# Patient Record
Sex: FEMALE | Race: WHITE | NOT HISPANIC OR LATINO | Employment: OTHER | URBAN - METROPOLITAN AREA
[De-identification: names, ages, dates, MRNs, and addresses within clinical notes are randomized per-mention and may not be internally consistent; named-entity substitution may affect disease eponyms.]

---

## 2017-07-14 ENCOUNTER — CONVERSION ENCOUNTER (OUTPATIENT)
Dept: MAMMOGRAPHY | Facility: HOSPITAL | Age: 67
End: 2017-07-14

## 2018-01-02 ENCOUNTER — OFFICE VISIT CONVERTED (OUTPATIENT)
Dept: FAMILY MEDICINE CLINIC | Facility: CLINIC | Age: 68
End: 2018-01-02
Attending: NURSE PRACTITIONER

## 2018-03-02 ENCOUNTER — CONVERSION ENCOUNTER (OUTPATIENT)
Dept: FAMILY MEDICINE CLINIC | Facility: CLINIC | Age: 68
End: 2018-03-02

## 2018-03-02 ENCOUNTER — OFFICE VISIT CONVERTED (OUTPATIENT)
Dept: FAMILY MEDICINE CLINIC | Facility: CLINIC | Age: 68
End: 2018-03-02
Attending: NURSE PRACTITIONER

## 2018-07-30 ENCOUNTER — OFFICE VISIT CONVERTED (OUTPATIENT)
Dept: FAMILY MEDICINE CLINIC | Facility: CLINIC | Age: 68
End: 2018-07-30
Attending: NURSE PRACTITIONER

## 2018-08-06 ENCOUNTER — OFFICE VISIT CONVERTED (OUTPATIENT)
Dept: FAMILY MEDICINE CLINIC | Facility: CLINIC | Age: 68
End: 2018-08-06
Attending: NURSE PRACTITIONER

## 2018-08-06 ENCOUNTER — CONVERSION ENCOUNTER (OUTPATIENT)
Dept: FAMILY MEDICINE CLINIC | Facility: CLINIC | Age: 68
End: 2018-08-06

## 2018-08-13 ENCOUNTER — CONVERSION ENCOUNTER (OUTPATIENT)
Dept: FAMILY MEDICINE CLINIC | Facility: CLINIC | Age: 68
End: 2018-08-13

## 2018-08-13 ENCOUNTER — OFFICE VISIT CONVERTED (OUTPATIENT)
Dept: FAMILY MEDICINE CLINIC | Facility: CLINIC | Age: 68
End: 2018-08-13
Attending: NURSE PRACTITIONER

## 2018-11-17 ENCOUNTER — CONVERSION ENCOUNTER (OUTPATIENT)
Dept: MAMMOGRAPHY | Facility: HOSPITAL | Age: 68
End: 2018-11-17

## 2019-01-03 ENCOUNTER — CONVERSION ENCOUNTER (OUTPATIENT)
Dept: FAMILY MEDICINE CLINIC | Facility: CLINIC | Age: 69
End: 2019-01-03

## 2019-01-03 ENCOUNTER — HOSPITAL ENCOUNTER (OUTPATIENT)
Dept: FAMILY MEDICINE CLINIC | Facility: CLINIC | Age: 69
Discharge: HOME | End: 2019-01-03
Attending: NURSE PRACTITIONER

## 2019-01-03 ENCOUNTER — OFFICE VISIT CONVERTED (OUTPATIENT)
Dept: FAMILY MEDICINE CLINIC | Facility: CLINIC | Age: 69
End: 2019-01-03
Attending: NURSE PRACTITIONER

## 2019-01-03 LAB
25(OH)D3 SERPL-MCNC: 24.5 NG/ML (ref 30–100)
ALBUMIN SERPL-MCNC: 4.2 G/DL (ref 3.5–5)
ALBUMIN/GLOB SERPL: 1.4 {RATIO} (ref 1.4–2.6)
ALP SERPL-CCNC: 63 U/L (ref 43–160)
ALT SERPL-CCNC: 23 U/L (ref 10–40)
ANION GAP SERPL CALC-SCNC: 16 MMOL/L (ref 8–19)
APPEARANCE UR: CLEAR
AST SERPL-CCNC: 24 U/L (ref 15–50)
BASOPHILS # BLD AUTO: 0.04 10*3/UL (ref 0–0.2)
BASOPHILS NFR BLD AUTO: 1.08 % (ref 0–3)
BILIRUB SERPL-MCNC: 0.45 MG/DL (ref 0.2–1.3)
BILIRUB UR QL: NEGATIVE
BUN SERPL-MCNC: 15 MG/DL (ref 5–25)
BUN/CREAT SERPL: 17 {RATIO} (ref 6–20)
CALCIUM SERPL-MCNC: 9.1 MG/DL (ref 8.7–10.4)
CHLORIDE SERPL-SCNC: 103 MMOL/L (ref 99–111)
CHOLEST SERPL-MCNC: 122 MG/DL (ref 107–200)
CHOLEST/HDLC SERPL: 2.4 {RATIO} (ref 3–6)
COLOR UR: YELLOW
CONV CO2: 23 MMOL/L (ref 22–32)
CONV COLLECTION SOURCE (UA): NORMAL
CONV TOTAL PROTEIN: 7.3 G/DL (ref 6.3–8.2)
CONV UROBILINOGEN IN URINE BY AUTOMATED TEST STRIP: 0.2 {EHRLICHU}/DL (ref 0.1–1)
CREAT UR-MCNC: 0.88 MG/DL (ref 0.5–0.9)
EOSINOPHIL # BLD AUTO: 0.08 10*3/UL (ref 0–0.7)
EOSINOPHIL # BLD AUTO: 2 % (ref 0–7)
ERYTHROCYTE [DISTWIDTH] IN BLOOD BY AUTOMATED COUNT: 12.7 % (ref 11.5–14.5)
EST. AVERAGE GLUCOSE BLD GHB EST-MCNC: 120 MG/DL
GFR SERPLBLD BASED ON 1.73 SQ M-ARVRAT: >60 ML/MIN/{1.73_M2}
GLOBULIN UR ELPH-MCNC: 3.1 G/DL (ref 2–3.5)
GLUCOSE SERPL-MCNC: 92 MG/DL (ref 65–99)
GLUCOSE UR QL: NEGATIVE MG/DL
HBA1C MFR BLD: 12.6 G/DL (ref 12–16)
HBA1C MFR BLD: 5.8 % (ref 3.5–5.7)
HCT VFR BLD AUTO: 37.9 % (ref 37–47)
HDLC SERPL-MCNC: 50 MG/DL (ref 40–60)
HGB UR QL STRIP: NEGATIVE
KETONES UR QL STRIP: NEGATIVE MG/DL
LDLC SERPL CALC-MCNC: 61 MG/DL (ref 70–100)
LEUKOCYTE ESTERASE UR QL STRIP: NEGATIVE
LYMPHOCYTES # BLD AUTO: 1.18 10*3/UL (ref 1–5)
MCH RBC QN AUTO: 28.6 PG (ref 27–31)
MCHC RBC AUTO-ENTMCNC: 33.2 G/DL (ref 33–37)
MCV RBC AUTO: 86 FL (ref 81–99)
MONOCYTES # BLD AUTO: 0.31 10*3/UL (ref 0.2–1.2)
MONOCYTES NFR BLD AUTO: 7.69 % (ref 3–10)
NEUTROPHILS # BLD AUTO: 2.46 10*3/UL (ref 2–8)
NEUTROPHILS NFR BLD AUTO: 60.4 % (ref 30–85)
NITRITE UR QL STRIP: NEGATIVE
NRBC BLD AUTO-RTO: 0 % (ref 0–0.01)
OSMOLALITY SERPL CALC.SUM OF ELEC: 286 MOSM/KG (ref 273–304)
PH UR STRIP.AUTO: 5 [PH] (ref 5–8)
PLATELET # BLD AUTO: 288 10*3/UL (ref 130–400)
PMV BLD AUTO: 6.2 FL (ref 7.4–10.4)
POTASSIUM SERPL-SCNC: 4 MMOL/L (ref 3.5–5.3)
PROT UR QL: NEGATIVE MG/DL
RBC # BLD AUTO: 4.41 10*6/UL (ref 4.2–5.4)
SODIUM SERPL-SCNC: 138 MMOL/L (ref 135–147)
SP GR UR: 1.02 (ref 1–1.03)
T4 FREE SERPL-MCNC: 1.4 NG/DL (ref 0.9–1.8)
TRIGL SERPL-MCNC: 55 MG/DL (ref 40–150)
TSH SERPL-ACNC: 1.28 M[IU]/L (ref 0.27–4.2)
VARIANT LYMPHS NFR BLD MANUAL: 28.8 % (ref 20–45)
VLDLC SERPL-MCNC: 11 MG/DL (ref 5–37)
WBC # BLD AUTO: 4.08 10*3/UL (ref 4.8–10.8)

## 2019-03-20 ENCOUNTER — OFFICE VISIT CONVERTED (OUTPATIENT)
Dept: FAMILY MEDICINE CLINIC | Facility: CLINIC | Age: 69
End: 2019-03-20
Attending: NURSE PRACTITIONER

## 2019-03-25 ENCOUNTER — HOSPITAL ENCOUNTER (OUTPATIENT)
Dept: FAMILY MEDICINE CLINIC | Facility: CLINIC | Age: 69
Discharge: HOME | End: 2019-03-25
Attending: NURSE PRACTITIONER

## 2019-03-25 ENCOUNTER — OFFICE VISIT CONVERTED (OUTPATIENT)
Dept: FAMILY MEDICINE CLINIC | Facility: CLINIC | Age: 69
End: 2019-03-25
Attending: NURSE PRACTITIONER

## 2019-03-25 LAB
ALBUMIN SERPL-MCNC: 4.1 G/DL (ref 3.5–5)
ALBUMIN/GLOB SERPL: 1.2 {RATIO} (ref 1.4–2.6)
ALP SERPL-CCNC: 68 U/L (ref 43–160)
ALT SERPL-CCNC: 16 U/L (ref 10–40)
ANION GAP SERPL CALC-SCNC: 16 MMOL/L (ref 8–19)
AST SERPL-CCNC: 19 U/L (ref 15–50)
BASOPHILS # BLD AUTO: 0.05 10*3/UL (ref 0–0.2)
BASOPHILS NFR BLD AUTO: 0.8 % (ref 0–3)
BILIRUB SERPL-MCNC: 0.69 MG/DL (ref 0.2–1.3)
BUN SERPL-MCNC: 10 MG/DL (ref 5–25)
BUN/CREAT SERPL: 11 {RATIO} (ref 6–20)
CALCIUM SERPL-MCNC: 9.3 MG/DL (ref 8.7–10.4)
CHLORIDE SERPL-SCNC: 105 MMOL/L (ref 99–111)
CHOLEST SERPL-MCNC: 102 MG/DL (ref 107–200)
CHOLEST/HDLC SERPL: 2.3 {RATIO} (ref 3–6)
CONV ABS IMM GRAN: 0.01 10*3/UL (ref 0–0.2)
CONV CO2: 26 MMOL/L (ref 22–32)
CONV IMMATURE GRAN: 0.2 % (ref 0–1.8)
CONV TOTAL PROTEIN: 7.5 G/DL (ref 6.3–8.2)
CREAT UR-MCNC: 0.91 MG/DL (ref 0.5–0.9)
DEPRECATED RDW RBC AUTO: 44.2 FL (ref 36.4–46.3)
EOSINOPHIL # BLD AUTO: 0.12 10*3/UL (ref 0–0.7)
EOSINOPHIL # BLD AUTO: 2 % (ref 0–7)
ERYTHROCYTE [DISTWIDTH] IN BLOOD BY AUTOMATED COUNT: 13.5 % (ref 11.7–14.4)
FOLATE SERPL-MCNC: 14 NG/ML (ref 4.8–20)
GFR SERPLBLD BASED ON 1.73 SQ M-ARVRAT: >60 ML/MIN/{1.73_M2}
GLOBULIN UR ELPH-MCNC: 3.4 G/DL (ref 2–3.5)
GLUCOSE SERPL-MCNC: 96 MG/DL (ref 65–99)
HBA1C MFR BLD: 11.5 G/DL (ref 12–16)
HCT VFR BLD AUTO: 37.3 % (ref 37–47)
HDLC SERPL-MCNC: 44 MG/DL (ref 40–60)
LDLC SERPL CALC-MCNC: 43 MG/DL (ref 70–100)
LYMPHOCYTES # BLD AUTO: 1.61 10*3/UL (ref 1–5)
MCH RBC QN AUTO: 27.8 PG (ref 27–31)
MCHC RBC AUTO-ENTMCNC: 30.8 G/DL (ref 33–37)
MCV RBC AUTO: 90.3 FL (ref 81–99)
MONOCYTES # BLD AUTO: 0.37 10*3/UL (ref 0.2–1.2)
MONOCYTES NFR BLD AUTO: 6.2 % (ref 3–10)
NEUTROPHILS # BLD AUTO: 3.81 10*3/UL (ref 2–8)
NEUTROPHILS NFR BLD AUTO: 63.8 % (ref 30–85)
NRBC CBCN: 0 % (ref 0–0.7)
OSMOLALITY SERPL CALC.SUM OF ELEC: 295 MOSM/KG (ref 273–304)
PLATELET # BLD AUTO: 306 10*3/UL (ref 130–400)
PMV BLD AUTO: 9.7 FL (ref 9.4–12.3)
POTASSIUM SERPL-SCNC: 4.2 MMOL/L (ref 3.5–5.3)
RBC # BLD AUTO: 4.13 10*6/UL (ref 4.2–5.4)
SODIUM SERPL-SCNC: 143 MMOL/L (ref 135–147)
T4 FREE SERPL-MCNC: 1.4 NG/DL (ref 0.9–1.8)
TRIGL SERPL-MCNC: 75 MG/DL (ref 40–150)
TSH SERPL-ACNC: 1.8 M[IU]/L (ref 0.27–4.2)
VARIANT LYMPHS NFR BLD MANUAL: 27 % (ref 20–45)
VIT B12 SERPL-MCNC: 168 PG/ML (ref 211–911)
VLDLC SERPL-MCNC: 15 MG/DL (ref 5–37)
WBC # BLD AUTO: 5.97 10*3/UL (ref 4.8–10.8)

## 2019-03-26 LAB
IRON SATN MFR SERPL: 10 % (ref 20–55)
IRON SERPL-MCNC: 42 UG/DL (ref 60–170)
TIBC SERPL-MCNC: 439 UG/DL (ref 245–450)
TRANSFERRIN SERPL-MCNC: 307 MG/DL (ref 250–380)

## 2019-09-10 ENCOUNTER — HOSPITAL ENCOUNTER (OUTPATIENT)
Dept: FAMILY MEDICINE CLINIC | Facility: CLINIC | Age: 69
Discharge: HOME | End: 2019-09-10
Attending: NURSE PRACTITIONER

## 2019-09-10 ENCOUNTER — OFFICE VISIT CONVERTED (OUTPATIENT)
Dept: FAMILY MEDICINE CLINIC | Facility: CLINIC | Age: 69
End: 2019-09-10
Attending: NURSE PRACTITIONER

## 2019-09-10 ENCOUNTER — CONVERSION ENCOUNTER (OUTPATIENT)
Dept: FAMILY MEDICINE CLINIC | Facility: CLINIC | Age: 69
End: 2019-09-10

## 2019-09-10 LAB
25(OH)D3 SERPL-MCNC: 20.1 NG/ML (ref 30–100)
ALBUMIN SERPL-MCNC: 4.2 G/DL (ref 3.5–5)
ALBUMIN/GLOB SERPL: 1.6 {RATIO} (ref 1.4–2.6)
ALP SERPL-CCNC: 68 U/L (ref 43–160)
ALT SERPL-CCNC: 18 U/L (ref 10–40)
ANION GAP SERPL CALC-SCNC: 15 MMOL/L (ref 8–19)
AST SERPL-CCNC: 21 U/L (ref 15–50)
BASOPHILS # BLD AUTO: 0.04 10*3/UL (ref 0–0.2)
BASOPHILS NFR BLD AUTO: 0.7 % (ref 0–3)
BILIRUB SERPL-MCNC: 0.95 MG/DL (ref 0.2–1.3)
BUN SERPL-MCNC: 18 MG/DL (ref 5–25)
BUN/CREAT SERPL: 19 {RATIO} (ref 6–20)
CALCIUM SERPL-MCNC: 9.4 MG/DL (ref 8.7–10.4)
CHLORIDE SERPL-SCNC: 110 MMOL/L (ref 99–111)
CHOLEST SERPL-MCNC: 122 MG/DL (ref 107–200)
CHOLEST/HDLC SERPL: 2.7 {RATIO} (ref 3–6)
CONV ABS IMM GRAN: 0.01 10*3/UL (ref 0–0.2)
CONV CO2: 22 MMOL/L (ref 22–32)
CONV IMMATURE GRAN: 0.2 % (ref 0–1.8)
CONV TOTAL PROTEIN: 6.9 G/DL (ref 6.3–8.2)
CREAT UR-MCNC: 0.97 MG/DL (ref 0.5–0.9)
DEPRECATED RDW RBC AUTO: 45.3 FL (ref 36.4–46.3)
EOSINOPHIL # BLD AUTO: 0.09 10*3/UL (ref 0–0.7)
EOSINOPHIL # BLD AUTO: 1.6 % (ref 0–7)
ERYTHROCYTE [DISTWIDTH] IN BLOOD BY AUTOMATED COUNT: 13.8 % (ref 11.7–14.4)
EST. AVERAGE GLUCOSE BLD GHB EST-MCNC: 126 MG/DL
FOLATE SERPL-MCNC: 9.3 NG/ML (ref 4.8–20)
GFR SERPLBLD BASED ON 1.73 SQ M-ARVRAT: >60 ML/MIN/{1.73_M2}
GLOBULIN UR ELPH-MCNC: 2.7 G/DL (ref 2–3.5)
GLUCOSE SERPL-MCNC: 104 MG/DL (ref 65–99)
HBA1C MFR BLD: 6 % (ref 3.5–5.7)
HCT VFR BLD AUTO: 37 % (ref 37–47)
HDLC SERPL-MCNC: 46 MG/DL (ref 40–60)
HGB BLD-MCNC: 11.5 G/DL (ref 12–16)
LDLC SERPL CALC-MCNC: 64 MG/DL (ref 70–100)
LYMPHOCYTES # BLD AUTO: 1.51 10*3/UL (ref 1–5)
LYMPHOCYTES NFR BLD AUTO: 27.3 % (ref 20–45)
MCH RBC QN AUTO: 28.2 PG (ref 27–31)
MCHC RBC AUTO-ENTMCNC: 31.1 G/DL (ref 33–37)
MCV RBC AUTO: 90.7 FL (ref 81–99)
MONOCYTES # BLD AUTO: 0.44 10*3/UL (ref 0.2–1.2)
MONOCYTES NFR BLD AUTO: 7.9 % (ref 3–10)
NEUTROPHILS # BLD AUTO: 3.45 10*3/UL (ref 2–8)
NEUTROPHILS NFR BLD AUTO: 62.3 % (ref 30–85)
NRBC CBCN: 0 % (ref 0–0.7)
OSMOLALITY SERPL CALC.SUM OF ELEC: 298 MOSM/KG (ref 273–304)
PLATELET # BLD AUTO: 264 10*3/UL (ref 130–400)
PMV BLD AUTO: 9.2 FL (ref 9.4–12.3)
POTASSIUM SERPL-SCNC: 3.8 MMOL/L (ref 3.5–5.3)
RBC # BLD AUTO: 4.08 10*6/UL (ref 4.2–5.4)
SODIUM SERPL-SCNC: 143 MMOL/L (ref 135–147)
T4 FREE SERPL-MCNC: 1.3 NG/DL (ref 0.9–1.8)
TRIGL SERPL-MCNC: 60 MG/DL (ref 40–150)
TSH SERPL-ACNC: 1.79 M[IU]/L (ref 0.27–4.2)
VIT B12 SERPL-MCNC: 226 PG/ML (ref 211–911)
VLDLC SERPL-MCNC: 12 MG/DL (ref 5–37)
WBC # BLD AUTO: 5.54 10*3/UL (ref 4.8–10.8)

## 2019-09-24 ENCOUNTER — HOSPITAL ENCOUNTER (OUTPATIENT)
Dept: FAMILY MEDICINE CLINIC | Facility: CLINIC | Age: 69
Discharge: HOME | End: 2019-09-24
Attending: NURSE PRACTITIONER

## 2019-09-24 LAB
25(OH)D3 SERPL-MCNC: 24 NG/ML (ref 30–100)
ALBUMIN SERPL-MCNC: 4.1 G/DL (ref 3.5–5)
ALBUMIN/GLOB SERPL: 1.5 {RATIO} (ref 1.4–2.6)
ALP SERPL-CCNC: 64 U/L (ref 43–160)
ALT SERPL-CCNC: 18 U/L (ref 10–40)
ANION GAP SERPL CALC-SCNC: 19 MMOL/L (ref 8–19)
AST SERPL-CCNC: 21 U/L (ref 15–50)
BASOPHILS # BLD AUTO: 0.04 10*3/UL (ref 0–0.2)
BASOPHILS NFR BLD AUTO: 1 % (ref 0–3)
BILIRUB SERPL-MCNC: 0.56 MG/DL (ref 0.2–1.3)
BUN SERPL-MCNC: 13 MG/DL (ref 5–25)
BUN/CREAT SERPL: 13 {RATIO} (ref 6–20)
CALCIUM SERPL-MCNC: 9.6 MG/DL (ref 8.7–10.4)
CHLORIDE SERPL-SCNC: 105 MMOL/L (ref 99–111)
CHOLEST SERPL-MCNC: 126 MG/DL (ref 107–200)
CHOLEST/HDLC SERPL: 2.6 {RATIO} (ref 3–6)
CONV ABS IMM GRAN: 0 10*3/UL (ref 0–0.2)
CONV CO2: 23 MMOL/L (ref 22–32)
CONV IMMATURE GRAN: 0 % (ref 0–1.8)
CONV TOTAL PROTEIN: 6.9 G/DL (ref 6.3–8.2)
CREAT UR-MCNC: 1 MG/DL (ref 0.5–0.9)
DEPRECATED RDW RBC AUTO: 44.8 FL (ref 36.4–46.3)
EOSINOPHIL # BLD AUTO: 0.12 10*3/UL (ref 0–0.7)
EOSINOPHIL # BLD AUTO: 3.1 % (ref 0–7)
ERYTHROCYTE [DISTWIDTH] IN BLOOD BY AUTOMATED COUNT: 13.2 % (ref 11.7–14.4)
EST. AVERAGE GLUCOSE BLD GHB EST-MCNC: 126 MG/DL
FERRITIN SERPL-MCNC: 19 NG/ML (ref 10–200)
FOLATE SERPL-MCNC: 9.3 NG/ML (ref 4.8–20)
GFR SERPLBLD BASED ON 1.73 SQ M-ARVRAT: 58 ML/MIN/{1.73_M2}
GLOBULIN UR ELPH-MCNC: 2.8 G/DL (ref 2–3.5)
GLUCOSE SERPL-MCNC: 118 MG/DL (ref 65–99)
HBA1C MFR BLD: 6 % (ref 3.5–5.7)
HCT VFR BLD AUTO: 40.2 % (ref 37–47)
HDLC SERPL-MCNC: 49 MG/DL (ref 40–60)
HGB BLD-MCNC: 12.6 G/DL (ref 12–16)
IRON SATN MFR SERPL: 17 % (ref 20–55)
IRON SERPL-MCNC: 66 UG/DL (ref 60–170)
LDLC SERPL CALC-MCNC: 56 MG/DL (ref 70–100)
LYMPHOCYTES # BLD AUTO: 1.13 10*3/UL (ref 1–5)
LYMPHOCYTES NFR BLD AUTO: 29 % (ref 20–45)
MCH RBC QN AUTO: 28.8 PG (ref 27–31)
MCHC RBC AUTO-ENTMCNC: 31.3 G/DL (ref 33–37)
MCV RBC AUTO: 91.8 FL (ref 81–99)
MONOCYTES # BLD AUTO: 0.31 10*3/UL (ref 0.2–1.2)
MONOCYTES NFR BLD AUTO: 8 % (ref 3–10)
NEUTROPHILS # BLD AUTO: 2.29 10*3/UL (ref 2–8)
NEUTROPHILS NFR BLD AUTO: 58.9 % (ref 30–85)
NRBC CBCN: 0 % (ref 0–0.7)
OSMOLALITY SERPL CALC.SUM OF ELEC: 295 MOSM/KG (ref 273–304)
PLATELET # BLD AUTO: 258 10*3/UL (ref 130–400)
PMV BLD AUTO: 9.4 FL (ref 9.4–12.3)
POTASSIUM SERPL-SCNC: 5.1 MMOL/L (ref 3.5–5.3)
RBC # BLD AUTO: 4.38 10*6/UL (ref 4.2–5.4)
SODIUM SERPL-SCNC: 142 MMOL/L (ref 135–147)
T4 FREE SERPL-MCNC: 1.2 NG/DL (ref 0.9–1.8)
TIBC SERPL-MCNC: 382 UG/DL (ref 245–450)
TRANSFERRIN SERPL-MCNC: 267 MG/DL (ref 250–380)
TRIGL SERPL-MCNC: 103 MG/DL (ref 40–150)
TSH SERPL-ACNC: 1.62 M[IU]/L (ref 0.27–4.2)
VIT B12 SERPL-MCNC: 388 PG/ML (ref 211–911)
VLDLC SERPL-MCNC: 21 MG/DL (ref 5–37)
WBC # BLD AUTO: 3.89 10*3/UL (ref 4.8–10.8)

## 2020-04-16 ENCOUNTER — TELEPHONE CONVERTED (OUTPATIENT)
Dept: FAMILY MEDICINE CLINIC | Facility: CLINIC | Age: 70
End: 2020-04-16
Attending: NURSE PRACTITIONER

## 2020-06-09 ENCOUNTER — OFFICE VISIT CONVERTED (OUTPATIENT)
Dept: FAMILY MEDICINE CLINIC | Facility: CLINIC | Age: 70
End: 2020-06-09
Attending: NURSE PRACTITIONER

## 2020-06-09 ENCOUNTER — CONVERSION ENCOUNTER (OUTPATIENT)
Dept: FAMILY MEDICINE CLINIC | Facility: CLINIC | Age: 70
End: 2020-06-09

## 2020-06-09 ENCOUNTER — HOSPITAL ENCOUNTER (OUTPATIENT)
Dept: FAMILY MEDICINE CLINIC | Facility: CLINIC | Age: 70
Discharge: HOME | End: 2020-06-09
Attending: NURSE PRACTITIONER

## 2020-06-09 LAB
ALBUMIN SERPL-MCNC: 4.2 G/DL (ref 3.5–5)
ALBUMIN/GLOB SERPL: 1.4 {RATIO} (ref 1.4–2.6)
ALP SERPL-CCNC: 63 U/L (ref 43–160)
ALT SERPL-CCNC: 20 U/L (ref 10–40)
ANION GAP SERPL CALC-SCNC: 16 MMOL/L (ref 8–19)
AST SERPL-CCNC: 22 U/L (ref 15–50)
BASOPHILS # BLD AUTO: 0.04 10*3/UL (ref 0–0.2)
BASOPHILS NFR BLD AUTO: 0.7 % (ref 0–3)
BILIRUB SERPL-MCNC: 0.74 MG/DL (ref 0.2–1.3)
BUN SERPL-MCNC: 10 MG/DL (ref 5–25)
BUN/CREAT SERPL: 11 {RATIO} (ref 6–20)
CALCIUM SERPL-MCNC: 9.8 MG/DL (ref 8.7–10.4)
CHLORIDE SERPL-SCNC: 104 MMOL/L (ref 99–111)
CHOLEST SERPL-MCNC: 147 MG/DL (ref 107–200)
CHOLEST/HDLC SERPL: 3.1 {RATIO} (ref 3–6)
CONV ABS IMM GRAN: 0 10*3/UL (ref 0–0.2)
CONV CO2: 24 MMOL/L (ref 22–32)
CONV IMMATURE GRAN: 0 % (ref 0–1.8)
CONV TOTAL PROTEIN: 7.3 G/DL (ref 6.3–8.2)
CREAT UR-MCNC: 0.94 MG/DL (ref 0.5–0.9)
DEPRECATED RDW RBC AUTO: 44 FL (ref 36.4–46.3)
EOSINOPHIL # BLD AUTO: 0.11 10*3/UL (ref 0–0.7)
EOSINOPHIL # BLD AUTO: 2 % (ref 0–7)
ERYTHROCYTE [DISTWIDTH] IN BLOOD BY AUTOMATED COUNT: 13.2 % (ref 11.7–14.4)
EST. AVERAGE GLUCOSE BLD GHB EST-MCNC: 134 MG/DL
FOLATE SERPL-MCNC: 11.7 NG/ML (ref 4.8–20)
GFR SERPLBLD BASED ON 1.73 SQ M-ARVRAT: >60 ML/MIN/{1.73_M2}
GLOBULIN UR ELPH-MCNC: 3.1 G/DL (ref 2–3.5)
GLUCOSE SERPL-MCNC: 95 MG/DL (ref 65–99)
HBA1C MFR BLD: 6.3 % (ref 3.5–5.7)
HCT VFR BLD AUTO: 40.2 % (ref 37–47)
HDLC SERPL-MCNC: 47 MG/DL (ref 40–60)
HGB BLD-MCNC: 12.5 G/DL (ref 12–16)
LDLC SERPL CALC-MCNC: 81 MG/DL (ref 70–100)
LYMPHOCYTES # BLD AUTO: 1.69 10*3/UL (ref 1–5)
LYMPHOCYTES NFR BLD AUTO: 30.3 % (ref 20–45)
MCH RBC QN AUTO: 28.3 PG (ref 27–31)
MCHC RBC AUTO-ENTMCNC: 31.1 G/DL (ref 33–37)
MCV RBC AUTO: 91.2 FL (ref 81–99)
MONOCYTES # BLD AUTO: 0.31 10*3/UL (ref 0.2–1.2)
MONOCYTES NFR BLD AUTO: 5.6 % (ref 3–10)
NEUTROPHILS # BLD AUTO: 3.43 10*3/UL (ref 2–8)
NEUTROPHILS NFR BLD AUTO: 61.4 % (ref 30–85)
NRBC CBCN: 0 % (ref 0–0.7)
OSMOLALITY SERPL CALC.SUM OF ELEC: 289 MOSM/KG (ref 273–304)
PLATELET # BLD AUTO: 257 10*3/UL (ref 130–400)
PMV BLD AUTO: 9.3 FL (ref 9.4–12.3)
POTASSIUM SERPL-SCNC: 3.9 MMOL/L (ref 3.5–5.3)
RBC # BLD AUTO: 4.41 10*6/UL (ref 4.2–5.4)
SODIUM SERPL-SCNC: 140 MMOL/L (ref 135–147)
TRIGL SERPL-MCNC: 93 MG/DL (ref 40–150)
TSH SERPL-ACNC: 1.63 M[IU]/L (ref 0.27–4.2)
VIT B12 SERPL-MCNC: 287 PG/ML (ref 211–911)
VLDLC SERPL-MCNC: 19 MG/DL (ref 5–37)
WBC # BLD AUTO: 5.58 10*3/UL (ref 4.8–10.8)

## 2020-06-10 LAB
25(OH)D3 SERPL-MCNC: 31 NG/ML (ref 30–100)
IRON SATN MFR SERPL: 23 % (ref 20–55)
IRON SERPL-MCNC: 92 UG/DL (ref 60–170)
TIBC SERPL-MCNC: 403 UG/DL (ref 245–450)
TRANSFERRIN SERPL-MCNC: 282 MG/DL (ref 250–380)

## 2020-07-20 ENCOUNTER — OFFICE VISIT CONVERTED (OUTPATIENT)
Dept: CARDIOLOGY | Facility: CLINIC | Age: 70
End: 2020-07-20
Attending: SPECIALIST

## 2020-07-31 ENCOUNTER — CONVERSION ENCOUNTER (OUTPATIENT)
Dept: CARDIOLOGY | Facility: CLINIC | Age: 70
End: 2020-07-31
Attending: SPECIALIST

## 2020-08-12 ENCOUNTER — HOSPITAL ENCOUNTER (OUTPATIENT)
Dept: NUCLEAR MEDICINE | Facility: HOSPITAL | Age: 70
Discharge: HOME | End: 2020-08-12
Attending: SPECIALIST

## 2020-09-23 ENCOUNTER — OFFICE VISIT CONVERTED (OUTPATIENT)
Dept: FAMILY MEDICINE CLINIC | Facility: CLINIC | Age: 70
End: 2020-09-23
Attending: NURSE PRACTITIONER

## 2020-09-23 ENCOUNTER — CONVERSION ENCOUNTER (OUTPATIENT)
Dept: FAMILY MEDICINE CLINIC | Facility: CLINIC | Age: 70
End: 2020-09-23

## 2020-09-23 ENCOUNTER — HOSPITAL ENCOUNTER (OUTPATIENT)
Dept: FAMILY MEDICINE CLINIC | Facility: CLINIC | Age: 70
Discharge: HOME | End: 2020-09-23
Attending: NURSE PRACTITIONER

## 2020-09-23 LAB
ALBUMIN SERPL-MCNC: 4.1 G/DL (ref 3.5–5)
ALBUMIN/GLOB SERPL: 1.4 {RATIO} (ref 1.4–2.6)
ALP SERPL-CCNC: 64 U/L (ref 43–160)
ALT SERPL-CCNC: 27 U/L (ref 10–40)
ANION GAP SERPL CALC-SCNC: 18 MMOL/L (ref 8–19)
AST SERPL-CCNC: 30 U/L (ref 15–50)
BASOPHILS # BLD AUTO: 0.06 10*3/UL (ref 0–0.2)
BASOPHILS NFR BLD AUTO: 1.4 % (ref 0–3)
BILIRUB SERPL-MCNC: 0.83 MG/DL (ref 0.2–1.3)
BUN SERPL-MCNC: 12 MG/DL (ref 5–25)
BUN/CREAT SERPL: 11 {RATIO} (ref 6–20)
CALCIUM SERPL-MCNC: 9.3 MG/DL (ref 8.7–10.4)
CHLORIDE SERPL-SCNC: 105 MMOL/L (ref 99–111)
CHOLEST SERPL-MCNC: 130 MG/DL (ref 107–200)
CHOLEST/HDLC SERPL: 3 {RATIO} (ref 3–6)
CONV ABS IMM GRAN: 0.01 10*3/UL (ref 0–0.2)
CONV CO2: 22 MMOL/L (ref 22–32)
CONV IMMATURE GRAN: 0.2 % (ref 0–1.8)
CONV TOTAL PROTEIN: 7.1 G/DL (ref 6.3–8.2)
CREAT UR-MCNC: 1.14 MG/DL (ref 0.5–0.9)
DEPRECATED RDW RBC AUTO: 45.9 FL (ref 36.4–46.3)
EOSINOPHIL # BLD AUTO: 0.09 10*3/UL (ref 0–0.7)
EOSINOPHIL # BLD AUTO: 2.1 % (ref 0–7)
ERYTHROCYTE [DISTWIDTH] IN BLOOD BY AUTOMATED COUNT: 13.2 % (ref 11.7–14.4)
GFR SERPLBLD BASED ON 1.73 SQ M-ARVRAT: 49 ML/MIN/{1.73_M2}
GLOBULIN UR ELPH-MCNC: 3 G/DL (ref 2–3.5)
GLUCOSE SERPL-MCNC: 130 MG/DL (ref 65–99)
HCT VFR BLD AUTO: 44 % (ref 37–47)
HDLC SERPL-MCNC: 43 MG/DL (ref 40–60)
HGB BLD-MCNC: 13.1 G/DL (ref 12–16)
LDLC SERPL CALC-MCNC: 66 MG/DL (ref 70–100)
LYMPHOCYTES # BLD AUTO: 1.64 10*3/UL (ref 1–5)
LYMPHOCYTES NFR BLD AUTO: 37.7 % (ref 20–45)
MCH RBC QN AUTO: 28.3 PG (ref 27–31)
MCHC RBC AUTO-ENTMCNC: 29.8 G/DL (ref 33–37)
MCV RBC AUTO: 95 FL (ref 81–99)
MONOCYTES # BLD AUTO: 0.32 10*3/UL (ref 0.2–1.2)
MONOCYTES NFR BLD AUTO: 7.4 % (ref 3–10)
NEUTROPHILS # BLD AUTO: 2.23 10*3/UL (ref 2–8)
NEUTROPHILS NFR BLD AUTO: 51.2 % (ref 30–85)
NRBC CBCN: 0 % (ref 0–0.7)
OSMOLALITY SERPL CALC.SUM OF ELEC: 294 MOSM/KG (ref 273–304)
PLATELET # BLD AUTO: 290 10*3/UL (ref 130–400)
PMV BLD AUTO: 9.8 FL (ref 9.4–12.3)
POTASSIUM SERPL-SCNC: 4.2 MMOL/L (ref 3.5–5.3)
RBC # BLD AUTO: 4.63 10*6/UL (ref 4.2–5.4)
SODIUM SERPL-SCNC: 141 MMOL/L (ref 135–147)
TRIGL SERPL-MCNC: 105 MG/DL (ref 40–150)
TSH SERPL-ACNC: 2.15 M[IU]/L (ref 0.27–4.2)
VLDLC SERPL-MCNC: 21 MG/DL (ref 5–37)
WBC # BLD AUTO: 4.35 10*3/UL (ref 4.8–10.8)

## 2020-09-24 LAB
25(OH)D3 SERPL-MCNC: 22.3 NG/ML (ref 30–100)
CONV CREATININE URINE, RANDOM: 241.3 MG/DL (ref 10–300)
CONV MICROALBUM.,U,RANDOM: <12 MG/L (ref 0–20)
FOLATE SERPL-MCNC: 10.1 NG/ML (ref 4.8–20)
IRON SATN MFR SERPL: 21 % (ref 20–55)
IRON SERPL-MCNC: 83 UG/DL (ref 60–170)
MICROALBUMIN/CREAT UR: 5 MG/G{CRE} (ref 0–35)
TIBC SERPL-MCNC: 389 UG/DL (ref 245–450)
TRANSFERRIN SERPL-MCNC: 272 MG/DL (ref 250–380)
VIT B12 SERPL-MCNC: 393 PG/ML (ref 211–911)

## 2020-09-25 LAB
EST. AVERAGE GLUCOSE BLD GHB EST-MCNC: 131 MG/DL
HBA1C MFR BLD: 6.2 % (ref 3.5–5.7)

## 2020-10-01 ENCOUNTER — CONVERSION ENCOUNTER (OUTPATIENT)
Dept: FAMILY MEDICINE CLINIC | Facility: CLINIC | Age: 70
End: 2020-10-01

## 2020-10-01 ENCOUNTER — OFFICE VISIT CONVERTED (OUTPATIENT)
Dept: FAMILY MEDICINE CLINIC | Facility: CLINIC | Age: 70
End: 2020-10-01
Attending: NURSE PRACTITIONER

## 2021-03-18 ENCOUNTER — HOSPITAL ENCOUNTER (OUTPATIENT)
Dept: FAMILY MEDICINE CLINIC | Facility: CLINIC | Age: 71
Discharge: HOME | End: 2021-03-18
Attending: NURSE PRACTITIONER

## 2021-03-18 ENCOUNTER — CONVERSION ENCOUNTER (OUTPATIENT)
Dept: FAMILY MEDICINE CLINIC | Facility: CLINIC | Age: 71
End: 2021-03-18

## 2021-03-18 ENCOUNTER — OFFICE VISIT CONVERTED (OUTPATIENT)
Dept: FAMILY MEDICINE CLINIC | Facility: CLINIC | Age: 71
End: 2021-03-18
Attending: NURSE PRACTITIONER

## 2021-03-18 LAB
25(OH)D3 SERPL-MCNC: 23.1 NG/ML (ref 30–100)
ALBUMIN SERPL-MCNC: 3.9 G/DL (ref 3.5–5)
ALBUMIN/GLOB SERPL: 1.2 {RATIO} (ref 1.4–2.6)
ALP SERPL-CCNC: 66 U/L (ref 43–160)
ALT SERPL-CCNC: 23 U/L (ref 10–40)
AMPHET UR QL CFM: NEGATIVE
ANION GAP SERPL CALC-SCNC: 12 MMOL/L (ref 8–19)
AST SERPL-CCNC: 23 U/L (ref 15–50)
BARBITURATES UR QL: NEGATIVE
BASOPHILS # BLD AUTO: 0.05 10*3/UL (ref 0–0.2)
BASOPHILS NFR BLD AUTO: 1 % (ref 0–3)
BENZODIAZ UR QL SCN: NEGATIVE
BILIRUB SERPL-MCNC: 0.61 MG/DL (ref 0.2–1.3)
BUN SERPL-MCNC: 12 MG/DL (ref 5–25)
BUN/CREAT SERPL: 12 {RATIO} (ref 6–20)
CALCIUM SERPL-MCNC: 9.9 MG/DL (ref 8.7–10.4)
CHLORIDE SERPL-SCNC: 106 MMOL/L (ref 99–111)
CHOLEST SERPL-MCNC: 133 MG/DL (ref 107–200)
CHOLEST/HDLC SERPL: 2.9 {RATIO} (ref 3–6)
CONV ABS IMM GRAN: 0.01 10*3/UL (ref 0–0.2)
CONV AMP/METHAMP UR: NEGATIVE
CONV CO2: 25 MMOL/L (ref 22–32)
CONV COCAINE, UR: NEGATIVE
CONV IMMATURE GRAN: 0.2 % (ref 0–1.8)
CONV TOTAL PROTEIN: 7.2 G/DL (ref 6.3–8.2)
CREAT UR-MCNC: 0.99 MG/DL (ref 0.5–0.9)
DEPRECATED RDW RBC AUTO: 44.1 FL (ref 36.4–46.3)
EOSINOPHIL # BLD AUTO: 0.08 10*3/UL (ref 0–0.7)
EOSINOPHIL # BLD AUTO: 1.7 % (ref 0–7)
ERYTHROCYTE [DISTWIDTH] IN BLOOD BY AUTOMATED COUNT: 13.3 % (ref 11.7–14.4)
EST. AVERAGE GLUCOSE BLD GHB EST-MCNC: 123 MG/DL
FOLATE SERPL-MCNC: 10.8 NG/ML (ref 4.8–20)
GFR SERPLBLD BASED ON 1.73 SQ M-ARVRAT: 58 ML/MIN/{1.73_M2}
GLOBULIN UR ELPH-MCNC: 3.3 G/DL (ref 2–3.5)
GLUCOSE SERPL-MCNC: 117 MG/DL (ref 65–99)
HBA1C MFR BLD: 5.9 % (ref 3.5–5.7)
HCT VFR BLD AUTO: 40.9 % (ref 37–47)
HDLC SERPL-MCNC: 46 MG/DL (ref 40–60)
HGB BLD-MCNC: 12.6 G/DL (ref 12–16)
IRON SATN MFR SERPL: 17 % (ref 20–55)
IRON SERPL-MCNC: 67 UG/DL (ref 60–170)
LDLC SERPL CALC-MCNC: 68 MG/DL (ref 70–100)
LYMPHOCYTES # BLD AUTO: 1.68 10*3/UL (ref 1–5)
LYMPHOCYTES NFR BLD AUTO: 35 % (ref 20–45)
MCH RBC QN AUTO: 27.8 PG (ref 27–31)
MCHC RBC AUTO-ENTMCNC: 30.8 G/DL (ref 33–37)
MCV RBC AUTO: 90.3 FL (ref 81–99)
MDMA UR QL SCN: NEGATIVE
METHADONE UR QL SCN: NEGATIVE
MONOCYTES # BLD AUTO: 0.38 10*3/UL (ref 0.2–1.2)
MONOCYTES NFR BLD AUTO: 7.9 % (ref 3–10)
NEUTROPHILS # BLD AUTO: 2.6 10*3/UL (ref 2–8)
NEUTROPHILS NFR BLD AUTO: 54.2 % (ref 30–85)
NRBC CBCN: 0 % (ref 0–0.7)
OPIATES UR QL SCN: NEGATIVE
OSMOLALITY SERPL CALC.SUM OF ELEC: 289 MOSM/KG (ref 273–304)
OXYCODONE UR QL SCN: NEGATIVE
PCP UR QL: NEGATIVE
PLATELET # BLD AUTO: 247 10*3/UL (ref 130–400)
PMV BLD AUTO: 9 FL (ref 9.4–12.3)
POTASSIUM SERPL-SCNC: 4.2 MMOL/L (ref 3.5–5.3)
RBC # BLD AUTO: 4.53 10*6/UL (ref 4.2–5.4)
SODIUM SERPL-SCNC: 139 MMOL/L (ref 135–147)
THC SERPLBLD CFM-MCNC: NEGATIVE NG/ML
TIBC SERPL-MCNC: 400 UG/DL (ref 245–450)
TRANSFERRIN SERPL-MCNC: 280 MG/DL (ref 250–380)
TRIGL SERPL-MCNC: 96 MG/DL (ref 40–150)
VIT B12 SERPL-MCNC: 419 PG/ML (ref 211–911)
VLDLC SERPL-MCNC: 19 MG/DL (ref 5–37)
WBC # BLD AUTO: 4.8 10*3/UL (ref 4.8–10.8)

## 2021-03-26 ENCOUNTER — HOSPITAL ENCOUNTER (OUTPATIENT)
Dept: VACCINE CLINIC | Facility: HOSPITAL | Age: 71
Discharge: HOME | End: 2021-03-26
Attending: INTERNAL MEDICINE

## 2021-04-23 ENCOUNTER — HOSPITAL ENCOUNTER (OUTPATIENT)
Dept: VACCINE CLINIC | Facility: HOSPITAL | Age: 71
Discharge: HOME | End: 2021-04-23
Attending: INTERNAL MEDICINE

## 2021-05-10 NOTE — H&P
History and Physical      Patient Name: Qiana Altman   Patient ID: [id number]   Sex: Female   YOB: 1950    Primary Care Provider: Taina HARP   Referring Provider: Taina HARP    Visit Date: July 20, 2020    Provider: Lei Richmond MD   Location: [facility]   Location Address: [address] [place] [address], [facility]   [place], [place]  [id number]   Location Phone: [phone]          Chief Complaint  · Chest pain       History Of Present Illness  Consult requested by: Taina HARP   Qiana Altman is a 70 year old /White female with a history of chest pain on and off that lasts for a few minutes to sometimes several hours, sometimes on exertion and relieved by rest. No definite exertional angina. She has shortness of breath on exertion. No PND or orthopnea.   PAST MEDICAL HISTORY: Positive for hypertension and hyperlipidemia. Negative for diabetes mellitus.   FAMILY HISTORY: She has a strong family history of coronary artery disease.   PSYCHOSOCIAL HISTORY: Positive for mood changes and depression. She never used alcohol or tobacco.   CURRENT MEDICATIONS: include Quinapril 10 mg b.i.d.; Esomeprazole; Atorvastatin 10 mg daily; Fluticasone; Zyrtec 10 mg p.r.n; Tylenol. The dosage and frequency of the medications were reviewed with the patient.   ALLERGIES: No known drug allergies.   CHOLESTEROL STATUS: Unknown.       Review of Systems  · Constitutional  o Admits  o : fatigue  o Denies  o : good general health lately, recent weight changes   · Eyes  o Denies  o : double vision  · HENT  o Admits  o : chronic sinus problem  o Denies  o : hearing loss or ringing, swollen glands in neck  · Cardiovascular  o Admits  o : chest pain  o Denies  o : palpitations (fast, fluttering, or skipping beats), swelling (feet, ankles, hands), shortness of breath while walking or lying flat  · Respiratory  o Admits  o : chronic or frequent  cough  o Denies  o : asthma or wheezing, COPD  · Gastrointestinal  o Admits  o : ulcers  o Denies  o : nausea or vomiting  · Neurologic  o Admits  o : lightheaded or dizzy, headaches  o Denies  o : stroke  · Musculoskeletal  o Admits  o : joint pain, back pain  · Endocrine  o Admits  o : thyroid disease, heat or cold intolerance, excessive thirst or urination  o Denies  o : diabetes  · Heme-Lymph  o Admits  o : bleeding or bruising tendency  o Denies  o : anemia      Vitals  Date Time BP Position Site L\R Cuff Size HR RR TEMP (F) WT  HT  BMI kg/m2 BSA m2 O2 Sat HC       07/20/2020 01:55 /108 Sitting    70 - R   156lbs 16oz 5'   30.66 1.74     07/20/2020 01:55 /80 Sitting                     Physical Examination  · Constitutional  o Appearance  o : Awake, alert, cooperative, pleasant.  · Eyes  o Pupils and Irises  o : Pupils equal and reacting to light and accommodation.  · Ears, Nose, Mouth and Throat  o Ears  o : Tympanic membranes are normal.  o Nose  o : Clear with no maxillary tenderness.  o Oral Cavity  o : Clear.  · Neck  o Inspection/Palpation  o : No JVD, bruits, thyromegaly, or adenopathy. Trachea midline. No axillary or cervical lymphadenopathy.  o Thyroid  o : No thyroid enlargement.   · Respiratory  o Inspection of Chest  o : No chest wall deformities, moving equal.  o Auscultation of Lungs  o : Good air entry with vesicular breath sounds.  o Percussion of Chest  o : Resonant bilaterally.   o Palpation of Chest  o : Equal movements bilaterally.  · Cardiovascular  o Heart  o :   § Auscultation of Heart  § : PMI is in the 5th intercostal space. S1 and S2 regular. No S3. No S4. No murmurs.  o Peripheral Vascular System  o :   § Extremities  § : No pedal edema. Peripheral pulses well felt. No cyanosis.  · Gastrointestinal  o Abdominal Examination  o : No organomegaly, masses, tenderness, bruits, or abnormal pulsations. Bowel sounds are normal.  · Musculoskeletal  o General  o : Muscle strength is  normal with normal tone.  · Skin and Subcutaneous Tissue  o General Inspection  o : No rash, petechiae, or suspicious skin lesions. Skin turgor is normal.     EKG was reviewed, which shows sinus rhythm, low voltage, inferior Q-waves.           Assessment     ASSESSMENT AND PLAN:    1.  Abnormal EKG, chest pain, positive risk factors:  In view of her multiple risk factors, strong family history,       will do a lexiscan stress test to rule out any significant ischemia.  She cannot walk on a treadmill because of       back problems.  2.  Shortness of breath:  Will do an echocardiogram to evaluate left ventricular systolic function.  3.  Essential hypertension controlled:  Blood pressure is well controlled at home.  Continue current dose of        Quinapril.  Monitor her blood pressure regularly and be on a low-salt diet.  4.  Hyperlipidemia:  Continue Lipitor, managed by her PMD.  5.  See me back in a month.    Lei Richmond MD, [facility]  SIDRA/gregor           This note was transcribed by Lauren Philip.  gregor/SIDRA  The above service was transcribed by Lauren Philip, and I attest to the accuracy of the note.  ISDRA               Electronically Signed by: Lauren Philip-, -Author on July 22, 2020 10:26:47 AM  Electronically Co-signed by: Lei Richmond MD -Reviewer on July 24, 2020 08:36:05 AM

## 2021-05-10 NOTE — PROCEDURES
"   Procedure Note      Patient Name: Qiana Altman   Patient ID: [id number]   Sex: Female   YOB: 1950    Primary Care Provider: Taina HARP   Referring Provider: Taina HARP    Visit Date: July 31, 2020    Provider: Lei Richmond MD   Location: [facility]   Location Address: [address] [place] [address], [address]   [place]  [id number]   Location Phone: [phone]          FINAL REPORT   TRANSTHORACIC ECHOCARDIOGRAM REPORT    Diagnosis: Chest pain, precordial   Height: 5'0\" Weight: 157 B/P: 168/108 BSA: 1.7   Tech: BNS   MEASUREMENTS:  RVID (Diastole) : RVID. (NORMAL: 0.7 to 2.4 cm max)   LVID (Systole): 2.6 cm (Diastole): 3.3 cm . (NORMAL: 3.7 - 5.4 cm)   Posterior Wall Thickness (Diastole): 1.2 cm. (NORMAL: 0.8 - 1.1 cm)   Septal Thickness (Diastole): 1.2 cm. (NORMAL: 0.7 - 1.2 cm)   LAID (Systole): 3.6 cm. (NORMAL: 1.9 - 3.8 cm)   Aortic Root Diameter (Diastole): 2.8 cm. (NORMAL: 2.0 - 3.7 cm)   DOPPLER:  E/A ratio 0.6 (NORMAL 0.8-2.0)   DT: 269 msec (NORMAL 140-240 msec.)   IVRT 95 m/sec (NORMAL  m/sec.)   E/E': 6 (NORMAL <8 avg.)   COMMENTS:  The patient underwent 2-D, M-Mode, and Doppler examination, including pulse-wave, continuous-wave, and color-flow analysis; the study is technically adequate. The following was observed:   FINDINGS:  AORTIC VALVE: Normal. Tricuspid in appearance with normal central closure.   MITRAL VALVE: Normal. Bicuspid in appearance.   TRICUSPID VALVE: Normal.   PULMONIC VALVE: Not well visualized.   LEFT ATRIUM: Normal. No intracavitary masses or clots seen. LA volume index is 23 mL/m2.   AORTIC ROOT: Normal in size with adequate motion.   LEFT VENTRICLE: Normal left ventricular systolic function. Ejection fraction 59%. There is mild left ventricular hypertrophy.   RIGHT ATRIUM: Normal.   RIGHT VENTRICLE: Normal size and function.   PERICARDIUM: Unremarkable. No evidence of effusion.   INFERIOR VENA CAVA: " Diameter is 1.9 cm.   DOPPLER: Doppler examination of the aortic, mitral, tricuspid, and pulmonary valves was performed. Normal pulmonary artery systolic pressure by Doppler.   Faxed: 08/03/2020      CONCLUSION:  1.  Mild left ventricular hypertrophy.   2.  Normal left ventricular systolic function.   3.  No significant valvular abnormalities noted.       MD SIDRA Mendoza/bill    This note was transcribed by Venus Patel.  bill/SIDRA  The above service was transcribed by Venus Patel, and I attest to the accuracy of the note.  SIDRA                 Electronically Signed by: Qiana Patel-, Other -Author on August 3, 2020 03:31:41 PM  Electronically Co-signed by: Lei Richmond MD -Reviewer on August 15, 2020 09:35:26 AM

## 2021-05-12 NOTE — PROGRESS NOTES
"   Quick Note      Patient Name: Qiana Altman   Patient ID: [id number]   Sex: Female   YOB: 1950    Primary Care Provider: Zohreh HARP   Referring Provider: Zohreh HARP    Visit Date: April 16, 2020    Provider: LOYD Moseley   Location: [facility]   Location Address: [address] [place] [address]  [id number]   Location Phone: [phone]          History Of Present Illness  TELEHEALTH TELEPHONE VISIT  Chief Complaint: Med refills   Qiana Altman is a 70 year old /White female who is presenting for evaluation via telehealth telephone visit. Verbal consent obtained before beginning visit. patient alone on the call.   Provider spent AT/CMA minutes with the patient during telehealth visit.   The following staff were present during this visit: INPUT BOX   Past Medical History/Overview of Patient Symptoms  Qiana Altman is a 70 year old /White female who presents for evaluation and treatment of:      Phone call started 2:33pm  ended at 2:45    HTN-She has not been checking blood pressures, she is on quinapril    Hyperlipidemia doing well on atorvastatin    Is arthritis, she has been hesitant to take Celebrex because she has heard negative things about NSAIDs and COVID-19.  She has been using Tylenol instead    Allergic rhinitis she needs refill on Zyrtec and Flonase    Anemia she has been using only one iron a day she does not need refills.  She forgets to take the second dose.    B12 deficiency, she has not been taking her supplements.  She says \"I am doing fine with all that.\"           Assessment  · Essential hypertension     401.9/I10  · GERD (gastroesophageal reflux disease)     530.81/K21.9  · Hyperlipidemia     272.4/E78.5  · Arthritis     V13.4/V13.4  · Allergic rhinitis     477.9/J30.9    Problems Reconciled  Plan  · Orders  o Hgb A1c [facility] ([id number]) - - 04/16/2020  o Physical, Primary Care Panel (CBC, CMP, Lipid, TSH) [facility] " ([id number], [id number], [id number], [id number]) - - 04/16/2020  o Physician Telephone Evaluation, 11-20 minutes ([id number]) - - 04/16/2020  o ACO-39: Current medications updated and reviewed () - - 04/16/2020  o ACO-15: Pneumococcal Vaccine Administered or Previously Received (4040F) - - 04/16/2020  o ACO-14: Influenza immunization administered or previously received () - - 04/16/2020  · Medications  o atorvastatin 10 mg oral tablet   SIG: TAKE ONE TABLET BY MOUTH EVERY NIGHT AT BEDTIME   DISP: (30) Each with 2 refills  Refilled on 04/16/2020     o Celebrex 100 mg oral capsule   SIG: take 1 capsule (100 mg) by oral route 2 times per day   DISP: (60) capsules with 5 refills  Refilled on 04/16/2020     o cetirizine 10 mg oral tablet   SIG: TAKE ONE TABLET BY MOUTH ONCE DAILY for 90 days   DISP: (90) Tablet with 1 refills  Refilled on 04/16/2020     o fluticasone propionate 50 mcg/actuation nasal spray,suspension   SIG: spray 2 sprays (100 mcg) in each nostril by intranasal route once daily as needed   DISP: (3) Gram with 1 refills  Refilled on 04/16/2020     o Nexium 40 mg oral capsule,delayed release(DR/EC)   SIG: take 1 capsule by oral route daily   DISP: (30) capsules with 2 refills  Refilled on 04/16/2020     o quinapril 10 mg oral tablet   SIG: take 1 tablet by oral route 2 times a day for 30 days   DISP: (60) Each with 2 refills  Refilled on 04/16/2020     o Medications have been Reconciled  o Transition of Care or Provider Policy  · Instructions  o Patient advised to monitor blood pressure (B/P) at home and journal readings. Patient informed that a B/P reading at home of more than 130/80 is considered hypertension. For readings greater nyub670/90 or higher patient is advised to follow up in the office with readings for management. Patient advised to limit sodium intake.  o Advised that cheeses and other sources of dairy fats, animal fats, fast food, and the extras (candy, pastries, pies, doughnuts and cookies) all contain  LDL raising nutrients. Advised to increase fruits, vegetables, whole grains, and to monitor portion sizes.   o Plan Of Care: continue meds as directed, f/u in 4-8 weeks for labs, sooner with problems.   o Take all medications as prescribed/directed.  o Call the office with any concerns or questions.  o Bring all medicines with their bottles to each office visit.  o Discussed Covid-19 precautions including, but not limited to, social distancing, avoid touching your face, and hand washing.   · Disposition  o Call or Return if symptoms worsen or persist.            Electronically Signed by: Taina Mane APRN -Author on April 16, 2020 03:10:58 PM

## 2021-05-13 NOTE — PROGRESS NOTES
Progress Note      Patient Name: Qiana Altman   Patient ID: [id number]   Sex: Female   YOB: 1950    Primary Care Provider: Zohreh HARP    Visit Date: September 23, 2020    Provider: LOYD Nolen   Location: [facility]   Location Address: [address]  [place], [place]  [id number]   Location Phone: [phone]          Chief Complaint     3 months follow up       History Of Present Illness  Qiana Altman is a 70 year old /White female who presents for evaluation and treatment of:      She is fasting and needs refills.  LIPID:  She is doing good overall with meds.  She had a cardio work-up and all is good.  She has had 3 family members with heart attack (mom, 2 brothers)  HTN- She is doing ok but her BP is elevated.  arthritis: SHe is take her meds reg.  b12 def and anemia- on iron supplements  GERD:  She is taking her meds and is doing good.  Allergies:  She is taking her reg meds.  Anxiety:  She is doing ok.  She is not having any SI/HI.  She has her ups and downs but is doing better.       Past Medical History  Disease Name Date Onset Notes   Allergic rhinitis --  --    Allergic rhinitis due to allergen 09/10/2019 --    Anemia 09/10/2019 --    Anxiety disorder 06/09/2020 --    Arthritis --  --    B12 deficiency --  --    B12 deficiency 09/23/2020 --    Depression --  --    Diabetes mellitus, type 2 01/03/2019 --    Diverticulosis --  --    Essential hypertension 01/03/2019 --    GERD (gastroesophageal reflux disease) 01/03/2019 --    Graves disease --  --    Hyperlipidemia 01/03/2019 --    Hypertension --  --    Major depressive disorder 01/03/2019 --    Osteoarthritis 09/23/2020 --    Vitamin D deficiency 01/03/2019 --          Past Surgical History  Procedure Name Date Notes   Appendectomy --  --    Cesarian Section --  --    Cholecstectomy 09/21/16 Dr Petit   Hip fracture repair, left --  --    Hip Surgery --  --   "  Oophorectomy --  --    Tubal_Ligation --  --          Medication List  Name Date Started Instructions   Alcohol Prep Pads topical pads, medicated 04/06/2017 Ck blood sugars every day and as needed   atorvastatin 10 mg oral tablet 09/23/2020 TAKE ONE TABLET BY MOUTH EVERY NIGHT AT BEDTIME   Celebrex 100 mg oral capsule 09/23/2020 take 1 capsule (100 mg) by oral route 2 times per day for 90 days   cetirizine 10 mg oral tablet 09/23/2020 TAKE ONE TABLET BY MOUTH ONCE DAILY for 90 days   cyanocobalamin (vitamin B-12) 1,000 mcg/mL injection solution 09/23/2020 inject 1 milliliter (1,000 mcg) by intramuscular route once a month   fluticasone propionate 50 mcg/actuation nasal spray,suspension 09/23/2020 spray 2 sprays (100 mcg) in each nostril by intranasal route once daily as needed   FreeStyle Lancets 28 gauge miscellaneous misc 08/20/2018 use as directed check blood sugars daily and as needed   glucometer strips 08/20/2018 check BS daily and needed elevated blood sugar dx 250.0   Glucometer-Strips and Lancets 08/27/2018 Check Blood sugars daily and as needed DX e11.9   Iron (ferrous sulfate) 325 mg (65 mg iron) oral tablet 09/23/2020 take 1 tablet (325 mg) by oral route 2 times per day for 90 days   Lexapro 5 mg oral tablet 09/23/2020 take 1 tablet (5 mg) by oral route once daily for 90 days   Nexium 40 mg oral capsule,delayed release(DR/EC) 09/23/2020 take 1 capsule by oral route daily for 90 days   quinapril 10 mg oral tablet 09/23/2020 take 1 tablet by oral route 2 times a day for 90 days   Syringe 3cc/25Gx1\" 3 mL 25 gauge x 1\" miscellaneous syringe 05/02/2019 use as directed with b12 monthly dx E53.8   Tylenol 325 mg Oral Tablet  take 1 - 2 tablets (325 - 650 mg) by oral route every 4-6 hours as needed         Allergy List  Allergen Name Date Reaction Notes   Avelox --  --  --    morphine --  --  --    PENICILLINS --  --  --    scopolamine base 09/2016 throat sweeling --    Shell Fish (shrimp, crayfish, lobster, " crab) --  --  --    SULFA (SULFONAMIDES) --  --  --          Family Medical History  Disease Name Relative/Age Notes   Lung Neoplasm, Malignant  --    Skin Carcinoma In Situ  --    Heart Disease Mother/   --    Congestive Heart Failure Mother/   --          Social History  Finding Status Start/Stop Quantity Notes   Alcohol Never --/-- --  --    Assessed for Abuse/Neglect --  --/-- --  --     --  --/-- --  --    Other Substance Use Never --/-- --  --    Tobacco Former --/-- --  years ago quit 28 yrs ago         Immunizations  NameDate Admin Mfg Trade Name Lot Number Route Inj VIS Given VIS Publication   Jnnklunud26/23/2020 SKB Fluarix, quadrivalent, preservative free [id number] [facility] 09/23/2020    Comments:    Oamibebes69/08/2018 NE Not Entered  NE NE 10/08/2018    Comments: [place]   Ckpfpcxem60/13/2017 NE Not Entered  NE NE 11/14/2017    Comments: [facility]   Wsjpnswuw69/29/2015 SKB Fluarix, quadrivalent, preservative free [id number] [facility] 12/29/2015 07/02/2012   Comments:    Kurkrauih46/09/2014 SKB Fluarix, quadrivalent, preservative free [id number]  NE 09/09/2014 07/02/2012   Comments:    Uzksbqybv2139/23/2020 NE Not Entered [id number] [facility] 09/23/2020    Comments:    Prevnar 1311/14/2017 NE Not Entered  NE NE 11/14/2017    Comments: [facility]   Tdap06/22/2016 SKB BOOSTRIX [id number] [facility] 06/22/2016 01/24/2012   Comments:          Review of Systems  · Constitutional  o Denies  o : fever, fatigue, weight loss, weight gain  · HENT  o Denies  o : headaches, vertigo, lightheadedness  · Cardiovascular  o Denies  o : lower extremity edema, claudication, chest pressure, palpitations  · Respiratory  o Denies  o : shortness of breath, wheezing, cough, hemoptysis, dyspnea on exertion  · Gastrointestinal  o Denies  o : nausea, vomiting, diarrhea, constipation, abdominal pain  · Psychiatric  o Admits  o : anxiety, depression  o Denies  o : suicidal ideation, homicidal ideation      Vitals  Date Time BP  Position Site L\R Cuff Size HR RR TEMP (F) WT  HT  BMI kg/m2 BSA m2 O2 Sat        09/23/2020 07:05 /73 Sitting    66 - R 24 98.4 159lbs 0oz 5'   31.05 1.75 99 %          Physical Examination  · Constitutional  o Appearance  o : well developed, well-nourished, no acute distress  · Neck  o Inspection/Palpation  o : normal appearance, no masses or tenderness, trachea midline  o Thyroid  o : gland size normal, nontender, no nodules or masses present on palpation  · Respiratory  o Respiratory Effort  o : breathing unlabored  o Inspection of Chest  o : chest rise symmetric bilaterally  o Auscultation of Lungs  o : clear to auscultation bilaterally throughout inspiration and expiration  · Cardiovascular  o Heart  o :   § Auscultation of Heart  § : regular rate and rhythm, no murmurs, gallops or rubs  o Peripheral Vascular System  o :   § Femoral Arteries  § : normal femoral pulses, no bruits present  § Extremities  § : no edema  · Psychiatric  o Mood and Affect  o : mood depressed  o Presence of Abnormal Thoughts  o : no hallucinations, no homicidal ideation, no suicidal ideation          Assessment  · Allergic rhinitis due to allergen     477.9/J30.9  · Anxiety disorder     300.00/F41.9  · Diabetes mellitus, type 2     250.00/E11.9  · Essential hypertension     401.9/I10  · GERD (gastroesophageal reflux disease)     530.81/K21.9  · Hyperlipidemia     272.4/E78.5  · Major depressive disorder     296.20/F32.2  · Osteoarthritis     715.90/M19.90  · Vitamin D deficiency     268.9/E55.9  · Screening for depression     V79.0/Z13.89  · Need for influenza vaccination     V04.81/Z23  · Need for pneumococcal vaccination     V03.82/Z23  · Family history of heart attack     V17.3/Z82.49  · B12 deficiency     266.2/E53.8      Plan  · Orders  o Urine microalbumin (88540) - 250.00/E11.9 - 09/23/2020  o Annual depression screening, 15 minutes (37943, ) - V79.0/Z13.89 - 09/23/2020  o ACO-18: Negative screen for clinical  depression using a standardized tool () - V79.0/Z13.89 - 09/23/2020  o Pneumococcal Vaccine, 23 valent, adult (36726) - V03.82/Z23 - 09/23/2020   Vaccine - [id number]; Dose: 0.5; Site: Left Deltoid; Route: Intramuscular; Date: 09/23/2020 07:56:00; Exp: 06/30/2021; Lot: [id number]; Mfg: Not Entered; TradeName: Not Entered; Administered By: Kadie Muhammad LPN; Comment: N/A  o Administration of Pneumococcal Vaccine - Medicare () - V03.82/Z23 - 09/23/2020  o Vitamin D (25-Hydroxy) Level (21305) - - 09/23/2020  o ACO-39: Current medications updated and reviewed () - - 09/23/2020  o ACO-15: Pneumococcal Vaccine Administered or Previously Received (4040F) - - 09/23/2020  o ACO-14: Influenza immunization administered or previously received () - - 09/23/2020  o ACO-13: Fall Risk Screening with no falls in past year or only one fall without injury in the past year (1101F) - - 09/23/2020  o B12 Folate levels (B12FO) - - 09/23/2020  o Iron Profile (Iron 21240 TIBC 87988 and Transferrin 67271) (IRONP) - - 09/23/2020  o Physical, Primary Care Panel (CBC, CMP, Lipid, TSH) [facility] (40055, 11301, 64865, 03050) - - 09/23/2020  o Fluzone High Dose Flu Vaccine (62433) - - 09/23/2020   Vaccine - Influenza; Dose: 0.5; Site: Right Deltoid; Route: Intramuscular; Date: 09/23/2020 07:55:00; Exp: 06/30/2021; Lot: [id number]; Mfg: [employer]; TradeName: Fluarix, quadrivalent, preservative free; Administered By: Kadie Muhammad LPN; Comment: N/A  o Administration of Influenza Vaccine - Medicare () - - 09/23/2020  · Medications  o atorvastatin 10 mg oral tablet   SIG: TAKE ONE TABLET BY MOUTH EVERY NIGHT AT BEDTIME   DISP: (90) Each with 1 refills  Refilled on 09/23/2020     o Celebrex 100 mg oral capsule   SIG: take 1 capsule (100 mg) by oral route 2 times per day for 90 days   DISP: (180) capsules with 1 refills  Refilled on 09/23/2020     o cetirizine 10 mg oral tablet   SIG: TAKE ONE TABLET BY MOUTH ONCE DAILY for 90 days    DISP: (90) Tablet with 1 refills  Refilled on 09/23/2020     o cyanocobalamin (vitamin B-12) 1,000 mcg/mL injection solution   SIG: inject 1 milliliter (1,000 mcg) by intramuscular route once a month   DISP: (3) 1 ml vial with 1 refills  Refilled on 09/23/2020     o fluticasone propionate 50 mcg/actuation nasal spray,suspension   SIG: spray 2 sprays (100 mcg) in each nostril by intranasal route once daily as needed   DISP: (3) Gram with 1 refills  Refilled on 09/23/2020     o Iron (ferrous sulfate) 325 mg (65 mg iron) oral tablet   SIG: take 1 tablet (325 mg) by oral route 2 times per day for 90 days   DISP: (180) tablets with 1 refills  Refilled on 09/23/2020     o Lexapro 5 mg oral tablet   SIG: take 1 tablet (5 mg) by oral route once daily for 90 days   DISP: (90) tablets with 1 refills  Refilled on 09/23/2020     o Nexium 40 mg oral capsule,delayed release(DR/EC)   SIG: take 1 capsule by oral route daily for 90 days   DISP: (90) capsules with 1 refills  Refilled on 09/23/2020     o quinapril 10 mg oral tablet   SIG: take 1 tablet by oral route 2 times a day for 90 days   DISP: (180) Each with 1 refills  Refilled on 09/23/2020     o Medications have been Reconciled  o Transition of Care or Provider Policy  · Instructions  o Discussed the need for therapy, either with a certified counselor, psychologist, and/or family . If no improvement is noted or worsening of their condition, return to office or ER. But also discussed with patient that if they are non-responsive to the type of medication they may need to see a psychiatrist for further evaluation and management.  o Patient was given an SSRI/SSNRI medication and warned of possible side effects of the medication including potential for increased risk of suicidal thoughts and feelings. Patient was instructed that if they begin to exhibit any of these effects they will discontinue the medication immediately and contact our office or the ER ASAP.  o Patient  "agrees to a \"No Self Harm\" contract. Patient will either call us, 911, , [facility], [facility].  o Patient advised to monitor blood pressure (B/P) at home and journal readings. Patient informed that a B/P reading at home of more than 130/80 is considered hypertension. For readings greater cfnn771/90 or higher patient is advised to follow up in the office with readings for management. Patient advised to limit sodium intake.  o Advised that cheeses and other sources of dairy fats, animal fats, fast food, and the extras (candy, pastries, pies, doughnuts and cookies) all contain LDL raising nutrients. Advised to increase fruits, vegetables, whole grains, and to monitor portion sizes.   o Depression Screen completed and scanned into the EMR under the designated folder within the patient's documents.  o Today's PHQ-9 result is _8__  o Take all medications as prescribed/directed.  o Patient was educated/instructed on their diagnosis, treatment and medications prior to discharge from the clinic today.  o Patient instructed to seek medical attention urgently for new or worsening symptoms.  o Call the office with any concerns or questions.  o We will do labs today. We will f.u in 6 months unless labs are needed. We will set up AWV at 7am due to babysitting. Discussed about family and to take care of her self.  o Electronically Identified Patient Education Materials Provided Electronically  · Disposition  o Call or Return if symptoms worsen or persist.  o Follow-up in office in 3-6 months            Electronically Signed by: LOYD Nolen -Author on September 23, 2020 08:12:59 AM  "

## 2021-05-13 NOTE — PROGRESS NOTES
"   Progress Note      Patient Name: Qiana Altman   Patient ID: [id number]   Sex: Female   YOB: 1950    Primary Care Provider: Zohreh HARP   Referring Provider: Zohreh HARP    Visit Date: 2020    Provider: LOYD Moseley   Location: [facility]   Location Address: [address] [place], [place] [address]M[place] Tavarez  [id number]   Location Phone: [phone]          Chief Complaint     Cardiologist referral  Lab   Med refills          History Of Present Illness  Qiana Altman is a 70 year old /White female who presents for evaluation and treatment of:      eval of heart. She wants to see a cardiologist. She lost her brother last week to a heart attack in his 60s, this is the second brother who has  in his 60's with CAD. Mom also had heart problems, she is very anxious. She does have shortness of breath, but denies chest pain. \"I have anxiety, so that causes my chest to hurt sometimes.\"     HLP- she is on lipitor    HTN- BP elevated today, she says it's her nerves, it is good at home. quinapril    arthritis, her back really bothers her. She would like the ultram refilled, she only uses it as needed. AMINAH and UDS were appropriate, she had 30 in September and she is out now.     b12 def  anemia- on iron supplements    gerd, on protonix    anxiety is bad, she wants to try something for it. She is interested in lexapro. She is depressed with her  gone, she denies SI/HI.       Past Medical History  Disease Name Date Onset Notes   Allergic rhinitis --  --    Allergic rhinitis due to allergen 09/10/2019 --    Anemia 09/10/2019 --    Anxiety disorder 2020 --    Arthritis --  --    B12 deficiency --  --    Depression --  --    Diabetes mellitus, type 2 2019 --    Diverticulosis --  --    Essential hypertension 2019 --    GERD (gastroesophageal reflux disease) 2019 --    Graves disease --  --    Hyperlipidemia 2019 --  " "  Hypertension --  --    Major depressive disorder 01/03/2019 --    Vitamin D deficiency 01/03/2019 --          Past Surgical History  Procedure Name Date Notes   Appendectomy --  --    Cesarian Section --  --    Cholecstectomy 09/21/16 Dr Petit   Hip fracture repair, left --  --    Hip Surgery --  --    Oophorectomy --  --    Tubal_Ligation --  --          Medication List  Name Date Started Instructions   Alcohol Prep Pads topical pads, medicated 04/06/2017 Ck blood sugars every day and as needed   atorvastatin 10 mg oral tablet 04/16/2020 TAKE ONE TABLET BY MOUTH EVERY NIGHT AT BEDTIME   Celebrex 100 mg oral capsule 04/16/2020 take 1 capsule (100 mg) by oral route 2 times per day   cetirizine 10 mg oral tablet 04/16/2020 TAKE ONE TABLET BY MOUTH ONCE DAILY for 90 days   cyanocobalamin (vitamin B-12) 1,000 mcg/mL injection solution 06/09/2020 inject 1 milliliter (1,000 mcg) by intramuscular route once a month   fluticasone propionate 50 mcg/actuation nasal spray,suspension 04/16/2020 spray 2 sprays (100 mcg) in each nostril by intranasal route once daily as needed   FreeStyle Lancets 28 gauge miscellaneous misc 08/20/2018 use as directed check blood sugars daily and as needed   glucometer strips 08/20/2018 check BS daily and needed elevated blood sugar dx 250.0   Glucometer-Strips and Lancets 08/27/2018 Check Blood sugars daily and as needed DX e11.9   Iron (ferrous sulfate) 325 mg (65 mg iron) oral tablet 06/09/2020 take 1 tablet (325 mg) by oral route 2 times per day   Nexium 40 mg oral capsule,delayed release(/EC) 04/16/2020 take 1 capsule by oral route daily   quinapril 10 mg oral tablet 04/16/2020 take 1 tablet by oral route 2 times a day for 30 days   Syringe 3cc/25Gx1\" 3 mL 25 gauge x 1\" miscellaneous syringe 05/02/2019 use as directed with b12 monthly dx E53.8   Tylenol 325 mg Oral Tablet  take 1 - 2 tablets (325 - 650 mg) by oral route every 4-6 hours as needed   Ultram 50 mg oral tablet 06/09/2020 " take 1 tablet by oral route 3 times a day as needed         Allergy List  Allergen Name Date Reaction Notes   Avelox --  --  --    morphine --  --  --    PENICILLINS --  --  --    scopolamine base 09/2016 throat sweeling --    Shell Fish (shrimp, crayfish, lobster, crab) --  --  --    SULFA (SULFONAMIDES) --  --  --        Allergies Reconciled  Family Medical History  Disease Name Relative/Age Notes   Lung Neoplasm, Malignant  --    Skin Carcinoma In Situ  --    Heart Disease Mother/   --    Congestive Heart Failure Mother/   --          Social History  Finding Status Start/Stop Quantity Notes   Alcohol Never --/-- --  --    Assessed for Abuse/Neglect --  --/-- --  --     --  --/-- --  --    Other Substance Use Never --/-- --  --    Tobacco Former --/-- --  years ago quit 28 yrs ago         Immunizations  NameDate Admin Mfg Trade Name Lot Number Route Inj VIS Given VIS Publication   Xnrnsrwbq14/08/2018 NE Not Entered  NE NE 10/08/2018    Comments: [place]   Evtxeyncb25/13/2017 NE Not Entered  NE NE 11/14/2017    Comments: [facility]   Krqodptvu06/29/2015 SKB Fluarix, quadrivalent, preservative free [id number] IM  12/29/2015 07/02/2012   Comments:    Haqrvudjs79/09/2014 SKB Fluarix, quadrivalent, preservative free [id number]  NE 09/09/2014 07/02/2012   Comments:    Prevnar 1311/14/2017 NE Not Entered  NE NE 11/14/2017    Comments: [facility]   Tdap06/22/2016 SKB BOOSTRIX [id number] [facility] 06/22/2016 01/24/2012   Comments:          Review of Systems  · Constitutional  o Admits  o : fatigue  o Denies  o : fever, weight loss, weight gain  · HENT  o Denies  o : headaches, vertigo, lightheadedness  · Cardiovascular  o Admits  o : chest pressure with anxiety  o Denies  o : lower extremity edema, claudication, palpitations  · Respiratory  o Admits  o : shortness of breath  o Denies  o : wheezing, cough, hemoptysis  · Gastrointestinal  o Denies  o : nausea, vomiting, diarrhea, constipation, abdominal  pain  · Psychiatric  o Admits  o : anxiety, depression  o Denies  o : suicidal ideation, homicidal ideation      Vitals  Date Time BP Position Site L\R Cuff Size HR RR TEMP (F) WT  HT  BMI kg/m2 BSA m2 O2 Sat        06/09/2020 02:52 /68 Sitting    76 - R 18 97.4 156lbs 3oz 5'   30.5 1.73 100 %    06/09/2020 04:15 /80 Sitting                     Physical Examination  · Constitutional  o Appearance  o : well developed, well-nourished, no acute distress  · Neck  o Inspection/Palpation  o : normal appearance, no masses or tenderness, trachea midline  o Thyroid  o : gland size normal, nontender, no nodules or masses present on palpation  · Respiratory  o Respiratory Effort  o : breathing unlabored  o Inspection of Chest  o : chest rise symmetric bilaterally  o Auscultation of Lungs  o : clear to auscultation bilaterally throughout inspiration and expiration  · Cardiovascular  o Heart  o :   § Auscultation of Heart  § : regular rate and rhythm, no murmurs, gallops or rubs  o Peripheral Vascular System  o :   § Extremities  § : no edema  · Lymphatic  o Neck  o : no cervical lymphadenopathy, no supraclavicular lymphadenopathy  · Psychiatric  o Mood and Affect  o : mood depressed  o Presence of Abnormal Thoughts  o : no hallucinations, no homicidal ideation, no suicidal ideation  · EKG  o EKG  o : sinus rhythm          Results  · In-Office Procedures  o Lab procedure  § IOP - Urine Drug Screen In-House [facility] (76783)   § Amphetamines Ur Ql: Negative   § Barbiturates Ur Ql: Negative   § Buprenorphine+Nor Ur Ql Scn: Negative   § Benzodiaz Ur Ql: Negative   § Cocaine Ur Ql: Negative   § Methadone Ur Ql: Negative   § Methamphet Ur Ql: Negative   § MDMA Ur Ql Scn: Negative   § Opiates Ur Ql: Negative   § Oxycodone Ur Ql: Negative   § PCP Ur Ql: Negative   § THC Ur Ql: Negative   § Temp in Range?: Within/Acceptable   § Control Seen?: Yes       Assessment  · Allergic rhinitis due to allergen     477.9/J30.9  · Anxiety  disorder     300.00/F41.9  · Chest pain     786.50/R07.9  · Essential hypertension     401.9/I10  · Fatigue     780.79/R53.83  · GERD (gastroesophageal reflux disease)     530.81/K21.9  · Hyperlipidemia     272.4/E78.5  · Major depressive disorder     296.20/F32.2  · Osteoarthritis     715.90/M19.90  · Vitamin D deficiency     268.9/E55.9  · Shortness of breath     786.05/R06.02  · Family history of heart attack     V17.3/Z82.49  · Back pain     724.5/M54.9  · B12 deficiency     266.2/E53.8    Problems Reconciled  Plan  · Orders  o Hgb A1c [facility] ([id number]) - - 06/09/2020  o Physical, Primary Care Panel (CBC, CMP, Lipid, TSH) [facility] (53710, 55239, 79301, 51627) - - 06/09/2020  o Vitamin D (25-Hydroxy) Level (06749) - - 06/09/2020  o ACO-39: Current medications updated and reviewed () - - 06/09/2020  o ACO-15: Pneumococcal Vaccine Administered or Previously Received (4040F) - - 06/09/2020  o ACO-14: Influenza immunization was not administered for reasons documented () - - 06/09/2020  o CARDIOLOGY CONSULTATION (CARDI) - 786.50/R07.9, 786.05/R06.02, V17.3/Z82.49 - 06/09/2020   pt requests dr magdaleno  o B12 Folate levels (B12FO) - - 06/09/2020  o AMINAH Report (KASPR) - - 06/09/2020  o Iron Profile (Iron 98098 TIBC 93809 and Transferrin 58041) (IRONP) - - 06/09/2020  · Medications  o Lexapro 5 mg oral tablet   SIG: take 1 tablet (5 mg) by oral route once daily   DISP: (30) tablets with 1 refills  Prescribed on 06/09/2020     o cyanocobalamin (vitamin B-12) 1,000 mcg/mL injection solution   SIG: inject 1 milliliter (1,000 mcg) by intramuscular route once a month   DISP: (3) 1 ml vial with 1 refills  Refilled on 06/09/2020     o Iron (ferrous sulfate) 325 mg (65 mg iron) oral tablet   SIG: take 1 tablet (325 mg) by oral route 2 times per day   DISP: (60) tablets with 5 refills  Refilled on 06/09/2020     o Ultram 50 mg oral tablet   SIG: take 1 tablet by oral route 3 times a day as needed   DISP: (30) tablets with 0  "refills  Refilled on 06/09/2020     o Medications have been Reconciled  o Transition of Care or Provider Policy  · Instructions  o Discussed the need for therapy, either with a certified counselor, psychologist, and/or family . If no improvement is noted or worsening of their condition, return to office or ER. But also discussed with patient that if they are non-responsive to the type of medication they may need to see a psychiatrist for further evaluation and management.  o Patient was given an SSRI/SSNRI medication and warned of possible side effects of the medication including potential for increased risk of suicidal thoughts and feelings. Patient was instructed that if they begin to exhibit any of these effects they will discontinue the medication immediately and contact our office or the ER ASAP.  o Patient agrees to a \"No Self Harm\" contract. Patient will either call , [facility], ER, [facility], [facility].  o Patient advised to monitor blood pressure (B/P) at home and journal readings. Patient informed that a B/P reading at home of more than 130/80 is considered hypertension. For readings greater cpbd920/90 or higher patient is advised to follow up in the office with readings for management. Patient advised to limit sodium intake.  o Advised that cheeses and other sources of dairy fats, animal fats, fast food, and the extras (candy, pastries, pies, doughnuts and cookies) all contain LDL raising nutrients. Advised to increase fruits, vegetables, whole grains, and to monitor portion sizes.   o Patient was educated/instructed on their diagnosis, treatment and medications prior to discharge from the clinic today.  o Minutes spent with patient including greater than 50% in Education/Counseling/Care Coordination.  o Time spent with the patient was minutes, more than 50% face to face.  o will refer to cardiology for stress test and consultation  o will try her on low dose lexapro " with f/u in 6 weeks, r and b discussed  o discussed r and b of the ultram, she knows she cannot stay on this, the 30 must last her for 6 months, precautions for falls  · Disposition  o Call or Return if symptoms worsen or persist.  o F/U 6 weeks  o Care Transition  o TIFFANY Sent            Electronically Signed by: LOYD Moseley -Author on June 12, 2020 10:23:01 AM

## 2021-05-13 NOTE — PROGRESS NOTES
Progress Note      Patient Name: Qiana Altman   Patient ID: [id number]   Sex: Female   YOB: 1950    Primary Care Provider: Zohreh HARP    Visit Date: October 1, 2020    Provider: LOYD Nolen   Location: [facility]   Location Address: [address]  [place]  [id number]   Location Phone: [phone]          Chief Complaint  · Annual Wellness Exam      History Of Present Illness  The patient is a 70 year old /White female who has come to this office for her Annual Wellness Visit.   Her Primary Care Provider is Zohreh HARP. Her comprehensive Care Team list, including suppliers, has been updated on the Facesheet. Her medical/family history, height, weight, BMI, and blood pressure have been reviewed and are in the chart. The Health Risk Assessment has been completed and scanned in the chart.   Medications are listed in the medication list.   The active problem list includes: Allergic rhinitis due to allergen, Anemia, Anxiety disorder, Arthritis, B12 deficiency, Diabetes mellitus, type 2, Diverticulosis, Essential hypertension, GERD (gastroesophageal reflux disease), Hyperlipidemia, Major depressive disorder, Osteoarthritis, and Vitamin D deficiency   The patient does have a history of substance use. This includes tobacco use. She stopped over 20+ yrs   Patient reports her diet is adequate.   The Mini-Cog has been administered and is scanned in chart. The results are negative. Her cognitive function is without limitation.   A hearing loss screen was completed today and the result is negative.   Patient does not have any risk factors for depression. Patient completed the PHQ-9 today and it has been scanned in the chart. The total score is 1-4.   The Timed Up and Go screen was administered today and the result is negative.   The Hernandez Index of [place] in ADLs indicated full function (score of 6).   A Falls Risk  Assessment has been completed, including a review of home fall hazards and medication review.   Overall, the patient's functional ability is noted by this provider to be within normal limits. Her level of safety is noted to be within normal limits. Her balance/gait is within normal limits. There have been no falls in the past year. Patient-specific home safety recommendations have been reviewed and a copy has been given to patient.   She denies issues with leaking urine.   There are no additional risk factors identified.   Living Will/Advanced Directive previously executed but not in chart.   Personalized health advice was given to the patient and a written health screening schedule was established; see Plan for details.       Past Medical History  Disease Name Date Onset Notes   Allergic rhinitis due to allergen 09/10/2019 --    Anemia 09/10/2019 --    Anxiety disorder 06/09/2020 --    Arthritis --  --    B12 deficiency --  --    B12 deficiency 09/23/2020 --    Depression --  --    Diabetes mellitus, type 2 01/03/2019 --    Diverticulosis --  --    Essential hypertension 01/03/2019 --    GERD (gastroesophageal reflux disease) 01/03/2019 --    Graves disease --  --    History of Graves' disease 10/01/2020 --    Hyperlipidemia 01/03/2019 --    Major depressive disorder 01/03/2019 --    Osteoarthritis 09/23/2020 --    Vitamin D deficiency 01/03/2019 --          Past Surgical History  Procedure Name Date Notes   Appendectomy --  --    Cesarian Section --  --    Cholecstectomy 09/21/16 Dr Petit   Hip fracture repair, left --  --    Hip Surgery --  --    Oophorectomy --  --    Tubal_Ligation --  --          Medication List  Name Date Started Instructions   Alcohol Prep Pads topical pads, medicated 04/06/2017 Ck blood sugars every day and as needed   atorvastatin 10 mg oral tablet 09/23/2020 TAKE ONE TABLET BY MOUTH EVERY NIGHT AT BEDTIME   Celebrex 100 mg oral capsule 09/23/2020 take 1 capsule (100 mg) by oral route 2  "times per day for 90 days   cetirizine 10 mg oral tablet 09/23/2020 TAKE ONE TABLET BY MOUTH ONCE DAILY for 90 days   cyanocobalamin (vitamin B-12) 1,000 mcg/mL injection solution 09/23/2020 inject 1 milliliter (1,000 mcg) by intramuscular route once a month   fluticasone propionate 50 mcg/actuation nasal spray,suspension 09/23/2020 spray 2 sprays (100 mcg) in each nostril by intranasal route once daily as needed   FreeStyle Lancets 28 gauge miscellaneous misc 08/20/2018 use as directed check blood sugars daily and as needed   glucometer strips 08/20/2018 check BS daily and needed elevated blood sugar dx 250.0   Glucometer-Strips and Lancets 08/27/2018 Check Blood sugars daily and as needed DX e11.9   Iron (ferrous sulfate) 325 mg (65 mg iron) oral tablet 09/23/2020 take 1 tablet (325 mg) by oral route 2 times per day for 90 days   Lexapro 5 mg oral tablet 09/23/2020 take 1 tablet (5 mg) by oral route once daily for 90 days   Nexium 40 mg oral capsule,delayed release(DR/EC) 09/23/2020 take 1 capsule by oral route daily for 90 days   quinapril 10 mg oral tablet 09/23/2020 take 1 tablet by oral route 2 times a day for 90 days   Syringe 3cc/25Gx1\" 3 mL 25 gauge x 1\" miscellaneous syringe 05/02/2019 use as directed with b12 monthly dx E53.8   Tylenol 325 mg Oral Tablet  take 1 - 2 tablets (325 - 650 mg) by oral route every 4-6 hours as needed         Allergy List  Allergen Name Date Reaction Notes   Avelox --  --  --    morphine --  --  --    PENICILLINS --  --  --    scopolamine base 09/2016 throat sweeling --    Shell Fish (shrimp, crayfish, lobster, crab) --  --  --    SULFA (SULFONAMIDES) --  --  --        Allergies Reconciled  Family Medical History  Disease Name Relative/Age Notes   Lung Neoplasm, Malignant  --    Skin Carcinoma In Situ  --    Heart Disease Mother/   --    Congestive Heart Failure Mother/   --          Social History  Finding Status Start/Stop Quantity Notes   Alcohol Never --/-- --  --  "   Assessed for Abuse/Neglect --  --/-- --  --     --  --/-- --  --    Other Substance Use Never --/-- --  --    Tobacco Former --/-- --  years ago quit 28 yrs ago         Immunizations  NameDate Admin Mfg Trade Name Lot Number Route Inj VIS Given VIS Publication   Kymzyfdmm16/23/2020 SKB Fluarix, quadrivalent, preservative free [id number] IM RD 09/23/2020    Comments:    Xvqzyacfr4545/23/2020 NE Not Entered [id number] IM LD 09/23/2020    Comments:    Prevnar 1311/14/2017 NE Not Entered  NE NE 11/14/2017    Comments: [facility]   Tdap06/22/2016 SKB BOOSTRIX [id number] IM LD 06/22/2016 01/24/2012   Comments:          Review of Systems  · Constitutional  o Denies  o : fever      Vitals  Date Time BP Position Site L\R Cuff Size HR RR TEMP (F) WT  HT  BMI kg/m2 BSA m2 O2 Sat FR L/min FiO2 HC       10/01/2020 07:10 /74 Sitting    69 - R 16 96.9 161lbs 0oz 5'   31.44 1.76 100 %  21%    10/01/2020 07:37 /67 Sitting                       Physical Examination  · Constitutional  o Appearance  o : well-nourished, well developed, alert, in no acute distress          Results  · In-Office Procedures  o Lab procedure  § IOP - Urine Drug Screen In-House [facility] (93506)   § Amphetamines Ur Ql: Negative   § Barbiturates Ur Ql: Negative   § Buprenorphine+Nor Ur Ql Scn: Negative   § Benzodiaz Ur Ql: Negative   § Cocaine Ur Ql: Negative   § Methadone Ur Ql: Negative   § Methamphet Ur Ql: Negative   § MDMA Ur Ql Scn: Negative   § Opiates Ur Ql: Negative   § Oxycodone Ur Ql: Negative   § PCP Ur Ql: Negative   § THC Ur Ql: Negative   § Temp in Range?: Within/Acceptable   § Control Seen?: Yes       Assessment  · Encounter for Medicare annual wellness exam     V70.0/Z00.00  · Screening for depression     V79.0/Z13.89  · Screening for alcoholism     V79.1/Z13.39  · Advanced care planning/counseling discussion     V65.49/Z71.89  · Allergic rhinitis due to allergen     477.9/J30.9  · Anemia     285.9/D64.9  · Anxiety  disorder     300.00/F41.9  · B12 deficiency     266.2/E53.8  · Diabetes mellitus, type 2     250.00/E11.9  · Essential hypertension     401.9/I10  · GERD (gastroesophageal reflux disease)     530.81/K21.9  · Hyperlipidemia     272.4/E78.5  · Major depressive disorder     296.20/F32.2  · Osteoarthritis     715.90/M19.90  · Vitamin D deficiency     268.9/E55.9  · Hypertension     401.9/I10  · Diverticulosis     562.10/K57.90  · Arthritis     V13.4/V13.4  · History of Graves' disease     V12.29/Z86.39      Plan  · Orders  o Falls Risk Assessment Completed (3288F) - V70.0/Z00.00 - 10/01/2020  o Brief hearing screening (written) [facility] () - V70.0/Z00.00 - 10/01/2020  o Annual wellness visit; includes a personalized prevention plan of service (pps), initial visit () - V70.0/Z00.00 - 10/01/2020  o ACO-13: Fall Risk Screening with no falls in past year or only one fall without injury in the past year (1101F) - V70.0/Z00.00 - 10/01/2020  o Presence or absence of urinary incontinence assessed (WOLF) (1090F) - V70.0/Z00.00 - 10/01/2020  o ACO-18: Negative screen for clinical depression using a standardized tool () - V79.0/Z13.89 - 10/01/2020  o Negative alcohol screening () - V79.1/Z13.39 - 10/01/2020  o ACO-39: Current medications updated and reviewed (, 1159F) - - 10/01/2020  o Influenza immunization was ordered or administered () - - 10/01/2020  o ACO-15: Pneumococcal Vaccine Administered or Previously Received (4040F) - - 10/01/2020  o ACO - Pt declines to or was not able to provide an Advance Care Plan or name a Surrogate Decision Maker (1124F) - - 10/01/2020   at [facility]  · Medications  o tramadol 50 mg oral tablet   SIG: take 1 tablet (50 mg) by oral route every 6 hours as needed   DISP: (60) Tablet with 0 refills  Adjusted on 10/01/2020     o Medications have been Reconciled  o Transition of Care or Provider Policy  · Instructions  o Health Risk Assessment has been reviewed with the  patient.  o Written health screening schedule for next 5-10 years was established with patient; information scanned in chart and given/mailed to patient.  o Fall prevention methods discussed and a copy of recommendations given/mailed to patient.  o Today's PHQ-9 score is: __4_  o Depression Screen completed and scanned into the EMR under the designated folder within the patient's documents.  o Audit-C Questionnaire completed and scanned into the EMR under the designated folder within the patient's documents.  o Audit-C score of 0-4 - Negative Screen - Brief Discussion  o We signed consent for the tramadol. She will do UDS. F.U for refill if needed but she take Celebrex but in the winter she needs a little more for the OA.  o Refuses all prevent. exam at this time.  o Discussed about her BP and taking med.   o Rx given to pt.  o Electronically Identified Patient Education Materials Provided Electronically  · Disposition  o Call or Return if symptoms worsen or persist.            Electronically Signed by: LOYD Nolen -Author on October 1, 2020 07:45:47 AM

## 2021-05-14 VITALS
HEART RATE: 78 BPM | BODY MASS INDEX: 31.29 KG/M2 | TEMPERATURE: 97.6 F | RESPIRATION RATE: 12 BRPM | SYSTOLIC BLOOD PRESSURE: 140 MMHG | DIASTOLIC BLOOD PRESSURE: 60 MMHG | OXYGEN SATURATION: 99 % | WEIGHT: 159.37 LBS | HEIGHT: 60 IN

## 2021-05-14 VITALS
HEIGHT: 60 IN | HEART RATE: 69 BPM | TEMPERATURE: 96.9 F | DIASTOLIC BLOOD PRESSURE: 74 MMHG | OXYGEN SATURATION: 100 % | SYSTOLIC BLOOD PRESSURE: 144 MMHG | RESPIRATION RATE: 16 BRPM | BODY MASS INDEX: 31.61 KG/M2 | WEIGHT: 161 LBS

## 2021-05-14 VITALS
TEMPERATURE: 98.4 F | SYSTOLIC BLOOD PRESSURE: 143 MMHG | BODY MASS INDEX: 31.22 KG/M2 | WEIGHT: 159 LBS | OXYGEN SATURATION: 99 % | DIASTOLIC BLOOD PRESSURE: 73 MMHG | RESPIRATION RATE: 24 BRPM | HEIGHT: 60 IN | HEART RATE: 66 BPM

## 2021-05-14 NOTE — PROGRESS NOTES
Progress Note      Patient Name: Qiana Altman   Patient ID: [id number]   Sex: Female   YOB: 1950    Primary Care Provider: Zohreh HARP    Visit Date: March 18, 2021    Provider: LOYD Nolen   Location: [facility]   Location Address: [address]  [place], [place]  [id number]   Location Phone: [phone]          Chief Complaint     Follow up and med refills       History Of Present Illness  Qiana Altman is a 71 year old /White female who presents for evaluation and treatment of:      She is fasting and needs refills.  LIPID:  She is doing good overall with meds.  She has had 3 family members with heart attack (mom, 2 brothers)  HTN- She is doing ok but her BP meds and no issues.  arthritis: SHe is take her meds reg.  She is taking the tramadol 1 a day but then some she will take 2 she will take the Celebrex and is doing ok overall.  she brought her bottle in.  b12 def and anemia- on iron supplements  GERD:  She is taking her meds and is doing good.  Allergies:  She is taking her reg meds.  Anxiety:  She is doing ok.  She is not having any SI/HI.  She has her ups and downs but is doing better.  Her daughter is going for a divorce.  Graves disease--she used to take med but no issues recently (has issues with eyes)       Past Medical History  Disease Name Date Onset Notes   Allergic rhinitis due to allergen 09/10/2019 --    Anemia 09/10/2019 --    Anxiety disorder 06/09/2020 --    Arthritis --  --    B12 deficiency --  --    B12 deficiency 09/23/2020 --    Depression --  --    Diabetes mellitus, type 2 01/03/2019 --    Diverticulosis --  --    Essential hypertension 01/03/2019 --    GERD (gastroesophageal reflux disease) 01/03/2019 --    Graves disease --  --    History of Graves' disease 10/01/2020 --    Hyperlipidemia 01/03/2019 --    Major depressive disorder 01/03/2019 --    Osteoarthritis 09/23/2020 --    Vitamin D deficiency  "01/03/2019 --          Past Surgical History  Procedure Name Date Notes   Appendectomy --  --    Cesarian Section --  --    Cholecstectomy 09/21/16 Dr Petit   Hip fracture repair, left --  --    Hip Surgery --  --    Oophorectomy --  --    Tubal_Ligation --  --          Medication List  Name Date Started Instructions   Alcohol Prep Pads topical pads, medicated 04/06/2017 Ck blood sugars every day and as needed   atorvastatin 10 mg oral tablet 03/18/2021 TAKE ONE TABLET BY MOUTH EVERY NIGHT AT BEDTIME   Celebrex 100 mg oral capsule 03/18/2021 take 1 capsule (100 mg) by oral route 2 times per day for 90 days   cetirizine 10 mg oral tablet 03/18/2021 TAKE ONE TABLET BY MOUTH ONCE DAILY for 90 days   cyanocobalamin (vitamin B-12) 1,000 mcg/mL injection solution 03/18/2021 inject 1 milliliter (1,000 mcg) by intramuscular route once a month   fluticasone propionate 50 mcg/actuation nasal spray,suspension 03/18/2021 spray 2 sprays (100 mcg) in each nostril by intranasal route once daily as needed   FreeStyle Lancets 28 gauge miscellaneous misc 08/20/2018 use as directed check blood sugars daily and as needed   glucometer strips 08/20/2018 check BS daily and needed elevated blood sugar dx 250.0   Glucometer-Strips and Lancets 08/27/2018 Check Blood sugars daily and as needed DX e11.9   Lexapro 5 mg oral tablet 03/18/2021 take 1 tablet (5 mg) by oral route once daily for 90 days   Nexium 40 mg oral capsule,delayed release(/EC) 03/18/2021 take 1 capsule by oral route daily for 90 days   quinapril 10 mg oral tablet 03/18/2021 take 1 tablet by oral route 2 times a day for 90 days   Syringe 3cc/25Gx1\" 3 mL 25 gauge x 1\" miscellaneous syringe 03/18/2021 use as directed with b12 monthly dx E53.8   tramadol 50 mg oral tablet 03/18/2021 take 1 tablet (50 mg) by oral route every 6 hours as needed   Tylenol 325 mg Oral Tablet  take 1 - 2 tablets (325 - 650 mg) by oral route every 4-6 hours as needed   Vitamin C 250 mg oral tablet "  take 1 tablet by oral route daily   Vitamin D2 1,250 mcg (50,000 unit) oral capsule 03/18/2021 take 1 capsule by oral route daily   Zinc Plus 10-1-13.3 mg-mcg-mg oral lozenge  dissolve 1 lozenge by oral route daily         Allergy List  Allergen Name Date Reaction Notes   Avelox --  --  --    morphine --  --  --    PENICILLINS --  --  --    scopolamine base 09/2016 throat sweeling --    Shell Fish (shrimp, crayfish, lobster, crab) --  --  --    SULFA (SULFONAMIDES) --  --  --        Allergies Reconciled  Family Medical History  Disease Name Relative/Age Notes   Lung Neoplasm, Malignant  --    Skin Carcinoma In Situ  --    Heart Disease Mother/   --    Congestive Heart Failure Mother/   --          Social History  Finding Status Start/Stop Quantity Notes   Alcohol Never --/-- --  --    Assessed for Abuse/Neglect --  --/-- --  --     --  --/-- --  --    Other Substance Use Never --/-- --  --    Tobacco Former --/-- --  years ago quit 28 yrs ago         Immunizations  NameDate Admin Mfg Trade Name Lot Number Route Inj VIS Given VIS Publication   Owwylsjzj12/23/2020 SKB Fluarix, quadrivalent, preservative free [id number] [facility] 09/23/2020    Comments:    Cydlquawv0832/23/2020 NE Not Entered [id number]  LD 09/23/2020    Comments:    Prevnar 1311/14/2017 NE Not Entered  NE NE 11/14/2017    Comments: [facility]   Tdap06/22/2016 SKB BOOSTRIX [id number] [facility] 06/22/2016 01/24/2012   Comments:          Review of Systems  · Constitutional  o Denies  o : fever, fatigue, weight loss, weight gain  · HENT  o Denies  o : headaches, vertigo, lightheadedness  · Cardiovascular  o Denies  o : lower extremity edema, claudication, chest pressure, palpitations  · Respiratory  o Denies  o : shortness of breath, wheezing, cough, hemoptysis, dyspnea on exertion  · Gastrointestinal  o Denies  o : nausea, vomiting, diarrhea, constipation, abdominal pain  · Integument  o Denies  o : rash, itching  · Psychiatric  o Admits  o : anxiety,  depression  o Denies  o : suicidal ideation, homicidal ideation      Vitals  Date Time BP Position Site L\R Cuff Size HR RR TEMP (F) WT  HT  BMI kg/m2 BSA m2 O2 Sat FR L/min FiO2 HC       03/18/2021 08:12 /60 Sitting    78 - R 12 97.6 159lbs 6oz 5'   31.13 1.75 99 %      03/18/2021 08:51 /72 Sitting                       Physical Examination  · Constitutional  o Appearance  o : well developed, well-nourished, no acute distress  · Neck  o Inspection/Palpation  o : normal appearance, no masses or tenderness, trachea midline  o Thyroid  o : gland size normal, nontender, no nodules or masses present on palpation  · Respiratory  o Respiratory Effort  o : breathing unlabored  o Inspection of Chest  o : chest rise symmetric bilaterally  o Auscultation of Lungs  o : clear to auscultation bilaterally throughout inspiration and expiration  · Cardiovascular  o Heart  o :   § Auscultation of Heart  § : regular rate and rhythm, no murmurs, gallops or rubs  o Peripheral Vascular System  o :   § Femoral Arteries  § : normal femoral pulses, no bruits present  § Pedal Pulses  § : bilateral pulse strength 2+  § Extremities  § : no edema  · Psychiatric  o Mood and Affect  o : mood depressed  o Presence of Abnormal Thoughts  o : no hallucinations, no homicidal ideation, no suicidal ideation          Results  · In-Office Procedures  o Lab procedure  § IOP - Urine Drug Screen In-House [facility] (43168)   § Amphetamines Ur Ql: Negative   § Barbiturates Ur Ql: Negative   § Buprenorphine+Nor Ur Ql Scn: Negative   § Benzodiaz Ur Ql: Negative   § Cocaine Ur Ql: Negative   § Methadone Ur Ql: Negative   § Methamphet Ur Ql: Negative   § MDMA Ur Ql Scn: Negative   § Opiates Ur Ql: Negative   § Oxycodone Ur Ql: Negative   § PCP Ur Ql: Negative   § THC Ur Ql: Negative   § Temp in Range?: Within/Acceptable   § Control Seen?: Yes       Assessment  · Allergic rhinitis due to allergen     477.9/J30.9  · Anxiety  disorder     300.00/F41.9  · Diabetes mellitus, type 2     250.00/E11.9  · Essential hypertension     401.9/I10  · GERD (gastroesophageal reflux disease)     530.81/K21.9  · Hyperlipidemia     272.4/E78.5  · Major depressive disorder     296.20/F32.2  · Osteoarthritis     715.90/M19.90  · Vitamin D deficiency     268.9/E55.9  · Family history of heart attack     V17.3/Z82.49  · B12 deficiency     266.2/E53.8      Plan  · Orders  o Urine microalbumin (36733) - 250.00/E11.9 - 03/18/2021  o CBC with Auto Diff [facility] (21058) - - 03/18/2021  o Vitamin D (25-Hydroxy) Level (70493) - - 03/18/2021  o AMINAH Report (KASPR) - - 03/18/2021  o ACO-39: Current medications updated and reviewed () - - 03/18/2021  o ACO-15: Pneumococcal Vaccine Administered or Previously Received (4040F) - - 03/18/2021  o ACO-14: Influenza immunization administered or previously received () - - 03/18/2021  o ACO-13: Fall Risk Screening with no falls in past year or only one fall without injury in the past year (1101F) - - 03/18/2021  o B12 Folate levels (B12FO) - - 03/18/2021  o Iron Profile (Iron 53036 TIBC 17290 and Transferrin 25490) (IRONP) - - 03/18/2021  o Diabetes 1 Panel (CMP, Lipid, A1c) [facility] (29635, 15190, 44691) - - 03/18/2021  · Medications  o atorvastatin 10 mg oral tablet   SIG: TAKE ONE TABLET BY MOUTH EVERY NIGHT AT BEDTIME   DISP: (90) Each with 1 refills  Refilled on 03/18/2021     o Celebrex 100 mg oral capsule   SIG: take 1 capsule (100 mg) by oral route 2 times per day for 90 days   DISP: (180) Capsule with 1 refills  Refilled on 03/18/2021     o cetirizine 10 mg oral tablet   SIG: TAKE ONE TABLET BY MOUTH ONCE DAILY for 90 days   DISP: (90) Tablet with 1 refills  Refilled on 03/18/2021     o cyanocobalamin (vitamin B-12) 1,000 mcg/mL injection solution   SIG: inject 1 milliliter (1,000 mcg) by intramuscular route once a month   DISP: (3) Vial with 1 refills  Refilled on 03/18/2021     o fluticasone propionate 50  "mcg/actuation nasal spray,suspension   SIG: spray 2 sprays (100 mcg) in each nostril by intranasal route once daily as needed   DISP: (3) Gram with 1 refills  Refilled on 03/18/2021     o Lexapro 5 mg oral tablet   SIG: take 1 tablet (5 mg) by oral route once daily for 90 days   DISP: (90) Tablet with 1 refills  Refilled on 03/18/2021     o Nexium 40 mg oral capsule,delayed release(DR/EC)   SIG: take 1 capsule by oral route daily for 90 days   DISP: (90) Capsule with 1 refills  Refilled on 03/18/2021     o quinapril 10 mg oral tablet   SIG: take 1 tablet by oral route 2 times a day for 90 days   DISP: (180) Each with 1 refills  Refilled on 03/18/2021     o Syringe 3cc/25Gx1\" 3 mL 25 gauge x 1\" miscellaneous syringe   SIG: use as directed with b12 monthly dx E53.8   DISP: (1) Box with 0 refills  Refilled on 03/18/2021     o tramadol 50 mg oral tablet   SIG: take 1 tablet (50 mg) by oral route every 6 hours as needed   DISP: (60) Tablet with 0 refills  Refilled on 03/18/2021     o Vitamin D2 1,250 mcg (50,000 unit) oral capsule   SIG: take 1 capsule by oral route daily   DISP: (13) Capsule with 1 refills  Refilled on 03/18/2021     o Medications have been Reconciled  o Transition of Care or Provider Policy  · Instructions  o Discussed the need for therapy, either with a certified counselor, psychologist, and/or family . If no improvement is noted or worsening of their condition, return to office or ER. But also discussed with patient that if they are non-responsive to the type of medication they may need to see a psychiatrist for further evaluation and management.  o Patient was given an SSRI/SSNRI medication and warned of possible side effects of the medication including potential for increased risk of suicidal thoughts and feelings. Patient was instructed that if they begin to exhibit any of these effects they will discontinue the medication immediately and contact our office or the ER ASAP.  o Patient " "agrees to a \"No Self Harm\" contract. Patient will either call us, 1, , [facility], [facility].  o Patient advised to monitor blood pressure (B/P) at home and journal readings. Patient informed that a B/P reading at home of more than 130/80 is considered hypertension. For readings greater [id number]/90 or higher patient is advised to follow up in the office with readings for management. Patient advised to limit sodium intake.  o Advised that cheeses and other sources of dairy fats, animal fats, fast food, and the extras (candy, pastries, pies, doughnuts and cookies) all contain LDL raising nutrients. Advised to increase fruits, vegetables, whole grains, and to monitor portion sizes.   o Obtained a written consent for AMINAH query. Discussed the risk and benefits of the use of controlled substances with the patient, including the risk of tolerance and drug dependence. The patient has been counseled on the need to have an exit strategy, including potentially discontinuing the use of controlled substances. AMINAH has or will be reviewed as soon as it becomes avaliable.  o Take all medications as prescribed/directed.  o Patient was educated/instructed on their diagnosis, treatment and medications prior to discharge from the clinic today.  o Patient instructed to seek medical attention urgently for new or worsening symptoms.  o Call the office with any concerns or questions.  o We will do labs today. We will f.u in 6 months unless labs are needed. Discussed about family and to take care of her self. I fill the tramadol.             Electronically Signed by: LOYD Nolen -Author on March 18, 2021 09:01:53 AM  "

## 2021-05-15 VITALS
HEART RATE: 68 BPM | WEIGHT: 157 LBS | DIASTOLIC BLOOD PRESSURE: 108 MMHG | HEIGHT: 60 IN | BODY MASS INDEX: 30.82 KG/M2 | SYSTOLIC BLOOD PRESSURE: 168 MMHG

## 2021-05-15 VITALS
SYSTOLIC BLOOD PRESSURE: 149 MMHG | WEIGHT: 154.19 LBS | OXYGEN SATURATION: 97 % | HEIGHT: 60 IN | TEMPERATURE: 98.1 F | BODY MASS INDEX: 30.27 KG/M2 | RESPIRATION RATE: 16 BRPM | HEART RATE: 79 BPM | DIASTOLIC BLOOD PRESSURE: 74 MMHG

## 2021-05-15 VITALS
HEIGHT: 60 IN | OXYGEN SATURATION: 99 % | WEIGHT: 152.06 LBS | TEMPERATURE: 98.2 F | DIASTOLIC BLOOD PRESSURE: 73 MMHG | SYSTOLIC BLOOD PRESSURE: 148 MMHG | BODY MASS INDEX: 29.85 KG/M2 | RESPIRATION RATE: 12 BRPM | HEART RATE: 72 BPM

## 2021-05-15 VITALS
SYSTOLIC BLOOD PRESSURE: 133 MMHG | HEIGHT: 60 IN | BODY MASS INDEX: 29.91 KG/M2 | HEART RATE: 76 BPM | RESPIRATION RATE: 12 BRPM | DIASTOLIC BLOOD PRESSURE: 72 MMHG | TEMPERATURE: 97.3 F | WEIGHT: 152.37 LBS | OXYGEN SATURATION: 99 %

## 2021-05-15 VITALS
DIASTOLIC BLOOD PRESSURE: 68 MMHG | TEMPERATURE: 97.4 F | WEIGHT: 156.19 LBS | HEIGHT: 60 IN | SYSTOLIC BLOOD PRESSURE: 138 MMHG | HEART RATE: 76 BPM | DIASTOLIC BLOOD PRESSURE: 80 MMHG | RESPIRATION RATE: 18 BRPM | OXYGEN SATURATION: 100 % | BODY MASS INDEX: 30.67 KG/M2 | SYSTOLIC BLOOD PRESSURE: 161 MMHG

## 2021-05-16 VITALS
HEART RATE: 73 BPM | SYSTOLIC BLOOD PRESSURE: 140 MMHG | RESPIRATION RATE: 12 BRPM | DIASTOLIC BLOOD PRESSURE: 79 MMHG | OXYGEN SATURATION: 99 % | SYSTOLIC BLOOD PRESSURE: 153 MMHG | TEMPERATURE: 97.8 F | HEIGHT: 60 IN | DIASTOLIC BLOOD PRESSURE: 80 MMHG | BODY MASS INDEX: 33.29 KG/M2 | WEIGHT: 169.56 LBS

## 2021-05-16 VITALS
OXYGEN SATURATION: 97 % | TEMPERATURE: 97.9 F | RESPIRATION RATE: 12 BRPM | HEART RATE: 73 BPM | WEIGHT: 150.44 LBS | SYSTOLIC BLOOD PRESSURE: 153 MMHG | DIASTOLIC BLOOD PRESSURE: 71 MMHG | BODY MASS INDEX: 29.54 KG/M2 | HEIGHT: 60 IN

## 2021-05-16 VITALS
SYSTOLIC BLOOD PRESSURE: 119 MMHG | RESPIRATION RATE: 14 BRPM | WEIGHT: 153.5 LBS | TEMPERATURE: 97.8 F | BODY MASS INDEX: 30.14 KG/M2 | OXYGEN SATURATION: 97 % | HEART RATE: 72 BPM | DIASTOLIC BLOOD PRESSURE: 66 MMHG | HEIGHT: 60 IN

## 2021-05-16 VITALS
HEIGHT: 60 IN | SYSTOLIC BLOOD PRESSURE: 132 MMHG | HEART RATE: 68 BPM | TEMPERATURE: 97.2 F | RESPIRATION RATE: 12 BRPM | BODY MASS INDEX: 29.54 KG/M2 | OXYGEN SATURATION: 99 % | WEIGHT: 150.44 LBS | DIASTOLIC BLOOD PRESSURE: 74 MMHG

## 2021-05-16 VITALS
RESPIRATION RATE: 12 BRPM | DIASTOLIC BLOOD PRESSURE: 72 MMHG | HEART RATE: 61 BPM | OXYGEN SATURATION: 98 % | WEIGHT: 153 LBS | BODY MASS INDEX: 30.04 KG/M2 | TEMPERATURE: 96.6 F | HEIGHT: 60 IN | SYSTOLIC BLOOD PRESSURE: 157 MMHG

## 2021-05-16 VITALS
DIASTOLIC BLOOD PRESSURE: 72 MMHG | HEIGHT: 60 IN | RESPIRATION RATE: 12 BRPM | SYSTOLIC BLOOD PRESSURE: 132 MMHG | WEIGHT: 168 LBS | BODY MASS INDEX: 32.98 KG/M2 | HEART RATE: 75 BPM | OXYGEN SATURATION: 98 % | TEMPERATURE: 98.2 F

## 2021-08-30 RX ORDER — ESOMEPRAZOLE MAGNESIUM 40 MG/1
CAPSULE, DELAYED RELEASE ORAL
Qty: 90 CAPSULE | Refills: 0 | Status: SHIPPED | OUTPATIENT
Start: 2021-08-30 | End: 2021-10-04 | Stop reason: SDUPTHER

## 2021-10-04 ENCOUNTER — OFFICE VISIT (OUTPATIENT)
Dept: FAMILY MEDICINE CLINIC | Facility: CLINIC | Age: 71
End: 2021-10-04

## 2021-10-04 VITALS
DIASTOLIC BLOOD PRESSURE: 78 MMHG | OXYGEN SATURATION: 98 % | WEIGHT: 159 LBS | BODY MASS INDEX: 31.22 KG/M2 | HEART RATE: 73 BPM | RESPIRATION RATE: 20 BRPM | TEMPERATURE: 96.6 F | HEIGHT: 60 IN | SYSTOLIC BLOOD PRESSURE: 130 MMHG

## 2021-10-04 DIAGNOSIS — J30.9 ALLERGIC RHINITIS, UNSPECIFIED SEASONALITY, UNSPECIFIED TRIGGER: ICD-10-CM

## 2021-10-04 DIAGNOSIS — I10 ESSENTIAL HYPERTENSION: ICD-10-CM

## 2021-10-04 DIAGNOSIS — E78.2 MIXED HYPERLIPIDEMIA: ICD-10-CM

## 2021-10-04 DIAGNOSIS — Z51.81 ENCOUNTER FOR THERAPEUTIC DRUG LEVEL MONITORING: ICD-10-CM

## 2021-10-04 DIAGNOSIS — D50.8 OTHER IRON DEFICIENCY ANEMIA: ICD-10-CM

## 2021-10-04 DIAGNOSIS — Z12.31 VISIT FOR SCREENING MAMMOGRAM: ICD-10-CM

## 2021-10-04 DIAGNOSIS — F33.0 MILD EPISODE OF RECURRENT MAJOR DEPRESSIVE DISORDER (HCC): ICD-10-CM

## 2021-10-04 DIAGNOSIS — Z12.31 ENCOUNTER FOR SCREENING MAMMOGRAM FOR MALIGNANT NEOPLASM OF BREAST: ICD-10-CM

## 2021-10-04 DIAGNOSIS — K21.9 GASTROESOPHAGEAL REFLUX DISEASE WITHOUT ESOPHAGITIS: ICD-10-CM

## 2021-10-04 DIAGNOSIS — E53.8 B12 DEFICIENCY: ICD-10-CM

## 2021-10-04 DIAGNOSIS — M19.90 OSTEOARTHRITIS, UNSPECIFIED OSTEOARTHRITIS TYPE, UNSPECIFIED SITE: ICD-10-CM

## 2021-10-04 DIAGNOSIS — Z86.39 HISTORY OF GRAVES' DISEASE: ICD-10-CM

## 2021-10-04 DIAGNOSIS — R73.09 ELEVATED GLUCOSE: Primary | ICD-10-CM

## 2021-10-04 DIAGNOSIS — F41.1 GENERALIZED ANXIETY DISORDER: ICD-10-CM

## 2021-10-04 DIAGNOSIS — E55.9 VITAMIN D DEFICIENCY: ICD-10-CM

## 2021-10-04 DIAGNOSIS — Z23 NEED FOR INFLUENZA VACCINATION: ICD-10-CM

## 2021-10-04 DIAGNOSIS — Z78.0 POSTMENOPAUSAL: ICD-10-CM

## 2021-10-04 PROBLEM — D64.9 ANEMIA: Status: ACTIVE | Noted: 2019-09-10

## 2021-10-04 PROBLEM — E78.5 HYPERLIPIDEMIA: Status: ACTIVE | Noted: 2019-01-03

## 2021-10-04 PROBLEM — F32.9 MAJOR DEPRESSIVE DISORDER: Status: ACTIVE | Noted: 2019-01-03

## 2021-10-04 PROBLEM — F41.9 ANXIETY DISORDER: Status: ACTIVE | Noted: 2020-06-09

## 2021-10-04 LAB
25(OH)D3 SERPL-MCNC: 41.2 NG/ML (ref 30–100)
ALBUMIN SERPL-MCNC: 4.5 G/DL (ref 3.5–5.2)
ALBUMIN/GLOB SERPL: 2 G/DL
ALP SERPL-CCNC: 65 U/L (ref 39–117)
ALT SERPL W P-5'-P-CCNC: 24 U/L (ref 1–33)
AMPHET+METHAMPHET UR QL: NEGATIVE
AMPHETAMINE INTERNAL CONTROL: NORMAL
AMPHETAMINES UR QL: NEGATIVE
ANION GAP SERPL CALCULATED.3IONS-SCNC: 12.4 MMOL/L (ref 5–15)
AST SERPL-CCNC: 24 U/L (ref 1–32)
BARBITURATE INTERNAL CONTROL: NORMAL
BARBITURATES UR QL SCN: NEGATIVE
BENZODIAZ UR QL SCN: NEGATIVE
BENZODIAZEPINE INTERNAL CONTROL: NORMAL
BILIRUB SERPL-MCNC: 0.8 MG/DL (ref 0–1.2)
BUN SERPL-MCNC: 11 MG/DL (ref 8–23)
BUN/CREAT SERPL: 12.5 (ref 7–25)
BUPRENORPHINE INTERNAL CONTROL: NORMAL
BUPRENORPHINE SERPL-MCNC: NEGATIVE NG/ML
CALCIUM SPEC-SCNC: 9.6 MG/DL (ref 8.6–10.5)
CANNABINOIDS SERPL QL: NEGATIVE
CHLORIDE SERPL-SCNC: 107 MMOL/L (ref 98–107)
CHOLEST SERPL-MCNC: 137 MG/DL (ref 0–200)
CO2 SERPL-SCNC: 24.6 MMOL/L (ref 22–29)
COCAINE INTERNAL CONTROL: NORMAL
COCAINE UR QL: NEGATIVE
CREAT SERPL-MCNC: 0.88 MG/DL (ref 0.57–1)
EXPIRATION DATE: NORMAL
FERRITIN SERPL-MCNC: 21.9 NG/ML (ref 13–150)
FOLATE SERPL-MCNC: 7.82 NG/ML (ref 4.78–24.2)
GFR SERPL CREATININE-BSD FRML MDRD: 64 ML/MIN/1.73
GLOBULIN UR ELPH-MCNC: 2.2 GM/DL
GLUCOSE SERPL-MCNC: 122 MG/DL (ref 65–99)
HBA1C MFR BLD: 5.7 % (ref 4.8–5.6)
HDLC SERPL-MCNC: 45 MG/DL (ref 40–60)
IRON 24H UR-MRATE: 100 MCG/DL (ref 37–145)
IRON SATN MFR SERPL: 23 % (ref 20–50)
LDLC SERPL CALC-MCNC: 69 MG/DL (ref 0–100)
LDLC/HDLC SERPL: 1.47 {RATIO}
Lab: NORMAL
MDMA (ECSTASY) INTERNAL CONTROL: NORMAL
MDMA UR QL SCN: NEGATIVE
METHADONE INTERNAL CONTROL: NORMAL
METHADONE UR QL SCN: NEGATIVE
METHAMPHETAMINE INTERNAL CONTROL: NORMAL
OPIATES INTERNAL CONTROL: NORMAL
OPIATES UR QL: NEGATIVE
OXYCODONE INTERNAL CONTROL: NORMAL
OXYCODONE UR QL SCN: NEGATIVE
PCP UR QL SCN: NEGATIVE
PHENCYCLIDINE INTERNAL CONTROL: NORMAL
POTASSIUM SERPL-SCNC: 4.3 MMOL/L (ref 3.5–5.2)
PROT SERPL-MCNC: 6.7 G/DL (ref 6–8.5)
SODIUM SERPL-SCNC: 144 MMOL/L (ref 136–145)
T4 FREE SERPL-MCNC: 1.32 NG/DL (ref 0.93–1.7)
THC INTERNAL CONTROL: NORMAL
TIBC SERPL-MCNC: 437 MCG/DL (ref 298–536)
TRANSFERRIN SERPL-MCNC: 293 MG/DL (ref 200–360)
TRIGL SERPL-MCNC: 129 MG/DL (ref 0–150)
TSH SERPL DL<=0.05 MIU/L-ACNC: 2.27 UIU/ML (ref 0.27–4.2)
VIT B12 BLD-MCNC: 257 PG/ML (ref 211–946)
VLDLC SERPL-MCNC: 23 MG/DL (ref 5–40)

## 2021-10-04 PROCEDURE — 82728 ASSAY OF FERRITIN: CPT | Performed by: NURSE PRACTITIONER

## 2021-10-04 PROCEDURE — 84443 ASSAY THYROID STIM HORMONE: CPT | Performed by: NURSE PRACTITIONER

## 2021-10-04 PROCEDURE — 83036 HEMOGLOBIN GLYCOSYLATED A1C: CPT | Performed by: NURSE PRACTITIONER

## 2021-10-04 PROCEDURE — G0008 ADMIN INFLUENZA VIRUS VAC: HCPCS | Performed by: NURSE PRACTITIONER

## 2021-10-04 PROCEDURE — 82607 VITAMIN B-12: CPT | Performed by: NURSE PRACTITIONER

## 2021-10-04 PROCEDURE — 80305 DRUG TEST PRSMV DIR OPT OBS: CPT | Performed by: NURSE PRACTITIONER

## 2021-10-04 PROCEDURE — 84466 ASSAY OF TRANSFERRIN: CPT | Performed by: NURSE PRACTITIONER

## 2021-10-04 PROCEDURE — 80053 COMPREHEN METABOLIC PANEL: CPT | Performed by: NURSE PRACTITIONER

## 2021-10-04 PROCEDURE — 80061 LIPID PANEL: CPT | Performed by: NURSE PRACTITIONER

## 2021-10-04 PROCEDURE — 1159F MED LIST DOCD IN RCRD: CPT | Performed by: NURSE PRACTITIONER

## 2021-10-04 PROCEDURE — G0439 PPPS, SUBSEQ VISIT: HCPCS | Performed by: NURSE PRACTITIONER

## 2021-10-04 PROCEDURE — 99214 OFFICE O/P EST MOD 30 MIN: CPT | Performed by: NURSE PRACTITIONER

## 2021-10-04 PROCEDURE — 83540 ASSAY OF IRON: CPT | Performed by: NURSE PRACTITIONER

## 2021-10-04 PROCEDURE — 82746 ASSAY OF FOLIC ACID SERUM: CPT | Performed by: NURSE PRACTITIONER

## 2021-10-04 PROCEDURE — 82306 VITAMIN D 25 HYDROXY: CPT | Performed by: NURSE PRACTITIONER

## 2021-10-04 PROCEDURE — 90662 IIV NO PRSV INCREASED AG IM: CPT | Performed by: NURSE PRACTITIONER

## 2021-10-04 PROCEDURE — 84439 ASSAY OF FREE THYROXINE: CPT | Performed by: NURSE PRACTITIONER

## 2021-10-04 PROCEDURE — 1126F AMNT PAIN NOTED NONE PRSNT: CPT | Performed by: NURSE PRACTITIONER

## 2021-10-04 RX ORDER — ERGOCALCIFEROL 1.25 MG/1
50000 CAPSULE ORAL WEEKLY
COMMUNITY
End: 2021-10-04 | Stop reason: SDUPTHER

## 2021-10-04 RX ORDER — QUINAPRIL 10 MG/1
10 TABLET ORAL DAILY
Qty: 90 TABLET | Refills: 1 | Status: SHIPPED | OUTPATIENT
Start: 2021-10-04 | End: 2021-11-29 | Stop reason: SDUPTHER

## 2021-10-04 RX ORDER — CELECOXIB 100 MG/1
100 CAPSULE ORAL 2 TIMES DAILY
Qty: 180 CAPSULE | Refills: 1 | Status: SHIPPED | OUTPATIENT
Start: 2021-10-04 | End: 2022-03-28 | Stop reason: SDUPTHER

## 2021-10-04 RX ORDER — ERGOCALCIFEROL 1.25 MG/1
50000 CAPSULE ORAL WEEKLY
Qty: 13 CAPSULE | Refills: 1 | Status: SHIPPED | OUTPATIENT
Start: 2021-10-04 | End: 2022-03-28 | Stop reason: SDUPTHER

## 2021-10-04 RX ORDER — CETIRIZINE HYDROCHLORIDE 10 MG/1
10 TABLET ORAL DAILY
COMMUNITY
End: 2021-10-04 | Stop reason: SDUPTHER

## 2021-10-04 RX ORDER — TRAMADOL HYDROCHLORIDE 50 MG/1
50 TABLET ORAL EVERY 6 HOURS PRN
Qty: 32 TABLET | Refills: 0 | Status: SHIPPED | OUTPATIENT
Start: 2021-10-04 | End: 2022-03-28 | Stop reason: SDUPTHER

## 2021-10-04 RX ORDER — FLUTICASONE PROPIONATE 50 MCG
2 SPRAY, SUSPENSION (ML) NASAL DAILY
COMMUNITY
End: 2021-10-04 | Stop reason: SDUPTHER

## 2021-10-04 RX ORDER — TRAMADOL HYDROCHLORIDE 50 MG/1
50 TABLET ORAL EVERY 6 HOURS PRN
COMMUNITY
End: 2021-10-04 | Stop reason: SDUPTHER

## 2021-10-04 RX ORDER — ESCITALOPRAM OXALATE 5 MG/1
TABLET ORAL
Qty: 90 TABLET | Refills: 1 | OUTPATIENT
Start: 2021-10-04

## 2021-10-04 RX ORDER — ESOMEPRAZOLE MAGNESIUM 40 MG/1
40 CAPSULE, DELAYED RELEASE ORAL DAILY
Qty: 90 CAPSULE | Refills: 1 | Status: SHIPPED | OUTPATIENT
Start: 2021-10-04 | End: 2022-03-28 | Stop reason: SDUPTHER

## 2021-10-04 RX ORDER — ATORVASTATIN CALCIUM 10 MG/1
10 TABLET, FILM COATED ORAL DAILY
Qty: 90 TABLET | Refills: 1 | Status: SHIPPED | OUTPATIENT
Start: 2021-10-04 | End: 2022-03-28 | Stop reason: SDUPTHER

## 2021-10-04 RX ORDER — FLUTICASONE PROPIONATE 50 MCG
2 SPRAY, SUSPENSION (ML) NASAL DAILY
Qty: 16 G | Refills: 5 | Status: SHIPPED | OUTPATIENT
Start: 2021-10-04 | End: 2022-03-29

## 2021-10-04 RX ORDER — CETIRIZINE HYDROCHLORIDE 10 MG/1
10 TABLET ORAL DAILY
Qty: 90 TABLET | Refills: 1 | Status: SHIPPED | OUTPATIENT
Start: 2021-10-04 | End: 2022-03-28 | Stop reason: SDUPTHER

## 2021-10-04 RX ORDER — QUINAPRIL 10 MG/1
10 TABLET ORAL NIGHTLY
COMMUNITY
End: 2021-10-04 | Stop reason: SDUPTHER

## 2021-10-04 RX ORDER — ATORVASTATIN CALCIUM 10 MG/1
10 TABLET, FILM COATED ORAL DAILY
COMMUNITY
End: 2021-10-04 | Stop reason: SDUPTHER

## 2021-10-04 RX ORDER — CELECOXIB 100 MG/1
CAPSULE ORAL
COMMUNITY
End: 2021-10-04 | Stop reason: SDUPTHER

## 2021-10-04 NOTE — PROGRESS NOTES
The ABCs of the Annual Wellness Visit  Subsequent Medicare Wellness Visit    Chief Complaint   Patient presents with   • Hypertension   • Hyperlipidemia   • Depression      Subjective    History of Present Illness:  Qiana Altman is a 71 y.o. female who presents for a Subsequent Medicare Wellness Visit.    The following portions of the patient's history were reviewed and   updated as appropriate: allergies, current medications, past family history, past medical history, past social history, past surgical history and problem list.    Compared to one year ago, the patient feels her physical   health is the same.    Compared to one year ago, the patient feels her mental   health is the same.    Recent Hospitalizations:  She was not admitted to the hospital during the last year.       Current Medical Providers:  Patient Care Team:  Zohreh Mcduffie APRN as PCP - General (Nurse Practitioner)    Outpatient Medications Prior to Visit   Medication Sig Dispense Refill   • atorvastatin (LIPITOR) 10 MG tablet Take 10 mg by mouth Daily.     • celecoxib (CeleBREX) 100 MG capsule Celebrex 100 mg oral capsule take 1 capsule by oral route daily for 30 days   Suspended     • cetirizine (zyrTEC) 10 MG tablet Take 10 mg by mouth Daily.     • esomeprazole (nexIUM) 20 MG capsule Nexium 20 mg oral capsule,delayed release(DR/EC) take 1 capsule (20 mg) by oral route once daily at least 1 hour before a meal swallowing whole. Do not crush or chew granules.   Suspended     • esomeprazole (nexIUM) 40 MG capsule TAKE ONE CAPSULE BY MOUTH ONCE DAILY 90 capsule 0   • fluticasone (FLONASE) 50 MCG/ACT nasal spray 2 sprays into the nostril(s) as directed by provider Daily.     • quinapril (ACCUPRIL) 10 MG tablet Take 10 mg by mouth Every Night.     • traMADol (ULTRAM) 50 MG tablet Take 50 mg by mouth Every 6 (Six) Hours As Needed.     • vitamin D (ERGOCALCIFEROL) 1.25 MG (87789 UT) capsule capsule Take 50,000 Units by mouth 1 (One) Time Per  "Week.       No facility-administered medications prior to visit.       Opioid medication/s are on active medication list.  and I have evaluated her active treatment plan and pain score trends (see table).  There were no vitals filed for this visit.  I have reviewed the chart for potential of high risk medication and harmful drug interactions in the elderly.            Aspirin is not on active medication list.  Aspirin use is contraindicated for this patient due to: current NSAID therapy.  .    Patient Active Problem List   Diagnosis   • Allergic rhinitis due to allergen   • Essential hypertension   • GERD (gastroesophageal reflux disease)   • History of Graves' disease   • Hyperlipidemia   • Major depressive disorder   • Vitamin D deficiency   • Osteoarthritis   • B12 deficiency   • Anxiety disorder   • Anemia     Advance Care Planning  Advance Directive is not on file.  ACP discussion was declined by the patient. Patient does not have an advance directive, information provided.          Objective    Vitals:    10/04/21 0738 10/04/21 0752   BP: 157/72 130/78   Pulse: 73    Resp: 20    Temp: 96.6 °F (35.9 °C)    SpO2: 98%    Weight: 72.1 kg (159 lb)    Height: 152.4 cm (60\")      BMI Readings from Last 1 Encounters:   10/04/21 31.05 kg/m²   BMI is above normal parameters. Recommendations include: nutrition counseling    Does the patient have evidence of cognitive impairment? No    Physical Exam  Vitals reviewed.   Constitutional:       Appearance: Normal appearance. She is well-developed.   Cardiovascular:      Rate and Rhythm: Normal rate and regular rhythm.      Heart sounds: Normal heart sounds. No murmur heard.     Pulmonary:      Effort: Pulmonary effort is normal.      Breath sounds: Normal breath sounds.   Neurological:      Mental Status: She is alert and oriented to person, place, and time.      Cranial Nerves: No cranial nerve deficit.      Motor: No weakness.   Psychiatric:         Mood and Affect: Mood " and affect normal.                 HEALTH RISK ASSESSMENT    Smoking Status:  Social History     Tobacco Use   Smoking Status Former Smoker   • Packs/day: 1.00   • Years: 5.00   • Pack years: 5.00   • Types: Cigarettes   • Start date:    • Quit date:    • Years since quittin.7   Smokeless Tobacco Never Used   Tobacco Comment    QUIT 28 YEARS AGO     Alcohol Consumption:  Social History     Substance and Sexual Activity   Alcohol Use Never     Fall Risk Screen:    VANIA Fall Risk Assessment was completed, and patient is at LOW risk for falls.Assessment completed on:10/4/2021    Depression Screening:  PHQ-2/PHQ-9 Depression Screening 10/4/2021   Little interest or pleasure in doing things 0   Feeling down, depressed, or hopeless 0   Total Score 0       Health Habits and Functional and Cognitive Screening:  Functional & Cognitive Status 10/4/2021   Do you have difficulty preparing food and eating? No   Do you have difficulty bathing yourself, getting dressed or grooming yourself? No   Do you have difficulty using the toilet? No   Do you have difficulty moving around from place to place? No   Do you have trouble with steps or getting out of a bed or a chair? No   Current Diet Well Balanced Diet   Dental Exam Not up to date   Eye Exam Not up to date   Exercise (times per week) 5 times per week   Current Exercises Include Walking;House Cleaning   Do you need help using the phone?  No   Are you deaf or do you have serious difficulty hearing?  No   Do you need help with transportation? No   Do you need help shopping? No   Do you need help preparing meals?  No   Do you need help with housework?  No   Do you need help with laundry? No   Do you need help taking your medications? No   Do you need help managing money? No   Do you ever drive or ride in a car without wearing a seat belt? No   Have you felt unusual stress, anger or loneliness in the last month? Yes   Who do you live with? Child   If you need help, do  you have trouble finding someone available to you? No   Have you been bothered in the last four weeks by sexual problems? No       Age-appropriate Screening Schedule:  Refer to the list below for future screening recommendations based on patient's age, sex and/or medical conditions. Orders for these recommended tests are listed in the plan section. The patient has been provided with a written plan.    Health Maintenance   Topic Date Due   • ZOSTER VACCINE (1 of 2) Never done   • DXA SCAN  07/14/2019   • MAMMOGRAM  11/19/2020   • LIPID PANEL  10/04/2021   • INFLUENZA VACCINE  10/01/2021   • TDAP/TD VACCINES (2 - Td or Tdap) 06/22/2026              Assessment/Plan   CMS Preventative Services Quick Reference  Risk Factors Identified During Encounter  Dementia/Memory   Fall Risk-High or Moderate   Immunization--flu, shingles  The above risks/problems have been discussed with the patient.  Follow up actions/plans if indicated are seen below in the Assessment/Plan Section.  Pertinent information has been shared with the patient in the After Visit Summary.    Diagnoses and all orders for this visit:    1. Elevated glucose (Primary)  -     Hemoglobin A1c    2. Essential hypertension  -     quinapril (ACCUPRIL) 10 MG tablet; Take 1 tablet by mouth Daily.  Dispense: 90 tablet; Refill: 1  -     Comprehensive Metabolic Panel  -     CBC & Differential    3. Mixed hyperlipidemia  -     atorvastatin (LIPITOR) 10 MG tablet; Take 1 tablet by mouth Daily.  Dispense: 90 tablet; Refill: 1  -     Comprehensive Metabolic Panel  -     CBC & Differential  -     Lipid Panel    4. History of Graves' disease  -     TSH  -     T4, Free    5. Generalized anxiety disorder    6. Vitamin D deficiency  -     vitamin D (ERGOCALCIFEROL) 1.25 MG (84337 UT) capsule capsule; Take 1 capsule by mouth 1 (One) Time Per Week.  Dispense: 13 capsule; Refill: 1  -     Vitamin D 25 Hydroxy    7. Gastroesophageal reflux disease without esophagitis  -      esomeprazole (nexIUM) 40 MG capsule; Take 1 capsule by mouth Daily.  Dispense: 90 capsule; Refill: 1    8. Mild episode of recurrent major depressive disorder (HCC)    9. Osteoarthritis, unspecified osteoarthritis type, unspecified site  -     celecoxib (CeleBREX) 100 MG capsule; Take 1 capsule by mouth 2 (Two) Times a Day.  Dispense: 180 capsule; Refill: 1  -     traMADol (ULTRAM) 50 MG tablet; Take 1 tablet by mouth Every 6 (Six) Hours As Needed for Moderate Pain  or Severe Pain .  Dispense: 32 tablet; Refill: 0  -     POC Urine Drug Screen [facility]    10. B12 deficiency  -     Vitamin B12 & Folate    11. Allergic rhinitis, unspecified seasonality, unspecified trigger  -     fluticasone (FLONASE) 50 MCG/ACT nasal spray; 2 sprays into the nostril(s) as directed by provider Daily.  Dispense: 16 g; Refill: 5  -     cetirizine (zyrTEC) 10 MG tablet; Take 1 tablet by mouth Daily.  Dispense: 90 tablet; Refill: 1    12. Postmenopausal  -     DEXA Bone Density Axial; Future    13. Need for influenza vaccination  -     Fluzone High-Dose 65+yrs (6013-8034)    14. Encounter for therapeutic drug level monitoring   -     POC Urine Drug Screen [facility]    15. Other iron deficiency anemia  -     Iron Profile  -     Ferritin    16. Visit for screening mammogram    Other orders  -     Cancel: Mammo Screening Bilateral With CAD; Future        Follow Up:   Return in about 6 months (around 4/4/2022).     An After Visit Summary and PPPS were made available to the patient.               Chief Complaint  Hypertension, Hyperlipidemia, and Depression    Subjective    History of Present Illness    Qiana A Agapito presents for Annual Wellness Visit as well as for follow-up on chronic medical problems including hypertension, hyperlipidemia, allergic rhinitis, depression, GERD, arthritis and VIDAL, hx of graves disease, b12 and d def..   LIPID:  She is doing good overall with lipitor.  She has had 3 family members with heart  attack (mom, 2 brothers)  HTN- She is doing ok but her quinapril and no issues.  Arthritis: She is take her tramadol only as needed but does take her celebrex daily. She is taking tylenol--she is having lower back pain on and off.  She has to watch otherwise she will aggrevate the leg.  b12 def and anemia- on iron supplements and b12 month (her daughter give it to her) but has not had it for about 1 month  GERD:  She is taking her nexium and is doing good.  Allergies:  She is taking her zyrtec and flonase.  Anxiety:  She is doing ok.  She is not having any SI/HI.  She has her ups and downs but is doing better.   Graves disease--she used to take med but no issues recently (has issues with eyes)    Past Medical History:   Diagnosis Date   • Allergic rhinitis due to allergen 09/10/2019   • Anemia 09/10/2019   • Anxiety disorder 2020   • Arthritis    • B12 deficiency 2020   • Depression    • Diverticulosis    • Essential hypertension 2019   • GERD (gastroesophageal reflux disease) 2019   • Graves' disease    • History of Graves' disease 10/01/2020   • HLD (hyperlipidemia) 2019   • Major depressive disorder 2019   • Osteoarthritis 2020   • T2DM (type 2 diabetes mellitus) (HCC) 2019   • Vitamin D deficiency 2019     Past Surgical History:   Procedure Laterality Date   • APPENDECTOMY     •  SECTION     • CHOLECYSTECTOMY  2016    DR SCALES   • HIP FRACTURE SURGERY Left     REPAIR   • HIP SURGERY     • OOPHORECTOMY     • TUBAL ABDOMINAL LIGATION       Family History   Problem Relation Age of Onset   • Heart disease Mother    • Heart failure Mother    • Lung cancer Other    • Skin cancer Other      Social History     Tobacco Use   • Smoking status: Former Smoker     Packs/day: 1.00     Years: 5.00     Pack years: 5.00     Types: Cigarettes     Start date:      Quit date:      Years since quittin.7   • Smokeless tobacco: Never Used   • Tobacco  "comment: QUIT 28 YEARS AGO   Substance Use Topics   • Alcohol use: Never     Vitals:    10/04/21 0738 10/04/21 0752   BP: 157/72 130/78   Pulse: 73    Resp: 20    Temp: 96.6 °F (35.9 °C)    SpO2: 98%    Weight: 72.1 kg (159 lb)    Height: 152.4 cm (60\")      Body mass index is 31.05 kg/m².    Result Review :     Common labs    Common Labsle 3/18/21 3/18/21    0855 0855   Glucose  117 (A)   BUN  12   Creatinine  0.99 (A)   Sodium  139   Potassium  4.2   Chloride  106   Calcium  9.9   Albumin  3.9   Total Bilirubin  0.61   Alkaline Phosphatase  66   AST (SGOT)  23   ALT (SGPT)  23   WBC 4.80    Hemoglobin 12.6    Hematocrit 40.9    Platelets 247    Total Cholesterol  133   Triglycerides  96   HDL Cholesterol  46   LDL Cholesterol   68 (A)   Hemoglobin A1C  5.9 (A)   (A) Abnormal value       Comments are available for some flowsheets but are not being displayed.                  Follow-up in 6 months for labs and appt. Call with any concerns or questions that you may have regarding your medications or history.    I have reviewed all medications and at this time no medications changes need to be adjusted for all chronic conditions.  We will see what her b12 level is and if it is ok we will do oral vit b12.    Zohreh Mcduffie, LOYD      "

## 2021-10-19 ENCOUNTER — HOSPITAL ENCOUNTER (OUTPATIENT)
Dept: MAMMOGRAPHY | Facility: HOSPITAL | Age: 71
Discharge: HOME | End: 2021-10-19
Admitting: NURSE PRACTITIONER

## 2021-10-19 PROCEDURE — 77063 BREAST TOMOSYNTHESIS BI: CPT

## 2021-10-19 PROCEDURE — 77067 SCR MAMMO BI INCL CAD: CPT

## 2021-11-03 DIAGNOSIS — M19.90 OSTEOARTHRITIS, UNSPECIFIED OSTEOARTHRITIS TYPE, UNSPECIFIED SITE: ICD-10-CM

## 2021-11-03 RX ORDER — TRAMADOL HYDROCHLORIDE 50 MG/1
TABLET ORAL
Qty: 32 TABLET | Refills: 0 | OUTPATIENT
Start: 2021-11-03

## 2021-11-29 DIAGNOSIS — I10 ESSENTIAL HYPERTENSION: ICD-10-CM

## 2021-11-29 RX ORDER — QUINAPRIL 10 MG/1
10 TABLET ORAL 2 TIMES DAILY
Qty: 180 TABLET | Refills: 1 | Status: SHIPPED | OUTPATIENT
Start: 2021-11-29 | End: 2022-03-28 | Stop reason: SDUPTHER

## 2022-01-13 ENCOUNTER — APPOINTMENT (OUTPATIENT)
Dept: BONE DENSITY | Facility: HOSPITAL | Age: 72
End: 2022-01-13

## 2022-03-28 ENCOUNTER — OFFICE VISIT (OUTPATIENT)
Dept: FAMILY MEDICINE CLINIC | Facility: CLINIC | Age: 72
End: 2022-03-28

## 2022-03-28 VITALS
WEIGHT: 157.7 LBS | SYSTOLIC BLOOD PRESSURE: 160 MMHG | OXYGEN SATURATION: 98 % | BODY MASS INDEX: 30.96 KG/M2 | TEMPERATURE: 97.5 F | DIASTOLIC BLOOD PRESSURE: 78 MMHG | RESPIRATION RATE: 16 BRPM | HEART RATE: 66 BPM | HEIGHT: 60 IN

## 2022-03-28 DIAGNOSIS — D50.9 IRON DEFICIENCY ANEMIA, UNSPECIFIED IRON DEFICIENCY ANEMIA TYPE: ICD-10-CM

## 2022-03-28 DIAGNOSIS — Z51.81 ENCOUNTER FOR MEDICATION MONITORING: ICD-10-CM

## 2022-03-28 DIAGNOSIS — E78.2 MIXED HYPERLIPIDEMIA: ICD-10-CM

## 2022-03-28 DIAGNOSIS — I10 ESSENTIAL HYPERTENSION: Primary | ICD-10-CM

## 2022-03-28 DIAGNOSIS — Z86.39 HISTORY OF GRAVES' DISEASE: ICD-10-CM

## 2022-03-28 DIAGNOSIS — K21.9 GASTROESOPHAGEAL REFLUX DISEASE WITHOUT ESOPHAGITIS: ICD-10-CM

## 2022-03-28 DIAGNOSIS — M19.90 OSTEOARTHRITIS, UNSPECIFIED OSTEOARTHRITIS TYPE, UNSPECIFIED SITE: ICD-10-CM

## 2022-03-28 DIAGNOSIS — Z78.0 POSTMENOPAUSAL: ICD-10-CM

## 2022-03-28 DIAGNOSIS — J30.9 ALLERGIC RHINITIS, UNSPECIFIED SEASONALITY, UNSPECIFIED TRIGGER: ICD-10-CM

## 2022-03-28 DIAGNOSIS — R30.0 DYSURIA: ICD-10-CM

## 2022-03-28 DIAGNOSIS — E53.8 B12 DEFICIENCY: ICD-10-CM

## 2022-03-28 DIAGNOSIS — E55.9 VITAMIN D DEFICIENCY: ICD-10-CM

## 2022-03-28 DIAGNOSIS — Z12.11 SCREEN FOR COLON CANCER: ICD-10-CM

## 2022-03-28 LAB
ALBUMIN SERPL-MCNC: 4 G/DL (ref 3.5–5.2)
ALBUMIN/GLOB SERPL: 1.4 G/DL
ALP SERPL-CCNC: 70 U/L (ref 39–117)
ALT SERPL W P-5'-P-CCNC: 18 U/L (ref 1–33)
AMPHET+METHAMPHET UR QL: NEGATIVE
AMPHETAMINE INTERNAL CONTROL: ABNORMAL
AMPHETAMINES UR QL: NEGATIVE
ANION GAP SERPL CALCULATED.3IONS-SCNC: 7.9 MMOL/L (ref 5–15)
AST SERPL-CCNC: 17 U/L (ref 1–32)
BARBITURATE INTERNAL CONTROL: ABNORMAL
BARBITURATES UR QL SCN: NEGATIVE
BASOPHILS # BLD AUTO: 0.05 10*3/MM3 (ref 0–0.2)
BASOPHILS NFR BLD AUTO: 1.1 % (ref 0–1.5)
BENZODIAZ UR QL SCN: NEGATIVE
BENZODIAZEPINE INTERNAL CONTROL: ABNORMAL
BILIRUB BLD-MCNC: NEGATIVE MG/DL
BILIRUB SERPL-MCNC: 0.6 MG/DL (ref 0–1.2)
BUN SERPL-MCNC: 13 MG/DL (ref 8–23)
BUN/CREAT SERPL: 14 (ref 7–25)
BUPRENORPHINE INTERNAL CONTROL: ABNORMAL
BUPRENORPHINE SERPL-MCNC: NEGATIVE NG/ML
CALCIUM SPEC-SCNC: 9.4 MG/DL (ref 8.6–10.5)
CANNABINOIDS SERPL QL: NEGATIVE
CHLORIDE SERPL-SCNC: 109 MMOL/L (ref 98–107)
CHOLEST SERPL-MCNC: 129 MG/DL (ref 0–200)
CLARITY, POC: CLEAR
CO2 SERPL-SCNC: 24.1 MMOL/L (ref 22–29)
COCAINE INTERNAL CONTROL: ABNORMAL
COCAINE UR QL: NEGATIVE
COLOR UR: YELLOW
CREAT SERPL-MCNC: 0.93 MG/DL (ref 0.57–1)
DEPRECATED RDW RBC AUTO: 39.6 FL (ref 37–54)
EGFRCR SERPLBLD CKD-EPI 2021: 66.3 ML/MIN/1.73
EOSINOPHIL # BLD AUTO: 0.09 10*3/MM3 (ref 0–0.4)
EOSINOPHIL NFR BLD AUTO: 1.9 % (ref 0.3–6.2)
ERYTHROCYTE [DISTWIDTH] IN BLOOD BY AUTOMATED COUNT: 12.8 % (ref 12.3–15.4)
EXPIRATION DATE: ABNORMAL
EXPIRATION DATE: ABNORMAL
FERRITIN SERPL-MCNC: 20.2 NG/ML (ref 13–150)
FOLATE SERPL-MCNC: 8.38 NG/ML (ref 4.78–24.2)
GLOBULIN UR ELPH-MCNC: 2.9 GM/DL
GLUCOSE SERPL-MCNC: 128 MG/DL (ref 65–99)
GLUCOSE UR STRIP-MCNC: NEGATIVE MG/DL
HCT VFR BLD AUTO: 39.8 % (ref 34–46.6)
HDLC SERPL-MCNC: 46 MG/DL (ref 40–60)
HGB BLD-MCNC: 12.6 G/DL (ref 12–15.9)
IMM GRANULOCYTES # BLD AUTO: 0.01 10*3/MM3 (ref 0–0.05)
IMM GRANULOCYTES NFR BLD AUTO: 0.2 % (ref 0–0.5)
IRON 24H UR-MRATE: 65 MCG/DL (ref 37–145)
IRON SATN MFR SERPL: 14 % (ref 20–50)
KETONES UR QL: NEGATIVE
LDLC SERPL CALC-MCNC: 66 MG/DL (ref 0–100)
LDLC/HDLC SERPL: 1.43 {RATIO}
LEUKOCYTE EST, POC: ABNORMAL
LYMPHOCYTES # BLD AUTO: 1.79 10*3/MM3 (ref 0.7–3.1)
LYMPHOCYTES NFR BLD AUTO: 38.3 % (ref 19.6–45.3)
Lab: ABNORMAL
Lab: ABNORMAL
MCH RBC QN AUTO: 27.3 PG (ref 26.6–33)
MCHC RBC AUTO-ENTMCNC: 31.7 G/DL (ref 31.5–35.7)
MCV RBC AUTO: 86.3 FL (ref 79–97)
MDMA (ECSTASY) INTERNAL CONTROL: ABNORMAL
MDMA UR QL SCN: NEGATIVE
METHADONE INTERNAL CONTROL: ABNORMAL
METHADONE UR QL SCN: NEGATIVE
METHAMPHETAMINE INTERNAL CONTROL: ABNORMAL
MONOCYTES # BLD AUTO: 0.26 10*3/MM3 (ref 0.1–0.9)
MONOCYTES NFR BLD AUTO: 5.6 % (ref 5–12)
NEUTROPHILS NFR BLD AUTO: 2.47 10*3/MM3 (ref 1.7–7)
NEUTROPHILS NFR BLD AUTO: 52.9 % (ref 42.7–76)
NITRITE UR-MCNC: NEGATIVE MG/ML
NRBC BLD AUTO-RTO: 0 /100 WBC (ref 0–0.2)
OPIATES INTERNAL CONTROL: ABNORMAL
OPIATES UR QL: NEGATIVE
OXYCODONE INTERNAL CONTROL: ABNORMAL
OXYCODONE UR QL SCN: NEGATIVE
PCP UR QL SCN: NEGATIVE
PH UR: 5 [PH] (ref 5–8)
PHENCYCLIDINE INTERNAL CONTROL: ABNORMAL
PLATELET # BLD AUTO: 259 10*3/MM3 (ref 140–450)
PMV BLD AUTO: 9.5 FL (ref 6–12)
POTASSIUM SERPL-SCNC: 3.9 MMOL/L (ref 3.5–5.2)
PROT SERPL-MCNC: 6.9 G/DL (ref 6–8.5)
PROT UR STRIP-MCNC: NEGATIVE MG/DL
RBC # BLD AUTO: 4.61 10*6/MM3 (ref 3.77–5.28)
RBC # UR STRIP: NEGATIVE /UL
SODIUM SERPL-SCNC: 141 MMOL/L (ref 136–145)
SP GR UR: 1.02 (ref 1–1.03)
T4 FREE SERPL-MCNC: 1.15 NG/DL (ref 0.93–1.7)
THC INTERNAL CONTROL: ABNORMAL
TIBC SERPL-MCNC: 450 MCG/DL (ref 298–536)
TRANSFERRIN SERPL-MCNC: 302 MG/DL (ref 200–360)
TRIGL SERPL-MCNC: 87 MG/DL (ref 0–150)
TSH SERPL DL<=0.05 MIU/L-ACNC: 2.73 UIU/ML (ref 0.27–4.2)
UROBILINOGEN UR QL: NORMAL
VIT B12 BLD-MCNC: 259 PG/ML (ref 211–946)
VLDLC SERPL-MCNC: 17 MG/DL (ref 5–40)
WBC NRBC COR # BLD: 4.67 10*3/MM3 (ref 3.4–10.8)

## 2022-03-28 PROCEDURE — 82607 VITAMIN B-12: CPT | Performed by: NURSE PRACTITIONER

## 2022-03-28 PROCEDURE — 83540 ASSAY OF IRON: CPT | Performed by: NURSE PRACTITIONER

## 2022-03-28 PROCEDURE — 99214 OFFICE O/P EST MOD 30 MIN: CPT | Performed by: NURSE PRACTITIONER

## 2022-03-28 PROCEDURE — 84443 ASSAY THYROID STIM HORMONE: CPT | Performed by: NURSE PRACTITIONER

## 2022-03-28 PROCEDURE — 82746 ASSAY OF FOLIC ACID SERUM: CPT | Performed by: NURSE PRACTITIONER

## 2022-03-28 PROCEDURE — 81003 URINALYSIS AUTO W/O SCOPE: CPT | Performed by: NURSE PRACTITIONER

## 2022-03-28 PROCEDURE — 80061 LIPID PANEL: CPT | Performed by: NURSE PRACTITIONER

## 2022-03-28 PROCEDURE — 84466 ASSAY OF TRANSFERRIN: CPT | Performed by: NURSE PRACTITIONER

## 2022-03-28 PROCEDURE — 84439 ASSAY OF FREE THYROXINE: CPT | Performed by: NURSE PRACTITIONER

## 2022-03-28 PROCEDURE — 85025 COMPLETE CBC W/AUTO DIFF WBC: CPT | Performed by: NURSE PRACTITIONER

## 2022-03-28 PROCEDURE — 87086 URINE CULTURE/COLONY COUNT: CPT | Performed by: NURSE PRACTITIONER

## 2022-03-28 PROCEDURE — 82728 ASSAY OF FERRITIN: CPT | Performed by: NURSE PRACTITIONER

## 2022-03-28 PROCEDURE — 80053 COMPREHEN METABOLIC PANEL: CPT | Performed by: NURSE PRACTITIONER

## 2022-03-28 PROCEDURE — 80305 DRUG TEST PRSMV DIR OPT OBS: CPT | Performed by: NURSE PRACTITIONER

## 2022-03-28 RX ORDER — ATORVASTATIN CALCIUM 10 MG/1
10 TABLET, FILM COATED ORAL DAILY
Qty: 90 TABLET | Refills: 1 | Status: SHIPPED | OUTPATIENT
Start: 2022-03-28 | End: 2022-09-19 | Stop reason: SDUPTHER

## 2022-03-28 RX ORDER — TRAMADOL HYDROCHLORIDE 50 MG/1
50 TABLET ORAL EVERY 6 HOURS PRN
Qty: 60 TABLET | Refills: 0 | Status: SHIPPED | OUTPATIENT
Start: 2022-03-28 | End: 2022-08-15

## 2022-03-28 RX ORDER — QUINAPRIL 10 MG/1
10 TABLET ORAL 2 TIMES DAILY
Qty: 180 TABLET | Refills: 1 | Status: SHIPPED | OUTPATIENT
Start: 2022-03-28 | End: 2022-09-19 | Stop reason: SDUPTHER

## 2022-03-28 RX ORDER — ESOMEPRAZOLE MAGNESIUM 40 MG/1
40 CAPSULE, DELAYED RELEASE ORAL DAILY
Qty: 90 CAPSULE | Refills: 1 | Status: SHIPPED | OUTPATIENT
Start: 2022-03-28 | End: 2022-09-19 | Stop reason: SDUPTHER

## 2022-03-28 RX ORDER — ERGOCALCIFEROL 1.25 MG/1
50000 CAPSULE ORAL WEEKLY
Qty: 13 CAPSULE | Refills: 1 | Status: SHIPPED | OUTPATIENT
Start: 2022-03-28 | End: 2022-09-19 | Stop reason: SDUPTHER

## 2022-03-28 RX ORDER — CETIRIZINE HYDROCHLORIDE 10 MG/1
10 TABLET ORAL DAILY
Qty: 90 TABLET | Refills: 1 | Status: SHIPPED | OUTPATIENT
Start: 2022-03-28 | End: 2022-09-19 | Stop reason: SDUPTHER

## 2022-03-28 RX ORDER — CELECOXIB 100 MG/1
100 CAPSULE ORAL 2 TIMES DAILY
Qty: 180 CAPSULE | Refills: 1 | Status: SHIPPED | OUTPATIENT
Start: 2022-03-28 | End: 2022-09-19 | Stop reason: SDUPTHER

## 2022-03-28 NOTE — PROGRESS NOTES
Chief Complaint  Follow-up (6 month ), Hypertension, Hyperlipidemia, and Urinary Tract Infection (Burning urinating )    Subjective          Qiana Altman presents to [facility]  History of Present Illness  LIPID:  She is doing good overall with lipitor.  She has had 3 family members with heart attack (mom, 2 brothers).  She is going to see cardiology soon just for a check up.  HTN- She is doing ok but her quinapril and no issues and is currently not have any CP/SOA.   Arthritis: She is take her tramadol only as needed but does take her celebrex daily. She is taking tylenol--she is having lower back pain on and off.  She has to watch otherwise she will aggrevate the leg.  She is taking the tramadol as needed but has been out for some time.  She takes it as needed.    b12 def and anemia- on iron supplements and b12 monthly and needs to have her labs checked.    GERD:  She is taking her nexium and is doing good.  Allergies:  She is taking her zyrtec and flonase.  Anxiety:  She is doing ok.  She is not having any SI/HI.  She has her ups and downs but is doing better.   Graves disease--she used to take med but no issues recently (has issues with eyes)        Allergies  Shellfish allergy, Moxifloxacin hcl, Scopolamine, Sulfa antibiotics, Morphine, and Penicillins    Social History     Tobacco Use   • Smoking status: Former Smoker     Packs/day: 1.00     Years: 5.00     Pack years: 5.00     Types: Cigarettes     Start date:      Quit date:      Years since quittin.2   • Smokeless tobacco: Never Used   • Tobacco comment: QUIT 28 YEARS AGO   Substance Use Topics   • Alcohol use: Never   • Drug use: Never       Family History   Problem Relation Age of Onset   • Heart disease Mother    • Heart failure Mother    • Lung cancer Other    • Skin cancer Other         Health Maintenance Due   Topic Date Due   • DXA SCAN  2019   • COLORECTAL CANCER SCREENING  2020     "    Immunization History   Administered Date(s) Administered   • Fluzone High-Dose 65+yrs 10/04/2021   • Influenza, Unspecified 09/23/2020   • Pneumococcal Conjugate 13-Valent (PCV13) 11/14/2017   • Pneumococcal Polysaccharide (PPSV23) 09/23/2020   • Tdap 06/22/2016       Review of Systems   Constitutional: Positive for fatigue.   Respiratory: Negative for cough and shortness of breath.    Cardiovascular: Negative for chest pain and leg swelling.   Gastrointestinal: Negative for constipation, diarrhea, nausea and vomiting.   Skin: Negative for rash.   Psychiatric/Behavioral: Negative for hallucinations and suicidal ideas.        Objective       Vitals:    03/28/22 0852 03/28/22 0905   BP: 172/76 160/78   Pulse: 66    Resp: 16    Temp: 97.5 °F (36.4 °C)    SpO2: 98%    Weight: 71.5 kg (157 lb 11.2 oz)    Height: 152.4 cm (60\")        Body mass index is 30.8 kg/m².         Physical Exam  Vitals reviewed.   Constitutional:       Appearance: Normal appearance. She is well-developed.   HENT:      Right Ear: Tympanic membrane and ear canal normal. There is no impacted cerumen.      Left Ear: Tympanic membrane and ear canal normal. There is no impacted cerumen.   Cardiovascular:      Rate and Rhythm: Normal rate and regular rhythm.      Heart sounds: Normal heart sounds. No murmur heard.  Pulmonary:      Effort: Pulmonary effort is normal.      Breath sounds: Normal breath sounds.   Musculoskeletal:      Right lower leg: No edema.      Left lower leg: No edema.   Neurological:      Mental Status: She is alert and oriented to person, place, and time.      Cranial Nerves: No cranial nerve deficit.      Motor: No weakness.   Psychiatric:         Mood and Affect: Mood and affect normal.             Result Review :     The following data was reviewed by: LOYD Nolen on 03/28/2022:    Common labs    Common Labsle 10/4/21 10/4/21 10/4/21    0821 0821 0821   Glucose   122 (A)   BUN   11   Creatinine   0.88   eGFR " Non African Am   64   Sodium   144   Potassium   4.3   Chloride   107   Calcium   9.6   Albumin   4.50   Total Bilirubin   0.8   Alkaline Phosphatase   65   AST (SGOT)   24   ALT (SGPT)   24   Total Cholesterol  137    Triglycerides  129    HDL Cholesterol  45    LDL Cholesterol   69    Hemoglobin A1C 5.70 (A)     (A) Abnormal value            UA    Urinalysis 3/28/22   Ketones, UA Negative   Leukocytes, UA Trace (A)   (A) Abnormal value            Amphetamine Screen, Urine   Date Value Ref Range Status   03/28/2022 Negative Negative Final     AMP INTERNAL CONTROL   Date Value Ref Range Status   03/28/2022 Passed Passed Final     Barbiturates Screen, Urine   Date Value Ref Range Status   03/28/2022 Negative Negative Final     Buprenorphine, Screen, Urine   Date Value Ref Range Status   03/28/2022 Negative Negative Final     BUPRENORPHINE INTERNAL CONTROL   Date Value Ref Range Status   03/28/2022 Passed Passed Final     Benzodiazepine Screen, Urine   Date Value Ref Range Status   03/28/2022 Negative Negative Final     BENZODIAZEPINE INTERNAL CONTROL   Date Value Ref Range Status   03/28/2022 Passed Passed Final     Cocaine Screen, Urine   Date Value Ref Range Status   03/28/2022 Negative Negative Final   03/18/2021 Negative  Final     COCAINE INTERNAL CONTROL   Date Value Ref Range Status   03/28/2022 Passed Passed Final     MDMA (ECSTASY)   Date Value Ref Range Status   03/28/2022 Negative Negative Final     MDMA (ECSTASY) INTERNAL CONTROL   Date Value Ref Range Status   03/28/2022 Passed Passed Final     Methamphetamine, Ur   Date Value Ref Range Status   03/28/2022 Negative Negative Final     METHAMPHETAMINE INTERNAL CONTROL   Date Value Ref Range Status   03/28/2022 Passed Passed Final     Methadone Screen, Urine   Date Value Ref Range Status   03/28/2022 Negative Negative Final     OPIATES INTERNAL CONTROL   Date Value Ref Range Status   03/28/2022 Passed Passed Final     Oxycodone Screen, Urine   Date Value  Ref Range Status   03/28/2022 Negative Negative Final     OXYCODONE INTERNAL CONTROL   Date Value Ref Range Status   03/28/2022 Passed Passed Final     PHENCYCLIDINE INTERNAL CONTROL   Date Value Ref Range Status   03/28/2022 Passed Passed Final     THC, Screen, Urine   Date Value Ref Range Status   03/28/2022 Negative Negative Final     THC INTERNAL CONTROL   Date Value Ref Range Status   03/28/2022 Passed Passed Final     Lot Number   Date Value Ref Range Status   03/28/2022 [id number]  Final     Expiration Date   Date Value Ref Range Status   03/28/2022 12/31/2022  Final                    Assessment and Plan      Diagnoses and all orders for this visit:    1. Essential hypertension (Primary)  -     quinapril (ACCUPRIL) 10 MG tablet; Take 1 tablet by mouth 2 (Two) Times a Day.  Dispense: 180 tablet; Refill: 1  -     Comprehensive Metabolic Panel  -     CBC & Differential  -     TSH  -     Lipid Panel    2. Mixed hyperlipidemia  -     atorvastatin (LIPITOR) 10 MG tablet; Take 1 tablet by mouth Daily.  Dispense: 90 tablet; Refill: 1    3. Osteoarthritis, unspecified osteoarthritis type, unspecified site  -     celecoxib (CeleBREX) 100 MG capsule; Take 1 capsule by mouth 2 (Two) Times a Day.  Dispense: 180 capsule; Refill: 1  -     traMADol (ULTRAM) 50 MG tablet; Take 1 tablet by mouth Every 6 (Six) Hours As Needed for Moderate Pain  or Severe Pain .  Dispense: 60 tablet; Refill: 0  -     POC Urine Drug Screen [facility]    4. Allergic rhinitis, unspecified seasonality, unspecified trigger  -     cetirizine (zyrTEC) 10 MG tablet; Take 1 tablet by mouth Daily.  Dispense: 90 tablet; Refill: 1    5. Gastroesophageal reflux disease without esophagitis  -     esomeprazole (nexIUM) 40 MG capsule; Take 1 capsule by mouth Daily.  Dispense: 90 capsule; Refill: 1    6. Postmenopausal  -     DEXA Bone Density Axial; Future    7. Dysuria  -     POCT urinalysis dipstick, automated  -     Urine Culture - Urine, Urine, Clean  Catch    8. Vitamin D deficiency  -     vitamin D (ERGOCALCIFEROL) 1.25 MG (39313 UT) capsule capsule; Take 1 capsule by mouth 1 (One) Time Per Week.  Dispense: 13 capsule; Refill: 1    9. Encounter for medication monitoring  -     POC Urine Drug Screen [facility]    10. Screen for colon cancer  -     Cologuard - Stool, Per Rectum; Future    11. B12 deficiency  -     Vitamin B12 & Folate    12. Iron deficiency anemia, unspecified iron deficiency anemia type  -     Iron Profile  -     Ferritin    13. History of Graves' disease  -     T4, Free            Follow Up     Return in about 6 months (around 9/28/2022).  Follow-up in 6 months for labs and appt. Call with any concerns or questions that you may have regarding your medications or history.    I have reviewed all medications and at this time no medications changes need to be adjusted for all chronic conditions.  Chuck, consent previously on chart.  She is aware that if she needs a refill of tramadol within the next 2 months she will need a follow-up.  I did increase from 30 to 60 tablets as she only takes when she needs the tramadol.  Patient is aware not to take the Celebrex with the tramadol.  Patient was given instructions and counseling regarding her condition or for health maintenance advice. Please see specific information pulled into the AVS if appropriate.         Zohreh Mcduffie, APRN  03/28/2022

## 2022-03-29 DIAGNOSIS — J30.9 ALLERGIC RHINITIS, UNSPECIFIED SEASONALITY, UNSPECIFIED TRIGGER: ICD-10-CM

## 2022-03-29 LAB — BACTERIA SPEC AEROBE CULT: NO GROWTH

## 2022-03-29 RX ORDER — FLUTICASONE PROPIONATE 50 MCG
SPRAY, SUSPENSION (ML) NASAL
Qty: 16 G | Refills: 5 | Status: SHIPPED | OUTPATIENT
Start: 2022-03-29 | End: 2022-09-19 | Stop reason: SDUPTHER

## 2022-06-23 ENCOUNTER — OFFICE VISIT (OUTPATIENT)
Dept: FAMILY MEDICINE CLINIC | Facility: CLINIC | Age: 72
End: 2022-06-23

## 2022-06-23 ENCOUNTER — TELEPHONE (OUTPATIENT)
Dept: FAMILY MEDICINE CLINIC | Facility: CLINIC | Age: 72
End: 2022-06-23

## 2022-06-23 DIAGNOSIS — U07.1 COVID-19: Primary | ICD-10-CM

## 2022-06-23 DIAGNOSIS — J06.9 UPPER RESPIRATORY INFECTION WITH COUGH AND CONGESTION: ICD-10-CM

## 2022-06-23 PROCEDURE — 99213 OFFICE O/P EST LOW 20 MIN: CPT | Performed by: NURSE PRACTITIONER

## 2022-06-23 RX ORDER — BROMPHENIRAMINE MALEATE, PSEUDOEPHEDRINE HYDROCHLORIDE, AND DEXTROMETHORPHAN HYDROBROMIDE 2; 30; 10 MG/5ML; MG/5ML; MG/5ML
10 SYRUP ORAL 4 TIMES DAILY PRN
Qty: 473 ML | Refills: 0 | Status: SHIPPED | OUTPATIENT
Start: 2022-06-23 | End: 2022-08-15

## 2022-06-23 RX ORDER — AZITHROMYCIN 250 MG/1
TABLET, FILM COATED ORAL
Qty: 6 TABLET | Refills: 0 | Status: SHIPPED | OUTPATIENT
Start: 2022-06-23 | End: 2022-06-28

## 2022-06-23 RX ORDER — NEBULIZER ACCESSORIES
1 KIT MISCELLANEOUS EVERY 4 HOURS PRN
Qty: 1 EACH | Refills: 0 | Status: SHIPPED | OUTPATIENT
Start: 2022-06-23 | End: 2022-09-19

## 2022-06-23 RX ORDER — DEXAMETHASONE 4 MG/1
4 TABLET ORAL 2 TIMES DAILY WITH MEALS
Qty: 10 TABLET | Refills: 0 | Status: SHIPPED | OUTPATIENT
Start: 2022-06-23 | End: 2022-06-28

## 2022-06-23 RX ORDER — ALBUTEROL SULFATE 2.5 MG/.5ML
2.5 SOLUTION RESPIRATORY (INHALATION) EVERY 4 HOURS PRN
Qty: 60 ML | Refills: 0 | Status: SHIPPED | OUTPATIENT
Start: 2022-06-23 | End: 2023-01-19

## 2022-06-23 NOTE — PROGRESS NOTES
Chief Complaint  Covid-19 Home Monitoring Video Visit (At home test 6/22/22-Positive ), Generalized Body Aches, Cough, Nasal Congestion, and Headache    Subjective         Qiana Altman presents to [facility]  History of Present Illness   She presents today for an acute visit.  She is a patient of LOYD Nolen.  She has had a positive COVID test yesterday.  She is complaining of body aches, headaches, cough and congestion.  She denies any difficulty breathing, denies nausea, vomiting or diarrhea.  She states she is able to eat and drink fluids.  She is wanting a prescription for albuterol nebulizer and a new tubing for the nebulizer.    Objective   Vital Signs:   There were no vitals taken for this visit.    Physical Exam   Constitutional: She appears well-developed and well-nourished.   HENT:   Head: Normocephalic.   Neck: Neck normal appearance.  Pulmonary/Chest: Effort normal.   Neurological: She is alert.   Psychiatric: She has a normal mood and affect.     Result Review :                 Assessment and Plan    Diagnoses and all orders for this visit:    1. COVID-19 (Primary)  Assessment & Plan:  Discussed with patient to quarantine for at least 5 days from onset of symptoms and symptoms have resolved.  She will then need to wear a mask if she goes out in public for the next 5 days. Patient instructed not to go anywhere except to seek medical care.  Instructed to seek medical care for any difficulty of breathing, unable to keep fluids down, or worsening of symptoms.    Orders:  -     azithromycin (ZITHROMAX) 250 MG tablet; Take 2 tablets by mouth Daily for 1 day, THEN 1 tablet Daily for 4 days.  Dispense: 6 tablet; Refill: 0  -     dexamethasone (DECADRON) 4 MG tablet; Take 1 tablet by mouth 2 (Two) Times a Day With Meals for 5 days.  Dispense: 10 tablet; Refill: 0    2. Upper respiratory infection with cough and congestion  -      brompheniramine-pseudoephedrine-DM 30-2-10 MG/5ML syrup; Take 10 mL by mouth 4 (Four) Times a Day As Needed for Congestion or Cough.  Dispense: 473 mL; Refill: 0  -     azithromycin (ZITHROMAX) 250 MG tablet; Take 2 tablets by mouth Daily for 1 day, THEN 1 tablet Daily for 4 days.  Dispense: 6 tablet; Refill: 0  -     dexamethasone (DECADRON) 4 MG tablet; Take 1 tablet by mouth 2 (Two) Times a Day With Meals for 5 days.  Dispense: 10 tablet; Refill: 0  -     albuterol (PROVENTIL) 2.5 MG/0.5ML nebulizer solution; Take 2.5 mg by nebulization Every 4 (Four) Hours As Needed for Wheezing or Shortness of Air.  Dispense: 60 mL; Refill: 0  -     Respiratory Therapy Supplies (Nebulizer/Tubing/Mouthpiece) kit; 1 each Every 4 (Four) Hours As Needed (wheezing/soa).  Dispense: 1 each; Refill: 0      Follow Up   Return if symptoms worsen or fail to improve.  Patient was given instructions and counseling regarding her condition or for health maintenance advice. Please see specific information pulled into the AVS if appropriate.     Mode of Visit: Video  Location of patient: home  You have chosen to receive care through a telehealth visit.  Does the patient consent to use a video/audio connection for your medical care today? Yes  The visit included audio and video interaction. No technical issues occurred during this visit.

## 2022-06-23 NOTE — TELEPHONE ENCOUNTER
Caller: Qiana Altman    Relationship: Self    Best call back number: [phone]    What medication are you requesting: Z-PAC AND STEROIDS    What are your current symptoms: COVID 19+  HEADACHE AND BODY ACHES     How long have you been experiencing symptoms: 1 DAY    Have you had these symptoms before:    [] Yes  [x] No    Have you been treated for these symptoms before:   [] Yes  [x] No    If a prescription is needed, what is your preferred pharmacy and phone number: [facility] [place], [place] - [address] - [phone] [facility] [phone]      Additional notes:    PATIENT STATES SHE TESTED POSITIVE FOR COVID ON 6/22/22.

## 2022-06-23 NOTE — ASSESSMENT & PLAN NOTE
Discussed with patient to quarantine for at least 5 days from onset of symptoms and symptoms have resolved.  She will then need to wear a mask if she goes out in public for the next 5 days. Patient instructed not to go anywhere except to seek medical care.  Instructed to seek medical care for any difficulty of breathing, unable to keep fluids down, or worsening of symptoms.

## 2022-06-23 NOTE — TELEPHONE ENCOUNTER
Advised patient and daughter that Zohreh is on vacation and she would need an apt with [facility] to get Rx. Transferred to [facility] for scheduling.

## 2022-07-01 ENCOUNTER — APPOINTMENT (OUTPATIENT)
Dept: BONE DENSITY | Facility: HOSPITAL | Age: 72
End: 2022-07-01

## 2022-07-26 ENCOUNTER — TELEPHONE (OUTPATIENT)
Dept: FAMILY MEDICINE CLINIC | Facility: CLINIC | Age: 72
End: 2022-07-26

## 2022-07-26 DIAGNOSIS — Z78.0 POSTMENOPAUSAL: Primary | ICD-10-CM

## 2022-07-26 NOTE — TELEPHONE ENCOUNTER
2022 PT CALLED & CANCELLED APT, STATES SHE IS SICK.  SHE WANTED TO R/S BUT IS AVAILABLE ONLY ON A Friday. OUR NEXT Friday IS IN October WHICH AT THAT TIME THE ORDER WILL BE .  ROUTED BACK TO [facility] FOR NEW ORDER - message from the hub regarding referral for bone density, patient had to cancel appointment for 22.

## 2022-08-05 ENCOUNTER — HOSPITAL ENCOUNTER (OUTPATIENT)
Dept: BONE DENSITY | Facility: HOSPITAL | Age: 72
Discharge: HOME | End: 2022-08-05
Admitting: NURSE PRACTITIONER

## 2022-08-05 DIAGNOSIS — Z78.0 POSTMENOPAUSAL: ICD-10-CM

## 2022-08-05 PROCEDURE — 77080 DXA BONE DENSITY AXIAL: CPT

## 2022-08-08 RX ORDER — PHENOL 1.4 %
600 AEROSOL, SPRAY (ML) MUCOUS MEMBRANE DAILY
Qty: 90 TABLET | Refills: 1 | Status: SHIPPED | OUTPATIENT
Start: 2022-08-08 | End: 2023-02-21

## 2022-08-08 RX ORDER — PHENOL 1.4 %
600 AEROSOL, SPRAY (ML) MUCOUS MEMBRANE DAILY
COMMUNITY
End: 2022-08-08 | Stop reason: SDUPTHER

## 2022-08-08 NOTE — TELEPHONE ENCOUNTER
Per patient , she asked if I could send over Calcium to [facility]. I sent Calcium 600 mg #90 1 rf to [facility]

## 2022-08-15 ENCOUNTER — OFFICE VISIT (OUTPATIENT)
Dept: FAMILY MEDICINE CLINIC | Facility: CLINIC | Age: 72
End: 2022-08-15

## 2022-08-15 VITALS
SYSTOLIC BLOOD PRESSURE: 148 MMHG | HEART RATE: 79 BPM | OXYGEN SATURATION: 96 % | BODY MASS INDEX: 30.51 KG/M2 | DIASTOLIC BLOOD PRESSURE: 80 MMHG | HEIGHT: 60 IN | WEIGHT: 155.4 LBS | TEMPERATURE: 97.8 F

## 2022-08-15 DIAGNOSIS — R52 BODY ACHES: ICD-10-CM

## 2022-08-15 DIAGNOSIS — J02.9 SORE THROAT: ICD-10-CM

## 2022-08-15 DIAGNOSIS — R05.9 COUGH: Primary | ICD-10-CM

## 2022-08-15 DIAGNOSIS — I10 ESSENTIAL HYPERTENSION: ICD-10-CM

## 2022-08-15 DIAGNOSIS — R51.9 ACUTE NONINTRACTABLE HEADACHE, UNSPECIFIED HEADACHE TYPE: ICD-10-CM

## 2022-08-15 LAB
EXPIRATION DATE: NORMAL
INTERNAL CONTROL: NORMAL
Lab: NORMAL
S PYO AG THROAT QL: NEGATIVE
SARS-COV-2 RNA PNL SPEC NAA+PROBE: DETECTED

## 2022-08-15 PROCEDURE — 99213 OFFICE O/P EST LOW 20 MIN: CPT | Performed by: NURSE PRACTITIONER

## 2022-08-15 PROCEDURE — U0004 COV-19 TEST NON-CDC HGH THRU: HCPCS | Performed by: NURSE PRACTITIONER

## 2022-08-15 PROCEDURE — 87880 STREP A ASSAY W/OPTIC: CPT | Performed by: NURSE PRACTITIONER

## 2022-08-15 RX ORDER — AZITHROMYCIN 250 MG/1
TABLET, FILM COATED ORAL
Qty: 6 TABLET | Refills: 0 | Status: SHIPPED | OUTPATIENT
Start: 2022-08-15 | End: 2022-08-20

## 2022-08-15 RX ORDER — PREDNISONE 20 MG/1
TABLET ORAL
Qty: 9 TABLET | Refills: 0 | Status: SHIPPED | OUTPATIENT
Start: 2022-08-15 | End: 2022-08-20

## 2022-08-15 NOTE — PROGRESS NOTES
"Chief Complaint  Headache, URI, and Generalized Body Aches    Subjective          Qiana AMA Altman, 72 y.o. female presents to [facility]  History of Present Illness   She presents today for an acute visit, she is a patient of LOYD Nolen.  She is complaining of cough, congestion and low-grade fever.  She is also complaining of body aches and sore throat.  She states her symptoms started yesterday.  She took a home COVID test and it was negative yesterday.  She states she had COVID on 6/26/2022.  She states she has been using over-the-counter Mucinex.    POCT rapid strep swab in office today is negative.  A COVID PCR was obtained.    She has hypertension and borderline diabetes, her last A1c was 5.7%.    Objective   Vital Signs:   /80 (BP Location: Right arm, Patient Position: Sitting, Cuff Size: Adult)   Pulse 79   Temp 97.8 °F (36.6 °C) (Temporal)   Ht 152.4 cm (60\")   Wt 70.5 kg (155 lb 6.4 oz)   SpO2 96%   BMI 30.35 kg/m²     Current Outpatient Medications on File Prior to Visit   Medication Sig Dispense Refill   • albuterol (PROVENTIL) 2.5 MG/0.5ML nebulizer solution Take 2.5 mg by nebulization Every 4 (Four) Hours As Needed for Wheezing or Shortness of Air. 60 mL 0   • atorvastatin (LIPITOR) 10 MG tablet Take 1 tablet by mouth Daily. 90 tablet 1   • calcium carbonate (OS-JANENE) 600 MG tablet Take 1 tablet by mouth Daily. 90 tablet 1   • celecoxib (CeleBREX) 100 MG capsule Take 1 capsule by mouth 2 (Two) Times a Day. 180 capsule 1   • cetirizine (zyrTEC) 10 MG tablet Take 1 tablet by mouth Daily. 90 tablet 1   • esomeprazole (nexIUM) 40 MG capsule Take 1 capsule by mouth Daily. 90 capsule 1   • fluticasone (FLONASE) 50 MCG/ACT nasal spray instill 2 sprays in each nostril every day AS NEEDED 16 g 5   • quinapril (ACCUPRIL) 10 MG tablet Take 1 tablet by mouth 2 (Two) Times a Day. 180 tablet 1   • Respiratory Therapy Supplies (Nebulizer/Tubing/Mouthpiece) " kit 1 each Every 4 (Four) Hours As Needed (wheezing/soa). 1 each 0   • vitamin D (ERGOCALCIFEROL) 1.25 MG (67452 UT) capsule capsule Take 1 capsule by mouth 1 (One) Time Per Week. 13 capsule 1   • [DISCONTINUED] brompheniramine-pseudoephedrine-DM 30-2-10 MG/5ML syrup Take 10 mL by mouth 4 (Four) Times a Day As Needed for Congestion or Cough. 473 mL 0   • [DISCONTINUED] traMADol (ULTRAM) 50 MG tablet Take 1 tablet by mouth Every 6 (Six) Hours As Needed for Moderate Pain  or Severe Pain . 60 tablet 0     No current facility-administered medications on file prior to visit.     Past Medical History:   Diagnosis Date   • Allergic rhinitis due to allergen 09/10/2019   • Anemia 09/10/2019   • Anxiety disorder 06/09/2020   • Arthritis    • B12 deficiency 09/23/2020   • Depression    • Diverticulosis    • Essential hypertension 01/03/2019   • GERD (gastroesophageal reflux disease) 01/03/2019   • Graves' disease    • History of Graves' disease 10/01/2020   • HLD (hyperlipidemia) 01/03/2019   • Major depressive disorder 01/03/2019   • Osteoarthritis 09/23/2020   • T2DM (type 2 diabetes mellitus) ([facility]) 01/03/2019   • Vitamin D deficiency 01/03/2019      Physical Exam  Vitals reviewed.   Constitutional:       Appearance: Normal appearance. She is well-developed.   HENT:      Right Ear: Tympanic membrane, ear canal and external ear normal.      Left Ear: Tympanic membrane, ear canal and external ear normal.      Mouth/Throat:      Mouth: Mucous membranes are moist.      Pharynx: Posterior oropharyngeal erythema present. No pharyngeal swelling or oropharyngeal exudate.   Neck:      Thyroid: No thyroid mass, thyromegaly or thyroid tenderness.   Cardiovascular:      Rate and Rhythm: Normal rate and regular rhythm.      Heart sounds: No murmur heard.    No friction rub. No gallop.   Pulmonary:      Effort: Pulmonary effort is normal.      Breath sounds: Normal breath sounds. No wheezing or rhonchi.   Lymphadenopathy:      Cervical:  No cervical adenopathy.   Skin:     General: Skin is warm and dry.   Neurological:      Mental Status: She is alert and oriented to person, place, and time.      Cranial Nerves: No cranial nerve deficit.   Psychiatric:         Mood and Affect: Mood and affect normal.         Behavior: Behavior normal.         Thought Content: Thought content normal. Thought content does not include homicidal or suicidal ideation.         Judgment: Judgment normal.        Result Review :   The following data was reviewed by: LOYD Mcarthur on 08/15/2022:  CMP    CMP 10/4/21 3/28/22   Glucose 122 (A) 128 (A)   BUN 11 13   Creatinine 0.88 0.93   eGFR Non African Am 64    Sodium 144 141   Potassium 4.3 3.9   Chloride 107 109 (A)   Calcium 9.6 9.4   Albumin 4.50 4.00   Total Bilirubin 0.8 0.6   Alkaline Phosphatase 65 70   AST (SGOT) 24 17   ALT (SGPT) 24 18   (A) Abnormal value            A1C Last 3 Results    HGBA1C Last 3 Results 10/4/21   Hemoglobin A1C 5.70 (A)   (A) Abnormal value            Data reviewed: POCT rapid strep:     POCT rapid strep A  Order: [id number]    Specimen Information: Swab         0 Result Notes    Component   Ref Range & Units 09:41   (8/15/22) 4 mo ago   (3/28/22) 4 mo ago   (3/28/22) 10 mo ago   (10/4/21)   Rapid Strep A Screen   Negative, VALID, INVALID, Not Performed Negative       Internal Control   Passed Passed       Lot Number  [id number]  [id number]  [id number]  [id number]    Expiration Date  04/21/2024 12/31/2022 03/31/2023 04/30/2022    Resulting Agency [facility]                Specimen Collected: 08/15/22 09:41 Last Resulted: 08/15/22 09:41                Assessment and Plan    Diagnoses and all orders for this visit:    1. Cough (Primary)  -     COVID-19,APTIMA PANTHER(KATIE),BH HECTOR/BH CRISTINA, NP/OP SWAB IN UTM/VTM/SALINE TRANSPORT MEDIA,24 HR TAT - Swab, Nasal Cavity  -     POCT rapid strep A  -     azithromycin (ZITHROMAX) 250 MG tablet;  Take 2 tablets by mouth Daily for 1 day, THEN 1 tablet Daily for 4 days.  Dispense: 6 tablet; Refill: 0  -     predniSONE (DELTASONE) 20 MG tablet; Take 3 tablets by mouth Daily for 1 day, THEN 2 tablets Daily for 2 days, THEN 1 tablet Daily for 2 days.  Dispense: 9 tablet; Refill: 0    2. Acute nonintractable headache, unspecified headache type  -     COVID-19,APTIMA PANTHER(KATIE),BH HECTOR/BH CRISTINA, NP/OP SWAB IN UTM/VTM/SALINE TRANSPORT MEDIA,24 HR TAT - Swab, Nasal Cavity  -     POCT rapid strep A  -     azithromycin (ZITHROMAX) 250 MG tablet; Take 2 tablets by mouth Daily for 1 day, THEN 1 tablet Daily for 4 days.  Dispense: 6 tablet; Refill: 0  -     predniSONE (DELTASONE) 20 MG tablet; Take 3 tablets by mouth Daily for 1 day, THEN 2 tablets Daily for 2 days, THEN 1 tablet Daily for 2 days.  Dispense: 9 tablet; Refill: 0    3. Sore throat  -     COVID-19,APTIMA PANTHER(KATIE),BH HECTOR/BH CRISTINA, NP/OP SWAB IN UTM/VTM/SALINE TRANSPORT MEDIA,24 HR TAT - Swab, Nasal Cavity  -     POCT rapid strep A  -     azithromycin (ZITHROMAX) 250 MG tablet; Take 2 tablets by mouth Daily for 1 day, THEN 1 tablet Daily for 4 days.  Dispense: 6 tablet; Refill: 0  -     predniSONE (DELTASONE) 20 MG tablet; Take 3 tablets by mouth Daily for 1 day, THEN 2 tablets Daily for 2 days, THEN 1 tablet Daily for 2 days.  Dispense: 9 tablet; Refill: 0    4. Body aches  -     COVID-19,APTIMA PANTHER(KATIE),BH HECTOR/BH CRISTINA, NP/OP SWAB IN UTM/VTM/SALINE TRANSPORT MEDIA,24 HR TAT - Swab, Nasal Cavity  -     POCT rapid strep A  -     azithromycin (ZITHROMAX) 250 MG tablet; Take 2 tablets by mouth Daily for 1 day, THEN 1 tablet Daily for 4 days.  Dispense: 6 tablet; Refill: 0  -     predniSONE (DELTASONE) 20 MG tablet; Take 3 tablets by mouth Daily for 1 day, THEN 2 tablets Daily for 2 days, THEN 1 tablet Daily for 2 days.  Dispense: 9 tablet; Refill: 0    5. Essential hypertension  Assessment & Plan:  Blood pressure stable on quinapril.    I will treat her with  Z-Evan and prednisone Dosepak.  We will send her COVID swab and will notify her of results when available.  Advised to rest and push fluids.  Discussed with patient to quarantine for at least 5 days from onset of symptoms and symptoms have resolved if positive for COVID.  Patient instructed not to go anywhere except to seek medical care.  Advised to wear a mask if patient goes anywhere for the next 5 days.  Instructed to seek medical care for any difficulty of breathing, unable to keep fluids down, or worsening of symptoms.      Follow Up   Return if symptoms worsen or fail to improve.  Patient was given instructions and counseling regarding her condition or for health maintenance advice. Please see specific information pulled into the AVS if appropriate.

## 2022-08-16 NOTE — PROGRESS NOTES
Please call patient with POSITIVE Covid!  Discuss with patient to quarantine for at least 5 days from onset of symptoms and symptoms have resolved.  Patient instructed not to go anywhere except to seek medical care.  Advise to wear a mask if patient goes anywhere for the next 10 days.  Instruct to seek medical care for any difficulty of breathing, unable to keep fluids down, or worsening of symptoms.

## 2022-09-16 ENCOUNTER — OFFICE VISIT (OUTPATIENT)
Dept: FAMILY MEDICINE CLINIC | Facility: CLINIC | Age: 72
End: 2022-09-16

## 2022-09-16 VITALS
BODY MASS INDEX: 30.15 KG/M2 | SYSTOLIC BLOOD PRESSURE: 138 MMHG | RESPIRATION RATE: 20 BRPM | HEIGHT: 60 IN | TEMPERATURE: 96.2 F | OXYGEN SATURATION: 96 % | DIASTOLIC BLOOD PRESSURE: 70 MMHG | WEIGHT: 153.6 LBS | HEART RATE: 72 BPM

## 2022-09-16 DIAGNOSIS — R05.9 COUGH: ICD-10-CM

## 2022-09-16 DIAGNOSIS — R06.02 SHORTNESS OF BREATH: ICD-10-CM

## 2022-09-16 DIAGNOSIS — J01.00 ACUTE NON-RECURRENT MAXILLARY SINUSITIS: Primary | ICD-10-CM

## 2022-09-16 DIAGNOSIS — U09.9 POST-COVID SYNDROME: ICD-10-CM

## 2022-09-16 PROCEDURE — 96372 THER/PROPH/DIAG INJ SC/IM: CPT | Performed by: NURSE PRACTITIONER

## 2022-09-16 PROCEDURE — 99213 OFFICE O/P EST LOW 20 MIN: CPT | Performed by: NURSE PRACTITIONER

## 2022-09-16 RX ORDER — METHYLPREDNISOLONE SODIUM SUCCINATE 125 MG/2ML
125 INJECTION, POWDER, LYOPHILIZED, FOR SOLUTION INTRAMUSCULAR; INTRAVENOUS ONCE
Status: COMPLETED | OUTPATIENT
Start: 2022-09-16 | End: 2022-09-16

## 2022-09-16 RX ORDER — BENZONATATE 200 MG/1
200 CAPSULE ORAL 3 TIMES DAILY PRN
Qty: 21 CAPSULE | Refills: 0 | Status: SHIPPED | OUTPATIENT
Start: 2022-09-16 | End: 2022-09-23

## 2022-09-16 RX ORDER — DOXYCYCLINE HYCLATE 100 MG/1
100 CAPSULE ORAL 2 TIMES DAILY
Qty: 14 CAPSULE | Refills: 0 | Status: SHIPPED | OUTPATIENT
Start: 2022-09-16 | End: 2022-09-23

## 2022-09-16 RX ADMIN — METHYLPREDNISOLONE SODIUM SUCCINATE 125 MG: 125 INJECTION, POWDER, LYOPHILIZED, FOR SOLUTION INTRAMUSCULAR; INTRAVENOUS at 09:11

## 2022-09-16 NOTE — PROGRESS NOTES
"Answers for HPI/ROS submitted by the patient on 9/15/2022  Please describe your symptoms.: Cough, congestion, shortness of breath, ear pain, wheezing  Have you had these symptoms before?: Yes  How long have you been having these symptoms?: Greater than 2 weeks  Please list any medications you are currently taking for this condition.: Zyrtec, Flonase, mucinex,celebrex  Please describe any probable cause for these symptoms. : COVID in June and August  What is the primary reason for your visit?: Other    Chief Complaint  Cough (On and off since covid 8/22), Nasal Congestion, and Sinus Problem (Sinus pressure )    Subjective          Qiana Altman, 72 y.o. female presents to [facility]  History of Present Illness   She presents today for an acute visit.  She is a patient of LOYD Nolen.  She is complaining of cough, congestion, shortness of breath, ear pain and wheezing for the past 2 weeks.  She tested positive for COVID on 8/15/2022.  She states she is just not getting better.  She has been taking Zyrtec and Flonase without any relief.  She has been using her albuterol nebulizer but does complain it causes her to feel little jittery.  She denies any chest pain or fever.  She is worried that she could have pneumonia.  Objective   Vital Signs:   /70   Pulse 72   Temp 96.2 °F (35.7 °C)   Resp 20   Ht 152.4 cm (60\")   Wt 69.7 kg (153 lb 9.6 oz)   SpO2 96%   BMI 30.00 kg/m²     Current Outpatient Medications on File Prior to Visit   Medication Sig Dispense Refill   • albuterol (PROVENTIL) 2.5 MG/0.5ML nebulizer solution Take 2.5 mg by nebulization Every 4 (Four) Hours As Needed for Wheezing or Shortness of Air. 60 mL 0   • atorvastatin (LIPITOR) 10 MG tablet Take 1 tablet by mouth Daily. 90 tablet 1   • calcium carbonate (OS-JANENE) 600 MG tablet Take 1 tablet by mouth Daily. 90 tablet 1   • celecoxib (CeleBREX) 100 MG capsule Take 1 capsule by mouth 2 (Two) " Times a Day. 180 capsule 1   • cetirizine (zyrTEC) 10 MG tablet Take 1 tablet by mouth Daily. 90 tablet 1   • esomeprazole (nexIUM) 40 MG capsule Take 1 capsule by mouth Daily. 90 capsule 1   • fluticasone (FLONASE) 50 MCG/ACT nasal spray instill 2 sprays in each nostril every day AS NEEDED 16 g 5   • quinapril (ACCUPRIL) 10 MG tablet Take 1 tablet by mouth 2 (Two) Times a Day. 180 tablet 1   • Respiratory Therapy Supplies (Nebulizer/Tubing/Mouthpiece) kit 1 each Every 4 (Four) Hours As Needed (wheezing/soa). 1 each 0   • vitamin D (ERGOCALCIFEROL) 1.25 MG (83405 UT) capsule capsule Take 1 capsule by mouth 1 (One) Time Per Week. 13 capsule 1     No current facility-administered medications on file prior to visit.     Past Medical History:   Diagnosis Date   • Allergic    • Allergic rhinitis due to allergen 09/10/2019   • Anemia 09/10/2019   • Anxiety disorder 06/09/2020   • Arthritis    • B12 deficiency 09/23/2020   • Depression    • Diverticulosis    • Essential hypertension 01/03/2019   • GERD (gastroesophageal reflux disease) 01/03/2019   • Graves' disease    • History of Graves' disease 10/01/2020   • HLD (hyperlipidemia) 01/03/2019   • Hypothyroidism    • Inflammatory bowel disease    • Major depressive disorder 01/03/2019   • Osteoarthritis 09/23/2020   • Osteopenia    • T2DM (type 2 diabetes mellitus) ([facility]) 01/03/2019   • Vitamin D deficiency 01/03/2019      Physical Exam  Vitals reviewed.   Constitutional:       Appearance: Normal appearance. She is well-developed.   HENT:      Right Ear: Tympanic membrane, ear canal and external ear normal.      Left Ear: Tympanic membrane, ear canal and external ear normal.      Nose:      Right Sinus: Maxillary sinus tenderness present.      Left Sinus: Maxillary sinus tenderness present.      Mouth/Throat:      Mouth: Mucous membranes are moist.      Pharynx: Posterior oropharyngeal erythema present. No pharyngeal swelling or oropharyngeal exudate.   Neck:      Thyroid:  No thyroid mass, thyromegaly or thyroid tenderness.   Cardiovascular:      Rate and Rhythm: Normal rate and regular rhythm.      Heart sounds: No murmur heard.    No friction rub. No gallop.   Pulmonary:      Effort: Pulmonary effort is normal.      Breath sounds: Normal breath sounds. No wheezing or rhonchi.   Lymphadenopathy:      Cervical: No cervical adenopathy.   Skin:     General: Skin is warm and dry.   Neurological:      Mental Status: She is alert and oriented to person, place, and time.      Cranial Nerves: No cranial nerve deficit.   Psychiatric:         Mood and Affect: Mood and affect normal.         Behavior: Behavior normal.         Thought Content: Thought content normal. Thought content does not include homicidal or suicidal ideation.         Judgment: Judgment normal.        Result Review :                 Assessment and Plan    Diagnoses and all orders for this visit:    1. Acute non-recurrent maxillary sinusitis (Primary)  -     doxycycline (VIBRAMYCIN) 100 MG capsule; Take 1 capsule by mouth 2 (Two) Times a Day for 7 days.  Dispense: 14 capsule; Refill: 0  -     methylPREDNISolone sodium succinate (SOLU-Medrol) injection 125 mg    2. Cough  -     XR Chest PA & Lateral; Future  -     methylPREDNISolone sodium succinate (SOLU-Medrol) injection 125 mg  -     benzonatate (TESSALON) 200 MG capsule; Take 1 capsule by mouth 3 (Three) Times a Day As Needed for Cough for up to 7 days.  Dispense: 21 capsule; Refill: 0    3. Post-COVID syndrome  -     XR Chest PA & Lateral; Future  -     methylPREDNISolone sodium succinate (SOLU-Medrol) injection 125 mg    4. Shortness of breath  -     XR Chest PA & Lateral; Future  -     methylPREDNISolone sodium succinate (SOLU-Medrol) injection 125 mg    Due to her prolonged cough, shortness of breath and congestion, I will have her get a chest x-ray to rule out pneumonia.  I will start her on doxycycline.  We will give her Solu-Medrol 125 mg IM in the office today.   Advised to call or follow-up if not improving or any worsening of symptoms.  Advised that if she has worsening of symptoms with difficulty breathing or fever over the weekend that she needs to go to the emergency room.    Follow Up   Return if symptoms worsen or fail to improve.  Patient was given instructions and counseling regarding her condition or for health maintenance advice. Please see specific information pulled into the AVS if appropriate.

## 2022-09-19 ENCOUNTER — OFFICE VISIT (OUTPATIENT)
Dept: FAMILY MEDICINE CLINIC | Facility: CLINIC | Age: 72
End: 2022-09-19

## 2022-09-19 ENCOUNTER — HOSPITAL ENCOUNTER (OUTPATIENT)
Dept: GENERAL RADIOLOGY | Facility: HOSPITAL | Age: 72
Discharge: HOME | End: 2022-09-19
Admitting: NURSE PRACTITIONER

## 2022-09-19 VITALS
RESPIRATION RATE: 12 BRPM | DIASTOLIC BLOOD PRESSURE: 66 MMHG | OXYGEN SATURATION: 98 % | HEART RATE: 70 BPM | SYSTOLIC BLOOD PRESSURE: 137 MMHG | TEMPERATURE: 97.6 F | BODY MASS INDEX: 30.1 KG/M2 | WEIGHT: 153.3 LBS | HEIGHT: 60 IN

## 2022-09-19 DIAGNOSIS — Z86.39 HISTORY OF GRAVES' DISEASE: ICD-10-CM

## 2022-09-19 DIAGNOSIS — R06.02 SHORTNESS OF BREATH: ICD-10-CM

## 2022-09-19 DIAGNOSIS — U09.9 POST-COVID SYNDROME: ICD-10-CM

## 2022-09-19 DIAGNOSIS — E53.8 B12 DEFICIENCY: ICD-10-CM

## 2022-09-19 DIAGNOSIS — Z12.31 SCREENING MAMMOGRAM FOR BREAST CANCER: ICD-10-CM

## 2022-09-19 DIAGNOSIS — I10 ESSENTIAL HYPERTENSION: Primary | ICD-10-CM

## 2022-09-19 DIAGNOSIS — R05.9 COUGH: ICD-10-CM

## 2022-09-19 DIAGNOSIS — J30.9 ALLERGIC RHINITIS, UNSPECIFIED SEASONALITY, UNSPECIFIED TRIGGER: ICD-10-CM

## 2022-09-19 DIAGNOSIS — E55.9 VITAMIN D DEFICIENCY: ICD-10-CM

## 2022-09-19 DIAGNOSIS — K21.9 GASTROESOPHAGEAL REFLUX DISEASE WITHOUT ESOPHAGITIS: ICD-10-CM

## 2022-09-19 DIAGNOSIS — E78.2 MIXED HYPERLIPIDEMIA: ICD-10-CM

## 2022-09-19 DIAGNOSIS — M19.90 OSTEOARTHRITIS, UNSPECIFIED OSTEOARTHRITIS TYPE, UNSPECIFIED SITE: ICD-10-CM

## 2022-09-19 LAB
25(OH)D3 SERPL-MCNC: 40 NG/ML (ref 30–100)
ALBUMIN SERPL-MCNC: 4.1 G/DL (ref 3.5–5.2)
ALBUMIN/GLOB SERPL: 1.9 G/DL
ALP SERPL-CCNC: 60 U/L (ref 39–117)
ALT SERPL W P-5'-P-CCNC: 21 U/L (ref 1–33)
ANION GAP SERPL CALCULATED.3IONS-SCNC: 10 MMOL/L (ref 5–15)
AST SERPL-CCNC: 22 U/L (ref 1–32)
BASOPHILS # BLD AUTO: 0.05 10*3/MM3 (ref 0–0.2)
BASOPHILS NFR BLD AUTO: 1.1 % (ref 0–1.5)
BILIRUB SERPL-MCNC: 0.6 MG/DL (ref 0–1.2)
BUN SERPL-MCNC: 15 MG/DL (ref 8–23)
BUN/CREAT SERPL: 16.1 (ref 7–25)
CALCIUM SPEC-SCNC: 9.6 MG/DL (ref 8.6–10.5)
CHLORIDE SERPL-SCNC: 107 MMOL/L (ref 98–107)
CHOLEST SERPL-MCNC: 120 MG/DL (ref 0–200)
CO2 SERPL-SCNC: 25 MMOL/L (ref 22–29)
CREAT SERPL-MCNC: 0.93 MG/DL (ref 0.57–1)
DEPRECATED RDW RBC AUTO: 40.1 FL (ref 37–54)
EGFRCR SERPLBLD CKD-EPI 2021: 66.3 ML/MIN/1.73
EOSINOPHIL # BLD AUTO: 0.13 10*3/MM3 (ref 0–0.4)
EOSINOPHIL NFR BLD AUTO: 2.8 % (ref 0.3–6.2)
ERYTHROCYTE [DISTWIDTH] IN BLOOD BY AUTOMATED COUNT: 12.8 % (ref 12.3–15.4)
FOLATE SERPL-MCNC: 8.13 NG/ML (ref 4.78–24.2)
GLOBULIN UR ELPH-MCNC: 2.2 GM/DL
GLUCOSE SERPL-MCNC: 129 MG/DL (ref 65–99)
HCT VFR BLD AUTO: 38.3 % (ref 34–46.6)
HDLC SERPL-MCNC: 48 MG/DL (ref 40–60)
HGB BLD-MCNC: 12.6 G/DL (ref 12–15.9)
IMM GRANULOCYTES # BLD AUTO: 0.01 10*3/MM3 (ref 0–0.05)
IMM GRANULOCYTES NFR BLD AUTO: 0.2 % (ref 0–0.5)
IRON 24H UR-MRATE: 71 MCG/DL (ref 37–145)
IRON SATN MFR SERPL: 16 % (ref 20–50)
LDLC SERPL CALC-MCNC: 55 MG/DL (ref 0–100)
LDLC/HDLC SERPL: 1.13 {RATIO}
LYMPHOCYTES # BLD AUTO: 1.71 10*3/MM3 (ref 0.7–3.1)
LYMPHOCYTES NFR BLD AUTO: 36.6 % (ref 19.6–45.3)
MCH RBC QN AUTO: 28.6 PG (ref 26.6–33)
MCHC RBC AUTO-ENTMCNC: 32.9 G/DL (ref 31.5–35.7)
MCV RBC AUTO: 86.8 FL (ref 79–97)
MONOCYTES # BLD AUTO: 0.37 10*3/MM3 (ref 0.1–0.9)
MONOCYTES NFR BLD AUTO: 7.9 % (ref 5–12)
NEUTROPHILS NFR BLD AUTO: 2.4 10*3/MM3 (ref 1.7–7)
NEUTROPHILS NFR BLD AUTO: 51.4 % (ref 42.7–76)
NRBC BLD AUTO-RTO: 0 /100 WBC (ref 0–0.2)
PLATELET # BLD AUTO: 267 10*3/MM3 (ref 140–450)
PMV BLD AUTO: 9.1 FL (ref 6–12)
POTASSIUM SERPL-SCNC: 4.3 MMOL/L (ref 3.5–5.2)
PROT SERPL-MCNC: 6.3 G/DL (ref 6–8.5)
RBC # BLD AUTO: 4.41 10*6/MM3 (ref 3.77–5.28)
SODIUM SERPL-SCNC: 142 MMOL/L (ref 136–145)
T4 FREE SERPL-MCNC: 1.42 NG/DL (ref 0.93–1.7)
TIBC SERPL-MCNC: 432 MCG/DL (ref 298–536)
TRANSFERRIN SERPL-MCNC: 290 MG/DL (ref 200–360)
TRIGL SERPL-MCNC: 88 MG/DL (ref 0–150)
VIT B12 BLD-MCNC: 228 PG/ML (ref 211–946)
VLDLC SERPL-MCNC: 17 MG/DL (ref 5–40)
WBC NRBC COR # BLD: 4.67 10*3/MM3 (ref 3.4–10.8)

## 2022-09-19 PROCEDURE — 80053 COMPREHEN METABOLIC PANEL: CPT | Performed by: NURSE PRACTITIONER

## 2022-09-19 PROCEDURE — 84466 ASSAY OF TRANSFERRIN: CPT | Performed by: NURSE PRACTITIONER

## 2022-09-19 PROCEDURE — 82746 ASSAY OF FOLIC ACID SERUM: CPT | Performed by: NURSE PRACTITIONER

## 2022-09-19 PROCEDURE — 85025 COMPLETE CBC W/AUTO DIFF WBC: CPT | Performed by: NURSE PRACTITIONER

## 2022-09-19 PROCEDURE — 71046 X-RAY EXAM CHEST 2 VIEWS: CPT

## 2022-09-19 PROCEDURE — 83540 ASSAY OF IRON: CPT | Performed by: NURSE PRACTITIONER

## 2022-09-19 PROCEDURE — 99214 OFFICE O/P EST MOD 30 MIN: CPT | Performed by: NURSE PRACTITIONER

## 2022-09-19 PROCEDURE — 80061 LIPID PANEL: CPT | Performed by: NURSE PRACTITIONER

## 2022-09-19 PROCEDURE — 82306 VITAMIN D 25 HYDROXY: CPT | Performed by: NURSE PRACTITIONER

## 2022-09-19 PROCEDURE — 84439 ASSAY OF FREE THYROXINE: CPT | Performed by: NURSE PRACTITIONER

## 2022-09-19 PROCEDURE — 82607 VITAMIN B-12: CPT | Performed by: NURSE PRACTITIONER

## 2022-09-19 RX ORDER — ESOMEPRAZOLE MAGNESIUM 40 MG/1
40 CAPSULE, DELAYED RELEASE ORAL DAILY
Qty: 90 CAPSULE | Refills: 1 | Status: SHIPPED | OUTPATIENT
Start: 2022-09-19

## 2022-09-19 RX ORDER — CELECOXIB 100 MG/1
100 CAPSULE ORAL 2 TIMES DAILY
Qty: 180 CAPSULE | Refills: 1 | Status: SHIPPED | OUTPATIENT
Start: 2022-09-19 | End: 2023-03-23

## 2022-09-19 RX ORDER — FLUTICASONE PROPIONATE 50 MCG
2 SPRAY, SUSPENSION (ML) NASAL DAILY
Qty: 16 G | Refills: 5 | Status: SHIPPED | OUTPATIENT
Start: 2022-09-19 | End: 2023-03-23

## 2022-09-19 RX ORDER — CETIRIZINE HYDROCHLORIDE 10 MG/1
10 TABLET ORAL DAILY
Qty: 90 TABLET | Refills: 1 | Status: SHIPPED | OUTPATIENT
Start: 2022-09-19 | End: 2023-02-21

## 2022-09-19 RX ORDER — ERGOCALCIFEROL 1.25 MG/1
50000 CAPSULE ORAL WEEKLY
Qty: 13 CAPSULE | Refills: 1 | Status: SHIPPED | OUTPATIENT
Start: 2022-09-19 | End: 2023-02-21

## 2022-09-19 RX ORDER — QUINAPRIL 10 MG/1
10 TABLET ORAL 2 TIMES DAILY
Qty: 180 TABLET | Refills: 1 | Status: SHIPPED | OUTPATIENT
Start: 2022-09-19 | End: 2023-03-23

## 2022-09-19 RX ORDER — ATORVASTATIN CALCIUM 10 MG/1
10 TABLET, FILM COATED ORAL DAILY
Qty: 90 TABLET | Refills: 1 | Status: SHIPPED | OUTPATIENT
Start: 2022-09-19 | End: 2023-02-23 | Stop reason: HOSPADM

## 2022-09-19 NOTE — PROGRESS NOTES
Answers for HPI/ROS submitted by the patient on 9/15/2022  How long have you been having these symptoms?: Greater than 2 weeks  What is the primary reason for your visit?: Other      Chief Complaint  Hyperlipidemia, Hypertension, and Anxiety (6 month follow up refills, fasting for labs )    Tyler Altman presents to [facility]  History of Present Illness   She has had covid 2 times.  She saw Treva last Friday.  She is going to get her CXR this week.  She is feeling better.  Anxiety:  She is doing ok.  She is not having any SI/HI.  She has her ups and downs but is doing better  LIPID:  She is doing good overall with lipitor.    HTN- She is doing ok but her quinapril and no issues and is currently not have any CP/SOA.   Arthritis: She is taking tylenol--she is having lower back pain on and off.  She has to watch otherwise she will aggrevate the leg.    B12 def and anemia- on iron supplements and b12 monthly and needs to have her labs checked.  She is doing OTC.    GERD:  She is taking her nexium and is doing good.  Allergies:  She is taking her zyrtec and flonase.  Graves disease--she used to take med but no issues recently (has issues with eyes)  She is due her mammogram.  Osteopenia she is going to do the calcium.  DEXA is due in 2 years.      Allergies  Shellfish allergy, Moxifloxacin hcl, Scopolamine, Sulfa antibiotics, Morphine, and Penicillins    Social History     Tobacco Use   • Smoking status: Former Smoker     Packs/day: 1.00     Years: 5.00     Pack years: 5.00     Types: Cigarettes     Start date: 1980     Quit date: 1985     Years since quittin.7   • Smokeless tobacco: Never Used   • Tobacco comment: QUIT 28 YEARS AGO   Vaping Use   • Vaping Use: Never used   Substance Use Topics   • Alcohol use: Never   • Drug use: Never       Family History   Problem Relation Age of Onset   • Heart disease Mother    • Heart failure Mother    •  "Arthritis Mother    • Hyperlipidemia Mother    • Hypertension Mother    • Stroke Mother    • Thyroid disease Mother    • Lung cancer Other    • Skin cancer Other    • Heart disease Brother    • Heart disease Brother    • Kidney disease Daughter    • Miscarriages / Stillbirths Daughter         Health Maintenance Due   Topic Date Due   • COLORECTAL CANCER SCREENING  03/20/2020        Immunization History   Administered Date(s) Administered   • COVID-19 (MODERNA) 1st, 2nd, 3rd Dose Only 03/26/2021, 04/23/2021   • Flu Vaccine Quad PF >36MO 09/09/2014, 12/29/2015   • Fluzone High-Dose 65+yrs 10/04/2021   • Influenza, Unspecified 09/23/2020   • Pneumococcal Conjugate 13-Valent (PCV13) 11/14/2017   • Pneumococcal Polysaccharide (PPSV23) 09/23/2020   • Tdap 06/22/2016       Review of Systems   Constitutional: Positive for fatigue.   Respiratory: Positive for cough. Negative for shortness of breath.    Cardiovascular: Negative for chest pain.   Gastrointestinal: Negative for diarrhea, nausea and vomiting.        Objective       Vitals:    09/19/22 0857   BP: 137/66   Pulse: 70   Resp: 12   Temp: 97.6 °F (36.4 °C)   SpO2: 98%   Weight: 69.5 kg (153 lb 4.8 oz)   Height: 152.4 cm (60\")       Body mass index is 29.94 kg/m².         Physical Exam  Vitals reviewed.   Constitutional:       Appearance: Normal appearance. She is well-developed.   Cardiovascular:      Rate and Rhythm: Normal rate and regular rhythm.      Heart sounds: Normal heart sounds. No murmur heard.  Pulmonary:      Effort: Pulmonary effort is normal.      Breath sounds: Normal breath sounds.   Neurological:      Mental Status: She is alert and oriented to person, place, and time.      Cranial Nerves: No cranial nerve deficit.      Motor: No weakness.   Psychiatric:         Mood and Affect: Mood and affect normal.             Result Review :     The following data was reviewed by: LOYD Nolen on 09/19/2022:    Common labs    Common Labsle 10/4/21 " 10/4/21 10/4/21 3/28/22 3/28/22 3/28/22    0821 0821 0821 0923 0923 0923   Glucose   122 (A)  128 (A)    BUN   11  13    Creatinine   0.88  0.93    eGFR Non African Am   64      Sodium   144  141    Potassium   4.3  3.9    Chloride   107  109 (A)    Calcium   9.6  9.4    Albumin   4.50  4.00    Total Bilirubin   0.8  0.6    Alkaline Phosphatase   65  70    AST (SGOT)   24  17    ALT (SGPT)   24  18    WBC    4.67     Hemoglobin    12.6     Hematocrit    39.8     Platelets    259     Total Cholesterol  137    129   Triglycerides  129    87   HDL Cholesterol  45    46   LDL Cholesterol   69    66   Hemoglobin A1C 5.70 (A)        (A) Abnormal value            Most Recent A1C    HGBA1C Most Recent 10/4/21   Hemoglobin A1C 5.70 (A)   (A) Abnormal value                           Assessment and Plan      Diagnoses and all orders for this visit:    1. Essential hypertension (Primary)  -     CBC Auto Differential  -     quinapril (ACCUPRIL) 10 MG tablet; Take 1 tablet by mouth 2 (Two) Times a Day.  Dispense: 180 tablet; Refill: 1  -     T4, Free    2. Mixed hyperlipidemia  -     Lipid Panel With LDL / HDL Ratio  -     Comprehensive metabolic panel  -     atorvastatin (LIPITOR) 10 MG tablet; Take 1 tablet by mouth Daily.  Dispense: 90 tablet; Refill: 1    3. Vitamin D deficiency  -     Vitamin D 25 Hydroxy  -     vitamin D (ERGOCALCIFEROL) 1.25 MG (98977 UT) capsule capsule; Take 1 capsule by mouth 1 (One) Time Per Week.  Dispense: 13 capsule; Refill: 1    4. B12 deficiency  -     Vitamin B12 & Folate  -     Iron Profile    5. Allergic rhinitis, unspecified seasonality, unspecified trigger  -     fluticasone (FLONASE) 50 MCG/ACT nasal spray; 2 sprays into the nostril(s) as directed by provider Daily.  Dispense: 16 g; Refill: 5  -     cetirizine (zyrTEC) 10 MG tablet; Take 1 tablet by mouth Daily.  Dispense: 90 tablet; Refill: 1    6. Gastroesophageal reflux disease without esophagitis  -     esomeprazole (nexIUM) 40 MG  capsule; Take 1 capsule by mouth Daily.  Dispense: 90 capsule; Refill: 1    7. Osteoarthritis, unspecified osteoarthritis type, unspecified site  -     celecoxib (CeleBREX) 100 MG capsule; Take 1 capsule by mouth 2 (Two) Times a Day.  Dispense: 180 capsule; Refill: 1    8. History of Graves' disease  -     Iron Profile    9. Screening mammogram for breast cancer  -     Mammo Screening Digital Tomosynthesis Bilateral With CAD; Future            Follow Up     Return in about 6 months (around 3/19/2023) for Medicare Wellness 10/4/22--Due this year.  Follow-up in 6 months for labs and appt. Call with any concerns or questions that you may have regarding your medications or history.    I have reviewed all medications and at this time no medications changes need to be adjusted for all chronic conditions.  She will get her CXR done this week.  Her lungs sound very good.  She does feel much better since being on the antibiotics and the cough medicine.  Patient was given instructions and counseling regarding her condition or for health maintenance advice. Please see specific information pulled into the AVS if appropriate.         Zohreh Mcduffie, APRN  09/19/2022

## 2022-09-29 ENCOUNTER — OFFICE VISIT (OUTPATIENT)
Dept: FAMILY MEDICINE CLINIC | Facility: CLINIC | Age: 72
End: 2022-09-29

## 2022-09-29 VITALS
BODY MASS INDEX: 30.77 KG/M2 | HEART RATE: 68 BPM | TEMPERATURE: 98 F | DIASTOLIC BLOOD PRESSURE: 72 MMHG | SYSTOLIC BLOOD PRESSURE: 162 MMHG | OXYGEN SATURATION: 99 % | WEIGHT: 156.7 LBS | HEIGHT: 60 IN

## 2022-09-29 DIAGNOSIS — Z23 NEED FOR INFLUENZA VACCINATION: ICD-10-CM

## 2022-09-29 DIAGNOSIS — R30.0 DYSURIA: Primary | ICD-10-CM

## 2022-09-29 DIAGNOSIS — I10 ESSENTIAL HYPERTENSION: ICD-10-CM

## 2022-09-29 LAB
BACTERIA UR QL AUTO: NORMAL /HPF
BILIRUB BLD-MCNC: NEGATIVE MG/DL
BILIRUB UR QL STRIP: NEGATIVE
CANDIDA SPECIES: NEGATIVE
CLARITY UR: CLEAR
CLARITY, POC: CLEAR
COLOR UR: YELLOW
COLOR UR: YELLOW
EXPIRATION DATE: NORMAL
GARDNERELLA VAGINALIS: NEGATIVE
GLUCOSE UR STRIP-MCNC: NEGATIVE MG/DL
GLUCOSE UR STRIP-MCNC: NEGATIVE MG/DL
HGB UR QL STRIP.AUTO: NEGATIVE
HYALINE CASTS UR QL AUTO: NORMAL /LPF
KETONES UR QL STRIP: NEGATIVE
KETONES UR QL: NEGATIVE
LEUKOCYTE EST, POC: NEGATIVE
LEUKOCYTE ESTERASE UR QL STRIP.AUTO: NEGATIVE
Lab: NORMAL
NITRITE UR QL STRIP: NEGATIVE
NITRITE UR-MCNC: NEGATIVE MG/ML
PH UR STRIP.AUTO: 5.5 [PH] (ref 5–8)
PH UR: 5.5 [PH] (ref 5–8)
PROT UR QL STRIP: NEGATIVE
PROT UR STRIP-MCNC: NEGATIVE MG/DL
RBC # UR STRIP: NEGATIVE /UL
RBC # UR STRIP: NORMAL /HPF
REF LAB TEST METHOD: NORMAL
SP GR UR STRIP: 1.02 (ref 1–1.03)
SP GR UR: 1.02 (ref 1–1.03)
SQUAMOUS #/AREA URNS HPF: NORMAL /HPF
T VAGINALIS DNA VAG QL PROBE+SIG AMP: NEGATIVE
UROBILINOGEN UR QL STRIP: NORMAL
UROBILINOGEN UR QL: NORMAL
WBC # UR STRIP: NORMAL /HPF

## 2022-09-29 PROCEDURE — G0008 ADMIN INFLUENZA VIRUS VAC: HCPCS | Performed by: NURSE PRACTITIONER

## 2022-09-29 PROCEDURE — 81003 URINALYSIS AUTO W/O SCOPE: CPT | Performed by: NURSE PRACTITIONER

## 2022-09-29 PROCEDURE — 81001 URINALYSIS AUTO W/SCOPE: CPT | Performed by: NURSE PRACTITIONER

## 2022-09-29 PROCEDURE — 87510 GARDNER VAG DNA DIR PROBE: CPT | Performed by: NURSE PRACTITIONER

## 2022-09-29 PROCEDURE — 90662 IIV NO PRSV INCREASED AG IM: CPT | Performed by: NURSE PRACTITIONER

## 2022-09-29 PROCEDURE — 87660 TRICHOMONAS VAGIN DIR PROBE: CPT | Performed by: NURSE PRACTITIONER

## 2022-09-29 PROCEDURE — 99214 OFFICE O/P EST MOD 30 MIN: CPT | Performed by: NURSE PRACTITIONER

## 2022-09-29 PROCEDURE — 87480 CANDIDA DNA DIR PROBE: CPT | Performed by: NURSE PRACTITIONER

## 2022-09-29 NOTE — ASSESSMENT & PLAN NOTE
Hypertension is worsening.  Medication changes per orders.  Ambulatory blood pressure monitoring.   Take one additional dose of Quinapril this morning due to elevated blood pressure and continue to monitor blood pressure readings.   Blood pressure will be reassessed in 4 weeks.  Advised to call or follow-up sooner if worsening of symptoms.

## 2022-09-29 NOTE — PROGRESS NOTES
Answers for HPI/ROS submitted by the patient on 9/29/2022  What is the primary reason for your visit?: Painful Urination  Chronicity: recurrent  Onset: yesterday  Frequency: every urination  Progression since onset: gradually worsening  Pain quality: burning  Pain - numeric: 6/10  Fever: no fever  Sexually active?: No  History of pyelonephritis?: No  chills: No  discharge: No  flank pain: No  frequency: Yes  hematuria: No  hesitancy: Yes  nausea: No  possible pregnancy: No  sweats: No  urgency: Yes  vomiting: No  Chief Complaint  Dysuria    Subjective          Qiana A Agapito, 72 y.o. female presents to [facility]  History of Present Illness  Patient presents to office with complaints of difficulties urination and painful urination. She describes urge to void but then unable to urinate. She reports similar symptoms in the past when feeling constipated or when ingesting excessive amounts of acid foods. She complains of stress incontinence and wears an incontinence pad to assist with dribbling.She reports urine that is dark in color. She admits to drinking a lot of tea, a cup of coffee with only a couple of bottles water per day. Patient also verbalizes that she has been ingesting a lot of fruit over the past few days.  She denies any vaginal discharge or itching.  She is also concerned with recent weight gain of three pounds, feeling bloated and having an elevated blood pressure reading here in the office. She currently take Quinipril 10mg twice daily and has been controlled for a long time. She states she checks her Blood pressure at home and it usually runs 130s - 135s systolically. Patient admits to eating a lot of doughnuts lately. She also reports recent loss of mother-in-law causing stress. She does not have any visible edema and denies having edema at home.  She does admit to having a headache but she denies any chest pain.    Dysuria   This is a recurrent problem. The current  "episode started yesterday. The problem occurs every urination. The problem has been gradually worsening. The quality of the pain is described as burning. The pain is at a severity of 6/10. There has been no fever. She is not sexually active. There is no history of pyelonephritis. Associated symptoms include frequency, hesitancy and urgency. Pertinent negatives include no chills, discharge, flank pain, hematuria, nausea, possible pregnancy, sweats or vomiting.          Objective   Vital Signs:   /72   Pulse 68   Temp 98 °F (36.7 °C)   Ht 152.4 cm (60\")   Wt 71.1 kg (156 lb 11.2 oz)   SpO2 99%   BMI 30.60 kg/m²     Current Outpatient Medications on File Prior to Visit   Medication Sig Dispense Refill   • albuterol (PROVENTIL) 2.5 MG/0.5ML nebulizer solution Take 2.5 mg by nebulization Every 4 (Four) Hours As Needed for Wheezing or Shortness of Air. 60 mL 0   • atorvastatin (LIPITOR) 10 MG tablet Take 1 tablet by mouth Daily. 90 tablet 1   • calcium carbonate (OS-JANENE) 600 MG tablet Take 1 tablet by mouth Daily. 90 tablet 1   • celecoxib (CeleBREX) 100 MG capsule Take 1 capsule by mouth 2 (Two) Times a Day. 180 capsule 1   • cetirizine (zyrTEC) 10 MG tablet Take 1 tablet by mouth Daily. 90 tablet 1   • esomeprazole (nexIUM) 40 MG capsule Take 1 capsule by mouth Daily. 90 capsule 1   • fluticasone (FLONASE) 50 MCG/ACT nasal spray 2 sprays into the nostril(s) as directed by provider Daily. 16 g 5   • quinapril (ACCUPRIL) 10 MG tablet Take 1 tablet by mouth 2 (Two) Times a Day. 180 tablet 1   • vitamin D (ERGOCALCIFEROL) 1.25 MG (84157 UT) capsule capsule Take 1 capsule by mouth 1 (One) Time Per Week. 13 capsule 1     No current facility-administered medications on file prior to visit.     Past Medical History:   Diagnosis Date   • Allergic    • Allergic rhinitis due to allergen 09/10/2019   • Anemia 09/10/2019   • Anxiety disorder 06/09/2020   • Arthritis    • B12 deficiency 09/23/2020   • Depression    • " Diverticulosis    • Essential hypertension 01/03/2019   • GERD (gastroesophageal reflux disease) 01/03/2019   • Graves' disease    • History of Graves' disease 10/01/2020   • HLD (hyperlipidemia) 01/03/2019   • Hypothyroidism    • Inflammatory bowel disease    • Major depressive disorder 01/03/2019   • Osteoarthritis 09/23/2020   • Osteopenia    • T2DM (type 2 diabetes mellitus) (HCC) 01/03/2019   • Vitamin D deficiency 01/03/2019      Physical Exam  Vitals and nursing note reviewed.   Constitutional:       Appearance: Normal appearance. She is well-developed.   Neck:      Thyroid: No thyroid mass, thyromegaly or thyroid tenderness.   Cardiovascular:      Rate and Rhythm: Normal rate and regular rhythm.      Heart sounds: No murmur heard.    No friction rub. No gallop.   Pulmonary:      Effort: Pulmonary effort is normal.      Breath sounds: Normal breath sounds. No wheezing or rhonchi.   Musculoskeletal:      Right lower leg: No edema.      Left lower leg: No edema.   Lymphadenopathy:      Cervical: No cervical adenopathy.   Skin:     General: Skin is warm and dry.   Neurological:      Mental Status: She is alert and oriented to person, place, and time.   Psychiatric:         Attention and Perception: Attention normal.         Mood and Affect: Mood and affect normal.         Behavior: Behavior normal. Behavior is cooperative.         Thought Content: Thought content normal. Thought content does not include homicidal or suicidal ideation.         Judgment: Judgment normal.        Result Review :              POCT urinalysis dipstick, automated [[id number]] (Order [id number])  Order  Date: 9/29/2022 Department: [facility] Ordering/Authorizing: Treva Jeffery APRN       Reprint Order Requisition    POCT urinalysis dipstick, automated (Order #[id number]) on 9/29/22            POCT urinalysis dipstick, automated  Order: [id number]   Status: Final result       Visible to patient: No  (scheduled for 9/29/2022 10:48 PM)       Next appt: 10/31/2022 at 08:45 AM in Family Medicine (Treva Jeffery, LOYD)       Dx: Dysuria      Specimen Information: Urine         0 Result Notes      Component   Ref Range & Units 08:47   (9/29/22) 1 mo ago   (8/15/22) 6 mo ago   (3/28/22) 6 mo ago   (3/28/22) 12 mo ago   (10/4/21) 3 yr ago   (3/20/19) 3 yr ago   (3/16/19)   Color   Yellow, Straw, Dark Yellow, Davina Yellow   Yellow        Clarity, UA   Clear Clear   Clear    CLEAR R  CLEAR R    Specific Gravity    1.005 - 1.030 1.020   1.020    1.016 R  1.009 R    pH, Urine   5.0 - 8.0 5.5   5.0    5.5 R  6.0 R    Leukocytes   Negative Negative   Trace Abnormal     NEGATIVE R, CM  NEGATIVE R, CM    Nitrite, UA   Negative Negative   Negative    NEGATIVE R  NEGATIVE R    Protein, POC   Negative mg/dL Negative   Negative        Glucose, UA   Negative mg/dL Negative   Negative R    NEGATIVE R  NEGATIVE R    Ketones, UA   Negative Negative   Negative    NEGATIVE R  NEGATIVE R    Urobilinogen, UA   Normal, 0.2 E.U./dL 0.2 E.U./dL   Normal R    0.2 R  0.2 R    Bilirubin   Negative Negative   Negative        Blood, UA   Negative Negative   Negative    NEGATIVE R  NEGATIVE            Assessment and Plan    Diagnoses and all orders for this visit:    1. Dysuria (Primary)  Comments:  Painful frequent urination. Negative urinanlysis with dipstick. Will send out urine for analysis at lab. Will also obtain vaginal swab for r/o BV.  Orders:  -     POCT urinalysis dipstick, automated  -     Gardnerella vaginalis, Trichomonas vaginalis, Candida albicans, DNA - Swab, Vagina  -     Urinalysis With Microscopic - Urine, Clean Catch    2. Essential hypertension  Assessment & Plan:  Hypertension is worsening.  Medication changes per orders.  Ambulatory blood pressure monitoring.   Take one additional dose of Quinapril this morning due to elevated blood pressure and continue to monitor blood pressure readings.   Blood pressure will be  reassessed in 4 weeks.  Advised to call or follow-up sooner if worsening of symptoms.      3. Need for influenza vaccination  Comments:  Influenza vaccine today at patient request  Orders:  -     Fluzone High-Dose 65+yrs      Follow Up   Return in about 4 weeks (around 10/27/2022) for Recheck uncontrolled HTN.  Patient was given instructions and counseling regarding her condition or for health maintenance advice. Please see specific information pulled into the AVS if appropriate.     I reviewed all the information by my student LOYD Ahumada student, and I attest that all information is correct.    LOYD Manzano

## 2022-10-03 ENCOUNTER — TELEPHONE (OUTPATIENT)
Dept: FAMILY MEDICINE CLINIC | Facility: CLINIC | Age: 72
End: 2022-10-03

## 2022-10-03 DIAGNOSIS — M25.462 SWELLING OF LEFT KNEE JOINT: ICD-10-CM

## 2022-10-03 DIAGNOSIS — M25.562 ACUTE PAIN OF LEFT KNEE: Primary | ICD-10-CM

## 2022-10-03 NOTE — TELEPHONE ENCOUNTER
Please let her know that I placed an order for an x-ray of the left knee.  Have her schedule an appointment to follow-up with me regarding her knee.

## 2022-10-03 NOTE — TELEPHONE ENCOUNTER
HER LEFT KNEE IS SWOLLEN AND VERY PAINFUL AND SHE SAID THAT SHE TALKED WITH YOU ABOUT IT ON Thursday - WANTS TO SEE IF SHE CAN GET A ORDER FOR AN XRAY - SHE HAS A JUMA IN HER LEG

## 2022-10-03 NOTE — TELEPHONE ENCOUNTER
Caller: HAZEL YOUSSEF    Relationship: Emergency Contact    Best call back number: [phone]     What is the medical concern/diagnosis: LEFT KNEE SWOLLEN AND SORE    What specialty or service is being requested: XRAY    What is the provider, practice or medical service name: [facility]    What is the office location:     What is the office phone number:     Any additional details:

## 2022-10-04 ENCOUNTER — TELEPHONE (OUTPATIENT)
Dept: FAMILY MEDICINE CLINIC | Facility: CLINIC | Age: 72
End: 2022-10-04

## 2022-10-04 NOTE — TELEPHONE ENCOUNTER
Patient states she has since taken 1 tablet 3x a day since she was advise by Treva to increase her meds from 2x a day to 3

## 2022-10-05 RX ORDER — HYDROCHLOROTHIAZIDE 12.5 MG/1
12.5 TABLET ORAL DAILY
COMMUNITY
End: 2022-10-05 | Stop reason: SDUPTHER

## 2022-10-05 RX ORDER — HYDROCHLOROTHIAZIDE 12.5 MG/1
12.5 TABLET ORAL DAILY
Qty: 30 TABLET | Refills: 2 | Status: SHIPPED | OUTPATIENT
Start: 2022-10-05 | End: 2023-01-19 | Stop reason: SDUPTHER

## 2022-10-08 ENCOUNTER — HOSPITAL ENCOUNTER (OUTPATIENT)
Dept: GENERAL RADIOLOGY | Facility: HOSPITAL | Age: 72
Discharge: HOME | End: 2022-10-08
Admitting: NURSE PRACTITIONER

## 2022-10-08 DIAGNOSIS — M25.562 ACUTE PAIN OF LEFT KNEE: ICD-10-CM

## 2022-10-08 DIAGNOSIS — M25.462 SWELLING OF LEFT KNEE JOINT: ICD-10-CM

## 2022-10-08 PROCEDURE — 73562 X-RAY EXAM OF KNEE 3: CPT

## 2022-10-12 ENCOUNTER — TELEPHONE (OUTPATIENT)
Dept: FAMILY MEDICINE CLINIC | Facility: CLINIC | Age: 72
End: 2022-10-12

## 2022-10-12 NOTE — TELEPHONE ENCOUNTER
Caller: Qiana Altman    Relationship: Self    Best call back number:[phone]     What is the best time to reach you: ANY    Who are you requesting to speak with (clinical staff, provider,  specific staff member): CLINICAL        What was the call regarding: PATIENT STATED SHE IS ON 3 DIFFERENT BLOOD PRESSURE MEDICATIONS AND A WATER PILL, WANTED TO KNOW IF SHE SHOULD CONTINUE TAKE ALL MEDICATIONS CAUSE HER BLOOD PRESSURE NOW /68 AND HER BLOOD PRESSURE MACHINE IS SHOWING IN THE GREEN.     Do you require a callback: YES

## 2022-10-13 ENCOUNTER — OFFICE VISIT (OUTPATIENT)
Dept: FAMILY MEDICINE CLINIC | Facility: CLINIC | Age: 72
End: 2022-10-13

## 2022-10-13 VITALS
SYSTOLIC BLOOD PRESSURE: 129 MMHG | BODY MASS INDEX: 29.9 KG/M2 | HEART RATE: 69 BPM | DIASTOLIC BLOOD PRESSURE: 64 MMHG | TEMPERATURE: 96.1 F | WEIGHT: 152.3 LBS | OXYGEN SATURATION: 99 % | HEIGHT: 60 IN

## 2022-10-13 DIAGNOSIS — I10 ESSENTIAL HYPERTENSION: ICD-10-CM

## 2022-10-13 DIAGNOSIS — E66.3 OVERWEIGHT (BMI 25.0-29.9): ICD-10-CM

## 2022-10-13 DIAGNOSIS — M17.12 OSTEOARTHRITIS OF LEFT KNEE, UNSPECIFIED OSTEOARTHRITIS TYPE: ICD-10-CM

## 2022-10-13 DIAGNOSIS — Z00.00 ENCOUNTER FOR SUBSEQUENT ANNUAL WELLNESS VISIT (AWV) IN MEDICARE PATIENT: Primary | ICD-10-CM

## 2022-10-13 PROCEDURE — 1159F MED LIST DOCD IN RCRD: CPT | Performed by: NURSE PRACTITIONER

## 2022-10-13 PROCEDURE — G0439 PPPS, SUBSEQ VISIT: HCPCS | Performed by: NURSE PRACTITIONER

## 2022-10-13 PROCEDURE — 1170F FXNL STATUS ASSESSED: CPT | Performed by: NURSE PRACTITIONER

## 2022-10-13 NOTE — ASSESSMENT & PLAN NOTE
Patient's (Body mass index is 29.74 kg/m².) indicates that they are overweight with health conditions that include hypertension, dyslipidemias and GERD . Weight is newly identified. BMI is is above average; BMI management plan is completed. We discussed portion control and increasing exercise.

## 2022-10-13 NOTE — PROGRESS NOTES
Answers for HPI/ROS submitted by the patient on 10/13/2022  Please describe your symptoms.: Follow up BP meds  Have you had these symptoms before?: Yes  How long have you been having these symptoms?: Greater than 2 weeks  Please list any medications you are currently taking for this condition.: Hydrochlorathyzide and BP meds  What is the primary reason for your visit?: Other    Chief Complaint  No chief complaint on file.    Subjective     {Problem List  Visit Diagnosis   Encounters  Notes  Medications  Labs  Result Review Imaging  Media :23}     Qiana Altman, 72 y.o. female presents to [facility]  History of Present Illness   She presents today for follow-up on uncontrolled hypertension.  She is on quinapril and was told to take an extra dose when she was here on her last visit 9/29/2022.     PHQ-9 Total Score:       She has been having left knee pain and x-ray was obtained.  X-ray showed mild degenerative changes/osteoarthritis.    Objective   Vital Signs:   There were no vitals taken for this visit.    Current Outpatient Medications on File Prior to Visit   Medication Sig Dispense Refill   • albuterol (PROVENTIL) 2.5 MG/0.5ML nebulizer solution Take 2.5 mg by nebulization Every 4 (Four) Hours As Needed for Wheezing or Shortness of Air. 60 mL 0   • atorvastatin (LIPITOR) 10 MG tablet Take 1 tablet by mouth Daily. 90 tablet 1   • calcium carbonate (OS-JANENE) 600 MG tablet Take 1 tablet by mouth Daily. 90 tablet 1   • celecoxib (CeleBREX) 100 MG capsule Take 1 capsule by mouth 2 (Two) Times a Day. 180 capsule 1   • cetirizine (zyrTEC) 10 MG tablet Take 1 tablet by mouth Daily. 90 tablet 1   • esomeprazole (nexIUM) 40 MG capsule Take 1 capsule by mouth Daily. 90 capsule 1   • fluticasone (FLONASE) 50 MCG/ACT nasal spray 2 sprays into the nostril(s) as directed by provider Daily. 16 g 5   • hydroCHLOROthiazide (HYDRODIURIL) 12.5 MG tablet Take 1 tablet by mouth Daily. 30  tablet 2   • quinapril (ACCUPRIL) 10 MG tablet Take 1 tablet by mouth 2 (Two) Times a Day. 180 tablet 1   • vitamin D (ERGOCALCIFEROL) 1.25 MG (92462 UT) capsule capsule Take 1 capsule by mouth 1 (One) Time Per Week. 13 capsule 1     No current facility-administered medications on file prior to visit.     Past Medical History:   Diagnosis Date   • Allergic    • Allergic rhinitis due to allergen 09/10/2019   • Anemia 09/10/2019   • Anxiety disorder 06/09/2020   • Arthritis    • B12 deficiency 09/23/2020   • Depression    • Diverticulosis    • Essential hypertension 01/03/2019   • GERD (gastroesophageal reflux disease) 01/03/2019   • Graves' disease    • History of Graves' disease 10/01/2020   • HLD (hyperlipidemia) 01/03/2019   • Hypothyroidism    • Inflammatory bowel disease    • Major depressive disorder 01/03/2019   • Osteoarthritis 09/23/2020   • Osteopenia    • T2DM (type 2 diabetes mellitus) ([facility]) 01/03/2019   • Vitamin D deficiency 01/03/2019      Physical Exam   Result Review :{Labs  Result Review  Imaging  Med Tab  Media  Procedures :23}   {The following data was reviewed by (Optional):68490}  {Ambulatory Labs (Optional):10051}  {Data reviewed (Optional):96387:::1}          Assessment and Plan {CC Problem List  Visit Diagnosis   ROS  Review (Popup)  Health Maintenance  Quality  BestPractice  Medications  SmartSets  SnapShot Encounters  Media :23}   There are no diagnoses linked to this encounter.  {Time Spent (Optional):95161}  Follow Up {Instructions Charge Capture  Follow-up Communications :23}  No follow-ups on file.  Patient was given instructions and counseling regarding her condition or for health maintenance advice. Please see specific information pulled into the AVS if appropriate.       Treva Jeffery, APRN  10/13/2022

## 2022-10-13 NOTE — PROGRESS NOTES
The ABCs of the Annual Wellness Visit  Subsequent Medicare Wellness Visit    Chief Complaint   Patient presents with   • Abnormal Imaging     Follow up on X Ray results    • Medicare Wellness-subsequent      Subjective    History of Present Illness:  Qiana Altman is a 72 y.o. female who presents today for follow-up on uncontrolled hypertension.  She is on quinapril 10 mg twice daily. She was having elevated BP, not well controlled. She was started on HCTZ 12.5 mg daily.  She states that she took quinapril 3 times a day as instructed at first but then was started on hydrochlorothiazide.  She states her blood pressures have been much better so she is only taking quinapril twice daily and the hydrochlorothiazide.    She has been having left knee pain and x-ray was obtained.  X-ray showed mild degenerative changes/osteoarthritis. She states her knee is better this week. She has been wearing a knee brace.     She also  presents for a Subsequent Medicare Wellness Visit.    The following portions of the patient's history were reviewed and   updated as appropriate: allergies, current medications, past family history, past medical history, past social history, past surgical history and problem list.    She is due for colorectal screening.  She received the Cologuard test but she is not collected yet.  She wants to know about where it can be dropped off at.    Compared to one year ago, the patient feels her physical   health is the same.    Compared to one year ago, the patient feels her mental   health is the same.    Recent Hospitalizations:  She was not admitted to the hospital during the last year.       Current Medical Providers:  Patient Care Team:  Zohreh Mcduffie APRN as PCP - General (Nurse Practitioner)    Outpatient Medications Prior to Visit   Medication Sig Dispense Refill   • albuterol (PROVENTIL) 2.5 MG/0.5ML nebulizer solution Take 2.5 mg by nebulization Every 4 (Four) Hours As Needed for Wheezing or  Shortness of Air. 60 mL 0   • atorvastatin (LIPITOR) 10 MG tablet Take 1 tablet by mouth Daily. 90 tablet 1   • calcium carbonate (OS-JANENE) 600 MG tablet Take 1 tablet by mouth Daily. 90 tablet 1   • celecoxib (CeleBREX) 100 MG capsule Take 1 capsule by mouth 2 (Two) Times a Day. 180 capsule 1   • cetirizine (zyrTEC) 10 MG tablet Take 1 tablet by mouth Daily. 90 tablet 1   • esomeprazole (nexIUM) 40 MG capsule Take 1 capsule by mouth Daily. 90 capsule 1   • fluticasone (FLONASE) 50 MCG/ACT nasal spray 2 sprays into the nostril(s) as directed by provider Daily. 16 g 5   • hydroCHLOROthiazide (HYDRODIURIL) 12.5 MG tablet Take 1 tablet by mouth Daily. 30 tablet 2   • quinapril (ACCUPRIL) 10 MG tablet Take 1 tablet by mouth 2 (Two) Times a Day. 180 tablet 1   • vitamin D (ERGOCALCIFEROL) 1.25 MG (38832 UT) capsule capsule Take 1 capsule by mouth 1 (One) Time Per Week. 13 capsule 1     No facility-administered medications prior to visit.       No opioid medication identified on active medication list. I have reviewed chart for other potential  high risk medication/s and harmful drug interactions in the elderly.          Aspirin is not on active medication list.  Aspirin use is not indicated based on review of current medical condition/s. Risk of harm outweighs potential benefits.  .    Patient Active Problem List   Diagnosis   • Allergic rhinitis due to allergen   • Essential hypertension   • GERD (gastroesophageal reflux disease)   • History of Graves' disease   • Hyperlipidemia   • Major depressive disorder   • Vitamin D deficiency   • Osteoarthritis   • B12 deficiency   • Anxiety disorder   • Anemia   • COVID-19   • Overweight (BMI 25.0-29.9)     Advance Care Planning  Advance Directive is not on file.  ACP discussion was held with the patient during this visit. Patient has an advance directive (not in EMR), copy requested.    Review of Systems   Constitutional: Negative for chills, fatigue and fever.   Respiratory:  "Negative for cough, shortness of breath and wheezing.    Cardiovascular: Negative for chest pain and palpitations.   Gastrointestinal: Negative for constipation, diarrhea, nausea and vomiting.   Genitourinary: Negative for difficulty urinating and dysuria.   Musculoskeletal: Positive for arthralgias.   Skin: Negative for color change.   Neurological: Negative for dizziness and syncope.   Psychiatric/Behavioral: Negative for suicidal ideas. The patient is not nervous/anxious.         Objective    Vitals:    10/13/22 0838   BP: 129/64   BP Location: Left arm   Patient Position: Sitting   Cuff Size: Adult   Pulse: 69   Temp: 96.1 °F (35.6 °C)   SpO2: 99%   Weight: 69.1 kg (152 lb 4.8 oz)   Height: 152.4 cm (60\")     Estimated body mass index is 29.74 kg/m² as calculated from the following:    Height as of this encounter: 152.4 cm (60\").    Weight as of this encounter: 69.1 kg (152 lb 4.8 oz).    BMI is >= 25 and <30. (Overweight) The following options were offered after discussion;: weight loss educational material (shared in after visit summary)      Does the patient have evidence of cognitive impairment? No    Physical Exam  Vitals reviewed.   Constitutional:       Appearance: Normal appearance. She is well-developed.   Neck:      Thyroid: No thyroid mass, thyromegaly or thyroid tenderness.   Cardiovascular:      Rate and Rhythm: Normal rate and regular rhythm.      Heart sounds: No murmur heard.    No friction rub. No gallop.   Pulmonary:      Effort: Pulmonary effort is normal.      Breath sounds: Normal breath sounds. No wheezing or rhonchi.   Lymphadenopathy:      Cervical: No cervical adenopathy.   Skin:     General: Skin is warm and dry.   Neurological:      Mental Status: She is alert and oriented to person, place, and time.      Cranial Nerves: No cranial nerve deficit.   Psychiatric:         Mood and Affect: Mood and affect normal.         Behavior: Behavior normal.         Thought Content: Thought content " normal. Thought content does not include homicidal or suicidal ideation.         Judgment: Judgment normal.       Lab Results   Component Value Date    TRIG 88 2022    HDL 48 2022    LDL 55 2022    VLDL 17 2022            HEALTH RISK ASSESSMENT    Smoking Status:  Social History     Tobacco Use   Smoking Status Former   • Packs/day: 1.00   • Years: 5.00   • Pack years: 5.00   • Types: Cigarettes   • Start date: 1980   • Quit date: 1985   • Years since quittin.8   Smokeless Tobacco Never   Tobacco Comments    QUIT 28 YEARS AGO     Alcohol Consumption:  Social History     Substance and Sexual Activity   Alcohol Use Never     Fall Risk Screen:    VANIA Fall Risk Assessment was completed, and patient is at LOW risk for falls.Assessment completed on:10/13/2022    Depression Screening:  PHQ-2/PHQ-9 Depression Screening 10/13/2022   Retired PHQ-9 Total Score -   Retired Total Score -   Little Interest or Pleasure in Doing Things 0-->not at all   Feeling Down, Depressed or Hopeless 0-->not at all   PHQ-9: Brief Depression Severity Measure Score 0       Health Habits and Functional and Cognitive Screening:  Functional & Cognitive Status 10/13/2022   Do you have difficulty preparing food and eating? No   Do you have difficulty bathing yourself, getting dressed or grooming yourself? No   Do you have difficulty using the toilet? No   Do you have difficulty moving around from place to place? No   Do you have trouble with steps or getting out of a bed or a chair? No   Current Diet Well Balanced Diet   Dental Exam Not up to date   Eye Exam Not up to date   Exercise (times per week) 5 times per week   Current Exercises Include Yard Work;Walking;Gardening   Do you need help using the phone?  No   Are you deaf or do you have serious difficulty hearing?  No   Do you need help with transportation? No   Do you need help shopping? No   Do you need help preparing meals?  No   Do you need help with  housework?  No   Do you need help with laundry? No   Do you need help taking your medications? No   Do you need help managing money? No   Do you ever drive or ride in a car without wearing a seat belt? No   Have you felt unusual stress, anger or loneliness in the last month? No   Who do you live with? Child   If you need help, do you have trouble finding someone available to you? No   Have you been bothered in the last four weeks by sexual problems? No       Age-appropriate Screening Schedule:  Refer to the list below for future screening recommendations based on patient's age, sex and/or medical conditions. Orders for these recommended tests are listed in the plan section. The patient has been provided with a written plan.    Health Maintenance   Topic Date Due   • ZOSTER VACCINE (1 of 2) 03/28/2023 (Originally 3/14/2002)   • LIPID PANEL  09/19/2023   • MAMMOGRAM  10/19/2023   • DXA SCAN  08/05/2024   • TDAP/TD VACCINES (2 - Td or Tdap) 06/22/2026   • INFLUENZA VACCINE  Completed              Assessment & Plan   CMS Preventative Services Quick Reference  Risk Factors Identified During Encounter  Immunizations Discussed/Encouraged (specific Immunizations; COVID19  Obesity/Overweight    We discussed Cologuard test and that she can call the phone number listed on the pamphlet to arrange pickup from [employer].  Cologuard pamphlet given to patient today.  The above risks/problems have been discussed with the patient.  Follow up actions/plans if indicated are seen below in the Assessment/Plan Section.  Pertinent information has been shared with the patient in the After Visit Summary.    Diagnoses and all orders for this visit:    1. Encounter for subsequent annual wellness visit (AWV) in Medicare patient (Primary)    2. Essential hypertension  Assessment & Plan:  Hypertension is improving with treatment.  Continue current treatment regimen.  Continue current medications.  Blood pressure will be reassessed in 3 months.      3.  Overweight (BMI 25.0-29.9)  Assessment & Plan:  Patient's (Body mass index is 29.74 kg/m².) indicates that they are overweight with health conditions that include hypertension, dyslipidemias and GERD . Weight is newly identified. BMI is is above average; BMI management plan is completed. We discussed portion control and increasing exercise.       4. Osteoarthritis of left knee, unspecified osteoarthritis type  Assessment & Plan:  Left knee pain improving with brace.  Patient will notify if any new or worsening of pain.        Follow Up:   Return in about 3 months (around 1/13/2023) for Next scheduled follow up.     An After Visit Summary and PPPS were made available to the patient.

## 2022-12-14 ENCOUNTER — APPOINTMENT (OUTPATIENT)
Dept: MAMMOGRAPHY | Facility: HOSPITAL | Age: 72
End: 2022-12-14

## 2022-12-15 ENCOUNTER — HOSPITAL ENCOUNTER (OUTPATIENT)
Dept: MAMMOGRAPHY | Facility: HOSPITAL | Age: 72
Discharge: HOME | End: 2022-12-15
Admitting: NURSE PRACTITIONER

## 2022-12-15 DIAGNOSIS — Z12.31 SCREENING MAMMOGRAM FOR BREAST CANCER: ICD-10-CM

## 2022-12-15 PROCEDURE — 77067 SCR MAMMO BI INCL CAD: CPT

## 2022-12-15 PROCEDURE — 77063 BREAST TOMOSYNTHESIS BI: CPT

## 2022-12-20 RX ORDER — HYDROCHLOROTHIAZIDE 12.5 MG/1
TABLET ORAL
Qty: 30 TABLET | Refills: 2 | OUTPATIENT
Start: 2022-12-20

## 2023-01-19 ENCOUNTER — OFFICE VISIT (OUTPATIENT)
Dept: FAMILY MEDICINE CLINIC | Facility: CLINIC | Age: 73
End: 2023-01-19
Payer: MEDICARE

## 2023-01-19 VITALS
TEMPERATURE: 96.6 F | BODY MASS INDEX: 29.72 KG/M2 | SYSTOLIC BLOOD PRESSURE: 135 MMHG | DIASTOLIC BLOOD PRESSURE: 65 MMHG | HEART RATE: 71 BPM | OXYGEN SATURATION: 98 % | WEIGHT: 151.4 LBS | HEIGHT: 60 IN

## 2023-01-19 DIAGNOSIS — E53.8 B12 DEFICIENCY: ICD-10-CM

## 2023-01-19 DIAGNOSIS — E78.2 MIXED HYPERLIPIDEMIA: ICD-10-CM

## 2023-01-19 DIAGNOSIS — I10 ESSENTIAL HYPERTENSION: Primary | ICD-10-CM

## 2023-01-19 DIAGNOSIS — R73.09 ELEVATED GLUCOSE LEVEL: ICD-10-CM

## 2023-01-19 DIAGNOSIS — R53.83 OTHER FATIGUE: ICD-10-CM

## 2023-01-19 DIAGNOSIS — E55.9 VITAMIN D DEFICIENCY: ICD-10-CM

## 2023-01-19 DIAGNOSIS — D64.9 ANEMIA, UNSPECIFIED TYPE: ICD-10-CM

## 2023-01-19 DIAGNOSIS — Z82.49 FAMILY HISTORY OF MYOCARDIAL INFARCTION: ICD-10-CM

## 2023-01-19 LAB
25(OH)D3 SERPL-MCNC: 53 NG/ML (ref 30–100)
ALBUMIN SERPL-MCNC: 4.4 G/DL (ref 3.5–5.2)
ALBUMIN/GLOB SERPL: 1.7 G/DL
ALP SERPL-CCNC: 68 U/L (ref 39–117)
ALT SERPL W P-5'-P-CCNC: 22 U/L (ref 1–33)
ANION GAP SERPL CALCULATED.3IONS-SCNC: 7.9 MMOL/L (ref 5–15)
AST SERPL-CCNC: 23 U/L (ref 1–32)
BASOPHILS # BLD AUTO: 0.06 10*3/MM3 (ref 0–0.2)
BASOPHILS NFR BLD AUTO: 1.5 % (ref 0–1.5)
BILIRUB SERPL-MCNC: 0.6 MG/DL (ref 0–1.2)
BUN SERPL-MCNC: 15 MG/DL (ref 8–23)
BUN/CREAT SERPL: 15.5 (ref 7–25)
CALCIUM SPEC-SCNC: 9.9 MG/DL (ref 8.6–10.5)
CHLORIDE SERPL-SCNC: 99 MMOL/L (ref 98–107)
CHOLEST SERPL-MCNC: 118 MG/DL (ref 0–200)
CO2 SERPL-SCNC: 29.1 MMOL/L (ref 22–29)
CREAT SERPL-MCNC: 0.97 MG/DL (ref 0.57–1)
DEPRECATED RDW RBC AUTO: 38 FL (ref 37–54)
EGFRCR SERPLBLD CKD-EPI 2021: 63 ML/MIN/1.73
EOSINOPHIL # BLD AUTO: 0.15 10*3/MM3 (ref 0–0.4)
EOSINOPHIL NFR BLD AUTO: 3.7 % (ref 0.3–6.2)
ERYTHROCYTE [DISTWIDTH] IN BLOOD BY AUTOMATED COUNT: 12.5 % (ref 12.3–15.4)
FERRITIN SERPL-MCNC: 30.6 NG/ML (ref 13–150)
FOLATE SERPL-MCNC: 9.51 NG/ML (ref 4.78–24.2)
GLOBULIN UR ELPH-MCNC: 2.6 GM/DL
GLUCOSE SERPL-MCNC: 126 MG/DL (ref 65–99)
HCT VFR BLD AUTO: 37 % (ref 34–46.6)
HDLC SERPL-MCNC: 52 MG/DL (ref 40–60)
HGB BLD-MCNC: 12.1 G/DL (ref 12–15.9)
IMM GRANULOCYTES # BLD AUTO: 0.01 10*3/MM3 (ref 0–0.05)
IMM GRANULOCYTES NFR BLD AUTO: 0.2 % (ref 0–0.5)
IRON 24H UR-MRATE: 49 MCG/DL (ref 37–145)
IRON SATN MFR SERPL: 10 % (ref 20–50)
LDLC SERPL CALC-MCNC: 53 MG/DL (ref 0–100)
LDLC/HDLC SERPL: 1.04 {RATIO}
LYMPHOCYTES # BLD AUTO: 1.36 10*3/MM3 (ref 0.7–3.1)
LYMPHOCYTES NFR BLD AUTO: 33.5 % (ref 19.6–45.3)
MCH RBC QN AUTO: 27.9 PG (ref 26.6–33)
MCHC RBC AUTO-ENTMCNC: 32.7 G/DL (ref 31.5–35.7)
MCV RBC AUTO: 85.3 FL (ref 79–97)
MONOCYTES # BLD AUTO: 0.36 10*3/MM3 (ref 0.1–0.9)
MONOCYTES NFR BLD AUTO: 8.9 % (ref 5–12)
NEUTROPHILS NFR BLD AUTO: 2.12 10*3/MM3 (ref 1.7–7)
NEUTROPHILS NFR BLD AUTO: 52.2 % (ref 42.7–76)
NRBC BLD AUTO-RTO: 0 /100 WBC (ref 0–0.2)
PLATELET # BLD AUTO: 276 10*3/MM3 (ref 140–450)
PMV BLD AUTO: 9.2 FL (ref 6–12)
POTASSIUM SERPL-SCNC: 4.2 MMOL/L (ref 3.5–5.2)
PROT SERPL-MCNC: 7 G/DL (ref 6–8.5)
RBC # BLD AUTO: 4.34 10*6/MM3 (ref 3.77–5.28)
SODIUM SERPL-SCNC: 136 MMOL/L (ref 136–145)
T4 FREE SERPL-MCNC: 1.36 NG/DL (ref 0.93–1.7)
TIBC SERPL-MCNC: 469 MCG/DL (ref 298–536)
TRANSFERRIN SERPL-MCNC: 315 MG/DL (ref 200–360)
TRIGL SERPL-MCNC: 59 MG/DL (ref 0–150)
TSH SERPL DL<=0.05 MIU/L-ACNC: 1.66 UIU/ML (ref 0.27–4.2)
VIT B12 BLD-MCNC: 196 PG/ML (ref 211–946)
VLDLC SERPL-MCNC: 13 MG/DL (ref 5–40)
WBC NRBC COR # BLD: 4.06 10*3/MM3 (ref 3.4–10.8)

## 2023-01-19 PROCEDURE — 84466 ASSAY OF TRANSFERRIN: CPT | Performed by: NURSE PRACTITIONER

## 2023-01-19 PROCEDURE — 82746 ASSAY OF FOLIC ACID SERUM: CPT | Performed by: NURSE PRACTITIONER

## 2023-01-19 PROCEDURE — 82306 VITAMIN D 25 HYDROXY: CPT | Performed by: NURSE PRACTITIONER

## 2023-01-19 PROCEDURE — 80061 LIPID PANEL: CPT | Performed by: NURSE PRACTITIONER

## 2023-01-19 PROCEDURE — 84443 ASSAY THYROID STIM HORMONE: CPT | Performed by: NURSE PRACTITIONER

## 2023-01-19 PROCEDURE — 85025 COMPLETE CBC W/AUTO DIFF WBC: CPT | Performed by: NURSE PRACTITIONER

## 2023-01-19 PROCEDURE — 82607 VITAMIN B-12: CPT | Performed by: NURSE PRACTITIONER

## 2023-01-19 PROCEDURE — 84439 ASSAY OF FREE THYROXINE: CPT | Performed by: NURSE PRACTITIONER

## 2023-01-19 PROCEDURE — 82728 ASSAY OF FERRITIN: CPT | Performed by: NURSE PRACTITIONER

## 2023-01-19 PROCEDURE — 80053 COMPREHEN METABOLIC PANEL: CPT | Performed by: NURSE PRACTITIONER

## 2023-01-19 PROCEDURE — 83540 ASSAY OF IRON: CPT | Performed by: NURSE PRACTITIONER

## 2023-01-19 PROCEDURE — 99214 OFFICE O/P EST MOD 30 MIN: CPT | Performed by: NURSE PRACTITIONER

## 2023-01-19 PROCEDURE — 83036 HEMOGLOBIN GLYCOSYLATED A1C: CPT | Performed by: NURSE PRACTITIONER

## 2023-01-19 RX ORDER — HYDROCHLOROTHIAZIDE 12.5 MG/1
12.5 TABLET ORAL DAILY
Qty: 30 TABLET | Refills: 2 | Status: SHIPPED | OUTPATIENT
Start: 2023-01-19 | End: 2023-02-23 | Stop reason: HOSPADM

## 2023-01-19 NOTE — ASSESSMENT & PLAN NOTE
Hypertension is improving with treatment.  Continue current treatment regimen.  Continue current medications.  Ambulatory blood pressure monitoring.  She will continue with quinapril and HCTZ as prescribed.  We will check labs today.  Blood pressure will be reassessed at the next regular appointment.

## 2023-01-19 NOTE — PROGRESS NOTES
"Chief Complaint  Hypertension and Hyperlipidemia    Subjective          Qiana Altman, 72 y.o. female presents to [facility]  History of Present Illness   She presents today for a 3-month follow-up on hypertension.  She is a patient of LOYD Nolen.  She wants to get her labs drawn today.    Hypertension: Blood pressure is stable on quinapril 10 mg twice daily and hydrochlorothiazide 12.5 mg daily. She is complaining of leg cramps.   She states she has a strong FH of MIs and wants a referral to follow-up with cardiology.  She has seen Dr. Richmond in the past.    Hyperlipidemia: She is currently on atorvastatin 10 mg daily.    Vitamin D deficiency: Currently on vitamin D 50,000 units weekly.    GERD: Currently on Nexium 40 mg daily.    She has vitamin B12 deficiency but has not had injections in a long time.  She is complaining of fatigue.       Tobacco Use: Medium Risk   • Smoking Tobacco Use: Former   • Smokeless Tobacco Use: Never   • Passive Exposure: Not on file      Objective   Vital Signs:   /65   Pulse 71   Temp 96.6 °F (35.9 °C)   Ht 152.4 cm (60\")   Wt 68.7 kg (151 lb 6.4 oz)   SpO2 98%   BMI 29.57 kg/m²       Current Outpatient Medications:   •  atorvastatin (LIPITOR) 10 MG tablet, Take 1 tablet by mouth Daily., Disp: 90 tablet, Rfl: 1  •  calcium carbonate (OS-JANENE) 600 MG tablet, Take 1 tablet by mouth Daily., Disp: 90 tablet, Rfl: 1  •  celecoxib (CeleBREX) 100 MG capsule, Take 1 capsule by mouth 2 (Two) Times a Day., Disp: 180 capsule, Rfl: 1  •  cetirizine (zyrTEC) 10 MG tablet, Take 1 tablet by mouth Daily., Disp: 90 tablet, Rfl: 1  •  esomeprazole (nexIUM) 40 MG capsule, Take 1 capsule by mouth Daily., Disp: 90 capsule, Rfl: 1  •  fluticasone (FLONASE) 50 MCG/ACT nasal spray, 2 sprays into the nostril(s) as directed by provider Daily., Disp: 16 g, Rfl: 5  •  hydroCHLOROthiazide (HYDRODIURIL) 12.5 MG tablet, Take 1 tablet by mouth Daily., " Disp: 30 tablet, Rfl: 2  •  quinapril (ACCUPRIL) 10 MG tablet, Take 1 tablet by mouth 2 (Two) Times a Day., Disp: 180 tablet, Rfl: 1  •  vitamin D (ERGOCALCIFEROL) 1.25 MG (40590 UT) capsule capsule, Take 1 capsule by mouth 1 (One) Time Per Week., Disp: 13 capsule, Rfl: 1   Past Medical History:   Diagnosis Date   • Allergic    • Allergic rhinitis due to allergen 09/10/2019   • Anemia 09/10/2019   • Anxiety disorder 06/09/2020   • Arthritis    • B12 deficiency 09/23/2020   • Depression    • Diverticulosis    • Essential hypertension 01/03/2019   • GERD (gastroesophageal reflux disease) 01/03/2019   • Graves' disease    • History of Graves' disease 10/01/2020   • HLD (hyperlipidemia) 01/03/2019   • Hypothyroidism    • Inflammatory bowel disease    • Major depressive disorder 01/03/2019   • Osteoarthritis 09/23/2020   • Osteopenia    • T2DM (type 2 diabetes mellitus) ([facility]) 01/03/2019   • Vitamin D deficiency 01/03/2019      Physical Exam  Vitals reviewed.   Constitutional:       Appearance: Normal appearance. She is well-developed.   Neck:      Thyroid: No thyroid mass, thyromegaly or thyroid tenderness.   Cardiovascular:      Rate and Rhythm: Normal rate and regular rhythm.      Heart sounds: No murmur heard.    No friction rub. No gallop.   Pulmonary:      Effort: Pulmonary effort is normal.      Breath sounds: Normal breath sounds. No wheezing or rhonchi.   Musculoskeletal:      Right lower leg: No edema.      Left lower leg: No edema.   Lymphadenopathy:      Cervical: No cervical adenopathy.   Skin:     General: Skin is warm and dry.   Neurological:      Mental Status: She is alert and oriented to person, place, and time.      Cranial Nerves: No cranial nerve deficit.   Psychiatric:         Mood and Affect: Mood and affect normal.         Behavior: Behavior normal.         Thought Content: Thought content normal. Thought content does not include homicidal or suicidal ideation.         Judgment: Judgment normal.         Result Review :     CMP    CMP 3/28/22 9/19/22   Glucose 128 (A) 129 (A)   BUN 13 15   Creatinine 0.93 0.93   eGFR 66.3 66.3   Sodium 141 142   Potassium 3.9 4.3   Chloride 109 (A) 107   Calcium 9.4 9.6   Total Protein 6.9 6.3   Albumin 4.00 4.10   Globulin 2.9 2.2   Total Bilirubin 0.6 0.6   Alkaline Phosphatase 70 60   AST (SGOT) 17 22   ALT (SGPT) 18 21   Albumin/Globulin Ratio 1.4 1.9   BUN/Creatinine Ratio 14.0 16.1   Anion Gap 7.9 10.0   (A) Abnormal value       Comments are available for some flowsheets but are not being displayed.           CBC    CBC 3/28/22 9/19/22   WBC 4.67 4.67   RBC 4.61 4.41   Hemoglobin 12.6 12.6   Hematocrit 39.8 38.3   MCV 86.3 86.8   MCH 27.3 28.6   MCHC 31.7 32.9   RDW 12.8 12.8   Platelets 259 267           Lipid Panel    Lipid Panel 3/28/22 9/19/22   Total Cholesterol 129 120   Triglycerides 87 88   HDL Cholesterol 46 48   VLDL Cholesterol 17 17   LDL Cholesterol  66 55   LDL/HDL Ratio 1.43 1.13           TSH    TSH 3/28/22   TSH 2.730                     Assessment and Plan    Diagnoses and all orders for this visit:    1. Essential hypertension (Primary)  Assessment & Plan:  Hypertension is improving with treatment.  Continue current treatment regimen.  Continue current medications.  Ambulatory blood pressure monitoring.  She will continue with quinapril and HCTZ as prescribed.  We will check labs today.  Blood pressure will be reassessed at the next regular appointment.    Orders:  -     Comprehensive Metabolic Panel  -     CBC & Differential  -     Lipid Panel  -     TSH+Free T4  -     Ambulatory Referral to Cardiology    2. B12 deficiency  Assessment & Plan:  Not currently on B12 injections, will check labs today.     Orders:  -     Vitamin B12 & Folate    3. Anemia, unspecified type  Assessment & Plan:  Will check labs today.     Orders:  -     CBC & Differential  -     Iron Profile  -     Ferritin  -     Vitamin B12 & Folate    4. Mixed hyperlipidemia  Assessment  & Plan:  Lipid abnormalities are improving with treatment.  Pharmacotherapy as ordered.  We will check fasting lipid panel today.  Lipids will be reassessed in 6 months.    Orders:  -     Comprehensive Metabolic Panel  -     Lipid Panel  -     TSH+Free T4    5. Vitamin D deficiency  Assessment & Plan:  We will check vitamin D level today with her labs.  She will continue vitamin D 50,000 units weekly at this time.    Orders:  -     Vitamin D,25-Hydroxy    6. Other fatigue  -     Comprehensive Metabolic Panel  -     CBC & Differential  -     TSH+Free T4  -     Iron Profile  -     Ferritin  -     Vitamin B12 & Folate  -     Ambulatory Referral to Cardiology    7. Family history of myocardial infarction  -     Ambulatory Referral to Cardiology    Other orders  -     hydroCHLOROthiazide (HYDRODIURIL) 12.5 MG tablet; Take 1 tablet by mouth Daily.  Dispense: 30 tablet; Refill: 2      Follow Up   Return in about 6 months (around 7/19/2023) for Next scheduled follow up HTN, HLD, Anemia.  Patient was given instructions and counseling regarding her condition or for health maintenance advice. Please see specific information pulled into the AVS if appropriate.     Parts of this note are electronic transcriptions/translations of spoken language to printed text using the Dragon Dictation system.      Treva Jeffery, APRN  01/19/2023

## 2023-01-19 NOTE — ASSESSMENT & PLAN NOTE
We will check vitamin D level today with her labs.  She will continue vitamin D 50,000 units weekly at this time.

## 2023-01-19 NOTE — ASSESSMENT & PLAN NOTE
Lipid abnormalities are improving with treatment.  Pharmacotherapy as ordered.  We will check fasting lipid panel today.  Lipids will be reassessed in 6 months.

## 2023-01-20 LAB — HBA1C MFR BLD: 6.3 % (ref 4.8–5.6)

## 2023-01-20 RX ORDER — CYANOCOBALAMIN 1000 UG/ML
1000 INJECTION, SOLUTION INTRAMUSCULAR; SUBCUTANEOUS WEEKLY
Qty: 8 ML | Refills: 0 | Status: SHIPPED | OUTPATIENT
Start: 2023-01-20 | End: 2023-03-11

## 2023-01-20 RX ORDER — CYANOCOBALAMIN 1000 UG/ML
1000 INJECTION, SOLUTION INTRAMUSCULAR; SUBCUTANEOUS WEEKLY
Status: DISCONTINUED | OUTPATIENT
Start: 2023-01-23 | End: 2023-01-20

## 2023-02-02 NOTE — TELEPHONE ENCOUNTER
"  Caller: Qiana Altman    Relationship: Self    Best call back number: [phone]    Requested Prescriptions:   Requested Prescriptions     Pending Prescriptions Disp Refills   • Syringe/Needle, Disp, 25G X 1\" 3 ML misc 8 each 0     Si each 1 (One) Time Per Week for 8 doses. With B12 inj    TEST STRIPS FOR ACCU CHECK AVIA PLUS AND NEEDLES    Pharmacy where request should be sent: [facility] [address] [facility] [phone] [facility] [phone] [facility]     Does the patient have less than a 3 day supply:  [x] Yes  [] No    Would you like a call back once the refill request has been completed: [x] Yes [] No    If the office needs to give you a call back, can they leave a voicemail: [x] Yes [] No    Zoie Brendan Nieto Rep   23 12:01 EST           "

## 2023-02-06 ENCOUNTER — TELEPHONE (OUTPATIENT)
Dept: FAMILY MEDICINE CLINIC | Facility: CLINIC | Age: 73
End: 2023-02-06

## 2023-02-20 LAB
BASOPHILS # BLD AUTO: 0.02 10*3/MM3 (ref 0–0.2)
BASOPHILS NFR BLD AUTO: 0.5 % (ref 0–1.5)
BILIRUB UR QL STRIP: ABNORMAL
CLARITY UR: ABNORMAL
COLOR UR: ABNORMAL
D-LACTATE SERPL-SCNC: 1.7 MMOL/L (ref 0.5–2)
DEPRECATED RDW RBC AUTO: 43.3 FL (ref 37–54)
EOSINOPHIL # BLD AUTO: 0.03 10*3/MM3 (ref 0–0.4)
EOSINOPHIL NFR BLD AUTO: 0.8 % (ref 0.3–6.2)
ERYTHROCYTE [DISTWIDTH] IN BLOOD BY AUTOMATED COUNT: 14 % (ref 12.3–15.4)
GLUCOSE UR STRIP-MCNC: NEGATIVE MG/DL
HCT VFR BLD AUTO: 35.7 % (ref 34–46.6)
HGB BLD-MCNC: 12.2 G/DL (ref 12–15.9)
HGB UR QL STRIP.AUTO: NEGATIVE
HOLD SPECIMEN: NORMAL
HOLD SPECIMEN: NORMAL
IMM GRANULOCYTES # BLD AUTO: 0.01 10*3/MM3 (ref 0–0.05)
IMM GRANULOCYTES NFR BLD AUTO: 0.3 % (ref 0–0.5)
KETONES UR QL STRIP: ABNORMAL
LEUKOCYTE ESTERASE UR QL STRIP.AUTO: ABNORMAL
LIPASE SERPL-CCNC: 40 U/L (ref 13–60)
LYMPHOCYTES # BLD AUTO: 0.76 10*3/MM3 (ref 0.7–3.1)
LYMPHOCYTES NFR BLD AUTO: 19.2 % (ref 19.6–45.3)
MCH RBC QN AUTO: 28.8 PG (ref 26.6–33)
MCHC RBC AUTO-ENTMCNC: 34.2 G/DL (ref 31.5–35.7)
MCV RBC AUTO: 84.2 FL (ref 79–97)
MONOCYTES # BLD AUTO: 0.49 10*3/MM3 (ref 0.1–0.9)
MONOCYTES NFR BLD AUTO: 12.4 % (ref 5–12)
NEUTROPHILS NFR BLD AUTO: 2.65 10*3/MM3 (ref 1.7–7)
NEUTROPHILS NFR BLD AUTO: 66.8 % (ref 42.7–76)
NITRITE UR QL STRIP: NEGATIVE
NRBC BLD AUTO-RTO: 0 /100 WBC (ref 0–0.2)
PH UR STRIP.AUTO: 5.5 [PH] (ref 5–8)
PLATELET # BLD AUTO: 250 10*3/MM3 (ref 140–450)
PMV BLD AUTO: 8.5 FL (ref 6–12)
PROT UR QL STRIP: ABNORMAL
RBC # BLD AUTO: 4.24 10*6/MM3 (ref 3.77–5.28)
SP GR UR STRIP: 1.02 (ref 1–1.03)
UROBILINOGEN UR QL STRIP: ABNORMAL
WBC NRBC COR # BLD: 3.96 10*3/MM3 (ref 3.4–10.8)
WHOLE BLOOD HOLD COAG: NORMAL
WHOLE BLOOD HOLD SPECIMEN: NORMAL

## 2023-02-20 PROCEDURE — 82550 ASSAY OF CK (CPK): CPT | Performed by: PHYSICIAN ASSISTANT

## 2023-02-20 PROCEDURE — 86308 HETEROPHILE ANTIBODY SCREEN: CPT | Performed by: PHYSICIAN ASSISTANT

## 2023-02-20 PROCEDURE — 99284 EMERGENCY DEPT VISIT MOD MDM: CPT

## 2023-02-20 PROCEDURE — 83615 LACTATE (LD) (LDH) ENZYME: CPT | Performed by: PHYSICIAN ASSISTANT

## 2023-02-20 PROCEDURE — 80053 COMPREHEN METABOLIC PANEL: CPT

## 2023-02-20 PROCEDURE — 36415 COLL VENOUS BLD VENIPUNCTURE: CPT

## 2023-02-20 PROCEDURE — 85025 COMPLETE CBC W/AUTO DIFF WBC: CPT

## 2023-02-20 PROCEDURE — 85610 PROTHROMBIN TIME: CPT | Performed by: EMERGENCY MEDICINE

## 2023-02-20 PROCEDURE — 82247 BILIRUBIN TOTAL: CPT | Performed by: PHYSICIAN ASSISTANT

## 2023-02-20 PROCEDURE — 83605 ASSAY OF LACTIC ACID: CPT

## 2023-02-20 PROCEDURE — 81001 URINALYSIS AUTO W/SCOPE: CPT

## 2023-02-20 PROCEDURE — 80143 DRUG ASSAY ACETAMINOPHEN: CPT | Performed by: FAMILY MEDICINE

## 2023-02-20 PROCEDURE — 83690 ASSAY OF LIPASE: CPT

## 2023-02-20 PROCEDURE — 82248 BILIRUBIN DIRECT: CPT | Performed by: PHYSICIAN ASSISTANT

## 2023-02-20 RX ORDER — SODIUM CHLORIDE 0.9 % (FLUSH) 0.9 %
10 SYRINGE (ML) INJECTION AS NEEDED
Status: DISCONTINUED | OUTPATIENT
Start: 2023-02-20 | End: 2023-02-23 | Stop reason: HOSPADM

## 2023-02-21 ENCOUNTER — APPOINTMENT (OUTPATIENT)
Dept: CT IMAGING | Facility: HOSPITAL | Age: 73
DRG: 442 | End: 2023-02-21
Payer: MEDICARE

## 2023-02-21 ENCOUNTER — APPOINTMENT (OUTPATIENT)
Dept: GENERAL RADIOLOGY | Facility: HOSPITAL | Age: 73
DRG: 442 | End: 2023-02-21
Payer: MEDICARE

## 2023-02-21 ENCOUNTER — APPOINTMENT (OUTPATIENT)
Dept: MRI IMAGING | Facility: HOSPITAL | Age: 73
DRG: 442 | End: 2023-02-21
Payer: MEDICARE

## 2023-02-21 ENCOUNTER — HOSPITAL ENCOUNTER (INPATIENT)
Facility: HOSPITAL | Age: 73
LOS: 2 days | Discharge: HOME | DRG: 442 | End: 2023-02-23
Attending: EMERGENCY MEDICINE | Admitting: FAMILY MEDICINE
Payer: MEDICARE

## 2023-02-21 DIAGNOSIS — R10.9 ACUTE RIGHT FLANK PAIN: ICD-10-CM

## 2023-02-21 DIAGNOSIS — N10 ACUTE PYELONEPHRITIS: ICD-10-CM

## 2023-02-21 DIAGNOSIS — R10.13 ABDOMINAL PAIN, ACUTE, EPIGASTRIC: Primary | ICD-10-CM

## 2023-02-21 DIAGNOSIS — E87.1 ACUTE HYPONATREMIA: ICD-10-CM

## 2023-02-21 DIAGNOSIS — R79.89 ELEVATED LFTS: ICD-10-CM

## 2023-02-21 LAB
ALBUMIN SERPL-MCNC: 3.9 G/DL (ref 3.5–5.2)
ALBUMIN SERPL-MCNC: 3.9 G/DL (ref 3.5–5.2)
ALBUMIN/GLOB SERPL: 1.1 G/DL
ALBUMIN/GLOB SERPL: 1.1 G/DL
ALP SERPL-CCNC: 198 U/L (ref 39–117)
ALP SERPL-CCNC: 202 U/L (ref 39–117)
ALT SERPL W P-5'-P-CCNC: 1214 U/L (ref 1–33)
ALT SERPL W P-5'-P-CCNC: 1214 U/L (ref 1–33)
ANION GAP SERPL CALCULATED.3IONS-SCNC: 11.3 MMOL/L (ref 5–15)
ANION GAP SERPL CALCULATED.3IONS-SCNC: 12.8 MMOL/L (ref 5–15)
APAP SERPL-MCNC: <5 MCG/ML (ref 0–30)
AST SERPL-CCNC: 1315 U/L (ref 1–32)
AST SERPL-CCNC: 1323 U/L (ref 1–32)
BACTERIA UR QL AUTO: ABNORMAL /HPF
BACTERIA UR QL AUTO: ABNORMAL /HPF
BASOPHILS # BLD AUTO: 0.03 10*3/MM3 (ref 0–0.2)
BASOPHILS NFR BLD AUTO: 0.9 % (ref 0–1.5)
BILIRUB CONJ SERPL-MCNC: 3.2 MG/DL (ref 0–0.3)
BILIRUB INDIRECT SERPL-MCNC: 1.6 MG/DL
BILIRUB SERPL-MCNC: 4.7 MG/DL (ref 0–1.2)
BILIRUB SERPL-MCNC: 4.8 MG/DL (ref 0–1.2)
BILIRUB SERPL-MCNC: 4.9 MG/DL (ref 0–1.2)
BILIRUB UR QL STRIP: ABNORMAL
BUN SERPL-MCNC: 13 MG/DL (ref 8–23)
BUN SERPL-MCNC: 14 MG/DL (ref 8–23)
BUN/CREAT SERPL: 13.2 (ref 7–25)
BUN/CREAT SERPL: 14.6 (ref 7–25)
CALCIUM SPEC-SCNC: 9.6 MG/DL (ref 8.6–10.5)
CALCIUM SPEC-SCNC: 9.7 MG/DL (ref 8.6–10.5)
CERULOPLASMIN SERPL-MCNC: 32 MG/DL (ref 19–39)
CHLORIDE SERPL-SCNC: 86 MMOL/L (ref 98–107)
CHLORIDE SERPL-SCNC: 87 MMOL/L (ref 98–107)
CK SERPL-CCNC: 154 U/L (ref 20–180)
CLARITY UR: CLEAR
CO2 SERPL-SCNC: 23.2 MMOL/L (ref 22–29)
CO2 SERPL-SCNC: 25.7 MMOL/L (ref 22–29)
COLOR UR: ABNORMAL
CREAT SERPL-MCNC: 0.89 MG/DL (ref 0.57–1)
CREAT SERPL-MCNC: 1.06 MG/DL (ref 0.57–1)
DEPRECATED RDW RBC AUTO: 43.8 FL (ref 37–54)
EGFRCR SERPLBLD CKD-EPI 2021: 56.6 ML/MIN/1.73
EGFRCR SERPLBLD CKD-EPI 2021: 69.8 ML/MIN/1.73
EOSINOPHIL # BLD AUTO: 0.04 10*3/MM3 (ref 0–0.4)
EOSINOPHIL NFR BLD AUTO: 1.3 % (ref 0.3–6.2)
ERYTHROCYTE [DISTWIDTH] IN BLOOD BY AUTOMATED COUNT: 14.3 % (ref 12.3–15.4)
FLUAV AG NPH QL: NEGATIVE
FLUBV AG NPH QL IA: NEGATIVE
GLOBULIN UR ELPH-MCNC: 3.5 GM/DL
GLOBULIN UR ELPH-MCNC: 3.5 GM/DL
GLUCOSE SERPL-MCNC: 130 MG/DL (ref 65–99)
GLUCOSE SERPL-MCNC: 95 MG/DL (ref 65–99)
GLUCOSE UR STRIP-MCNC: NEGATIVE MG/DL
HAV IGM SERPL QL IA: NORMAL
HBV CORE IGM SERPL QL IA: NORMAL
HBV SURFACE AG SERPL QL IA: NORMAL
HCT VFR BLD AUTO: 35.8 % (ref 34–46.6)
HCV AB SER DONR QL: NORMAL
HETEROPH AB SER QL LA: NEGATIVE
HGB BLD-MCNC: 12.3 G/DL (ref 12–15.9)
HGB UR QL STRIP.AUTO: NEGATIVE
HYALINE CASTS UR QL AUTO: ABNORMAL /LPF
HYALINE CASTS UR QL AUTO: ABNORMAL /LPF
IMM GRANULOCYTES # BLD AUTO: 0 10*3/MM3 (ref 0–0.05)
IMM GRANULOCYTES NFR BLD AUTO: 0 % (ref 0–0.5)
INR PPP: 1.03 (ref 0.86–1.15)
IRON 24H UR-MRATE: 109 MCG/DL (ref 37–145)
IRON SATN MFR SERPL: 22 % (ref 20–50)
KETONES UR QL STRIP: ABNORMAL
LDH SERPL-CCNC: 519 U/L (ref 135–214)
LEUKOCYTE ESTERASE UR QL STRIP.AUTO: ABNORMAL
LYMPHOCYTES # BLD AUTO: 0.71 10*3/MM3 (ref 0.7–3.1)
LYMPHOCYTES NFR BLD AUTO: 22.4 % (ref 19.6–45.3)
MCH RBC QN AUTO: 28.7 PG (ref 26.6–33)
MCHC RBC AUTO-ENTMCNC: 34.4 G/DL (ref 31.5–35.7)
MCV RBC AUTO: 83.4 FL (ref 79–97)
MONOCYTES # BLD AUTO: 0.47 10*3/MM3 (ref 0.1–0.9)
MONOCYTES NFR BLD AUTO: 14.8 % (ref 5–12)
NEUTROPHILS NFR BLD AUTO: 1.92 10*3/MM3 (ref 1.7–7)
NEUTROPHILS NFR BLD AUTO: 60.6 % (ref 42.7–76)
NITRITE UR QL STRIP: NEGATIVE
NRBC BLD AUTO-RTO: 0 /100 WBC (ref 0–0.2)
OSMOLALITY UR: 418 MOSM/KG (ref 50–1400)
PH UR STRIP.AUTO: 5.5 [PH] (ref 5–8)
PLATELET # BLD AUTO: 264 10*3/MM3 (ref 140–450)
PMV BLD AUTO: 8.8 FL (ref 6–12)
POTASSIUM SERPL-SCNC: 3.8 MMOL/L (ref 3.5–5.2)
POTASSIUM SERPL-SCNC: 4.4 MMOL/L (ref 3.5–5.2)
PROT SERPL-MCNC: 7.4 G/DL (ref 6–8.5)
PROT SERPL-MCNC: 7.4 G/DL (ref 6–8.5)
PROT UR QL STRIP: NEGATIVE
PROTHROMBIN TIME: 13.6 SECONDS (ref 11.8–14.9)
RBC # BLD AUTO: 4.29 10*6/MM3 (ref 3.77–5.28)
RBC # UR STRIP: ABNORMAL /HPF
RBC # UR STRIP: ABNORMAL /HPF
REF LAB TEST METHOD: ABNORMAL
REF LAB TEST METHOD: ABNORMAL
SARS-COV-2 RNA RESP QL NAA+PROBE: NOT DETECTED
SODIUM SERPL-SCNC: 122 MMOL/L (ref 136–145)
SODIUM SERPL-SCNC: 124 MMOL/L (ref 136–145)
SODIUM UR-SCNC: 66 MMOL/L
SP GR UR STRIP: 1.03 (ref 1–1.03)
SQUAMOUS #/AREA URNS HPF: ABNORMAL /HPF
SQUAMOUS #/AREA URNS HPF: ABNORMAL /HPF
TIBC SERPL-MCNC: 501 MCG/DL (ref 298–536)
TRANSFERRIN SERPL-MCNC: 336 MG/DL (ref 200–360)
UROBILINOGEN UR QL STRIP: ABNORMAL
WBC # UR STRIP: ABNORMAL /HPF
WBC # UR STRIP: ABNORMAL /HPF
WBC NRBC COR # BLD: 3.17 10*3/MM3 (ref 3.4–10.8)

## 2023-02-21 PROCEDURE — 86015 ACTIN ANTIBODY EACH: CPT | Performed by: INTERNAL MEDICINE

## 2023-02-21 PROCEDURE — 86235 NUCLEAR ANTIGEN ANTIBODY: CPT | Performed by: INTERNAL MEDICINE

## 2023-02-21 PROCEDURE — 86645 CMV ANTIBODY IGM: CPT | Performed by: INTERNAL MEDICINE

## 2023-02-21 PROCEDURE — 74181 MRI ABDOMEN W/O CONTRAST: CPT

## 2023-02-21 PROCEDURE — 87040 BLOOD CULTURE FOR BACTERIA: CPT | Performed by: EMERGENCY MEDICINE

## 2023-02-21 PROCEDURE — 86665 EPSTEIN-BARR CAPSID VCA: CPT | Performed by: INTERNAL MEDICINE

## 2023-02-21 PROCEDURE — 84466 ASSAY OF TRANSFERRIN: CPT | Performed by: INTERNAL MEDICINE

## 2023-02-21 PROCEDURE — 82390 ASSAY OF CERULOPLASMIN: CPT | Performed by: INTERNAL MEDICINE

## 2023-02-21 PROCEDURE — 83935 ASSAY OF URINE OSMOLALITY: CPT | Performed by: PHYSICIAN ASSISTANT

## 2023-02-21 PROCEDURE — 87804 INFLUENZA ASSAY W/OPTIC: CPT | Performed by: PHYSICIAN ASSISTANT

## 2023-02-21 PROCEDURE — 25010000002 CEFTRIAXONE PER 250 MG: Performed by: EMERGENCY MEDICINE

## 2023-02-21 PROCEDURE — 84300 ASSAY OF URINE SODIUM: CPT | Performed by: PHYSICIAN ASSISTANT

## 2023-02-21 PROCEDURE — 85025 COMPLETE CBC W/AUTO DIFF WBC: CPT | Performed by: FAMILY MEDICINE

## 2023-02-21 PROCEDURE — 94799 UNLISTED PULMONARY SVC/PX: CPT

## 2023-02-21 PROCEDURE — 71045 X-RAY EXAM CHEST 1 VIEW: CPT

## 2023-02-21 PROCEDURE — U0004 COV-19 TEST NON-CDC HGH THRU: HCPCS | Performed by: PHYSICIAN ASSISTANT

## 2023-02-21 PROCEDURE — 86225 DNA ANTIBODY NATIVE: CPT | Performed by: INTERNAL MEDICINE

## 2023-02-21 PROCEDURE — 86644 CMV ANTIBODY: CPT | Performed by: INTERNAL MEDICINE

## 2023-02-21 PROCEDURE — 80053 COMPREHEN METABOLIC PANEL: CPT | Performed by: FAMILY MEDICINE

## 2023-02-21 PROCEDURE — 99223 1ST HOSP IP/OBS HIGH 75: CPT | Performed by: FAMILY MEDICINE

## 2023-02-21 PROCEDURE — 86381 MITOCHONDRIAL ANTIBODY EACH: CPT | Performed by: INTERNAL MEDICINE

## 2023-02-21 PROCEDURE — 74177 CT ABD & PELVIS W/CONTRAST: CPT

## 2023-02-21 PROCEDURE — 83540 ASSAY OF IRON: CPT | Performed by: INTERNAL MEDICINE

## 2023-02-21 PROCEDURE — 80074 ACUTE HEPATITIS PANEL: CPT | Performed by: EMERGENCY MEDICINE

## 2023-02-21 PROCEDURE — 81001 URINALYSIS AUTO W/SCOPE: CPT | Performed by: EMERGENCY MEDICINE

## 2023-02-21 PROCEDURE — 0 IOPAMIDOL PER 1 ML: Performed by: EMERGENCY MEDICINE

## 2023-02-21 RX ORDER — NALOXONE HCL 0.4 MG/ML
0.4 VIAL (ML) INJECTION
Status: DISCONTINUED | OUTPATIENT
Start: 2023-02-21 | End: 2023-02-23 | Stop reason: HOSPADM

## 2023-02-21 RX ORDER — LIDOCAINE HYDROCHLORIDE 20 MG/ML
15 SOLUTION OROPHARYNGEAL ONCE
Status: COMPLETED | OUTPATIENT
Start: 2023-02-21 | End: 2023-02-21

## 2023-02-21 RX ORDER — ONDANSETRON 4 MG/1
4 TABLET, FILM COATED ORAL EVERY 6 HOURS PRN
Status: DISCONTINUED | OUTPATIENT
Start: 2023-02-21 | End: 2023-02-23 | Stop reason: HOSPADM

## 2023-02-21 RX ORDER — PANTOPRAZOLE SODIUM 40 MG/10ML
40 INJECTION, POWDER, LYOPHILIZED, FOR SOLUTION INTRAVENOUS DAILY
Status: DISCONTINUED | OUTPATIENT
Start: 2023-02-21 | End: 2023-02-23 | Stop reason: HOSPADM

## 2023-02-21 RX ORDER — CEFTRIAXONE SODIUM 1 G/50ML
1 INJECTION, SOLUTION INTRAVENOUS EVERY 24 HOURS
Status: DISCONTINUED | OUTPATIENT
Start: 2023-02-22 | End: 2023-02-23 | Stop reason: HOSPADM

## 2023-02-21 RX ORDER — ALUMINA, MAGNESIA, AND SIMETHICONE 2400; 2400; 240 MG/30ML; MG/30ML; MG/30ML
15 SUSPENSION ORAL ONCE
Status: COMPLETED | OUTPATIENT
Start: 2023-02-21 | End: 2023-02-21

## 2023-02-21 RX ORDER — NITROGLYCERIN 0.4 MG/1
0.4 TABLET SUBLINGUAL
Status: DISCONTINUED | OUTPATIENT
Start: 2023-02-21 | End: 2023-02-23 | Stop reason: HOSPADM

## 2023-02-21 RX ORDER — SODIUM CHLORIDE 9 MG/ML
100 INJECTION, SOLUTION INTRAVENOUS CONTINUOUS
Status: DISCONTINUED | OUTPATIENT
Start: 2023-02-21 | End: 2023-02-21 | Stop reason: SDUPTHER

## 2023-02-21 RX ORDER — SODIUM CHLORIDE 0.9 % (FLUSH) 0.9 %
10 SYRINGE (ML) INJECTION AS NEEDED
Status: DISCONTINUED | OUTPATIENT
Start: 2023-02-21 | End: 2023-02-23 | Stop reason: HOSPADM

## 2023-02-21 RX ORDER — SODIUM CHLORIDE 9 MG/ML
40 INJECTION, SOLUTION INTRAVENOUS AS NEEDED
Status: DISCONTINUED | OUTPATIENT
Start: 2023-02-21 | End: 2023-02-23 | Stop reason: HOSPADM

## 2023-02-21 RX ORDER — SODIUM CHLORIDE 9 MG/ML
75 INJECTION, SOLUTION INTRAVENOUS CONTINUOUS
Status: DISCONTINUED | OUTPATIENT
Start: 2023-02-21 | End: 2023-02-23 | Stop reason: HOSPADM

## 2023-02-21 RX ORDER — FLUTICASONE PROPIONATE 50 MCG
2 SPRAY, SUSPENSION (ML) NASAL DAILY
Status: DISCONTINUED | OUTPATIENT
Start: 2023-02-21 | End: 2023-02-23 | Stop reason: HOSPADM

## 2023-02-21 RX ORDER — SODIUM CHLORIDE 0.9 % (FLUSH) 0.9 %
10 SYRINGE (ML) INJECTION EVERY 12 HOURS SCHEDULED
Status: DISCONTINUED | OUTPATIENT
Start: 2023-02-21 | End: 2023-02-23 | Stop reason: HOSPADM

## 2023-02-21 RX ORDER — ONDANSETRON 2 MG/ML
4 INJECTION INTRAMUSCULAR; INTRAVENOUS EVERY 6 HOURS PRN
Status: DISCONTINUED | OUTPATIENT
Start: 2023-02-21 | End: 2023-02-23 | Stop reason: HOSPADM

## 2023-02-21 RX ORDER — CEFTRIAXONE SODIUM 1 G/50ML
1 INJECTION, SOLUTION INTRAVENOUS ONCE
Status: COMPLETED | OUTPATIENT
Start: 2023-02-21 | End: 2023-02-21

## 2023-02-21 RX ADMIN — IOPAMIDOL 75 ML: 755 INJECTION, SOLUTION INTRAVENOUS at 00:31

## 2023-02-21 RX ADMIN — FLUTICASONE PROPIONATE 2 SPRAY: 50 SPRAY, METERED NASAL at 14:54

## 2023-02-21 RX ADMIN — SODIUM CHLORIDE 100 ML/HR: 9 INJECTION, SOLUTION INTRAVENOUS at 10:48

## 2023-02-21 RX ADMIN — CEFTRIAXONE SODIUM 1 G: 1 INJECTION, SOLUTION INTRAVENOUS at 05:25

## 2023-02-21 RX ADMIN — PANTOPRAZOLE SODIUM 40 MG: 40 INJECTION, POWDER, FOR SOLUTION INTRAVENOUS at 14:54

## 2023-02-21 RX ADMIN — SODIUM CHLORIDE 1000 ML: 9 INJECTION, SOLUTION INTRAVENOUS at 05:20

## 2023-02-21 RX ADMIN — Medication 10 ML: at 10:48

## 2023-02-21 RX ADMIN — LIDOCAINE HYDROCHLORIDE 15 ML: 20 SOLUTION ORAL; TOPICAL at 05:35

## 2023-02-21 RX ADMIN — ALUMINUM HYDROXIDE, MAGNESIUM HYDROXIDE, AND DIMETHICONE 15 ML: 400; 400; 40 SUSPENSION ORAL at 05:35

## 2023-02-22 LAB
ALBUMIN SERPL-MCNC: 3.4 G/DL (ref 3.5–5.2)
ALBUMIN/GLOB SERPL: 1.1 G/DL
ALP SERPL-CCNC: 177 U/L (ref 39–117)
ALT SERPL W P-5'-P-CCNC: 1126 U/L (ref 1–33)
ANION GAP SERPL CALCULATED.3IONS-SCNC: 12.2 MMOL/L (ref 5–15)
AST SERPL-CCNC: 1265 U/L (ref 1–32)
BASOPHILS # BLD AUTO: 0.01 10*3/MM3 (ref 0–0.2)
BASOPHILS NFR BLD AUTO: 0.4 % (ref 0–1.5)
BILIRUB CONJ SERPL-MCNC: 3 MG/DL (ref 0–0.3)
BILIRUB SERPL-MCNC: 3.7 MG/DL (ref 0–1.2)
BUN SERPL-MCNC: 11 MG/DL (ref 8–23)
BUN/CREAT SERPL: 13.8 (ref 7–25)
CALCIUM SPEC-SCNC: 9 MG/DL (ref 8.6–10.5)
CENTROMERE B AB SER-ACNC: <0.2 AI (ref 0–0.9)
CHLORIDE SERPL-SCNC: 97 MMOL/L (ref 98–107)
CHROMATIN AB SERPL-ACNC: <0.2 AI (ref 0–0.9)
CMV IGG SERPL IA-ACNC: >10 U/ML (ref 0–0.59)
CMV IGM SERPL IA-ACNC: <30 AU/ML (ref 0–29.9)
CO2 SERPL-SCNC: 20.8 MMOL/L (ref 22–29)
CREAT SERPL-MCNC: 0.8 MG/DL (ref 0.57–1)
DEPRECATED RDW RBC AUTO: 46.7 FL (ref 37–54)
DSDNA AB SER-ACNC: 1 IU/ML (ref 0–9)
EBV VCA IGM SER IA-ACNC: <36 U/ML (ref 0–35.9)
EGFRCR SERPLBLD CKD-EPI 2021: 79.4 ML/MIN/1.73
ENA JO1 AB SER-ACNC: <0.2 AI (ref 0–0.9)
ENA RNP AB SER-ACNC: <0.2 AI (ref 0–0.9)
ENA SCL70 AB SER-ACNC: <0.2 AI (ref 0–0.9)
ENA SM AB SER-ACNC: <0.2 AI (ref 0–0.9)
ENA SS-A AB SER-ACNC: <0.2 AI (ref 0–0.9)
ENA SS-B AB SER-ACNC: <0.2 AI (ref 0–0.9)
EOSINOPHIL # BLD AUTO: 0.02 10*3/MM3 (ref 0–0.4)
EOSINOPHIL NFR BLD AUTO: 0.7 % (ref 0.3–6.2)
ERYTHROCYTE [DISTWIDTH] IN BLOOD BY AUTOMATED COUNT: 14.6 % (ref 12.3–15.4)
GLOBULIN UR ELPH-MCNC: 3.2 GM/DL
GLUCOSE BLDC GLUCOMTR-MCNC: 110 MG/DL (ref 70–99)
GLUCOSE BLDC GLUCOMTR-MCNC: 60 MG/DL (ref 70–99)
GLUCOSE SERPL-MCNC: 64 MG/DL (ref 65–99)
HCT VFR BLD AUTO: 32.9 % (ref 34–46.6)
HGB BLD-MCNC: 11.1 G/DL (ref 12–15.9)
IMM GRANULOCYTES # BLD AUTO: 0.01 10*3/MM3 (ref 0–0.05)
IMM GRANULOCYTES NFR BLD AUTO: 0.4 % (ref 0–0.5)
INR PPP: 1.06 (ref 0.86–1.15)
LYMPHOCYTES # BLD AUTO: 0.57 10*3/MM3 (ref 0.7–3.1)
LYMPHOCYTES NFR BLD AUTO: 20.4 % (ref 19.6–45.3)
Lab: NORMAL
MAGNESIUM SERPL-MCNC: 2 MG/DL (ref 1.6–2.4)
MCH RBC QN AUTO: 29.2 PG (ref 26.6–33)
MCHC RBC AUTO-ENTMCNC: 33.7 G/DL (ref 31.5–35.7)
MCV RBC AUTO: 86.6 FL (ref 79–97)
MITOCHONDRIA M2 IGG SER-ACNC: <20 UNITS (ref 0–20)
MONOCYTES # BLD AUTO: 0.45 10*3/MM3 (ref 0.1–0.9)
MONOCYTES NFR BLD AUTO: 16.1 % (ref 5–12)
NEUTROPHILS NFR BLD AUTO: 1.74 10*3/MM3 (ref 1.7–7)
NEUTROPHILS NFR BLD AUTO: 62 % (ref 42.7–76)
NRBC BLD AUTO-RTO: 0 /100 WBC (ref 0–0.2)
PHOSPHATE SERPL-MCNC: 3.4 MG/DL (ref 2.5–4.5)
PLATELET # BLD AUTO: 258 10*3/MM3 (ref 140–450)
PMV BLD AUTO: 8.7 FL (ref 6–12)
POTASSIUM SERPL-SCNC: 3.9 MMOL/L (ref 3.5–5.2)
PROCALCITONIN SERPL-MCNC: 0.36 NG/ML (ref 0–0.25)
PROT SERPL-MCNC: 6.6 G/DL (ref 6–8.5)
PROTHROMBIN TIME: 13.9 SECONDS (ref 11.8–14.9)
RBC # BLD AUTO: 3.8 10*6/MM3 (ref 3.77–5.28)
SMA IGG SER-ACNC: 45 UNITS (ref 0–19)
SODIUM SERPL-SCNC: 130 MMOL/L (ref 136–145)
WBC NRBC COR # BLD: 2.8 10*3/MM3 (ref 3.4–10.8)

## 2023-02-22 PROCEDURE — 80053 COMPREHEN METABOLIC PANEL: CPT | Performed by: FAMILY MEDICINE

## 2023-02-22 PROCEDURE — 85610 PROTHROMBIN TIME: CPT | Performed by: PHYSICIAN ASSISTANT

## 2023-02-22 PROCEDURE — 94799 UNLISTED PULMONARY SVC/PX: CPT

## 2023-02-22 PROCEDURE — 82248 BILIRUBIN DIRECT: CPT | Performed by: FAMILY MEDICINE

## 2023-02-22 PROCEDURE — 99233 SBSQ HOSP IP/OBS HIGH 50: CPT | Performed by: FAMILY MEDICINE

## 2023-02-22 PROCEDURE — 25010000002 CEFTRIAXONE PER 250 MG: Performed by: FAMILY MEDICINE

## 2023-02-22 PROCEDURE — 85025 COMPLETE CBC W/AUTO DIFF WBC: CPT | Performed by: FAMILY MEDICINE

## 2023-02-22 PROCEDURE — 84100 ASSAY OF PHOSPHORUS: CPT | Performed by: FAMILY MEDICINE

## 2023-02-22 PROCEDURE — 83735 ASSAY OF MAGNESIUM: CPT | Performed by: FAMILY MEDICINE

## 2023-02-22 PROCEDURE — 82962 GLUCOSE BLOOD TEST: CPT

## 2023-02-22 PROCEDURE — 84145 PROCALCITONIN (PCT): CPT | Performed by: FAMILY MEDICINE

## 2023-02-22 PROCEDURE — 36415 COLL VENOUS BLD VENIPUNCTURE: CPT | Performed by: FAMILY MEDICINE

## 2023-02-22 RX ADMIN — PANTOPRAZOLE SODIUM 40 MG: 40 INJECTION, POWDER, FOR SOLUTION INTRAVENOUS at 09:02

## 2023-02-22 RX ADMIN — FLUTICASONE PROPIONATE 2 SPRAY: 50 SPRAY, METERED NASAL at 09:03

## 2023-02-22 RX ADMIN — Medication 10 ML: at 09:03

## 2023-02-22 RX ADMIN — SODIUM CHLORIDE 75 ML/HR: 9 INJECTION, SOLUTION INTRAVENOUS at 09:02

## 2023-02-22 RX ADMIN — CEFTRIAXONE SODIUM 1 G: 1 INJECTION, SOLUTION INTRAVENOUS at 04:12

## 2023-02-22 RX ADMIN — SODIUM CHLORIDE 75 ML/HR: 9 INJECTION, SOLUTION INTRAVENOUS at 04:12

## 2023-02-22 RX ADMIN — SODIUM CHLORIDE 75 ML/HR: 9 INJECTION, SOLUTION INTRAVENOUS at 14:08

## 2023-02-23 ENCOUNTER — READMISSION MANAGEMENT (OUTPATIENT)
Dept: CALL CENTER | Facility: HOSPITAL | Age: 73
End: 2023-02-23
Payer: MEDICARE

## 2023-02-23 VITALS
HEART RATE: 81 BPM | TEMPERATURE: 98 F | RESPIRATION RATE: 18 BRPM | HEIGHT: 60 IN | WEIGHT: 143.3 LBS | BODY MASS INDEX: 28.13 KG/M2 | DIASTOLIC BLOOD PRESSURE: 67 MMHG | OXYGEN SATURATION: 96 % | SYSTOLIC BLOOD PRESSURE: 123 MMHG

## 2023-02-23 PROBLEM — R10.13 ABDOMINAL PAIN, ACUTE, EPIGASTRIC: Status: RESOLVED | Noted: 2023-02-21 | Resolved: 2023-02-23

## 2023-02-23 LAB
ALBUMIN SERPL-MCNC: 3.2 G/DL (ref 3.5–5.2)
ALP SERPL-CCNC: 193 U/L (ref 39–117)
ALT SERPL W P-5'-P-CCNC: 965 U/L (ref 1–33)
ANION GAP SERPL CALCULATED.3IONS-SCNC: 6.5 MMOL/L (ref 5–15)
AST SERPL-CCNC: 960 U/L (ref 1–32)
BASOPHILS # BLD AUTO: 0.02 10*3/MM3 (ref 0–0.2)
BASOPHILS NFR BLD AUTO: 0.8 % (ref 0–1.5)
BILIRUB CONJ SERPL-MCNC: 2.1 MG/DL (ref 0–0.3)
BILIRUB INDIRECT SERPL-MCNC: 0.6 MG/DL
BILIRUB SERPL-MCNC: 2.7 MG/DL (ref 0–1.2)
BUN SERPL-MCNC: 7 MG/DL (ref 8–23)
BUN/CREAT SERPL: 8.9 (ref 7–25)
CALCIUM SPEC-SCNC: 8.7 MG/DL (ref 8.6–10.5)
CHLORIDE SERPL-SCNC: 103 MMOL/L (ref 98–107)
CO2 SERPL-SCNC: 24.5 MMOL/L (ref 22–29)
CREAT SERPL-MCNC: 0.79 MG/DL (ref 0.57–1)
DEPRECATED RDW RBC AUTO: 47.4 FL (ref 37–54)
EGFRCR SERPLBLD CKD-EPI 2021: 80.6 ML/MIN/1.73
EOSINOPHIL # BLD AUTO: 0.04 10*3/MM3 (ref 0–0.4)
EOSINOPHIL NFR BLD AUTO: 1.5 % (ref 0.3–6.2)
ERYTHROCYTE [DISTWIDTH] IN BLOOD BY AUTOMATED COUNT: 15 % (ref 12.3–15.4)
GLUCOSE SERPL-MCNC: 115 MG/DL (ref 65–99)
HCT VFR BLD AUTO: 32.3 % (ref 34–46.6)
HGB BLD-MCNC: 10.7 G/DL (ref 12–15.9)
IMM GRANULOCYTES # BLD AUTO: 0.01 10*3/MM3 (ref 0–0.05)
IMM GRANULOCYTES NFR BLD AUTO: 0.4 % (ref 0–0.5)
LYMPHOCYTES # BLD AUTO: 0.77 10*3/MM3 (ref 0.7–3.1)
LYMPHOCYTES NFR BLD AUTO: 29.3 % (ref 19.6–45.3)
MAGNESIUM SERPL-MCNC: 2.1 MG/DL (ref 1.6–2.4)
MCH RBC QN AUTO: 28.5 PG (ref 26.6–33)
MCHC RBC AUTO-ENTMCNC: 33.1 G/DL (ref 31.5–35.7)
MCV RBC AUTO: 85.9 FL (ref 79–97)
MONOCYTES # BLD AUTO: 0.49 10*3/MM3 (ref 0.1–0.9)
MONOCYTES NFR BLD AUTO: 18.6 % (ref 5–12)
NEUTROPHILS NFR BLD AUTO: 1.3 10*3/MM3 (ref 1.7–7)
NEUTROPHILS NFR BLD AUTO: 49.4 % (ref 42.7–76)
NRBC BLD AUTO-RTO: 0 /100 WBC (ref 0–0.2)
PHOSPHATE SERPL-MCNC: 2.6 MG/DL (ref 2.5–4.5)
PLATELET # BLD AUTO: 243 10*3/MM3 (ref 140–450)
PMV BLD AUTO: 8.5 FL (ref 6–12)
POTASSIUM SERPL-SCNC: 4.4 MMOL/L (ref 3.5–5.2)
PROT SERPL-MCNC: 6.4 G/DL (ref 6–8.5)
RBC # BLD AUTO: 3.76 10*6/MM3 (ref 3.77–5.28)
SODIUM SERPL-SCNC: 134 MMOL/L (ref 136–145)
WBC NRBC COR # BLD: 2.63 10*3/MM3 (ref 3.4–10.8)

## 2023-02-23 PROCEDURE — 80048 BASIC METABOLIC PNL TOTAL CA: CPT | Performed by: FAMILY MEDICINE

## 2023-02-23 PROCEDURE — 99239 HOSP IP/OBS DSCHRG MGMT >30: CPT | Performed by: FAMILY MEDICINE

## 2023-02-23 PROCEDURE — 25010000002 CEFTRIAXONE PER 250 MG: Performed by: FAMILY MEDICINE

## 2023-02-23 PROCEDURE — 94799 UNLISTED PULMONARY SVC/PX: CPT

## 2023-02-23 PROCEDURE — 84100 ASSAY OF PHOSPHORUS: CPT | Performed by: FAMILY MEDICINE

## 2023-02-23 PROCEDURE — 85025 COMPLETE CBC W/AUTO DIFF WBC: CPT | Performed by: FAMILY MEDICINE

## 2023-02-23 PROCEDURE — 80076 HEPATIC FUNCTION PANEL: CPT | Performed by: FAMILY MEDICINE

## 2023-02-23 PROCEDURE — 83735 ASSAY OF MAGNESIUM: CPT | Performed by: FAMILY MEDICINE

## 2023-02-23 RX ADMIN — FLUTICASONE PROPIONATE 2 SPRAY: 50 SPRAY, METERED NASAL at 09:22

## 2023-02-23 RX ADMIN — Medication 10 ML: at 09:21

## 2023-02-23 RX ADMIN — PANTOPRAZOLE SODIUM 40 MG: 40 INJECTION, POWDER, FOR SOLUTION INTRAVENOUS at 09:21

## 2023-02-23 RX ADMIN — CEFTRIAXONE SODIUM 1 G: 1 INJECTION, SOLUTION INTRAVENOUS at 05:21

## 2023-02-23 RX ADMIN — SODIUM CHLORIDE 75 ML/HR: 9 INJECTION, SOLUTION INTRAVENOUS at 05:21

## 2023-02-23 NOTE — OUTREACH NOTE
Prep Survey    Flowsheet Row Responses   [facility] patient discharged from? [facility]   Is LACE score < 7 ? Yes   Eligibility [facility]   Date of Admission 02/21/23   Date of Discharge 02/23/23   Discharge Disposition Home or Self Care   Discharge diagnosis Abdominal pain, acute, epigastric   Does the patient have one of the following disease processes/diagnoses(primary or secondary)? Other   Does the patient have Home health ordered? No   Is there a DME ordered? No   Prep survey completed? Yes          Flora MATOS - Registered Nurse

## 2023-02-24 ENCOUNTER — TRANSITIONAL CARE MANAGEMENT TELEPHONE ENCOUNTER (OUTPATIENT)
Dept: CALL CENTER | Facility: HOSPITAL | Age: 73
End: 2023-02-24
Payer: MEDICARE

## 2023-02-24 NOTE — OUTREACH NOTE
Call Center TCM Note    Flowsheet Row Responses   [facility] [place] patient discharged from? [facility]   Does the patient have one of the following disease processes/diagnoses(primary or secondary)? Other   TCM attempt successful? Yes   Call start time 0947   Call end time 0950   Discharge diagnosis Abdominal pain, acute, epigastric   Does the patient have all medications ordered at discharge? Yes   Is the patient taking all medications as directed (includes completed medication regime)? Yes   Comments hospital f/u with PCP on 3/2   Does the patient have an appointment with their PCP within 7 days of discharge? Yes   Has home health visited the patient within 72 hours of discharge? N/A   Psychosocial issues? No   Did the patient receive a copy of their discharge instructions? Yes   What is the patient's perception of their health status since discharge? Improving   Is the patient/caregiver able to teach back the hierarchy of who to call/visit for symptoms/problems? PCP, Specialist, Home health nurse, Urgent Care, ED, 911 Yes   TCM call completed? Yes   Wrap up additional comments Doing well, all concerns addressed, confirmed appt with PCP for 3/2.   Call end time 0950   Would this patient benefit from a Referral to Amb Social Work? No   Is the patient interested in additional calls from an ambulatory ?  NOTE:  applies to high risk patients requiring additional follow-up. No          Mell Rea RN    2/24/2023, 09:50 EST

## 2023-02-26 LAB
BACTERIA SPEC AEROBE CULT: NORMAL
BACTERIA SPEC AEROBE CULT: NORMAL

## 2023-03-02 ENCOUNTER — OFFICE VISIT (OUTPATIENT)
Dept: FAMILY MEDICINE CLINIC | Facility: CLINIC | Age: 73
End: 2023-03-02
Payer: MEDICARE

## 2023-03-02 ENCOUNTER — TELEPHONE (OUTPATIENT)
Dept: GASTROENTEROLOGY | Facility: CLINIC | Age: 73
End: 2023-03-02
Payer: MEDICARE

## 2023-03-02 VITALS
TEMPERATURE: 97.4 F | SYSTOLIC BLOOD PRESSURE: 130 MMHG | OXYGEN SATURATION: 99 % | HEIGHT: 60 IN | HEART RATE: 74 BPM | DIASTOLIC BLOOD PRESSURE: 70 MMHG | WEIGHT: 141 LBS | BODY MASS INDEX: 27.68 KG/M2 | RESPIRATION RATE: 18 BRPM

## 2023-03-02 DIAGNOSIS — K75.9 HEPATITIS: Primary | ICD-10-CM

## 2023-03-02 DIAGNOSIS — L29.9 ITCHING: ICD-10-CM

## 2023-03-02 LAB
ALBUMIN SERPL-MCNC: 3.9 G/DL (ref 3.5–5.2)
ALBUMIN/GLOB SERPL: 1 G/DL
ALP SERPL-CCNC: 184 U/L (ref 39–117)
ALT SERPL W P-5'-P-CCNC: 823 U/L (ref 1–33)
ANION GAP SERPL CALCULATED.3IONS-SCNC: 6.5 MMOL/L (ref 5–15)
AST SERPL-CCNC: 748 U/L (ref 1–32)
BASOPHILS # BLD AUTO: 0.03 10*3/MM3 (ref 0–0.2)
BASOPHILS NFR BLD AUTO: 0.7 % (ref 0–1.5)
BILIRUB SERPL-MCNC: 4.9 MG/DL (ref 0–1.2)
BUN SERPL-MCNC: 8 MG/DL (ref 8–23)
BUN/CREAT SERPL: 9.8 (ref 7–25)
CALCIUM SPEC-SCNC: 9.9 MG/DL (ref 8.6–10.5)
CHLORIDE SERPL-SCNC: 99 MMOL/L (ref 98–107)
CO2 SERPL-SCNC: 22.5 MMOL/L (ref 22–29)
CREAT SERPL-MCNC: 0.82 MG/DL (ref 0.57–1)
DEPRECATED RDW RBC AUTO: 43 FL (ref 37–54)
EGFRCR SERPLBLD CKD-EPI 2021: 77.1 ML/MIN/1.73
EOSINOPHIL # BLD AUTO: 0.06 10*3/MM3 (ref 0–0.4)
EOSINOPHIL NFR BLD AUTO: 1.4 % (ref 0.3–6.2)
ERYTHROCYTE [DISTWIDTH] IN BLOOD BY AUTOMATED COUNT: 13.7 % (ref 12.3–15.4)
GLOBULIN UR ELPH-MCNC: 4.1 GM/DL
GLUCOSE SERPL-MCNC: 109 MG/DL (ref 65–99)
HCT VFR BLD AUTO: 37.4 % (ref 34–46.6)
HGB BLD-MCNC: 12.5 G/DL (ref 12–15.9)
IMM GRANULOCYTES # BLD AUTO: 0.01 10*3/MM3 (ref 0–0.05)
IMM GRANULOCYTES NFR BLD AUTO: 0.2 % (ref 0–0.5)
LYMPHOCYTES # BLD AUTO: 0.99 10*3/MM3 (ref 0.7–3.1)
LYMPHOCYTES NFR BLD AUTO: 23 % (ref 19.6–45.3)
MCH RBC QN AUTO: 28.9 PG (ref 26.6–33)
MCHC RBC AUTO-ENTMCNC: 33.4 G/DL (ref 31.5–35.7)
MCV RBC AUTO: 86.4 FL (ref 79–97)
MONOCYTES # BLD AUTO: 0.56 10*3/MM3 (ref 0.1–0.9)
MONOCYTES NFR BLD AUTO: 13 % (ref 5–12)
NEUTROPHILS NFR BLD AUTO: 2.66 10*3/MM3 (ref 1.7–7)
NEUTROPHILS NFR BLD AUTO: 61.7 % (ref 42.7–76)
NRBC BLD AUTO-RTO: 0 /100 WBC (ref 0–0.2)
PLATELET # BLD AUTO: 416 10*3/MM3 (ref 140–450)
PMV BLD AUTO: 9.7 FL (ref 6–12)
POTASSIUM SERPL-SCNC: 5.1 MMOL/L (ref 3.5–5.2)
PROT SERPL-MCNC: 8 G/DL (ref 6–8.5)
RBC # BLD AUTO: 4.33 10*6/MM3 (ref 3.77–5.28)
SODIUM SERPL-SCNC: 128 MMOL/L (ref 136–145)
WBC NRBC COR # BLD: 4.31 10*3/MM3 (ref 3.4–10.8)

## 2023-03-02 PROCEDURE — 80053 COMPREHEN METABOLIC PANEL: CPT | Performed by: NURSE PRACTITIONER

## 2023-03-02 PROCEDURE — 85025 COMPLETE CBC W/AUTO DIFF WBC: CPT | Performed by: NURSE PRACTITIONER

## 2023-03-02 PROCEDURE — 1111F DSCHRG MED/CURRENT MED MERGE: CPT | Performed by: NURSE PRACTITIONER

## 2023-03-02 PROCEDURE — 36415 COLL VENOUS BLD VENIPUNCTURE: CPT | Performed by: NURSE PRACTITIONER

## 2023-03-02 PROCEDURE — 99495 TRANSJ CARE MGMT MOD F2F 14D: CPT | Performed by: NURSE PRACTITIONER

## 2023-03-02 RX ORDER — HYDROXYZINE HYDROCHLORIDE 10 MG/1
10 TABLET, FILM COATED ORAL 3 TIMES DAILY PRN
Qty: 30 TABLET | Refills: 0 | Status: SHIPPED | OUTPATIENT
Start: 2023-03-02 | End: 2023-03-09

## 2023-03-02 NOTE — PROGRESS NOTES
Chief Complaint  Transitional Care Management    Subjective     {Problem List  Visit Diagnosis   Encounters  Notes  Medications  Labs  Result Review Imaging  Media :23}     Qiana Altman presents to [facility]  History of Present Illness       Qiana Altman is a pleasant 72 y.o. female came to the ED with ab pain and nausea, poor appetite.  She had elevated AST, ALT, hyponatremia, and mild hyperbilirubinemia.  She had evidence for UTI as well.  Gastroenterology was consulted (Dr. Murcia).  CT abd without acute findings.  MRI/MRCP performed, which did not show any filling defects.  She was given empiric ceftriaxone for 3 days.  She was given IV fluids and PPI while here.  Home atorvastatin stopped.  Home HCTZ stopped.  Hep ABC panel negative.  Gastroenterology thought acute hepatitis was most likely viral.  She did have positive smooth muscle antibody and a positive CMV IgG.  Over there following days, her LFTs/bili, sodium trended towards normal.  Her appetite and general well being improved and she was tolerated normal diet past 48 hours without N/V.  She is feeling close to baseline and wants to go home.  She is being discharged home with home health.  Repeat CMP in 1 week ordered to follow Na, and LFTs.  F/U with PCP 1 weeks.  Gastroenterology follow up in 2 weeks.   (*of note: patient wants to follow up with Dr. Dominguez, who has taken care of  and other family members)    She went to the ER for abd pain.  She is going to pee a lot.  She is itching.    Allergies  Shellfish allergy, Moxifloxacin hcl, Scopolamine, Sulfa antibiotics, Morphine, and Penicillins    Social History     Tobacco Use   • Smoking status: Former     Packs/day: 1.00     Years: 5.00     Pack years: 5.00     Types: Cigarettes     Start date: 1980     Quit date: 1985     Years since quittin.1   • Smokeless tobacco: Never   • Tobacco comments:     QUIT 28 YEARS AGO   Vaping Use   •  "Vaping Use: Never used   Substance Use Topics   • Alcohol use: Never   • Drug use: Never       Family History   Problem Relation Age of Onset   • Heart disease Mother    • Heart failure Mother    • Arthritis Mother    • Hyperlipidemia Mother    • Hypertension Mother    • Stroke Mother    • Thyroid disease Mother    • Lung cancer Other    • Skin cancer Other    • Heart disease Brother    • Heart disease Brother    • Kidney disease Daughter    • Miscarriages / Stillbirths Daughter         Health Maintenance Due   Topic Date Due   • COVID-19 Vaccine (3 - Booster for Moderna series) 06/18/2021        Immunization History   Administered Date(s) Administered   • COVID-19 (MODERNA) 1st, 2nd, 3rd Dose Only 03/26/2021, 04/23/2021   • Flu Vaccine Quad PF >36MO 09/09/2014, 12/29/2015   • Fluzone High-Dose 65+yrs 10/04/2021, 09/29/2022   • Influenza, Unspecified 09/23/2020   • Pneumococcal Conjugate 13-Valent (PCV13) 11/14/2017   • Pneumococcal Polysaccharide (PPSV23) 09/23/2020   • Tdap 06/22/2016       Review of Systems   Constitutional: Positive for fatigue.   Respiratory: Negative for cough and shortness of breath.    Cardiovascular: Negative for chest pain.   Gastrointestinal: Positive for abdominal distention and abdominal pain. Negative for GERD and indigestion.    Itching    Objective       Vitals:    03/02/23 1141   BP: 130/70   Pulse: 74   Resp: 18   Temp: 97.4 °F (36.3 °C)   SpO2: 99%   Weight: 64 kg (141 lb)   Height: 152.4 cm (60\")       Body mass index is 27.54 kg/m².         Physical Exam  Vitals reviewed.   Constitutional:       Appearance: Normal appearance. She is well-developed.   Cardiovascular:      Rate and Rhythm: Normal rate and regular rhythm.      Heart sounds: Normal heart sounds. No murmur heard.  Pulmonary:      Effort: Pulmonary effort is normal.      Breath sounds: Normal breath sounds.   Skin:     Coloration: Skin is jaundiced.   Neurological:      Mental Status: She is alert and oriented to " person, place, and time.      Cranial Nerves: No cranial nerve deficit.      Motor: No weakness.   Psychiatric:         Mood and Affect: Mood and affect normal.             Result Review :{Labs  Result Review  Imaging  Med Tab  Media :23}     The following data was reviewed by: LOYD Nolen on 03/02/2023:    Common Labs   Common labs    Common Labs 2/21/23 2/21/23 2/22/23 2/22/23 2/23/23 2/23/23 2/23/23    1003 1003 0616 0616 0509 0509 0509   Glucose  95  64 (A)  115 (A)    BUN  13  11  7 (A)    Creatinine  0.89  0.80  0.79    Sodium  124 (A)  130 (A)  134 (A)    Potassium  3.8  3.9  4.4    Chloride  87 (A)  97 (A)  103    Calcium  9.6  9.0  8.7    Albumin  3.9  3.4 (A)   3.2 (A)   Total Bilirubin  4.7 (A)  3.7 (A)   2.7 (A)   Alkaline Phosphatase  198 (A)  177 (A)   193 (A)   AST (SGOT)  1,323 (A)  1,265 (A)   960 (A)   ALT (SGPT)  1,214 (A)  1,126 (A)   965 (A)   WBC 3.17 (A)  2.80 (A)  2.63 (A)     Hemoglobin 12.3  11.1 (A)  10.7 (A)     Hematocrit 35.8  32.9 (A)  32.3 (A)     Platelets 264  258  243     (A) Abnormal value              {Data reviewed (optional):10868}             Assessment and Plan {CC Problem List  Visit Diagnosis  ROS  Review (Popup)  [facility] Maintenance  Quality  BestPractice  Medications  SmartSets  SnapShot Encounters  Media :23}     Diagnoses and all orders for this visit:    1. Hepatitis (Primary)  -     Comprehensive Metabolic Panel  -     CBC Auto Differential  -     hydrOXYzine (ATARAX) 10 MG tablet; Take 1 tablet by mouth 3 (Three) Times a Day As Needed for Itching.  Dispense: 30 tablet; Refill: 0    2. Itching  -     hydrOXYzine (ATARAX) 10 MG tablet; Take 1 tablet by mouth 3 (Three) Times a Day As Needed for Itching.  Dispense: 30 tablet; Refill: 0        {Time Spent (Optional):22389}    Follow Up {Instructions Charge Capture  Follow-up Communications :23}    No follow-ups on file.  We will reach out to GI.    Patient was given instructions and  counseling regarding her condition or for health maintenance advice. Please see specific information pulled into the AVS if appropriate.     Parts of this note are electronic transcriptions/translations of spoken language to printed text using the Dragon Dictation system.      {Cessation (Optional):42018}    Zohreh Mcduffie, APRN  03/02/2023

## 2023-03-02 NOTE — TELEPHONE ENCOUNTER
Contacted patient and left voicemail to call regarding a sooner new patient appt with Anna Oquendo.

## 2023-03-02 NOTE — PROGRESS NOTES
Transitional Care Follow Up Visit  Subjective     Qiana Altman is a 72 y.o. female who presents for a transitional care management visit.    Within 48 business hours after discharge our office contacted her via telephone to coordinate her care and needs.      I reviewed and discussed the details of that call along with the discharge summary, hospital problems, inpatient lab results, inpatient diagnostic studies, and consultation reports with Qiana.     Current outpatient and discharge medications have been reconciled for the patient.  Reviewed by: LOYD Nolen      Date of TCM Phone Call 2/23/2023   [facility]   Date of Admission 2/21/2023   Date of Discharge 2/23/2023   Discharge Disposition Home or Self Care     Risk for Readmission (LACE) Score: 5 (2/23/2023  6:00 AM)      History of Present Illness   Course During Hospital Stay:    Discharge Diagnoses   • Hepatitis/transaminitis, resolving  • Hyperbilirubinemia, resolving  • Hyponatremia, resolving (Home HCTZ stopped this admission)  • Urinary tract infection, org unspecified, treated  • Hypertension  • Hyperlipidemia (Home atorvastatin stopped this admission).  • Depression/anxiety  • GERD  • Osteoarthritis   • Low vitamin B12  • Positive smooth muscle antibody  • Positive CMV IgG  Hospital Course   Hospital Course:     • Qiana Altman is a pleasant 72 y.o. female came to the ED with ab pain and nausea, poor appetite.  She had elevated AST, ALT, hyponatremia, and mild hyperbilirubinemia.  She had evidence for UTI as well.  Gastroenterology was consulted (Dr. Murcia).  CT abd without acute findings.  MRI/MRCP performed, which did not show any filling defects.  She was given empiric ceftriaxone for 3 days.  She was given IV fluids and PPI while here.  Home atorvastatin stopped.  Home HCTZ stopped.  Hep ABC panel negative.  Gastroenterology thought acute hepatitis was most likely viral.  She did have positive smooth muscle  antibody and a positive CMV IgG.  Over there following days, her LFTs/bili, sodium trended towards normal.  Her appetite and general well being improved and she was tolerated normal diet past 48 hours without N/V.  She is feeling close to baseline and wants to go home.  She is being discharged home with home health.  Repeat CMP in 1 week ordered to follow Na, and LFTs.  F/U with PCP 1 weeks.  Gastroenterology follow up in 2 weeks.   (*of note: patient wants to follow up with Dr. Dominguez, who has taken care of  and other family members)  She said that she started with abdominal pain.  She went to the emergency room.  She said she really saw her urine to be dark dark yellow.  She was not really concerned that it would be the liver.  She just knew her abdomen hurt.  She said she felt nauseated.  She did not have that much of an appetite.  She said that has gotten better since getting home.  The belly is still very tender.  She is able to eat.  She is not having any diarrhea.  She is having itching and this has started recently.  She said her skin is very itchy and aggravating.  She thinks that it was potentially an antibiotic.     The following portions of the patient's history were reviewed and updated as appropriate: allergies, current medications, past family history, past medical history, past social history, past surgical history and problem list.    Review of Systems   Constitutional: Positive for fatigue.   Respiratory: Negative for cough and shortness of breath.    Cardiovascular: Negative for chest pain.   Gastrointestinal: Positive for abdominal pain. Negative for constipation, diarrhea, nausea and vomiting.       Objective   Physical Exam  Vitals reviewed.   Constitutional:       Appearance: Normal appearance. She is well-developed.   Cardiovascular:      Rate and Rhythm: Normal rate and regular rhythm.      Heart sounds: Normal heart sounds. No murmur heard.  Pulmonary:      Effort: Pulmonary effort  is normal.      Breath sounds: Normal breath sounds.   Abdominal:      General: Bowel sounds are normal.      Palpations: Abdomen is soft.      Tenderness: There is abdominal tenderness.   Skin:     General: Skin is warm and dry.      Coloration: Skin is jaundiced.   Neurological:      Mental Status: She is alert and oriented to person, place, and time.      Cranial Nerves: No cranial nerve deficit.      Motor: No weakness.   Psychiatric:         Mood and Affect: Mood and affect normal.         Assessment & Plan   Problems Addressed this Visit    None  Visit Diagnoses     Hepatitis    -  Primary    Relevant Medications    hydrOXYzine (ATARAX) 10 MG tablet    Other Relevant Orders    Comprehensive Metabolic Panel    CBC Auto Differential    Itching        Relevant Medications    hydrOXYzine (ATARAX) 10 MG tablet      Diagnoses       Codes Comments    Hepatitis    -  Primary ICD-10-CM: K75.9  ICD-9-CM: 573.3     Itching     ICD-10-CM: L29.9  ICD-9-CM: 698.9         We will reach out to GI for a follow-up appointment.  We will start Atarax for itching.  I did discuss that it was more than likely the liver breaking down the enzymes which is cause the itching.  We will start the low-dose 3 times a day.  Caution sedation.  Call with questions or concerns.  She will keep her appointments with cardiology and myself in the upcoming months.  We may need to do further labs if her liver enzymes have not come down further.  We need to monitor the liver functions until they are back to her normal range.    Zohreh Mcduffie, LOYD  03/02/2023

## 2023-03-06 ENCOUNTER — TELEPHONE (OUTPATIENT)
Dept: FAMILY MEDICINE CLINIC | Facility: CLINIC | Age: 73
End: 2023-03-06

## 2023-03-06 NOTE — TELEPHONE ENCOUNTER
Caller: Qiana Altman    Relationship to patient: Self    Best call back number: [phone]    Chief complaint: 1 MONTH FOLLOW UP     Type of visit: OFFICE VISIT    Requested date: THE WEEK OF April 2, 2023    Additional notes: PLEASE CALL IF THERE ARE ANY OPENINGS.

## 2023-03-08 PROBLEM — Z82.49 FAMILY HISTORY OF MYOCARDIAL INFARCTION: Status: RESOLVED | Noted: 2023-01-19 | Resolved: 2023-03-08

## 2023-03-08 PROBLEM — U07.1 COVID-19: Status: RESOLVED | Noted: 2022-06-23 | Resolved: 2023-03-08

## 2023-03-08 PROBLEM — Z86.39 HISTORY OF GRAVES' DISEASE: Status: RESOLVED | Noted: 2020-10-01 | Resolved: 2023-03-08

## 2023-03-09 ENCOUNTER — OFFICE VISIT (OUTPATIENT)
Dept: CARDIOLOGY | Facility: CLINIC | Age: 73
End: 2023-03-09
Payer: MEDICARE

## 2023-03-09 ENCOUNTER — TELEPHONE (OUTPATIENT)
Dept: FAMILY MEDICINE CLINIC | Facility: CLINIC | Age: 73
End: 2023-03-09
Payer: MEDICARE

## 2023-03-09 VITALS
HEIGHT: 60 IN | HEART RATE: 69 BPM | WEIGHT: 139 LBS | SYSTOLIC BLOOD PRESSURE: 123 MMHG | BODY MASS INDEX: 27.29 KG/M2 | DIASTOLIC BLOOD PRESSURE: 68 MMHG

## 2023-03-09 DIAGNOSIS — I10 ESSENTIAL HYPERTENSION: Primary | ICD-10-CM

## 2023-03-09 DIAGNOSIS — E78.2 MIXED HYPERLIPIDEMIA: ICD-10-CM

## 2023-03-09 PROCEDURE — 3078F DIAST BP <80 MM HG: CPT | Performed by: NURSE PRACTITIONER

## 2023-03-09 PROCEDURE — 3074F SYST BP LT 130 MM HG: CPT | Performed by: NURSE PRACTITIONER

## 2023-03-09 PROCEDURE — 1159F MED LIST DOCD IN RCRD: CPT | Performed by: NURSE PRACTITIONER

## 2023-03-09 PROCEDURE — 99213 OFFICE O/P EST LOW 20 MIN: CPT | Performed by: NURSE PRACTITIONER

## 2023-03-09 PROCEDURE — 1160F RVW MEDS BY RX/DR IN RCRD: CPT | Performed by: NURSE PRACTITIONER

## 2023-03-09 RX ORDER — LANCETS
1 EACH MISCELLANEOUS DAILY
COMMUNITY
Start: 2023-02-07

## 2023-03-09 RX ORDER — BLOOD SUGAR DIAGNOSTIC
1 STRIP MISCELLANEOUS DAILY
COMMUNITY
Start: 2023-03-01

## 2023-03-09 RX ORDER — SYRINGE WITH NEEDLE, 1 ML 25GX5/8"
1 SYRINGE, EMPTY DISPOSABLE MISCELLANEOUS DAILY
COMMUNITY
Start: 2023-02-24

## 2023-03-09 NOTE — PROGRESS NOTES
Chief Complaint  Hypertension and Fatigue    Subjective            History of Present Illness  Qiana Altman is a 72-year-old white/ female patient who presents to the office today for follow-up.  She has hypertension and hyperlipidemia. She had recent hospitalization, was diagnosed with hepatitis, and is being followed by gastroenterology. Upon discharge her atorvastatin was discontinued due to liver function. She reports compliance with her other medications.  She denies any chest pain, shortness of breath, lightheadedness/dizziness, palpitations, or edema.    PMH  Past Medical History:   Diagnosis Date   • Allergic rhinitis due to allergen 09/10/2019   • Anemia 09/10/2019   • Anxiety disorder 2020   • B12 deficiency 2020   • COVID-19 2022   • Depression    • Diverticulosis    • Essential hypertension 2019   • Family history of myocardial infarction 2023   • GERD (gastroesophageal reflux disease) 2019   • Graves' disease    • HLD (hyperlipidemia) 2019   • Hypothyroidism    • Inflammatory bowel disease    • Major depressive disorder 2019   • Osteoarthritis 2020   • Osteopenia    • T2DM (type 2 diabetes mellitus) ([facility]) 2019   • Vitamin D deficiency 2019         ALLERGY  Allergies   Allergen Reactions   • Shellfish Allergy Anaphylaxis   • Moxifloxacin Hcl Unknown - Low Severity   • Scopolamine Swelling   • Sulfa Antibiotics Unknown - Low Severity   • Morphine Rash   • Penicillins Rash          SURGICALHX  Past Surgical History:   Procedure Laterality Date   • APPENDECTOMY     •  SECTION     • CHOLECYSTECTOMY  2016    DR SCALES   • COLON SURGERY     • COLONOSCOPY     • HIP FRACTURE SURGERY Left     REPAIR   • HIP SURGERY     • OOPHORECTOMY     • SMALL INTESTINE SURGERY     • TUBAL ABDOMINAL LIGATION            SOC  Social History     Socioeconomic History   • Marital status:    Tobacco Use   • Smoking status: Former      "Packs/day: 1.00     Years: 5.00     Pack years: 5.00     Types: Cigarettes     Start date: 1980     Quit date: 1985     Years since quittin.2   • Smokeless tobacco: Never   • Tobacco comments:     QUIT 28 YEARS AGO   Vaping Use   • Vaping Use: Never used   Substance and Sexual Activity   • Alcohol use: Never   • Drug use: Never   • Sexual activity: Not Currently     Birth control/protection: Surgical, Post-menopausal, Tubal ligation         FAMHX  Family History   Problem Relation Age of Onset   • Heart disease Mother    • Heart failure Mother    • Arthritis Mother    • Hyperlipidemia Mother    • Hypertension Mother    • Stroke Mother    • Thyroid disease Mother    • Lung cancer Other    • Skin cancer Other    • Heart disease Brother    • Heart disease Brother    • Kidney disease Daughter    • Miscarriages / Stillbirths Daughter           MEDSIGONLY  Current Outpatient Medications on File Prior to Visit   Medication Sig   • Accu-Chek Graciela Plus test strip 1 each by Other route Daily.   • Accu-Chek Softclix Lancets lancets 1 each by Other route Daily.   • B-D 3CC LUER-BUZZ SYR 25GX1\" 25G X 1\" 3 ML misc 1 syringe by Other route Daily.   • celecoxib (CeleBREX) 100 MG capsule Take 1 capsule by mouth 2 (Two) Times a Day.   • cyanocobalamin 1000 MCG/ML injection Inject 1 mL into the appropriate muscle as directed by prescriber 1 (One) Time Per Week for 8 doses. (Patient taking differently: Inject 1 mL into the appropriate muscle as directed by prescriber 1 (One) Time Per Week. Tuesday)   • esomeprazole (nexIUM) 40 MG capsule Take 1 capsule by mouth Daily.   • fluticasone (FLONASE) 50 MCG/ACT nasal spray 2 sprays into the nostril(s) as directed by provider Daily.   • quinapril (ACCUPRIL) 10 MG tablet Take 1 tablet by mouth 2 (Two) Times a Day.   • [DISCONTINUED] hydrOXYzine (ATARAX) 10 MG tablet Take 1 tablet by mouth 3 (Three) Times a Day As Needed for Itching.     No current facility-administered medications " "on file prior to visit.         Objective   /68   Pulse 69   Ht 152.4 cm (60\")   Wt 63 kg (139 lb)   BMI 27.15 kg/m²       Physical Exam  HENT:      Head: Normocephalic.   Neck:      Vascular: No carotid bruit.   Cardiovascular:      Rate and Rhythm: Normal rate and regular rhythm.      Pulses: Normal pulses.      Heart sounds: Normal heart sounds. No murmur heard.  Pulmonary:      Effort: Pulmonary effort is normal.      Breath sounds: Normal breath sounds.   Musculoskeletal:      Cervical back: Neck supple.      Right lower leg: No edema.      Left lower leg: No edema.   Skin:     General: Skin is dry.      Coloration: Skin is jaundiced.   Neurological:      Mental Status: She is alert and oriented to person, place, and time.   Psychiatric:         Behavior: Behavior normal.       Result Review :   The following data was reviewed by: LOYD Sanchez on 03/09/2023:  No results found for: PROBNP  CMP    CMP 3/2/23       Glucose 109 (A)   BUN 8   Creatinine 0.82   eGFR 77.1   Sodium 128 (A)   Potassium 5.1   Chloride 99   Calcium 9.9   Total Protein 8.0   Albumin 3.9   Globulin 4.1   Total Bilirubin 4.9 (A)   Alkaline Phosphatase 184 (A)   AST (SGOT) 748 (A)   ALT (SGPT) 823 (A)   Albumin/Globulin Ratio 1.0   BUN/Creatinine Ratio 9.8   Anion Gap 6.5   (A) Abnormal value            CBC w/diff    CBC w/Diff 3/2/23   WBC 4.31   RBC 4.33   Hemoglobin 12.5   Hematocrit 37.4   MCV 86.4   MCH 28.9   MCHC 33.4   RDW 13.7   Platelets 416   Neutrophil Rel % 61.7   Immature Granulocyte Rel % 0.2   Lymphocyte Rel % 23.0   Monocyte Rel % 13.0 (A)   Eosinophil Rel % 1.4   Basophil Rel % 0.7   (A) Abnormal value             Lab Results   Component Value Date    TSH 1.660 01/19/2023      Lab Results   Component Value Date    FREET4 1.36 01/19/2023      No results found for: DDIMERQUANT  Magnesium   Date Value Ref Range Status   02/23/2023 2.1 1.6 - 2.4 mg/dL Final      No results found for: DIGOXIN   Lab Results "   Component Value Date    TROPONINT <0.01 03/16/2019           Lipid Panel    Lipid Panel 1/19/23   Total Cholesterol 118   Triglycerides 59   HDL Cholesterol 52   VLDL Cholesterol 13   LDL Cholesterol  53   LDL/HDL Ratio 1.04                   Assessment and Plan    Diagnoses and all orders for this visit:    1. Essential hypertension (Primary)  Currently controlled and without adverse effect from medication, continue quinapril 10 mg twice daily.    2. Mixed hyperlipidemia  Last lipid panel was 1/19/2023 with LDL 53 which is within her goal range, she is no longer able to take statin medication due to her liver disease.  We talked about ways to modify diet to lower lipid levels.  Repeat fasting lipid and hepatic function panel in 3 months.  -     Lipid Panel; Future  -     Hepatic Function Panel; Future            Follow Up   Return in about 6 months (around 9/9/2023) for Follow up with Dr Richmond.    Patient was given instructions and counseling regarding her condition or for health maintenance advice. Please see specific information pulled into the AVS if appropriate.     Qiana SERRATO Agapito  reports that she quit smoking about 38 years ago. Her smoking use included cigarettes. She started smoking about 43 years ago. She has a 5.00 pack-year smoking history. She has never used smokeless tobacco.           Nikki Read, APRN  03/09/23  08:57 EST    Dictated Utilizing Dragon Dictation

## 2023-03-09 NOTE — TELEPHONE ENCOUNTER
Called patient in regards to a message about getting her an appt with Dr. Dominguez's office. Patient verified information and has been scheduled with Anna Oquendo on 5/11 at 8:15. Patient verbalized understanding and the appt with Britni Arias has been cancelled.

## 2023-03-13 NOTE — TELEPHONE ENCOUNTER
Pt wants to know if she should be taking her vitamin D? And she would liked it checked tomorrow along with her sodium level.

## 2023-03-14 ENCOUNTER — LAB (OUTPATIENT)
Dept: FAMILY MEDICINE CLINIC | Facility: CLINIC | Age: 73
End: 2023-03-14
Payer: MEDICARE

## 2023-03-14 DIAGNOSIS — E55.9 VITAMIN D DEFICIENCY: Primary | ICD-10-CM

## 2023-03-14 LAB
25(OH)D3 SERPL-MCNC: 30.2 NG/ML (ref 30–100)
ALBUMIN SERPL-MCNC: 3.5 G/DL (ref 3.5–5.2)
ALBUMIN/GLOB SERPL: 0.8 G/DL
ALP SERPL-CCNC: 128 U/L (ref 39–117)
ALT SERPL W P-5'-P-CCNC: 566 U/L (ref 1–33)
ANION GAP SERPL CALCULATED.3IONS-SCNC: 9.8 MMOL/L (ref 5–15)
AST SERPL-CCNC: 503 U/L (ref 1–32)
BILIRUB SERPL-MCNC: 2.7 MG/DL (ref 0–1.2)
BUN SERPL-MCNC: 8 MG/DL (ref 8–23)
BUN/CREAT SERPL: 11.4 (ref 7–25)
CALCIUM SPEC-SCNC: 9.5 MG/DL (ref 8.6–10.5)
CHLORIDE SERPL-SCNC: 96 MMOL/L (ref 98–107)
CO2 SERPL-SCNC: 24.2 MMOL/L (ref 22–29)
CREAT SERPL-MCNC: 0.7 MG/DL (ref 0.57–1)
EGFRCR SERPLBLD CKD-EPI 2021: 92.6 ML/MIN/1.73
GLOBULIN UR ELPH-MCNC: 4.2 GM/DL
GLUCOSE SERPL-MCNC: 100 MG/DL (ref 65–99)
POTASSIUM SERPL-SCNC: 4.8 MMOL/L (ref 3.5–5.2)
PROT SERPL-MCNC: 7.7 G/DL (ref 6–8.5)
SODIUM SERPL-SCNC: 130 MMOL/L (ref 136–145)

## 2023-03-14 PROCEDURE — 36415 COLL VENOUS BLD VENIPUNCTURE: CPT | Performed by: NURSE PRACTITIONER

## 2023-03-14 PROCEDURE — 82306 VITAMIN D 25 HYDROXY: CPT | Performed by: NURSE PRACTITIONER

## 2023-03-14 PROCEDURE — 80053 COMPREHEN METABOLIC PANEL: CPT | Performed by: NURSE PRACTITIONER

## 2023-03-15 DIAGNOSIS — K75.9 HEPATITIS: Primary | ICD-10-CM

## 2023-03-23 DIAGNOSIS — I10 ESSENTIAL HYPERTENSION: ICD-10-CM

## 2023-03-23 DIAGNOSIS — E78.2 MIXED HYPERLIPIDEMIA: ICD-10-CM

## 2023-03-23 DIAGNOSIS — J30.9 ALLERGIC RHINITIS, UNSPECIFIED SEASONALITY, UNSPECIFIED TRIGGER: ICD-10-CM

## 2023-03-23 DIAGNOSIS — M19.90 OSTEOARTHRITIS, UNSPECIFIED OSTEOARTHRITIS TYPE, UNSPECIFIED SITE: ICD-10-CM

## 2023-03-23 RX ORDER — QUINAPRIL 10 MG/1
TABLET ORAL
Qty: 180 TABLET | Refills: 1 | Status: SHIPPED | OUTPATIENT
Start: 2023-03-23

## 2023-03-23 RX ORDER — CELECOXIB 100 MG/1
CAPSULE ORAL
Qty: 180 CAPSULE | Refills: 1 | Status: SHIPPED | OUTPATIENT
Start: 2023-03-23

## 2023-03-23 RX ORDER — FLUTICASONE PROPIONATE 50 MCG
SPRAY, SUSPENSION (ML) NASAL
Qty: 16 G | Refills: 1 | Status: SHIPPED | OUTPATIENT
Start: 2023-03-23

## 2023-03-23 RX ORDER — ATORVASTATIN CALCIUM 10 MG/1
TABLET, FILM COATED ORAL
Qty: 90 TABLET | Refills: 1 | Status: SHIPPED | OUTPATIENT
Start: 2023-03-23

## 2023-04-18 ENCOUNTER — LAB (OUTPATIENT)
Dept: FAMILY MEDICINE CLINIC | Facility: CLINIC | Age: 73
End: 2023-04-18
Payer: MEDICARE

## 2023-04-18 DIAGNOSIS — E78.2 MIXED HYPERLIPIDEMIA: ICD-10-CM

## 2023-04-18 DIAGNOSIS — K75.9 HEPATITIS: ICD-10-CM

## 2023-04-18 LAB
ALBUMIN SERPL-MCNC: 3.4 G/DL (ref 3.5–5.2)
ALBUMIN/GLOB SERPL: 0.7 G/DL
ALP SERPL-CCNC: 122 U/L (ref 39–117)
ALT SERPL W P-5'-P-CCNC: 268 U/L (ref 1–33)
ANION GAP SERPL CALCULATED.3IONS-SCNC: 8 MMOL/L (ref 5–15)
AST SERPL-CCNC: 283 U/L (ref 1–32)
BASOPHILS # BLD AUTO: 0.04 10*3/MM3 (ref 0–0.2)
BASOPHILS NFR BLD AUTO: 1.2 % (ref 0–1.5)
BILIRUB CONJ SERPL-MCNC: 0.3 MG/DL (ref 0–0.3)
BILIRUB SERPL-MCNC: 0.7 MG/DL (ref 0–1.2)
BUN SERPL-MCNC: 10 MG/DL (ref 8–23)
BUN/CREAT SERPL: 10.9 (ref 7–25)
CALCIUM SPEC-SCNC: 9.4 MG/DL (ref 8.6–10.5)
CHLORIDE SERPL-SCNC: 102 MMOL/L (ref 98–107)
CHOLEST SERPL-MCNC: 136 MG/DL (ref 0–200)
CO2 SERPL-SCNC: 26 MMOL/L (ref 22–29)
CREAT SERPL-MCNC: 0.92 MG/DL (ref 0.57–1)
DEPRECATED RDW RBC AUTO: 50.1 FL (ref 37–54)
EGFRCR SERPLBLD CKD-EPI 2021: 66.7 ML/MIN/1.73
EOSINOPHIL # BLD AUTO: 0.02 10*3/MM3 (ref 0–0.4)
EOSINOPHIL NFR BLD AUTO: 0.6 % (ref 0.3–6.2)
ERYTHROCYTE [DISTWIDTH] IN BLOOD BY AUTOMATED COUNT: 15 % (ref 12.3–15.4)
GLOBULIN UR ELPH-MCNC: 4.7 GM/DL
GLUCOSE SERPL-MCNC: 125 MG/DL (ref 65–99)
HCT VFR BLD AUTO: 39.1 % (ref 34–46.6)
HDLC SERPL-MCNC: 38 MG/DL (ref 40–60)
HGB BLD-MCNC: 12.8 G/DL (ref 12–15.9)
IMM GRANULOCYTES # BLD AUTO: 0.01 10*3/MM3 (ref 0–0.05)
IMM GRANULOCYTES NFR BLD AUTO: 0.3 % (ref 0–0.5)
LDLC SERPL CALC-MCNC: 83 MG/DL (ref 0–100)
LDLC/HDLC SERPL: 2.18 {RATIO}
LYMPHOCYTES # BLD AUTO: 0.97 10*3/MM3 (ref 0.7–3.1)
LYMPHOCYTES NFR BLD AUTO: 28.1 % (ref 19.6–45.3)
MCH RBC QN AUTO: 29.4 PG (ref 26.6–33)
MCHC RBC AUTO-ENTMCNC: 32.7 G/DL (ref 31.5–35.7)
MCV RBC AUTO: 89.9 FL (ref 79–97)
MONOCYTES # BLD AUTO: 0.33 10*3/MM3 (ref 0.1–0.9)
MONOCYTES NFR BLD AUTO: 9.6 % (ref 5–12)
NEUTROPHILS NFR BLD AUTO: 2.08 10*3/MM3 (ref 1.7–7)
NEUTROPHILS NFR BLD AUTO: 60.2 % (ref 42.7–76)
NRBC BLD AUTO-RTO: 0 /100 WBC (ref 0–0.2)
PLATELET # BLD AUTO: 284 10*3/MM3 (ref 140–450)
PMV BLD AUTO: 9.2 FL (ref 6–12)
POTASSIUM SERPL-SCNC: 4.9 MMOL/L (ref 3.5–5.2)
PROT SERPL-MCNC: 8.1 G/DL (ref 6–8.5)
RBC # BLD AUTO: 4.35 10*6/MM3 (ref 3.77–5.28)
SODIUM SERPL-SCNC: 136 MMOL/L (ref 136–145)
TRIGL SERPL-MCNC: 76 MG/DL (ref 0–150)
VLDLC SERPL-MCNC: 15 MG/DL (ref 5–40)
WBC NRBC COR # BLD: 3.45 10*3/MM3 (ref 3.4–10.8)

## 2023-04-18 PROCEDURE — 80061 LIPID PANEL: CPT | Performed by: NURSE PRACTITIONER

## 2023-04-18 PROCEDURE — 85025 COMPLETE CBC W/AUTO DIFF WBC: CPT | Performed by: NURSE PRACTITIONER

## 2023-04-18 PROCEDURE — 80053 COMPREHEN METABOLIC PANEL: CPT | Performed by: NURSE PRACTITIONER

## 2023-04-18 PROCEDURE — 82248 BILIRUBIN DIRECT: CPT | Performed by: NURSE PRACTITIONER

## 2023-04-19 ENCOUNTER — TELEPHONE (OUTPATIENT)
Dept: CARDIOLOGY | Facility: CLINIC | Age: 73
End: 2023-04-19
Payer: MEDICARE

## 2023-04-19 NOTE — TELEPHONE ENCOUNTER
----- Message from LOYD Burnette sent at 4/18/2023  4:37 PM EDT -----  Notify pt renal function is good, sodium and potassium are in normal ranges, liver enzymes are improved. Continue current meds

## 2023-05-11 ENCOUNTER — OFFICE VISIT (OUTPATIENT)
Dept: GASTROENTEROLOGY | Facility: CLINIC | Age: 73
End: 2023-05-11
Payer: MEDICARE

## 2023-05-11 VITALS
BODY MASS INDEX: 28.11 KG/M2 | HEIGHT: 60 IN | DIASTOLIC BLOOD PRESSURE: 70 MMHG | WEIGHT: 143.2 LBS | SYSTOLIC BLOOD PRESSURE: 156 MMHG | HEART RATE: 62 BPM

## 2023-05-11 DIAGNOSIS — Z87.440 HISTORY OF UTI: ICD-10-CM

## 2023-05-11 DIAGNOSIS — R74.8 ELEVATED LIVER ENZYMES: Primary | ICD-10-CM

## 2023-05-11 DIAGNOSIS — R17 JAUNDICE: ICD-10-CM

## 2023-05-11 DIAGNOSIS — K21.9 GASTROESOPHAGEAL REFLUX DISEASE WITHOUT ESOPHAGITIS: ICD-10-CM

## 2023-05-11 PROCEDURE — 3078F DIAST BP <80 MM HG: CPT | Performed by: NURSE PRACTITIONER

## 2023-05-11 PROCEDURE — 1159F MED LIST DOCD IN RCRD: CPT | Performed by: NURSE PRACTITIONER

## 2023-05-11 PROCEDURE — 1160F RVW MEDS BY RX/DR IN RCRD: CPT | Performed by: NURSE PRACTITIONER

## 2023-05-11 PROCEDURE — 3077F SYST BP >= 140 MM HG: CPT | Performed by: NURSE PRACTITIONER

## 2023-05-11 PROCEDURE — 99204 OFFICE O/P NEW MOD 45 MIN: CPT | Performed by: NURSE PRACTITIONER

## 2023-05-11 NOTE — PROGRESS NOTES
Chief Complaint        Elevated Hepatic Enzymes    History of Present Illness      Qiana Altman is a 73 y.o. female who presents to [facility] as a new patient for elevated liver enzymes.     Went to the ER at [place] in Feb of this year with worsening abd pain and back pain. Initially diagnosed with elevated liver enzymes and UTI. Admits she had covid 3 times previously. Was jaundiced on admission. Labs showed alkaline phosphatase at 177, ALT 1126, AST 1265 and total bilirubin at 3.7.    Normal CT abd/pelvis and MRCP. LFTs are now trending down. Patient is happy to report her abd pain has completely resolved. Still having some chronic back pain secondary to arthritis.     Has been taking celebrex daily for OA pain.     Hx GERD/HH--taking Nexium 40 mg daily. Denies any breakthrough reflux. Denies any N&V, abd pain, reflux, constipation, diarrhea, rectal bleeding or melena. Good appetite.     Is currently off LIPITOR since elevated liver enzymes.     Last EGD and colonoscopy---Was done at [facility] years ago. Has hx colon polyps. Had negative COLOGARD recently.     GI FH--Maternal grandmother with rectal cancer.     Results       Result Review :   The following data was reviewed by: LOYD Leary on 05/11/2023     CMP        3/2/2023    12:08 3/14/2023    09:49 4/18/2023    09:02   CMP   Glucose 109   100   125     BUN 8   8   10     Creatinine 0.82   0.70   0.92     EGFR 77.1   92.6   66.7     Sodium 128   130   136     Potassium 5.1   4.8   4.9     Chloride 99   96   102     Calcium 9.9   9.5   9.4     Total Protein 8.0   7.7   8.1     Albumin 3.9   3.5   3.4     Globulin 4.1   4.2   4.7     Total Bilirubin 4.9   2.7   0.7     Alkaline Phosphatase 184   128   122     AST (SGOT) 748   503   283     ALT (SGPT) 823   566   268     Albumin/Globulin Ratio 1.0   0.8   0.7     BUN/Creatinine Ratio 9.8   11.4   10.9     Anion Gap 6.5   9.8   8.0       CBC         2/23/2023    05:09 3/2/2023    12:08 4/18/2023    09:02   CBC   WBC 2.63   4.31   3.45     RBC 3.76   4.33   4.35     Hemoglobin 10.7   12.5   12.8     Hematocrit 32.3   37.4   39.1     MCV 85.9   86.4   89.9     MCH 28.5   28.9   29.4     MCHC 33.1   33.4   32.7     RDW 15.0   13.7   15.0     Platelets 243   416   284         Lipase   Lipase   Date Value Ref Range Status   02/20/2023 40 13 - 60 U/L Final     Iron Profile   Iron   Date Value Ref Range Status   02/21/2023 109 37 - 145 mcg/dL Final     TIBC   Date Value Ref Range Status   02/21/2023 501 298 - 536 mcg/dL Final     Iron Saturation   Date Value Ref Range Status   02/21/2023 22 20 - 50 % Final     Transferrin   Date Value Ref Range Status   02/21/2023 336 200 - 360 mg/dL Final     Ferritin   Ferritin   Date Value Ref Range Status   01/19/2023 30.60 13.00 - 150.00 ng/mL Final     Liver Workup   Smooth Muscle Ab   Date Value Ref Range Status   02/21/2023 45 (H) 0 - 19 Units Final     Comment:                      Negative                     0 - 19                   Weak positive               20 - 30                   Moderate to strong positive     >30   Actin Antibodies are found in 52-85% of patients with   autoimmune hepatitis or chronic active hepatitis and   in 22% of patients with primary biliary cirrhosis.     Ceruloplasmin   Date Value Ref Range Status   02/21/2023 32 19 - 39 mg/dL Final     Ferritin   Date Value Ref Range Status   01/19/2023 30.60 13.00 - 150.00 ng/mL Final     Iron   Date Value Ref Range Status   02/21/2023 109 37 - 145 mcg/dL Final     TIBC   Date Value Ref Range Status   02/21/2023 501 298 - 536 mcg/dL Final     Iron Saturation   Date Value Ref Range Status   02/21/2023 22 20 - 50 % Final     Transferrin   Date Value Ref Range Status   02/21/2023 336 200 - 360 mg/dL Final     Mitochondrial Ab   Date Value Ref Range Status   02/21/2023 <20.0 0.0 - 20.0 Units Final     Comment:                                     Negative    0.0  - 20.0                                  Equivocal  20.1 - 24.9                                  Positive         >24.9  Mitochondrial (M2) Antibodies are found in 90-96% of  patients with primary biliary cirrhosis.     Protime   Date Value Ref Range Status   2023 13.9 11.8 - 14.9 Seconds Final     INR   Date Value Ref Range Status   2023 1.06 0.86 - 1.15 Final     PT/INR   Protime   Date Value Ref Range Status   2023 13.9 11.8 - 14.9 Seconds Final     INR   Date Value Ref Range Status   2023 1.06 0.86 - 1.15 Final     Vitamin D   25 Hydroxy, Vitamin D   Date Value Ref Range Status   2023 30.2 30.0 - 100.0 ng/ml Final            Past Medical History       Past Medical History:   Diagnosis Date   • Allergic rhinitis due to allergen 09/10/2019   • Anemia 09/10/2019   • Anxiety disorder 2020   • B12 deficiency 2020   • COVID-19 2022   • Depression    • Diverticulosis    • Essential hypertension 2019   • Family history of myocardial infarction 2023   • GERD (gastroesophageal reflux disease) 2019   • Graves' disease    • HLD (hyperlipidemia) 2019   • Hypothyroidism    • Inflammatory bowel disease    • Major depressive disorder 2019   • Osteoarthritis 2020   • Osteopenia    • T2DM (type 2 diabetes mellitus) 2019   • Vitamin D deficiency 2019       Past Surgical History:   Procedure Laterality Date   • APPENDECTOMY     •  SECTION     • CHOLECYSTECTOMY  2016    DR SCALES   • COLON SURGERY     • COLONOSCOPY     • HIP FRACTURE SURGERY Left     REPAIR   • HIP SURGERY     • OOPHORECTOMY     • SMALL INTESTINE SURGERY     • TUBAL ABDOMINAL LIGATION     • UPPER GASTROINTESTINAL ENDOSCOPY           Current Outpatient Medications:   •  Accu-Chek Graciela Plus test strip, 1 each by Other route Daily., Disp: , Rfl:   •  Accu-Chek Softclix Lancets lancets, 1 each by Other route Daily., Disp: , Rfl:   •  atorvastatin (LIPITOR) 10  "MG tablet, TAKE ONE TABLET BY MOUTH DAILY, Disp: 90 tablet, Rfl: 1  •  B-D 3CC LUER-BUZZ SYR 25GX1\" 25G X 1\" 3 ML misc, 1 syringe by Other route Daily., Disp: , Rfl:   •  celecoxib (CeleBREX) 100 MG capsule, TAKE ONE CAPSULE BY MOUTH TWICE DAILY, Disp: 180 capsule, Rfl: 1  •  esomeprazole (nexIUM) 40 MG capsule, Take 1 capsule by mouth Daily., Disp: 90 capsule, Rfl: 1  •  fluticasone (FLONASE) 50 MCG/ACT nasal spray, INSTILL 2 SPRAYS IN EACH NOSTRIL DAILY, Disp: 16 g, Rfl: 1  •  quinapril (ACCUPRIL) 10 MG tablet, TAKE ONE TABLET BY MOUTH TWICE DAILY, Disp: 180 tablet, Rfl: 1     Allergies   Allergen Reactions   • Shellfish Allergy Anaphylaxis   • Moxifloxacin Hcl Unknown - Low Severity   • Scopolamine Swelling   • Sulfa Antibiotics Unknown - Low Severity   • Morphine Rash   • Penicillins Rash       Family History   Problem Relation Age of Onset   • Heart disease Mother    • Heart failure Mother    • Arthritis Mother    • Hyperlipidemia Mother    • Hypertension Mother    • Stroke Mother    • Thyroid disease Mother    • Heart disease Brother    • Heart disease Brother    • Rectal cancer Maternal Grandmother    • Kidney disease Daughter    • Miscarriages / Stillbirths Daughter    • Lung cancer Other    • Skin cancer Other         Social History     Social History Narrative   • Not on file       Objective       Objective     Vital Signs:   /70 (BP Location: Left arm, Patient Position: Sitting, Cuff Size: Adult)   Pulse 62   Ht 152.4 cm (60\")   Wt 65 kg (143 lb 3.2 oz)   BMI 27.97 kg/m²     Body mass index is 27.97 kg/m².    Review of Systems   Constitutional: Negative for appetite change, chills, diaphoresis, fatigue, fever and unexpected weight change.   HENT: Negative for nosebleeds, postnasal drip, sore throat, trouble swallowing and voice change.    Respiratory: Negative for cough, choking, chest tightness, shortness of breath, wheezing and stridor.    Cardiovascular: Negative for chest pain, palpitations " and leg swelling.   Gastrointestinal: Negative for abdominal distention, abdominal pain, anal bleeding, blood in stool, constipation, diarrhea, nausea, rectal pain and vomiting.   Endocrine: Negative for polydipsia, polyphagia and polyuria.   Musculoskeletal: Negative for gait problem.   Skin: Negative for rash and wound.   Allergic/Immunologic: Negative for food allergies.   Neurological: Negative for dizziness, speech difficulty and light-headedness.   Psychiatric/Behavioral: Negative for confusion, self-injury, sleep disturbance and suicidal ideas.        Physical Exam  Constitutional:       General: She is not in acute distress.     Appearance: She is well-developed. She is not ill-appearing.   HENT:      Head: Normocephalic.   Eyes:      Pupils: Pupils are equal, round, and reactive to light.   Cardiovascular:      Rate and Rhythm: Normal rate and regular rhythm.      Heart sounds: Normal heart sounds.   Pulmonary:      Effort: Pulmonary effort is normal.      Breath sounds: Normal breath sounds.   Abdominal:      General: Bowel sounds are normal. There is no distension.      Palpations: Abdomen is soft. There is no mass.      Tenderness: There is no abdominal tenderness. There is no guarding or rebound.      Hernia: No hernia is present.   Musculoskeletal:         General: Normal range of motion.   Skin:     General: Skin is warm and dry.   Neurological:      Mental Status: She is alert and oriented to person, place, and time.   Psychiatric:         Speech: Speech normal.         Behavior: Behavior normal.         Judgment: Judgment normal.              Assessment & Plan          Assessment and Plan    Diagnoses and all orders for this visit:    1. Elevated liver enzymes (Primary)    2. Jaundice    3. History of UTI    4. Gastroesophageal reflux disease without esophagitis    Reviewed medical history with her today.  Reviewed most recent labs.  LFTs are trending down.  Liver imaging was normal in the hospital.   I do wonder if this was viral related?  COVID?  Overall she seems to be doing much better.  No longer having any abdominal pain or back pain.  Other than her normal arthritis pain.  Bowels moving well currently.  We will repeat labs and continue to follow her liver enzymes until they go back to normal.  Patient to call the office with any issues.  Patient to follow-up with me in 1 month.  Patient is agreeable to the plan.            Follow Up       Follow Up   Return in about 4 weeks (around 6/8/2023) for ELEVATED LIVER ENZYMES.  Patient was given instructions and counseling regarding her condition or for health maintenance advice. Please see specific information pulled into the AVS if appropriate.

## 2023-05-18 ENCOUNTER — LAB (OUTPATIENT)
Dept: FAMILY MEDICINE CLINIC | Facility: CLINIC | Age: 73
End: 2023-05-18
Payer: MEDICARE

## 2023-05-18 DIAGNOSIS — R74.8 ELEVATED LIVER ENZYMES: Primary | ICD-10-CM

## 2023-05-18 LAB
ALBUMIN SERPL-MCNC: 3.7 G/DL (ref 3.5–5.2)
ALBUMIN/GLOB SERPL: 0.9 G/DL
ALP SERPL-CCNC: 69 U/L (ref 39–117)
ALT SERPL W P-5'-P-CCNC: 23 U/L (ref 1–33)
ANION GAP SERPL CALCULATED.3IONS-SCNC: 5.6 MMOL/L (ref 5–15)
AST SERPL-CCNC: 29 U/L (ref 1–32)
BILIRUB SERPL-MCNC: 0.4 MG/DL (ref 0–1.2)
BUN SERPL-MCNC: 9 MG/DL (ref 8–23)
BUN/CREAT SERPL: 10.7 (ref 7–25)
CALCIUM SPEC-SCNC: 10.1 MG/DL (ref 8.6–10.5)
CHLORIDE SERPL-SCNC: 106 MMOL/L (ref 98–107)
CO2 SERPL-SCNC: 27.4 MMOL/L (ref 22–29)
CREAT SERPL-MCNC: 0.84 MG/DL (ref 0.57–1)
EGFRCR SERPLBLD CKD-EPI 2021: 74.4 ML/MIN/1.73
GLOBULIN UR ELPH-MCNC: 3.9 GM/DL
GLUCOSE SERPL-MCNC: 101 MG/DL (ref 65–99)
POTASSIUM SERPL-SCNC: 5.2 MMOL/L (ref 3.5–5.2)
PROT SERPL-MCNC: 7.6 G/DL (ref 6–8.5)
SODIUM SERPL-SCNC: 139 MMOL/L (ref 136–145)

## 2023-05-18 PROCEDURE — 36415 COLL VENOUS BLD VENIPUNCTURE: CPT | Performed by: NURSE PRACTITIONER

## 2023-05-18 PROCEDURE — 80053 COMPREHEN METABOLIC PANEL: CPT | Performed by: NURSE PRACTITIONER

## 2023-05-23 DIAGNOSIS — K21.9 GASTROESOPHAGEAL REFLUX DISEASE WITHOUT ESOPHAGITIS: ICD-10-CM

## 2023-05-23 RX ORDER — ESOMEPRAZOLE MAGNESIUM 40 MG/1
CAPSULE, DELAYED RELEASE ORAL
Qty: 90 CAPSULE | Refills: 0 | Status: SHIPPED | OUTPATIENT
Start: 2023-05-23

## 2023-06-08 ENCOUNTER — OFFICE VISIT (OUTPATIENT)
Dept: GASTROENTEROLOGY | Facility: CLINIC | Age: 73
End: 2023-06-08
Payer: MEDICARE

## 2023-06-08 VITALS
SYSTOLIC BLOOD PRESSURE: 166 MMHG | HEART RATE: 61 BPM | WEIGHT: 145 LBS | HEIGHT: 60 IN | DIASTOLIC BLOOD PRESSURE: 61 MMHG | BODY MASS INDEX: 28.47 KG/M2

## 2023-06-08 DIAGNOSIS — R74.8 ELEVATED LIVER ENZYMES: Primary | ICD-10-CM

## 2023-06-08 DIAGNOSIS — R17 JAUNDICE: ICD-10-CM

## 2023-06-08 PROCEDURE — 3078F DIAST BP <80 MM HG: CPT | Performed by: NURSE PRACTITIONER

## 2023-06-08 PROCEDURE — 99214 OFFICE O/P EST MOD 30 MIN: CPT | Performed by: NURSE PRACTITIONER

## 2023-06-08 PROCEDURE — 3077F SYST BP >= 140 MM HG: CPT | Performed by: NURSE PRACTITIONER

## 2023-06-08 PROCEDURE — 1159F MED LIST DOCD IN RCRD: CPT | Performed by: NURSE PRACTITIONER

## 2023-06-08 PROCEDURE — 1160F RVW MEDS BY RX/DR IN RCRD: CPT | Performed by: NURSE PRACTITIONER

## 2023-06-08 NOTE — PROGRESS NOTES
Chief Complaint   Elevated Hepatic Enzymes    History of Present Illness       Qiana Altman is a 73 y.o. female who presents to [facility] for follow-up Elevated liver enzymes.  She was last seen in the office by me on 5/11/2023.    Went to the ER at [place] in Feb of this year with worsening abd pain and back pain. Initially diagnosed with elevated liver enzymes and UTI. Admits she had covid 3 times previously. Was jaundiced on admission. Labs showed alkaline phosphatase at 177, ALT 1126, AST 1265 and total bilirubin at 3.7.     Normal CT abd/pelvis and MRCP. LFTs are now trending down. Patient is happy to report her abd pain has completely resolved. Still having some chronic back pain secondary to arthritis.      Has been taking celebrex daily for OA pain.      Hx GERD/HH--taking Nexium 40 mg daily. Denies any breakthrough reflux. Denies any N&V, abd pain, reflux, constipation, diarrhea, rectal bleeding or melena. Good appetite.      Is currently off LIPITOR since elevated liver enzymes. Has also been off Tylenol and Zyrtec. Most recent LFTs since last visit were back to normal. She is feeling a lot better. Still trying to get her strength and energy back.      Last EGD and colonoscopy---Was done at [facility] 9 years ago. Has hx colon polyps. Had negative COLOGARD recently.      GI FH--Maternal grandmother with rectal cancer.        Results       Result Review :       CMP          3/14/2023    09:49 4/18/2023    09:02 5/18/2023    10:13   CMP   Glucose 100  125  101    BUN 8  10  9    Creatinine 0.70  0.92  0.84    EGFR 92.6  66.7  74.4    Sodium 130  136  139    Potassium 4.8  4.9  5.2    Chloride 96  102  106    Calcium 9.5  9.4  10.1    Total Protein 7.7  8.1  7.6    Albumin 3.5  3.4  3.7    Globulin 4.2  4.7  3.9    Total Bilirubin 2.7  0.7  0.4    Alkaline Phosphatase 128  122  69    AST (SGOT) 503  283  29    ALT (SGPT) 566  268  23    Albumin/Globulin Ratio 0.8   0.7  0.9    BUN/Creatinine Ratio 11.4  10.9  10.7    Anion Gap 9.8  8.0  5.6      CBC          2/23/2023    05:09 3/2/2023    12:08 4/18/2023    09:02   CBC   WBC 2.63  4.31  3.45    RBC 3.76  4.33  4.35    Hemoglobin 10.7  12.5  12.8    Hematocrit 32.3  37.4  39.1    MCV 85.9  86.4  89.9    MCH 28.5  28.9  29.4    MCHC 33.1  33.4  32.7    RDW 15.0  13.7  15.0    Platelets 243  416  284        Lipase   Lipase   Date Value Ref Range Status   02/20/2023 40 13 - 60 U/L Final     Amylase No results found for: AMYLASE  Liver Workup   Smooth Muscle Ab   Date Value Ref Range Status   02/21/2023 45 (H) 0 - 19 Units Final     Comment:                      Negative                     0 - 19                   Weak positive               20 - 30                   Moderate to strong positive     >30   Actin Antibodies are found in 52-85% of patients with   autoimmune hepatitis or chronic active hepatitis and   in 22% of patients with primary biliary cirrhosis.     Ceruloplasmin   Date Value Ref Range Status   02/21/2023 32 19 - 39 mg/dL Final     Ferritin   Date Value Ref Range Status   01/19/2023 30.60 13.00 - 150.00 ng/mL Final     Iron   Date Value Ref Range Status   02/21/2023 109 37 - 145 mcg/dL Final     TIBC   Date Value Ref Range Status   02/21/2023 501 298 - 536 mcg/dL Final     Iron Saturation (TSAT)   Date Value Ref Range Status   02/21/2023 22 20 - 50 % Final     Transferrin   Date Value Ref Range Status   02/21/2023 336 200 - 360 mg/dL Final     Mitochondrial Ab   Date Value Ref Range Status   02/21/2023 <20.0 0.0 - 20.0 Units Final     Comment:                                     Negative    0.0 - 20.0                                  Equivocal  20.1 - 24.9                                  Positive         >24.9  Mitochondrial (M2) Antibodies are found in 90-96% of  patients with primary biliary cirrhosis.     Protime   Date Value Ref Range Status   02/22/2023 13.9 11.8 - 14.9 Seconds Final     INR   Date Value  "Ref Range Status   2023 1.06 0.86 - 1.15 Final     Acute HEP Panel   Hepatitis B Surface Ag   Date Value Ref Range Status   2023 Non-Reactive Non-Reactive Final     Hep A IgM   Date Value Ref Range Status   2023 Non-Reactive Non-Reactive Final     Hep B C IgM   Date Value Ref Range Status   2023 Non-Reactive Non-Reactive Final     Hepatitis C Ab   Date Value Ref Range Status   2023 Non-Reactive Non-Reactive Final               Past Medical History       Past Medical History:   Diagnosis Date    Allergic rhinitis due to allergen 09/10/2019    Anemia 09/10/2019    Anxiety disorder 2020    B12 deficiency 2020    COVID-19 2022    Depression     Diverticulosis     Essential hypertension 2019    Family history of myocardial infarction 2023    GERD (gastroesophageal reflux disease) 2019    Graves' disease     HLD (hyperlipidemia) 2019    Hypothyroidism     Inflammatory bowel disease     Major depressive disorder 2019    Osteoarthritis 2020    Osteopenia     T2DM (type 2 diabetes mellitus) 2019    Vitamin D deficiency 2019       Past Surgical History:   Procedure Laterality Date    APPENDECTOMY       SECTION      CHOLECYSTECTOMY  2016    DR SCALES    COLON SURGERY      COLONOSCOPY      HIP FRACTURE SURGERY Left     REPAIR    HIP SURGERY      OOPHORECTOMY      SMALL INTESTINE SURGERY      TUBAL ABDOMINAL LIGATION      UPPER GASTROINTESTINAL ENDOSCOPY           Current Outpatient Medications:     Accu-Chek Graciela Plus test strip, 1 each by Other route Daily., Disp: , Rfl:     Accu-Chek Softclix Lancets lancets, 1 each by Other route Daily., Disp: , Rfl:     atorvastatin (LIPITOR) 10 MG tablet, TAKE ONE TABLET BY MOUTH DAILY, Disp: 90 tablet, Rfl: 1    B-D 3CC LUER-BUZZ SYR 25GX1\" 25G X 1\" 3 ML misc, 1 syringe by Other route Daily., Disp: , Rfl:     celecoxib (CeleBREX) 100 MG capsule, TAKE ONE CAPSULE BY MOUTH TWICE " "DAILY, Disp: 180 capsule, Rfl: 1    esomeprazole (nexIUM) 40 MG capsule, TAKE ONE CAPSULE BY MOUTH DAILY, Disp: 90 capsule, Rfl: 0    fluticasone (FLONASE) 50 MCG/ACT nasal spray, INSTILL 2 SPRAYS IN EACH NOSTRIL DAILY, Disp: 16 g, Rfl: 1    quinapril (ACCUPRIL) 10 MG tablet, TAKE ONE TABLET BY MOUTH TWICE DAILY, Disp: 180 tablet, Rfl: 1     Allergies   Allergen Reactions    Shellfish Allergy Anaphylaxis    Moxifloxacin Hcl Unknown - Low Severity    Scopolamine Swelling    Sulfa Antibiotics Unknown - Low Severity    Morphine Rash    Penicillins Rash       Family History   Problem Relation Age of Onset    Heart disease Mother     Heart failure Mother     Arthritis Mother     Hyperlipidemia Mother     Hypertension Mother     Stroke Mother     Thyroid disease Mother     Heart disease Brother     Heart disease Brother     Rectal cancer Maternal Grandmother     Kidney disease Daughter     Miscarriages / Stillbirths Daughter     Lung cancer Other     Skin cancer Other         Social History     Social History Narrative    Not on file       Objective       Review of Systems   Constitutional:  Negative for appetite change, fatigue, fever, unexpected weight gain and unexpected weight loss.   HENT:  Negative for trouble swallowing.    Respiratory:  Negative for cough, choking, chest tightness, shortness of breath, wheezing and stridor.    Gastrointestinal:  Negative for abdominal distention, abdominal pain, anal bleeding, blood in stool, constipation, diarrhea, nausea, rectal pain, vomiting, GERD and indigestion.      Vital Signs:   /61 (BP Location: Left arm, Patient Position: Sitting, Cuff Size: Adult)   Pulse 61   Ht 152.4 cm (60\")   Wt 65.8 kg (145 lb)   BMI 28.32 kg/m²       Physical Exam  Constitutional:       General: She is not in acute distress.     Appearance: She is well-developed. She is not ill-appearing.   HENT:      Head: Normocephalic.   Eyes:      Pupils: Pupils are equal, round, and reactive to " light.   Cardiovascular:      Rate and Rhythm: Normal rate and regular rhythm.      Heart sounds: Normal heart sounds.   Pulmonary:      Effort: Pulmonary effort is normal.      Breath sounds: Normal breath sounds.   Abdominal:      General: Bowel sounds are normal. There is no distension.      Palpations: Abdomen is soft. There is no mass.      Tenderness: There is no abdominal tenderness. There is no guarding or rebound.      Hernia: No hernia is present.   Musculoskeletal:         General: Normal range of motion.   Skin:     General: Skin is warm and dry.   Neurological:      Mental Status: She is alert and oriented to person, place, and time.   Psychiatric:         Speech: Speech normal.         Behavior: Behavior normal.         Judgment: Judgment normal.         Assessment & Plan          Assessment and Plan    Diagnoses and all orders for this visit:    1. Elevated liver enzymes (Primary)  -     Comprehensive Metabolic Panel    2. Jaundice  -     Comprehensive Metabolic Panel      Reviewed most recent lab results with her today.  LFTs are completely back down to normal.  Overall she seems to be doing much better.  No longer having back pain or the severe fatigue.  Bowels moving well.  Appetite is improving.  Okay to restart Tylenol as needed and Zyrtec.  Continue to hold off on Lipitor for now.  We will repeat CMP today.  Patient to call the office with any issues.  Patient to follow-up with me in 3 months.  Patient is agreeable to the plan.          Follow Up       Follow Up   Return in about 3 months (around 9/8/2023) for ELEVATED LIVER ENZYMES.  Patient was given instructions and counseling regarding her condition or for health maintenance advice. Please see specific information pulled into the AVS if appropriate.

## 2023-07-23 ENCOUNTER — APPOINTMENT (OUTPATIENT)
Dept: CT IMAGING | Facility: HOSPITAL | Age: 73
End: 2023-07-23
Payer: MEDICARE

## 2023-07-23 ENCOUNTER — HOSPITAL ENCOUNTER (EMERGENCY)
Facility: HOSPITAL | Age: 73
Discharge: HOME | End: 2023-07-23
Attending: EMERGENCY MEDICINE | Admitting: EMERGENCY MEDICINE
Payer: MEDICARE

## 2023-07-23 VITALS
DIASTOLIC BLOOD PRESSURE: 77 MMHG | OXYGEN SATURATION: 100 % | SYSTOLIC BLOOD PRESSURE: 151 MMHG | RESPIRATION RATE: 17 BRPM | HEART RATE: 74 BPM | HEIGHT: 60 IN | TEMPERATURE: 97.7 F | BODY MASS INDEX: 28.24 KG/M2

## 2023-07-23 DIAGNOSIS — R10.9 RIGHT-SIDED ABDOMINAL PAIN OF UNKNOWN CAUSE: Primary | ICD-10-CM

## 2023-07-23 DIAGNOSIS — R93.89 ABNORMAL FINDING ON CT SCAN: ICD-10-CM

## 2023-07-23 DIAGNOSIS — R10.9 RIGHT FLANK PAIN: ICD-10-CM

## 2023-07-23 LAB
ALBUMIN SERPL-MCNC: 4 G/DL (ref 3.5–5.2)
ALBUMIN/GLOB SERPL: 1.1 G/DL
ALP SERPL-CCNC: 87 U/L (ref 39–117)
ALT SERPL W P-5'-P-CCNC: 15 U/L (ref 1–33)
ANION GAP SERPL CALCULATED.3IONS-SCNC: 8.4 MMOL/L (ref 5–15)
AST SERPL-CCNC: 21 U/L (ref 1–32)
BASOPHILS # BLD AUTO: 0.03 10*3/MM3 (ref 0–0.2)
BASOPHILS NFR BLD AUTO: 0.7 % (ref 0–1.5)
BILIRUB SERPL-MCNC: 0.6 MG/DL (ref 0–1.2)
BILIRUB UR QL STRIP: NEGATIVE
BUN SERPL-MCNC: 9 MG/DL (ref 8–23)
BUN/CREAT SERPL: 10.6 (ref 7–25)
CALCIUM SPEC-SCNC: 9.5 MG/DL (ref 8.6–10.5)
CHLORIDE SERPL-SCNC: 101 MMOL/L (ref 98–107)
CLARITY UR: CLEAR
CO2 SERPL-SCNC: 26.6 MMOL/L (ref 22–29)
COLOR UR: YELLOW
CREAT SERPL-MCNC: 0.85 MG/DL (ref 0.57–1)
D-LACTATE SERPL-SCNC: 0.9 MMOL/L (ref 0.5–2)
DEPRECATED RDW RBC AUTO: 40.7 FL (ref 37–54)
EGFRCR SERPLBLD CKD-EPI 2021: 73.4 ML/MIN/1.73
EOSINOPHIL # BLD AUTO: 0.06 10*3/MM3 (ref 0–0.4)
EOSINOPHIL NFR BLD AUTO: 1.4 % (ref 0.3–6.2)
ERYTHROCYTE [DISTWIDTH] IN BLOOD BY AUTOMATED COUNT: 13.2 % (ref 12.3–15.4)
GLOBULIN UR ELPH-MCNC: 3.8 GM/DL
GLUCOSE SERPL-MCNC: 98 MG/DL (ref 65–99)
GLUCOSE UR STRIP-MCNC: NEGATIVE MG/DL
HCT VFR BLD AUTO: 37.8 % (ref 34–46.6)
HGB BLD-MCNC: 12.7 G/DL (ref 12–15.9)
HGB UR QL STRIP.AUTO: NEGATIVE
HOLD SPECIMEN: NORMAL
HOLD SPECIMEN: NORMAL
IMM GRANULOCYTES # BLD AUTO: 0.01 10*3/MM3 (ref 0–0.05)
IMM GRANULOCYTES NFR BLD AUTO: 0.2 % (ref 0–0.5)
KETONES UR QL STRIP: NEGATIVE
LEUKOCYTE ESTERASE UR QL STRIP.AUTO: NEGATIVE
LIPASE SERPL-CCNC: 51 U/L (ref 13–60)
LYMPHOCYTES # BLD AUTO: 1.44 10*3/MM3 (ref 0.7–3.1)
LYMPHOCYTES NFR BLD AUTO: 32.6 % (ref 19.6–45.3)
MCH RBC QN AUTO: 28.8 PG (ref 26.6–33)
MCHC RBC AUTO-ENTMCNC: 33.6 G/DL (ref 31.5–35.7)
MCV RBC AUTO: 85.7 FL (ref 79–97)
MONOCYTES # BLD AUTO: 0.4 10*3/MM3 (ref 0.1–0.9)
MONOCYTES NFR BLD AUTO: 9 % (ref 5–12)
NEUTROPHILS NFR BLD AUTO: 2.48 10*3/MM3 (ref 1.7–7)
NEUTROPHILS NFR BLD AUTO: 56.1 % (ref 42.7–76)
NITRITE UR QL STRIP: NEGATIVE
NRBC BLD AUTO-RTO: 0 /100 WBC (ref 0–0.2)
PH UR STRIP.AUTO: 5.5 [PH] (ref 5–8)
PLATELET # BLD AUTO: 171 10*3/MM3 (ref 140–450)
PMV BLD AUTO: 8.3 FL (ref 6–12)
POTASSIUM SERPL-SCNC: 3.5 MMOL/L (ref 3.5–5.2)
PROT SERPL-MCNC: 7.8 G/DL (ref 6–8.5)
PROT UR QL STRIP: NEGATIVE
RBC # BLD AUTO: 4.41 10*6/MM3 (ref 3.77–5.28)
SODIUM SERPL-SCNC: 136 MMOL/L (ref 136–145)
SP GR UR STRIP: <=1.005 (ref 1–1.03)
UROBILINOGEN UR QL STRIP: NORMAL
WBC NRBC COR # BLD: 4.42 10*3/MM3 (ref 3.4–10.8)
WHOLE BLOOD HOLD COAG: NORMAL
WHOLE BLOOD HOLD SPECIMEN: NORMAL

## 2023-07-23 PROCEDURE — 99283 EMERGENCY DEPT VISIT LOW MDM: CPT

## 2023-07-23 PROCEDURE — 80053 COMPREHEN METABOLIC PANEL: CPT | Performed by: NURSE PRACTITIONER

## 2023-07-23 PROCEDURE — 96375 TX/PRO/DX INJ NEW DRUG ADDON: CPT

## 2023-07-23 PROCEDURE — 81003 URINALYSIS AUTO W/O SCOPE: CPT | Performed by: NURSE PRACTITIONER

## 2023-07-23 PROCEDURE — 96374 THER/PROPH/DIAG INJ IV PUSH: CPT

## 2023-07-23 PROCEDURE — 85025 COMPLETE CBC W/AUTO DIFF WBC: CPT | Performed by: NURSE PRACTITIONER

## 2023-07-23 PROCEDURE — 74177 CT ABD & PELVIS W/CONTRAST: CPT

## 2023-07-23 PROCEDURE — 83605 ASSAY OF LACTIC ACID: CPT | Performed by: NURSE PRACTITIONER

## 2023-07-23 PROCEDURE — 83690 ASSAY OF LIPASE: CPT | Performed by: NURSE PRACTITIONER

## 2023-07-23 PROCEDURE — 25010000002 KETOROLAC TROMETHAMINE PER 15 MG: Performed by: NURSE PRACTITIONER

## 2023-07-23 PROCEDURE — 25510000001 IOPAMIDOL PER 1 ML: Performed by: EMERGENCY MEDICINE

## 2023-07-23 PROCEDURE — 25010000002 ONDANSETRON PER 1 MG: Performed by: NURSE PRACTITIONER

## 2023-07-23 RX ORDER — SODIUM CHLORIDE 0.9 % (FLUSH) 0.9 %
10 SYRINGE (ML) INJECTION AS NEEDED
Status: DISCONTINUED | OUTPATIENT
Start: 2023-07-23 | End: 2023-07-23 | Stop reason: HOSPADM

## 2023-07-23 RX ORDER — ONDANSETRON 2 MG/ML
4 INJECTION INTRAMUSCULAR; INTRAVENOUS ONCE
Status: COMPLETED | OUTPATIENT
Start: 2023-07-23 | End: 2023-07-23

## 2023-07-23 RX ORDER — FAMOTIDINE 10 MG/ML
20 INJECTION, SOLUTION INTRAVENOUS ONCE
Status: COMPLETED | OUTPATIENT
Start: 2023-07-23 | End: 2023-07-23

## 2023-07-23 RX ORDER — KETOROLAC TROMETHAMINE 15 MG/ML
15 INJECTION, SOLUTION INTRAMUSCULAR; INTRAVENOUS ONCE
Status: COMPLETED | OUTPATIENT
Start: 2023-07-23 | End: 2023-07-23

## 2023-07-23 RX ORDER — DICYCLOMINE HCL 20 MG
20 TABLET ORAL EVERY 6 HOURS
Qty: 30 TABLET | Refills: 0 | Status: SHIPPED | OUTPATIENT
Start: 2023-07-23

## 2023-07-23 RX ORDER — ONDANSETRON 4 MG/1
4 TABLET, ORALLY DISINTEGRATING ORAL 4 TIMES DAILY PRN
Qty: 15 TABLET | Refills: 0 | Status: SHIPPED | OUTPATIENT
Start: 2023-07-23

## 2023-07-23 RX ADMIN — ONDANSETRON 4 MG: 2 INJECTION INTRAMUSCULAR; INTRAVENOUS at 02:09

## 2023-07-23 RX ADMIN — IOPAMIDOL 100 ML: 755 INJECTION, SOLUTION INTRAVENOUS at 02:46

## 2023-07-23 RX ADMIN — FAMOTIDINE 20 MG: 10 INJECTION INTRAVENOUS at 02:07

## 2023-07-23 RX ADMIN — KETOROLAC TROMETHAMINE 15 MG: 15 INJECTION, SOLUTION INTRAMUSCULAR; INTRAVENOUS at 02:11

## 2023-07-23 NOTE — ED PROVIDER NOTES
Time: 1:46 AM EDT  Date of encounter:  7/23/2023  Independent Historian/Clinical History and Information was obtained by:   Patient    History is limited by: N/A    Chief Complaint: Abdominal pain with some nausea      History of Present Illness:  Patient is a 73 y.o. year old female who presents to the emergency department for evaluation of abdominal pain with nausea that started tonight.  Patient states she has right upper quadrant pain that is sharp and stabbing and intermittently will shoot down into her right lower quadrant and into her right side back.  Patient has had a history before where she states that her liver functions were elevated and she had to stay in the hospital for 4 days and have numerous test.  They diagnosed her with just a viral inflammation and she has routinely followed up with Dr. Dominguez's office for trending of her liver functions and monitoring.  Patient reports some nausea no vomiting.  Patient denies any diarrhea.  Last bowel movement was tonight about 6 PM and it was soft.  No bleeding.  Patient denies fever.    HPI    Patient Care Team  Primary Care Provider: Zohreh Mcduffie APRN    Past Medical History:     Allergies   Allergen Reactions    Shellfish Allergy Anaphylaxis    Moxifloxacin Hcl Unknown - Low Severity    Scopolamine Swelling    Sulfa Antibiotics Unknown - Low Severity    Morphine Rash    Penicillins Rash     Past Medical History:   Diagnosis Date    Allergic rhinitis due to allergen 09/10/2019    Anemia 09/10/2019    Anxiety disorder 06/09/2020    B12 deficiency 09/23/2020    COVID-19 06/23/2022    Depression     Diverticulosis     Essential hypertension 01/03/2019    Family history of myocardial infarction 01/19/2023    GERD (gastroesophageal reflux disease) 01/03/2019    Graves' disease     HLD (hyperlipidemia) 01/03/2019    Hypothyroidism     Inflammatory bowel disease     Major depressive disorder 01/03/2019    Osteoarthritis 09/23/2020    Osteopenia      T2DM (type 2 diabetes mellitus) 2019    Vitamin D deficiency 2019     Past Surgical History:   Procedure Laterality Date    APPENDECTOMY       SECTION      CHOLECYSTECTOMY  2016    DR SCALES    COLON SURGERY      COLONOSCOPY      HIP FRACTURE SURGERY Left     REPAIR    HIP SURGERY      OOPHORECTOMY      SMALL INTESTINE SURGERY      TUBAL ABDOMINAL LIGATION      UPPER GASTROINTESTINAL ENDOSCOPY       Family History   Problem Relation Age of Onset    Heart disease Mother     Heart failure Mother     Arthritis Mother     Hyperlipidemia Mother     Hypertension Mother     Stroke Mother     Thyroid disease Mother     Heart disease Brother     Heart disease Brother     Rectal cancer Maternal Grandmother     Kidney disease Daughter     Miscarriages / Stillbirths Daughter     Lung cancer Other     Skin cancer Other        Home Medications:  Prior to Admission medications    Medication Sig Start Date End Date Taking? Authorizing Provider   Accu-Chek Graciela Plus test strip 1 each by Other route Daily. 3/1/23   ProviderLaverne MD   Accu-Chek Softclix Lancets lancets 1 each by Other route Daily. 23   Laverne Seals MD   celecoxib (CeleBREX) 100 MG capsule TAKE ONE CAPSULE BY MOUTH TWICE DAILY 3/23/23   Zohreh Mcduffie APRN   cetirizine (zyrTEC) 10 MG tablet Take 1 tablet by mouth Daily. 23   Zohreh Mcduffie APRN   esomeprazole (nexIUM) 40 MG capsule Take 1 capsule by mouth Daily. 23   Zohreh Mcduffie APRN   fluticasone (FLONASE) 50 MCG/ACT nasal spray 2 sprays by Each Nare route Daily. 23   Zohreh Mcduffie APRN   quinapril (ACCUPRIL) 10 MG tablet TAKE ONE TABLET BY MOUTH TWICE DAILY 3/23/23   Zohreh Mcduffie APRN        Social History:   Social History     Tobacco Use    Smoking status: Former     Packs/day: 1.00     Years: 5.00     Pack years: 5.00     Types: Cigarettes     Start date: 1980     Quit date: 1985     Years since  "quittin.5    Smokeless tobacco: Never    Tobacco comments:     QUIT 28 YEARS AGO   Vaping Use    Vaping Use: Never used   Substance Use Topics    Alcohol use: Never    Drug use: Never         Review of Systems:  Review of Systems   Constitutional:  Negative for chills and fever.   HENT:  Negative for congestion, ear pain and sore throat.    Eyes:  Negative for pain.   Respiratory:  Negative for cough, chest tightness and shortness of breath.    Cardiovascular:  Negative for chest pain.   Gastrointestinal:  Positive for abdominal pain and nausea. Negative for diarrhea and vomiting.   Genitourinary:  Positive for flank pain (Right). Negative for difficulty urinating, dysuria and hematuria.   Musculoskeletal:  Negative for joint swelling.   Skin:  Negative for pallor.   Neurological:  Negative for seizures and headaches.   Hematological: Negative.    Psychiatric/Behavioral: Negative.     All other systems reviewed and are negative.     Physical Exam:  /77   Pulse 74   Temp 97.7 °F (36.5 °C) (Oral)   Resp 17   Ht 152.4 cm (60\")   SpO2 100%   BMI 28.24 kg/m²     Physical Exam  Vitals and nursing note reviewed.   Constitutional:       General: She is not in acute distress.     Appearance: Normal appearance. She is not toxic-appearing.   HENT:      Head: Normocephalic and atraumatic.      Mouth/Throat:      Mouth: Mucous membranes are moist.   Eyes:      General: No scleral icterus.  Cardiovascular:      Rate and Rhythm: Normal rate and regular rhythm.      Pulses: Normal pulses.      Heart sounds: Normal heart sounds.   Pulmonary:      Effort: Pulmonary effort is normal. No respiratory distress.      Breath sounds: Normal breath sounds.   Abdominal:      General: Bowel sounds are normal. There is no distension.      Palpations: Abdomen is soft.      Tenderness: There is no abdominal tenderness in the right upper quadrant and right lower quadrant. There is right CVA tenderness. There is no left CVA " tenderness.      Hernia: No hernia is present.   Musculoskeletal:         General: Normal range of motion.      Cervical back: Normal range of motion and neck supple.   Skin:     General: Skin is warm and dry.   Neurological:      Mental Status: She is alert and oriented to person, place, and time. Mental status is at baseline.   Psychiatric:         Mood and Affect: Mood normal.         Behavior: Behavior normal.          Procedures:  Procedures      Medical Decision Making:      Comorbidities that affect care:    Patient has history of IBS, diverticulosis, GERD, Diabetes, Hypertension, Thyroid Disease    External Notes reviewed:    Previous Clinic Note: Patient has had annual wellness visit on July 12      The following orders were placed and all results were independently analyzed by me:  Orders Placed This Encounter   Procedures    CT Abdomen Pelvis With Contrast    Rhodes Draw    Comprehensive Metabolic Panel    Lipase    Urinalysis With Microscopic If Indicated (No Culture) - Urine, Clean Catch    Lactic Acid, Plasma    CBC Auto Differential    Insert Peripheral IV    CBC & Differential    Green Top (Gel)    Lavender Top    Gold Top - SST    Light Blue Top       Medications Given in the Emergency Department:  Medications   sodium chloride 0.9 % flush 10 mL (has no administration in time range)   famotidine (PEPCID) injection 20 mg (20 mg Intravenous Given 7/23/23 0207)   ondansetron (ZOFRAN) injection 4 mg (4 mg Intravenous Given 7/23/23 0209)   ketorolac (TORADOL) injection 15 mg (15 mg Intravenous Given 7/23/23 0211)   iopamidol (ISOVUE-370) 76 % injection 100 mL (100 mL Intravenous Given 7/23/23 0246)        ED Course:    ED Course as of 07/23/23 0357   Sun Jul 23, 2023   0308 CT Abdomen Pelvis With Contrast  No acute abdominal pathology [DS]   0315 Patient is currently pain-free [DS]      ED Course User Index  [DS] Lauren Stone APRN       Labs:    Lab Results (last 24 hours)       Procedure Component  Value Units Date/Time    CBC & Differential [[id number]]  (Normal) Collected: 07/23/23 0148    Specimen: Blood Updated: 07/23/23 0158    Narrative:      The following orders were created for panel order CBC & Differential.  Procedure                               Abnormality         Status                     ---------                               -----------         ------                     CBC Auto Differential[[id number]]        Normal              Final result                 Please view results for these tests on the individual orders.    Comprehensive Metabolic Panel [[id number]] Collected: 07/23/23 0148    Specimen: Blood Updated: 07/23/23 0224     Glucose 98 mg/dL      BUN 9 mg/dL      Creatinine 0.85 mg/dL      Sodium 136 mmol/L      Potassium 3.5 mmol/L      Chloride 101 mmol/L      CO2 26.6 mmol/L      Calcium 9.5 mg/dL      Total Protein 7.8 g/dL      Albumin 4.0 g/dL      ALT (SGPT) 15 U/L      AST (SGOT) 21 U/L      Alkaline Phosphatase 87 U/L      Total Bilirubin 0.6 mg/dL      Globulin 3.8 gm/dL      A/G Ratio 1.1 g/dL      BUN/Creatinine Ratio 10.6     Anion Gap 8.4 mmol/L      eGFR 73.4 mL/min/1.73     Narrative:      GFR Normal >60  Chronic Kidney Disease <60  Kidney Failure <15    The GFR formula is only valid for adults with stable renal function between ages 18 and 70.    Lipase [[id number]]  (Normal) Collected: 07/23/23 0148    Specimen: Blood Updated: 07/23/23 0224     Lipase 51 U/L     Lactic Acid, Plasma [[id number]]  (Normal) Collected: 07/23/23 0148    Specimen: Blood Updated: 07/23/23 0222     Lactate 0.9 mmol/L     CBC Auto Differential [[id number]]  (Normal) Collected: 07/23/23 0148    Specimen: Blood Updated: 07/23/23 0158     WBC 4.42 10*3/mm3      RBC 4.41 10*6/mm3      Hemoglobin 12.7 g/dL      Hematocrit 37.8 %      MCV 85.7 fL      MCH 28.8 pg      MCHC 33.6 g/dL      RDW 13.2 %      RDW-SD 40.7 fl      MPV 8.3 fL      Platelets 171 10*3/mm3      Neutrophil % 56.1 %      Lymphocyte  % 32.6 %      Monocyte % 9.0 %      Eosinophil % 1.4 %      Basophil % 0.7 %      Immature Grans % 0.2 %      Neutrophils, Absolute 2.48 10*3/mm3      Lymphocytes, Absolute 1.44 10*3/mm3      Monocytes, Absolute 0.40 10*3/mm3      Eosinophils, Absolute 0.06 10*3/mm3      Basophils, Absolute 0.03 10*3/mm3      Immature Grans, Absolute 0.01 10*3/mm3      nRBC 0.0 /100 WBC     Urinalysis With Microscopic If Indicated (No Culture) - Urine, Clean Catch [[id number]]  (Normal) Collected: 07/23/23 0155    Specimen: Urine, Clean Catch Updated: 07/23/23 0215     Color, UA Yellow     Appearance, UA Clear     pH, UA 5.5     Specific Gravity, UA <=1.005     Glucose, UA Negative     Ketones, UA Negative     Bilirubin, UA Negative     Blood, UA Negative     Protein, UA Negative     Leuk Esterase, UA Negative     Nitrite, UA Negative     Urobilinogen, UA 0.2 E.U./dL    Narrative:      Urine microscopic not indicated.             Imaging:    CT Abdomen Pelvis With Contrast    Result Date: 7/23/2023  PROCEDURE: CT ABDOMEN PELVIS W CONTRAST  COMPARISON: [facility], CT, CT ABDOMEN PELVIS W CONTRAST, 2/21/2023, 0:18.  INDICATIONS: right side ab pain and flank pain  TECHNIQUE: After obtaining the patient's consent, CT images were created with non-ionic intravenous contrast material.   PROTOCOL:   Standard imaging protocol performed    RADIATION:   DLP:696.3 mGy*cm   Automated exposure control was utilized to minimize radiation dose. CONTRAST: 90ccIsovue 370 I.V.  FINDINGS:  Included lung bases are clear.  Gallbladder is surgically absent.  Liver, pancreas, spleen, adrenal glands, kidneys appear unremarkable.  There is a small hiatal hernia.  No abnormal small bowel distention is seen.  Appendix is not visualized.  No right lower quadrant inflammation is seen.  There are postoperative changes of the ascending colon, which is mildly distended by stool.  Urinary bladder is not well distended.  No significant free fluid or  lymphadenopathy is seen.  No acute osseous abnormality is identified.  There are multilevel degenerative changes in the lumbar spine and included lower thoracic spine.  There is postoperative change from intramedullary salma fixation of the proximal left femur, partially included.  Posterior to the proximal femur, there is a 5.9 x 4.2 x 4.9 cm fluid collection, previously 4.1 x 3.1 x 2.7 cm.        1. No acute abdominal or pelvic abnormality is identified. 2. Postoperative change of the proximal left femur with enlarging fluid collection posterior to the proximal femur.  Recommend orthopedic evaluation.      AUDRA CERVANTES MD       Electronically Signed and Approved By: AUDRA CERVANTES MD on 7/23/2023 at 3:02                Differential Diagnosis and Discussion:    Abdominal Pain: Based on the patient's signs and symptoms, I considered abdominal aortic aneurysm, small bowel obstruction, pancreatitis, acute cholecystitis, acute appendecitis, peptic ulcer disease, gastritis, colitis, endocrine disorders, irritable bowel syndrome and other differential diagnosis an etiology of the patient's abdominal pain.    All labs were reviewed and interpreted by me.  CT scan radiology impression was interpreted by me.    MDM  Number of Diagnoses or Management Options  Abnormal finding on CT scan  Right flank pain  Right-sided abdominal pain of unknown cause  Diagnosis management comments: The patient is resting comfortably and feels better, is alert and in no distress. Repeat examination is unremarkable and benign; in particular, there's no discomfort at McBurney's point and there is no pulsatile mass. The history, exam, diagnostic testing, and current condition does not suggest acute appendicitis, bowel obstruction, acute cholecystitis, bowel perforation, major gastrointestinal bleeding, severe diverticulitis, abdominal aortic aneurysm, mesenteric ischemia, volvulus, sepsis, or other significant pathology that warrants further  testing, continued ED treatment, admission, for surgical evaluation at this point. The vital signs have been stable. The patient does not have uncontrollable pain, intractable vomiting, or other significant symptoms. The patient's condition is stable and appropriate for discharge from the emergency department.           Amount and/or Complexity of Data Reviewed  Clinical lab tests: reviewed and ordered  Tests in the radiology section of CPT®: reviewed and ordered  Tests in the medicine section of CPT®: ordered and reviewed  Decide to obtain previous medical records or to obtain history from someone other than the patient: yes  Review and summarize past medical records: yes (Patient annual wellness visit from the 12th reviewed)    Risk of Complications, Morbidity, and/or Mortality  Presenting problems: moderate  Diagnostic procedures: moderate  Management options: low    Patient Progress  Patient progress: stable         Patient Care Considerations:    ANTIBIOTICS: I considered prescribing antibiotics as an outpatient however no infectious or bacterial etiology noted on imaging or lab work      Consultants/Shared Management Plan:    None    Social Determinants of Health:    Patient is independent, reliable, and has access to care.       Disposition and Care Coordination:    Discharged: I considered escalation of care by admitting this patient for observation, however the patient has improved and is suitable and  stable for discharge.    I have explained the patient´s condition, diagnoses and treatment plan based on the information available to me at this time. I have answered questions and addressed any concerns. The patient has a good  understanding of the patient´s diagnosis, condition, and treatment plan as can be expected at this point. The vital signs have been stable. The patient´s condition is stable and appropriate for discharge from the emergency department.      The patient will pursue further outpatient  evaluation with the primary care physician or other designated or consulting physician as outlined in the discharge instructions. They are agreeable to this plan of care and follow-up instructions have been explained in detail. The patient has received these instructions in written format and have expressed an understanding of the discharge instructions. The patient is aware that any significant change in condition or worsening of symptoms should prompt an immediate return to this or the closest emergency department or call to 911.  I have explained discharge medications and the need for follow up with the patient/caretakers. This was also printed in the discharge instructions. Patient was discharged with the following medications and follow up:      Medication List        New Prescriptions      dicyclomine 20 MG tablet  Commonly known as: BENTYL  Take 1 tablet by mouth Every 6 (Six) Hours.     ondansetron ODT 4 MG disintegrating tablet  Commonly known as: ZOFRAN-ODT  Place 1 tablet on the tongue 4 (Four) Times a Day As Needed for Nausea or Vomiting.               Where to Get Your Medications        These medications were sent to [facility]/pharmacy #[id number] - [place], [place] - [address] - [phone] [facility] [phone] FX  [address] [place] [address] [place] [address]      Hours: 24-hours Phone: [phone]   dicyclomine 20 MG tablet  ondansetron ODT 4 MG disintegrating tablet      Zohreh Mcduffie, LOYD  [address] [place] [address] [phone]      As needed       Final diagnoses:   Right-sided abdominal pain of unknown cause   Right flank pain   Abnormal finding on CT scan        ED Disposition       ED Disposition   Discharge    Condition   Stable    Comment   --               This medical record created using voice recognition software.             Lauren Stone, LOYD  07/23/23 0351

## 2023-07-23 NOTE — DISCHARGE INSTRUCTIONS
CT scan and lab work was unremarkable and did not show any emergent cause for your abdominal pain.    Follow-up with your provider Dr. Dominguez's office for further evaluation if you continue to have abdominal pain.    As we discussed you are found to have some fluid collecting around-year-old femur surgery that you had done in the 80's and they recommend orthopedic evaluation to ensure no problems.    Return for new or worsening symptoms

## 2023-07-24 DIAGNOSIS — R93.89 ABNORMAL CT SCAN: Primary | ICD-10-CM

## 2023-07-25 ENCOUNTER — TELEPHONE (OUTPATIENT)
Dept: ORTHOPEDIC SURGERY | Facility: CLINIC | Age: 73
End: 2023-07-25
Payer: MEDICARE

## 2023-07-25 NOTE — TELEPHONE ENCOUNTER
[facility] Postoperative change of the proximal left femur with enlarging fluid collection posterior to the proximal femur.  Recommend orthopedic evaluation. SURGERY WAS IN 1983.

## 2023-08-04 ENCOUNTER — OFFICE VISIT (OUTPATIENT)
Dept: ORTHOPEDIC SURGERY | Facility: CLINIC | Age: 73
End: 2023-08-04
Payer: MEDICARE

## 2023-08-04 VITALS
SYSTOLIC BLOOD PRESSURE: 159 MMHG | HEART RATE: 70 BPM | HEIGHT: 60 IN | BODY MASS INDEX: 28.27 KG/M2 | DIASTOLIC BLOOD PRESSURE: 76 MMHG | WEIGHT: 144 LBS | OXYGEN SATURATION: 98 %

## 2023-08-04 DIAGNOSIS — M79.605 LEFT LEG PAIN: Primary | ICD-10-CM

## 2023-08-04 NOTE — PROGRESS NOTES
"Chief Complaint  Initial Evaluation of the Left Femur     Subjective      Qiana Altman presents to [facility] for evaluation of the left femur. She reports she had a salma in her hip in . She has left knee arthritis. She has bursitis and fluid collection to the hip.     Allergies   Allergen Reactions    Shellfish Allergy Anaphylaxis    Moxifloxacin Hcl Unknown - Low Severity    Scopolamine Swelling    Sulfa Antibiotics Unknown - Low Severity    Morphine Rash    Penicillins Rash        Social History     Socioeconomic History    Marital status:    Tobacco Use    Smoking status: Former     Packs/day: 1.00     Years: 5.00     Pack years: 5.00     Types: Cigarettes     Start date: 1980     Quit date: 1985     Years since quittin.6    Smokeless tobacco: Never    Tobacco comments:     QUIT 28 YEARS AGO   Vaping Use    Vaping Use: Never used   Substance and Sexual Activity    Alcohol use: Never    Drug use: Never    Sexual activity: Not Currently     Birth control/protection: Surgical, Post-menopausal, Tubal ligation        I reviewed the patient's chief complaint, history of present illness, review of systems, past medical history, surgical history, family history, social history, medications, and allergy list.     Review of Systems     Constitutional: Denies fevers, chills, weight loss  Cardiovascular: Denies chest pain, shortness of breath  Skin: Denies rashes, acute skin changes  Neurologic: Denies headache, loss of consciousness  MSK: Left femur pain      Vital Signs:   /76   Pulse 70   Ht 152.4 cm (60\")   Wt 65.3 kg (144 lb)   SpO2 98%   BMI 28.12 kg/mý          Physical Exam  General: Alert. No acute distress    Ortho Exam      Left femur- left leg shorter than the right. Flexion 80. External Rotation 40. Internal rotation 20. Well healed scar to the lateral femur. Neurovascularly intact. Negative straight leg raise Positive EHL, FHL, GS and TA. " Sensation intact to all 5 nerves of the foot. Positive pulses.     Procedures    X-Ray Report:  Left femur  X-Ray  Indication: Evaluation of left femur pain  AP/Lateral view(s)  Findings: healed femur fracture. Intact intra-medullary salma the salma is prominate to the greater trochanter and extending into the soft tissues proximal to trochanter. No acute findings.   Prior studies available for comparison: yes       Imaging Results (Most Recent)       Procedure Component Value Units Date/Time    XR Femur 2 View Left [[id number]] Resulted: 08/04/23 0938     Updated: 08/04/23 0943             Result Review :       CT Abdomen Pelvis With Contrast    Result Date: 7/23/2023  Narrative: PROCEDURE: CT ABDOMEN PELVIS W CONTRAST  COMPARISON: [facility], CT, CT ABDOMEN PELVIS W CONTRAST, 2/21/2023, 0:18.  INDICATIONS: right side ab pain and flank pain  TECHNIQUE: After obtaining the patient's consent, CT images were created with non-ionic intravenous contrast material.   PROTOCOL:   Standard imaging protocol performed    RADIATION:   DLP:696.3 mGy*cm   Automated exposure control was utilized to minimize radiation dose. CONTRAST: 90ccIsovue 370 I.V.  FINDINGS:  Included lung bases are clear.  Gallbladder is surgically absent.  Liver, pancreas, spleen, adrenal glands, kidneys appear unremarkable.  There is a small hiatal hernia.  No abnormal small bowel distention is seen.  Appendix is not visualized.  No right lower quadrant inflammation is seen.  There are postoperative changes of the ascending colon, which is mildly distended by stool.  Urinary bladder is not well distended.  No significant free fluid or lymphadenopathy is seen.  No acute osseous abnormality is identified.  There are multilevel degenerative changes in the lumbar spine and included lower thoracic spine.  There is postoperative change from intramedullary salma fixation of the proximal left femur, partially included.  Posterior to the proximal femur,  there is a 5.9 x 4.2 x 4.9 cm fluid collection, previously 4.1 x 3.1 x 2.7 cm.      Impression:   1. No acute abdominal or pelvic abnormality is identified. 2. Postoperative change of the proximal left femur with enlarging fluid collection posterior to the proximal femur.  Recommend orthopedic evaluation.      AUDRA CERVANTES MD       Electronically Signed and Approved By: AUDRA CERVANTES MD on 7/23/2023 at 3:02                     Assessment and Plan     Diagnoses and all orders for this visit:    1. Left leg pain (Primary)  -     XR Femur 2 View Left        Discussed the treatment plan with the patient.  I reviewed the x-rays that were obtained today with the patient. I advised the patient if the salma ever starts to cause her issues that it can be removed.     Call or return if worsening symptoms.    Follow Up     PRN      Patient was given instructions and counseling regarding her condition or for health maintenance advice. Please see specific information pulled into the AVS if appropriate.     Scribed for Demetrio Ken MD by Kayla Means.  08/04/23   09:38 EDT    I have personally performed the services described in this document as scribed by the above individual and it is both accurate and complete. Demetrio Ken MD 08/06/23

## 2023-08-16 ENCOUNTER — OFFICE VISIT (OUTPATIENT)
Dept: PODIATRY | Facility: CLINIC | Age: 73
End: 2023-08-16
Payer: MEDICARE

## 2023-08-16 VITALS
SYSTOLIC BLOOD PRESSURE: 167 MMHG | BODY MASS INDEX: 28.27 KG/M2 | OXYGEN SATURATION: 98 % | WEIGHT: 144 LBS | DIASTOLIC BLOOD PRESSURE: 80 MMHG | TEMPERATURE: 97.6 F | HEART RATE: 65 BPM | HEIGHT: 60 IN

## 2023-08-16 DIAGNOSIS — M21.621 TAILOR'S BUNIONETTE, RIGHT: ICD-10-CM

## 2023-08-16 DIAGNOSIS — M79.671 RIGHT FOOT PAIN: Primary | ICD-10-CM

## 2023-08-16 DIAGNOSIS — Q82.8 POROKERATOSIS: ICD-10-CM

## 2023-08-16 NOTE — PROGRESS NOTES
[facility] - PODIATRY    Today's Date: 23    Patient Name: Qiana Altman  MRN: [id number]  CSN: [id number]  PCP: Zohreh Mcduffie APRN,   Referring Provider: Zohreh Mcduffie, *    SUBJECTIVE     Chief Complaint   Patient presents with    Right Foot - Establish Care, Pain, Callouses     tender     HPI: Qiana Altman, a 73 y.o.female, presents to clinic.    Patient is a 73-year-old female presenting with right foot pain.  Patient states been going on for several months.  Patient states progressively gotten worse.  Patient states she has 2 different limb lengths.  Patient states that she is here for further treatment.  She just wants to get checked to make sure it is nothing serious.  Past Medical History:   Diagnosis Date    Allergic rhinitis due to allergen 09/10/2019    Anemia 09/10/2019    Anxiety disorder 2020    B12 deficiency 2020    Callus     COVID-19 2022    Depression     Diverticulosis     Essential hypertension 2019    Family history of myocardial infarction 2023    GERD (gastroesophageal reflux disease) 2019    Graves' disease     HLD (hyperlipidemia) 2019    Hypothyroidism     Inflammatory bowel disease     Major depressive disorder 2019    Osteoarthritis 2020    Osteopenia     T2DM (type 2 diabetes mellitus) 2019    Vitamin D deficiency 2019     Past Surgical History:   Procedure Laterality Date    APPENDECTOMY       SECTION      CHOLECYSTECTOMY  2016    DR SCALES    COLON SURGERY      COLONOSCOPY      HIP FRACTURE SURGERY Left     REPAIR    HIP SURGERY      OOPHORECTOMY      SMALL INTESTINE SURGERY      TUBAL ABDOMINAL LIGATION      UPPER GASTROINTESTINAL ENDOSCOPY       Family History   Problem Relation Age of Onset    Heart disease Mother     Heart failure Mother     Arthritis Mother     Hyperlipidemia Mother     Hypertension Mother     Stroke Mother     Thyroid disease Mother      Heart disease Brother     Heart disease Brother     Rectal cancer Maternal Grandmother     Kidney disease Daughter     Miscarriages / Stillbirths Daughter     Lung cancer Other     Skin cancer Other      Social History     Socioeconomic History    Marital status:    Tobacco Use    Smoking status: Former     Packs/day: 1.00     Years: 5.00     Pack years: 5.00     Types: Cigarettes     Start date: 1980     Quit date: 1985     Years since quittin.6    Smokeless tobacco: Never    Tobacco comments:     QUIT 28 YEARS AGO   Vaping Use    Vaping Use: Never used   Substance and Sexual Activity    Alcohol use: Never    Drug use: Never    Sexual activity: Not Currently     Birth control/protection: Surgical, Post-menopausal, Tubal ligation     Allergies   Allergen Reactions    Shellfish Allergy Anaphylaxis    Moxifloxacin Hcl Unknown - Low Severity    Scopolamine Swelling    Sulfa Antibiotics Unknown - Low Severity    Morphine Rash    Penicillins Rash     Current Outpatient Medications   Medication Sig Dispense Refill    Accu-Chek Graciela Plus test strip 1 each by Other route Daily.      Accu-Chek Softclix Lancets lancets 1 each by Other route Daily.      celecoxib (CeleBREX) 100 MG capsule TAKE ONE CAPSULE BY MOUTH TWICE DAILY 180 capsule 1    cetirizine (zyrTEC) 10 MG tablet Take 1 tablet by mouth Daily. 90 tablet 1    dicyclomine (BENTYL) 20 MG tablet Take 1 tablet by mouth Every 6 (Six) Hours. 30 tablet 0    esomeprazole (nexIUM) 40 MG capsule Take 1 capsule by mouth Daily. 90 capsule 1    fluticasone (FLONASE) 50 MCG/ACT nasal spray 2 sprays by Each Nare route Daily. 16 g 2    ondansetron ODT (ZOFRAN-ODT) 4 MG disintegrating tablet Place 1 tablet on the tongue 4 (Four) Times a Day As Needed for Nausea or Vomiting. 15 tablet 0    quinapril (ACCUPRIL) 10 MG tablet TAKE ONE TABLET BY MOUTH TWICE DAILY 180 tablet 1     No current facility-administered medications for this visit.     Review of Systems    Constitutional: Negative.    HENT: Negative.     Eyes: Negative.    Respiratory: Negative.     Cardiovascular: Negative.    Gastrointestinal: Negative.    Endocrine: Negative.    Genitourinary: Negative.    Musculoskeletal: Negative.         Right foot pain   Skin: Negative.         Foot pain   Allergic/Immunologic: Negative.    Neurological: Negative.    Hematological: Negative.    Psychiatric/Behavioral: Negative.     All other systems reviewed and are negative.    OBJECTIVE     Vitals:    08/16/23 0746   BP: 167/80   Pulse: 65   Temp: 97.6 øF (36.4 øC)   SpO2: 98%       WBC   Date Value Ref Range Status   07/23/2023 4.42 3.40 - 10.80 10*3/mm3 Final     RBC   Date Value Ref Range Status   07/23/2023 4.41 3.77 - 5.28 10*6/mm3 Final     Hemoglobin   Date Value Ref Range Status   07/23/2023 12.7 12.0 - 15.9 g/dL Final     Hematocrit   Date Value Ref Range Status   07/23/2023 37.8 34.0 - 46.6 % Final     MCV   Date Value Ref Range Status   07/23/2023 85.7 79.0 - 97.0 fL Final     MCH   Date Value Ref Range Status   07/23/2023 28.8 26.6 - 33.0 pg Final     MCHC   Date Value Ref Range Status   07/23/2023 33.6 31.5 - 35.7 g/dL Final     RDW   Date Value Ref Range Status   07/23/2023 13.2 12.3 - 15.4 % Final     RDW-SD   Date Value Ref Range Status   07/23/2023 40.7 37.0 - 54.0 fl Final     MPV   Date Value Ref Range Status   07/23/2023 8.3 6.0 - 12.0 fL Final     Platelets   Date Value Ref Range Status   07/23/2023 171 140 - 450 10*3/mm3 Final     Neutrophil %   Date Value Ref Range Status   07/23/2023 56.1 42.7 - 76.0 % Final     Lymphocyte %   Date Value Ref Range Status   07/23/2023 32.6 19.6 - 45.3 % Final     Monocyte %   Date Value Ref Range Status   07/23/2023 9.0 5.0 - 12.0 % Final     Eosinophil %   Date Value Ref Range Status   07/23/2023 1.4 0.3 - 6.2 % Final     Basophil %   Date Value Ref Range Status   07/23/2023 0.7 0.0 - 1.5 % Final     Immature Grans %   Date Value Ref Range Status   07/23/2023 0.2 0.0  - 0.5 % Final     Neutrophils, Absolute   Date Value Ref Range Status   07/23/2023 2.48 1.70 - 7.00 10*3/mm3 Final     Lymphocytes, Absolute   Date Value Ref Range Status   07/23/2023 1.44 0.70 - 3.10 10*3/mm3 Final     Monocytes, Absolute   Date Value Ref Range Status   07/23/2023 0.40 0.10 - 0.90 10*3/mm3 Final     Eosinophils, Absolute   Date Value Ref Range Status   07/23/2023 0.06 0.00 - 0.40 10*3/mm3 Final     Basophils, Absolute   Date Value Ref Range Status   07/23/2023 0.03 0.00 - 0.20 10*3/mm3 Final     Immature Grans, Absolute   Date Value Ref Range Status   07/23/2023 0.01 0.00 - 0.05 10*3/mm3 Final     nRBC   Date Value Ref Range Status   07/23/2023 0.0 0.0 - 0.2 /100 WBC Final         Lab Results   Component Value Date    GLUCOSE 98 07/23/2023    BUN 9 07/23/2023    CREATININE 0.85 07/23/2023    EGFRIFNONA 64 10/04/2021    BCR 10.6 07/23/2023    K 3.5 07/23/2023    CO2 26.6 07/23/2023    CALCIUM 9.5 07/23/2023    ALBUMIN 4.0 07/23/2023    LABIL2 1.2 (L) 03/18/2021    AST 21 07/23/2023    ALT 15 07/23/2023       Patient seen in no apparent distress.      PHYSICAL EXAM:     Foot/Ankle Exam    GENERAL  Appearance:  appears stated age  Orientation:  AAOx3  Affect:  appropriate  Gait:  unimpaired  Assistance:  independent  Right shoe gear: casual shoe  Left shoe gear: casual shoe    VASCULAR     Right Foot Vascularity   Normal vascular exam    Dorsalis pedis:  2+  Posterior tibial:  2+  Skin temperature:  warm  Edema grading:  None  CFT:  < 3 seconds  Pedal hair growth:  Present  Varicosities:  none     Left Foot Vascularity   Normal vascular exam    Dorsalis pedis:  2+  Posterior tibial:  2+  Skin temperature:  warm  Edema grading:  None  CFT:  < 3 seconds  Pedal hair growth:  Present  Varicosities:  none     NEUROLOGIC     Right Foot Neurologic   Normal sensation    Light touch sensation: normal  Vibratory sensation: normal  Hot/Cold sensation: normal  Protective Sensation using [place]-Julissa  Monofilament:   Sites intact: 10  Sites tested: 10     Left Foot Neurologic   Normal sensation    Light touch sensation: normal  Vibratory sensation: normal  Hot/Cold sensation:  normal  Protective Sensation using [place]-Julissa Monofilament:   Sites intact: 10  Sites tested: 10    MUSCULOSKELETAL     Right Foot Musculoskeletal   Tailor's bunion: Yes      MUSCLE STRENGTH     Right Foot Muscle Strength   Foot dorsiflexion:  4  Foot plantar flexion:  4  Foot inversion:  4  Foot eversion:  4     Left Foot Muscle Strength   Foot dorsiflexion:  4  Foot plantar flexion:  4  Foot inversion:  4  Foot eversion:  4    RANGE OF MOTION     Right Foot Range of Motion   Foot and ankle ROM within normal limits       Left Foot Range of Motion   Foot and ankle ROM within normal limits      DERMATOLOGIC      Right Foot Dermatologic   Skin  Right foot skin is intact.      Left Foot Dermatologic   Skin  Left foot skin is intact.      Right foot additional comments: Lesion to plantar 5th mpj     RADIOLOGY:        XR Femur 2 View Left    Result Date: 8/4/2023  Narrative: X-Ray Report: Left femur  X-Ray Indication: Evaluation of left femur pain AP/Lateral view(s) Findings: healed femur fracture. Intact intra-medullary salma the salma is prominate to the greater trochanter and extending into the soft tissues proximal to trochanter. No acute findings. Prior studies available for comparison: yes      CT Abdomen Pelvis With Contrast    Result Date: 7/23/2023  Narrative: PROCEDURE: CT ABDOMEN PELVIS W CONTRAST  COMPARISON: [facility], CT, CT ABDOMEN PELVIS W CONTRAST, 2/21/2023, 0:18.  INDICATIONS: right side ab pain and flank pain  TECHNIQUE: After obtaining the patient's consent, CT images were created with non-ionic intravenous contrast material.   PROTOCOL:   Standard imaging protocol performed    RADIATION:   DLP:696.3 mGy*cm   Automated exposure control was utilized to minimize radiation dose. CONTRAST: 90ccIsovue 370 I.V.   FINDINGS:  Included lung bases are clear.  Gallbladder is surgically absent.  Liver, pancreas, spleen, adrenal glands, kidneys appear unremarkable.  There is a small hiatal hernia.  No abnormal small bowel distention is seen.  Appendix is not visualized.  No right lower quadrant inflammation is seen.  There are postoperative changes of the ascending colon, which is mildly distended by stool.  Urinary bladder is not well distended.  No significant free fluid or lymphadenopathy is seen.  No acute osseous abnormality is identified.  There are multilevel degenerative changes in the lumbar spine and included lower thoracic spine.  There is postoperative change from intramedullary salma fixation of the proximal left femur, partially included.  Posterior to the proximal femur, there is a 5.9 x 4.2 x 4.9 cm fluid collection, previously 4.1 x 3.1 x 2.7 cm.      Impression:   1. No acute abdominal or pelvic abnormality is identified. 2. Postoperative change of the proximal left femur with enlarging fluid collection posterior to the proximal femur.  Recommend orthopedic evaluation.      AUDRA CERVANTES MD       Electronically Signed and Approved By: AUDRA CERVANTES MD on 7/23/2023 at 3:02              ASSESSMENT/PLAN     Diagnoses and all orders for this visit:    1. Right foot pain (Primary)    2. Tailor's bunionette, right    3. Porokeratosis      Patient to begin using OTC inserts.    Discussed proper shoe gear    Offload area as needed    RTC PRN     Comprehensive lower extremity examination and evaluation was performed.    Discussed findings and treatment plan including risks, benefits, and treatment options with patient in detail. Patient agreed with treatment plan.    Medications and allergies reviewed.  Reviewed available lab values along with other pertinent labs.  These were discussed with the patient.    An After Visit Summary was printed and given to the patient at discharge, including (if requested) any available  informative/educational handouts regarding diagnosis, treatment, or medications. All questions were answered to patient/family satisfaction. Should symptoms fail to improve or worsen they agree to call or return to clinic or to go to the Emergency Department. Discussed the importance of following up with any needed screening tests/labs/specialist appointments and any requested follow-up recommended by me today. Importance of maintaining follow-up discussed and patient accepts that missed appointments can delay diagnosis and potentially lead to worsening of conditions.    Return in about 3 months (around 11/16/2023), or if symptoms worsen or fail to improve., or sooner if acute issues arise.    This document has been electronically signed by Mark Toro DPM on August 16, 2023 08:03 EDT

## 2023-08-23 ENCOUNTER — HOSPITAL ENCOUNTER (EMERGENCY)
Facility: HOSPITAL | Age: 73
Discharge: HOME | End: 2023-08-23
Attending: EMERGENCY MEDICINE
Payer: MEDICARE

## 2023-08-23 ENCOUNTER — APPOINTMENT (OUTPATIENT)
Dept: GENERAL RADIOLOGY | Facility: HOSPITAL | Age: 73
End: 2023-08-23
Payer: MEDICARE

## 2023-08-23 VITALS
DIASTOLIC BLOOD PRESSURE: 70 MMHG | HEIGHT: 62 IN | TEMPERATURE: 97.9 F | BODY MASS INDEX: 25.4 KG/M2 | RESPIRATION RATE: 16 BRPM | HEART RATE: 78 BPM | WEIGHT: 138.01 LBS | OXYGEN SATURATION: 98 % | SYSTOLIC BLOOD PRESSURE: 146 MMHG

## 2023-08-23 DIAGNOSIS — M25.559 HIP PAIN, UNSPECIFIED LATERALITY: Primary | ICD-10-CM

## 2023-08-23 DIAGNOSIS — M25.552 LEFT HIP PAIN: ICD-10-CM

## 2023-08-23 PROCEDURE — 97110 THERAPEUTIC EXERCISES: CPT | Performed by: PHYSICAL THERAPIST

## 2023-08-23 PROCEDURE — 99282 EMERGENCY DEPT VISIT SF MDM: CPT

## 2023-08-23 PROCEDURE — 97161 PT EVAL LOW COMPLEX 20 MIN: CPT | Performed by: PHYSICAL THERAPIST

## 2023-08-23 PROCEDURE — 96372 THER/PROPH/DIAG INJ SC/IM: CPT

## 2023-08-23 PROCEDURE — 73502 X-RAY EXAM HIP UNI 2-3 VIEWS: CPT

## 2023-08-23 PROCEDURE — 25010000002 KETOROLAC TROMETHAMINE PER 15 MG: Performed by: EMERGENCY MEDICINE

## 2023-08-23 PROCEDURE — 73560 X-RAY EXAM OF KNEE 1 OR 2: CPT

## 2023-08-23 RX ORDER — OXYCODONE HYDROCHLORIDE AND ACETAMINOPHEN 5; 325 MG/1; MG/1
1 TABLET ORAL EVERY 6 HOURS PRN
Qty: 12 TABLET | Refills: 0 | Status: SHIPPED | OUTPATIENT
Start: 2023-08-23

## 2023-08-23 RX ORDER — KETOROLAC TROMETHAMINE 30 MG/ML
30 INJECTION, SOLUTION INTRAMUSCULAR; INTRAVENOUS ONCE
Status: COMPLETED | OUTPATIENT
Start: 2023-08-23 | End: 2023-08-23

## 2023-08-23 RX ORDER — KETOROLAC TROMETHAMINE 10 MG/1
10 TABLET, FILM COATED ORAL EVERY 6 HOURS PRN
Qty: 15 TABLET | Refills: 0 | Status: SHIPPED | OUTPATIENT
Start: 2023-08-23

## 2023-08-23 RX ADMIN — KETOROLAC TROMETHAMINE 30 MG: 30 INJECTION, SOLUTION INTRAMUSCULAR; INTRAVENOUS at 08:55

## 2023-08-23 NOTE — THERAPY EVALUATION
Patient Name: Qiana Altman  : 1950    MRN: [id number]                              Today's Date: 2023       Admit Date: 2023    Visit Dx:     ICD-10-CM ICD-9-CM   1. Hip pain, unspecified laterality  M25.559 719.45   2. Left hip pain  M25.552 719.45     Patient Active Problem List   Diagnosis    Allergic rhinitis due to allergen    Essential hypertension    GERD (gastroesophageal reflux disease)    Hyperlipidemia    Major depressive disorder    Vitamin D deficiency    Osteoarthritis    B12 deficiency    Anxiety disorder    Anemia    Overweight (BMI 25.0-29.9)     Past Medical History:   Diagnosis Date    Allergic rhinitis due to allergen 09/10/2019    Anemia 09/10/2019    Anxiety disorder 2020    B12 deficiency 2020    Callus     COVID-19 2022    Depression     Diverticulosis     Essential hypertension 2019    Family history of myocardial infarction 2023    GERD (gastroesophageal reflux disease) 2019    Graves' disease     HLD (hyperlipidemia) 2019    Hypothyroidism     Inflammatory bowel disease     Major depressive disorder 2019    Osteoarthritis 2020    Osteopenia     T2DM (type 2 diabetes mellitus) 2019    Vitamin D deficiency 2019     Past Surgical History:   Procedure Laterality Date    APPENDECTOMY       SECTION      CHOLECYSTECTOMY  2016    DR SCALES    COLON SURGERY      COLONOSCOPY      HIP FRACTURE SURGERY Left     REPAIR    HIP SURGERY      OOPHORECTOMY      SMALL INTESTINE SURGERY      TUBAL ABDOMINAL LIGATION      UPPER GASTROINTESTINAL ENDOSCOPY        General Information       Row Name 23 0926          Physical Therapy Time and Intention    Document Type evaluation  -LR     Mode of Treatment individual therapy  -LR       Row Name 23 09          General Information    Patient Profile Reviewed yes  -LR     Prior Level of Function independent:  -LR               User Key  (r) = Recorded  By, (t) = Taken By, (c) = Cosigned By      Initials Name Provider Type    Dayana Guthrie, PT Physical Therapist                  History: Patient reports she has had pain from her left knee to her hip since Friday.  Patient denies any known injury but states she did walk up and down a bunch of steps on Thursday at the doctor's office.  Patient has a salma and pin in her left femur that was placed in 1983.  Patient recently had her leg x-rayed at Dr. Ken's office and everything looked fine.  Patient reports she is able to walk on her leg but it causes significantly increased pain.  Patient is 8/10.  Patient reports numbness on the outside of her knee.  Patient reports she has never been able to bend her knee very much since her surgery.  Patient denies use of an assistive device.    Objective:    Palpation: Tender to palpation at left greater trochanter left quadricep, left knee medial and lateral joint line, left IT band    ROM:  Passive left hip flexion: 90 degrees  Decreased left hip internal and external rotation and abduction  Left knee flexion: 45 degrees (actively and passively; this is patient's normal knee ROM)  Full left ankle ROM     Strength:  L Hip MMT:    R Hip MMT:  Flexion: 3+   Flexion: 4+  Abduction: 4   Abduction: NT  Adduction: 4   Adduction: NT    L Knee MMT:   R Knee MMT:  Flexion: 4   Flexion: NT  Extension: 3+   Extension: NT    L Ankle MMT:   R Ankle MMT:  DF: 4+    DF: NT  PF: 4+    PF: NT  Inversion: 4+   Inversion: NT  Eversion: 4+   Eversion: NT     Special Tests:  Hip Scouring Test: positive on L  Obers Test: NT  FABERs Test: NT     Sensation: B LE sensation intact    Assessment/Plan:   Pt presents with a diagnosis of left hip and knee pain and has weakness and decreased ROM in left hip and knee that are limiting her ability to stand and walk.  The patient performed gentle ROM and strengthening exercises for her left hip and knee.  She was provided with a HEP handout.    Goals:    LTG 1: The patient will be independent in HEP in order to decrease pain and improve tolerance to functional activities.  STATUS: Met    Interventions:   Manual Therapy: Not performed    Therapeutic Exercises: SAQ (5x), SKTC (20 seconds), supine hip abduction (5x), quad set (5x5 seconds), glute set (5x5 seconds), SLR (5x)    Modalities: not performed      Outcome Measures       Row Name 08/23/23 0926          Optimal Instrument    Optimal Instrument Optimal - 3  -LR     Standing 2  -LR     Walking - short distance 3  -LR     Walking - long distance 4  -LR     From the list, choose the 3 activities you would most like to be able to do without any difficulty Standing;Walking -short distance;Walking -long distance  -LR     Total Score Optimal - 3 9  -LR       Row Name 08/23/23 0926          Functional Assessment    Outcome Measure Options Optimal Instrument  -LR               User Key  (r) = Recorded By, (t) = Taken By, (c) = Cosigned By      Initials Name Provider Type    Dayana Guthrie, PT Physical Therapist                     Time Calculation:   PT Evaluation Complexity  History, PT Evaluation Complexity: 3 or more personal factors and/or comorbidities  Examination of Body Systems (PT Eval Complexity): 1-2 elements  Clinical Presentation (PT Evaluation Complexity): stable  Clinical Decision Making (PT Evaluation Complexity): low complexity  Overall Complexity (PT Evaluation Complexity): low complexity     PT Charges       Row Name 08/23/23 0927             Time Calculation    PT Received On 08/23/23  -LR         Time Calculation- PT    Total Timed Code Minutes- PT 29 minute(s)  -LR         Timed Charges    16079 - PT Therapeutic Exercise Minutes 11  -LR         Untimed Charges    PT Eval/Re-eval Minutes 18  -LR         Total Minutes    Timed Charges Total Minutes 11  -LR      Untimed Charges Total Minutes 18  -LR       Total Minutes 29  -LR                User Key  (r) = Recorded By, (t) = Taken By, (c) =  Cosigned By      Initials Name Provider Type    LR Dayana Evans, PT Physical Therapist                  Therapy Charges for Today       Code Description Service Date Service Provider Modifiers Qty    43124808014 HC PT THER PROC EA 15 MIN 8/23/2023 Dayana Evans, PT GP 1    34659071443 HC PT EVAL LOW COMPLEXITY 2 8/23/2023 Dayana Evans, PT GP 1            PT G-Codes  Outcome Measure Options: Optimal Instrument       Dayana Evans, PT  8/23/2023

## 2023-08-23 NOTE — ED PROVIDER NOTES
Time: 9:18 AM EDT  Date of encounter:  2023  Independent Historian/Clinical History and Information was obtained by:   Patient    History is limited by: N/A    Chief Complaint: Hip pain      History of Present Illness:  Patient is a 73 y.o. year old female who presents to the emergency department for evaluation of left hip pain that is gotten worse over the past several days.  The patient reports that she went up several stairs last week to get to her PCP office.  Patient has no chest pain or shortness of breath.  Patient has no cough hemoptysis.  Patient denies nausea, vomiting, and diarrhea.  Patient denies leg swelling.  Patient has no fever or chills.    HPI    Patient Care Team  Primary Care Provider: Zohreh Mcduffie APRN    Past Medical History:     Allergies   Allergen Reactions    Shellfish Allergy Anaphylaxis    Moxifloxacin Hcl Unknown - Low Severity    Scopolamine Swelling    Sulfa Antibiotics Unknown - Low Severity    Morphine Rash    Penicillins Rash     Past Medical History:   Diagnosis Date    Allergic rhinitis due to allergen 09/10/2019    Anemia 09/10/2019    Anxiety disorder 2020    B12 deficiency 2020    Callus     COVID-19 2022    Depression     Diverticulosis     Essential hypertension 2019    Family history of myocardial infarction 2023    GERD (gastroesophageal reflux disease) 2019    Graves' disease     HLD (hyperlipidemia) 2019    Hypothyroidism     Inflammatory bowel disease     Major depressive disorder 2019    Osteoarthritis 2020    Osteopenia     T2DM (type 2 diabetes mellitus) 2019    Vitamin D deficiency 2019     Past Surgical History:   Procedure Laterality Date    APPENDECTOMY       SECTION      CHOLECYSTECTOMY  2016    DR SCALES    COLON SURGERY      COLONOSCOPY      HIP FRACTURE SURGERY Left     REPAIR    HIP SURGERY      OOPHORECTOMY      SMALL INTESTINE SURGERY      TUBAL ABDOMINAL  LIGATION      UPPER GASTROINTESTINAL ENDOSCOPY       Family History   Problem Relation Age of Onset    Heart disease Mother     Heart failure Mother     Arthritis Mother     Hyperlipidemia Mother     Hypertension Mother     Stroke Mother     Thyroid disease Mother     Heart disease Brother     Heart disease Brother     Rectal cancer Maternal Grandmother     Kidney disease Daughter     Miscarriages / Stillbirths Daughter     Lung cancer Other     Skin cancer Other        Home Medications:  Prior to Admission medications    Medication Sig Start Date End Date Taking? Authorizing Provider   Accu-Chek Graciela Plus test strip 1 each by Other route Daily. 3/1/23  Yes Laverne Seals MD   Accu-Chek Softclix Lancets lancets 1 each by Other route Daily. 2/7/23  Yes Laverne Seals MD   celecoxib (CeleBREX) 100 MG capsule TAKE ONE CAPSULE BY MOUTH TWICE DAILY 3/23/23  Yes Zohreh Mcduffie APRN   cetirizine (zyrTEC) 10 MG tablet Take 1 tablet by mouth Daily. 7/12/23  Yes Zohreh Mcduffie APRN   dicyclomine (BENTYL) 20 MG tablet Take 1 tablet by mouth Every 6 (Six) Hours. 7/23/23  Yes Lauren Stone APRN   esomeprazole (nexIUM) 40 MG capsule Take 1 capsule by mouth Daily. 7/12/23  Yes Zohreh Mcduffie APRN   fluticasone (FLONASE) 50 MCG/ACT nasal spray 2 sprays by Each Nare route Daily. 7/12/23  Yes Zohreh Mcduffie APRN   ondansetron ODT (ZOFRAN-ODT) 4 MG disintegrating tablet Place 1 tablet on the tongue 4 (Four) Times a Day As Needed for Nausea or Vomiting. 7/23/23  Yes Lauren Stone APRN   quinapril (ACCUPRIL) 10 MG tablet TAKE ONE TABLET BY MOUTH TWICE DAILY 3/23/23  Yes Zohreh Mcduffie APRN   ketorolac (TORADOL) 10 MG tablet Take 1 tablet by mouth Every 6 (Six) Hours As Needed for Moderate Pain. 8/23/23   Clyde Clark MD   oxyCODONE-acetaminophen (PERCOCET) 5-325 MG per tablet Take 1 tablet by mouth Every 6 (Six) Hours As Needed for Severe Pain. 8/23/23   Clyde Clark  "MD        Social History:   Social History     Tobacco Use    Smoking status: Former     Packs/day: 1.00     Years: 5.00     Pack years: 5.00     Types: Cigarettes     Start date: 1980     Quit date: 1985     Years since quittin.6    Smokeless tobacco: Never    Tobacco comments:     QUIT 28 YEARS AGO   Vaping Use    Vaping Use: Never used   Substance Use Topics    Alcohol use: Never    Drug use: Never         Review of Systems:  Review of Systems   Constitutional:  Negative for chills and fever.   HENT:  Negative for congestion, rhinorrhea and sore throat.    Eyes:  Negative for pain and visual disturbance.   Respiratory:  Negative for apnea, cough, chest tightness and shortness of breath.    Cardiovascular:  Negative for chest pain and palpitations.   Gastrointestinal:  Negative for abdominal pain, diarrhea, nausea and vomiting.   Genitourinary:  Negative for difficulty urinating and dysuria.   Musculoskeletal:  Positive for myalgias. Negative for joint swelling.   Skin:  Negative for color change.   Neurological:  Negative for seizures and headaches.   Psychiatric/Behavioral: Negative.     All other systems reviewed and are negative.     Physical Exam:  /70   Pulse 78   Temp 97.9 øF (36.6 øC) (Oral)   Resp 16   Ht 157.5 cm (62\")   Wt 62.6 kg (138 lb 0.1 oz)   SpO2 98%   BMI 25.24 kg/mý     Physical Exam  Vitals and nursing note reviewed.   Constitutional:       General: She is not in acute distress.     Appearance: Normal appearance. She is not toxic-appearing.   HENT:      Head: Normocephalic and atraumatic.      Jaw: There is normal jaw occlusion.   Eyes:      General: Lids are normal.      Extraocular Movements: Extraocular movements intact.      Conjunctiva/sclera: Conjunctivae normal.      Pupils: Pupils are equal, round, and reactive to light.   Cardiovascular:      Rate and Rhythm: Normal rate and regular rhythm.      Pulses: Normal pulses.      Heart sounds: Normal heart sounds. "   Pulmonary:      Effort: Pulmonary effort is normal. No respiratory distress.      Breath sounds: Normal breath sounds. No wheezing or rhonchi.   Abdominal:      General: Abdomen is flat.      Palpations: Abdomen is soft.      Tenderness: There is no abdominal tenderness. There is no guarding or rebound.   Musculoskeletal:         General: Normal range of motion.      Cervical back: Normal range of motion and neck supple.      Right lower leg: No edema.      Left lower leg: No edema.      Comments: (+) Left hip tenderness   Skin:     General: Skin is warm and dry.   Neurological:      Mental Status: She is alert and oriented to person, place, and time. Mental status is at baseline.   Psychiatric:         Mood and Affect: Mood normal.              Procedures:  Procedures      Medical Decision Making:      Comorbidities that affect care:    Previous surgery left left femur repair    External Notes reviewed:    Hospital Discharge Summary: Patient was admitted for hepatitis/transaminitis      The following orders were placed and all results were independently analyzed by me:  Orders Placed This Encounter   Procedures    XR Knee 1 or 2 View Left    XR Hip With or Without Pelvis 2 - 3 View Left       Medications Given in the Emergency Department:  Medications   ketorolac (TORADOL) injection 30 mg (30 mg Intramuscular Given 8/23/23 0855)        ED Course:         Labs:    Lab Results (last 24 hours)       ** No results found for the last 24 hours. **             Imaging:    XR Knee 1 or 2 View Left    Result Date: 8/23/2023  PROCEDURE: XR KNEE 1 OR 2 VW LEFT  COMPARISON: [facility], [place], XR KNEE 3 VW LEFT, 10/08/2022, 14:17.  INDICATIONS: GENERALIZED LEFT KNEE PAIN, NO KNOWN INJURY  FINDINGS:  No acute bony abnormality.  There is a femoral medullary salma and healed femoral diaphysis fracture.  Mild medial compartment osteoarthritis is noted.  No fluid in the suprapatellar bursa         1. No acute abnormality   2. Mild medial compartment osteoarthritis  3. Old femur fracture      GORDON TURNER MD       Electronically Signed and Approved By: GORDON TURNER MD on 8/23/2023 at 8:27             XR Hip With or Without Pelvis 2 - 3 View Left    Result Date: 8/23/2023  PROCEDURE: XR HIP W OR WO PELVIS 2-3 VIEW LEFT  COMPARISON: [facility], [place], XR KNEE 1 OR 2 VW LEFT, 8/23/2023, 8:13.  INDICATIONS: GENERALIZED LEFT HIP PAIN , NO KNOWN INJURY  FINDINGS:  No acute fracture.  No left hip joint space loss or arthritic change.  Femoral head has a normal contour and trabecular pattern.  There is a femoral medullary salma.  The proximal aspect of the salma extends approximately 3 cm be on the greater trochanter.  No abnormality of the pelvis is demonstrated.  Hip joints are symmetric.  Severe degenerative disease of the lumbar spine noted         1. Unremarkable left hip  2. Femoral salma for previous femur fracture fixation  3. Lumbar degenerative disease      GORDON TURNER MD       Electronically Signed and Approved By: GORDON TURNER MD on 8/23/2023 at 8:26                Differential Diagnosis and Discussion:    Extremity Pain: Differential diagnosis includes but is not limited to soft tissue sprain, tendonitis, tendon injury, dislocation, fracture, deep vein thrombosis, arterial insufficiency, osteoarthritis, bursitis, and ligamentous damage.    All X-rays impressions were independently interpreted by me.    MDM     Amount and/or Complexity of Data Reviewed  Tests in the radiology section of [facility]: reviewed       The patient's symptoms are consistent with a strain vs. sprain.     A muscle strain, also known as a pulled muscle, is an injury that occurs when a muscle is overstretched or torn, often as a result of fatigue, overuse, or improper use. This can result in pain, swelling, and a limited range of motion.    A sprain, on the other hand, is an injury to a ligament, which is the tissue that connects bones to each other.  Sprains often occur in joints like the ankle or wrist when they are twisted or impacted in a way that stretches or tears the ligaments. Symptoms of a sprain can include pain, swelling, bruising, and a decreased ability to move the joint.    The patient was counseled to use rest, ice, and elevation and follow-up with their PCP or an orthopedic surgeon.        Patient Care Considerations:    CT EXTREMITY: I considered ordering an extremity CT, however patient has bounding dorsalis pedis pulses bilaterally.      Consultants/Shared Management Plan:    None    Social Determinants of Health:    Patient is independent, reliable, and has access to care.       Disposition and Care Coordination:    Discharged: The patient is suitable and stable for discharge with no need for consideration of observation or admission.    I have explained the patient's condition, diagnoses and treatment plan based on the information available to me at this time. I have answered questions and addressed any concerns. The patient has a good  understanding of the patient's diagnosis, condition, and treatment plan as can be expected at this point. The vital signs have been stable. The patient's condition is stable and appropriate for discharge from the emergency department.      The patient will pursue further outpatient evaluation with the primary care physician or other designated or consulting physician as outlined in the discharge instructions. They are agreeable to this plan of care and follow-up instructions have been explained in detail. The patient has received these instructions in written format and have expressed an understanding of the discharge instructions. The patient is aware that any significant change in condition or worsening of symptoms should prompt an immediate return to this or the closest emergency department or call to 911.  I have explained discharge medications and the need for follow up with the patient/caretakers. This  was also printed in the discharge instructions. Patient was discharged with the following medications and follow up:      Medication List        New Prescriptions      ketorolac 10 MG tablet  Commonly known as: TORADOL  Take 1 tablet by mouth Every 6 (Six) Hours As Needed for Moderate Pain.     oxyCODONE-acetaminophen 5-325 MG per tablet  Commonly known as: PERCOCET  Take 1 tablet by mouth Every 6 (Six) Hours As Needed for Severe Pain.               Where to Get Your Medications        These medications were sent to [facility] - [place], [place] - [address] - [phone]  - [phone] FX  [address] [facility] [place] [address]      Phone: [phone]   ketorolac 10 MG tablet  oxyCODONE-acetaminophen 5-325 MG per tablet      Zohreh Mcduffie, APRN  [address] [facility] [place] [address]  [phone]    In 2 days         Final diagnoses:   Hip pain, unspecified laterality        ED Disposition       ED Disposition   Discharge    Condition   Stable    Comment   --               This medical record created using voice recognition software.             Clyde Clark MD  08/23/23 3808

## 2023-08-24 ENCOUNTER — TELEPHONE (OUTPATIENT)
Dept: FAMILY MEDICINE CLINIC | Facility: CLINIC | Age: 73
End: 2023-08-24

## 2023-08-24 NOTE — TELEPHONE ENCOUNTER
Caller: Qiana Altman    Relationship to patient: Self    Best call back number: [phone]    Patient is needing: PATIENT CALLED IN AND WAS SEEN IN THE E.R. AND IS STILL HAVING PAIN IN HER HIP AND LEG. SHE IS ALSO STATING HER BLOOD PRESSURE IS UP AND IS HAVING BACK PAIN. PATIENT STATES THAT SHE is urinating less even though drinking water and cranberry juice. PATIENT IS REQUESTING A CALL BACK PLEASE

## 2023-08-24 NOTE — TELEPHONE ENCOUNTER
Sched patient with Treva tomorrow to check for UTI-She states her leg is sore now. They xray leg and hip in ER and RX pain meds -but she is afraid to take med because of her kidneys.

## 2023-08-25 ENCOUNTER — OFFICE VISIT (OUTPATIENT)
Dept: FAMILY MEDICINE CLINIC | Facility: CLINIC | Age: 73
End: 2023-08-25
Payer: MEDICARE

## 2023-08-25 VITALS
BODY MASS INDEX: 26.68 KG/M2 | SYSTOLIC BLOOD PRESSURE: 134 MMHG | TEMPERATURE: 97.7 F | DIASTOLIC BLOOD PRESSURE: 62 MMHG | HEART RATE: 70 BPM | HEIGHT: 62 IN | WEIGHT: 145 LBS | OXYGEN SATURATION: 97 %

## 2023-08-25 DIAGNOSIS — M54.42 ACUTE BILATERAL LOW BACK PAIN WITH LEFT-SIDED SCIATICA: Primary | ICD-10-CM

## 2023-08-25 DIAGNOSIS — M25.562 ACUTE PAIN OF LEFT KNEE: ICD-10-CM

## 2023-08-25 DIAGNOSIS — M25.552 LEFT HIP PAIN: ICD-10-CM

## 2023-08-25 LAB
BILIRUB BLD-MCNC: NEGATIVE MG/DL
CLARITY, POC: CLEAR
COLOR UR: YELLOW
EXPIRATION DATE: NORMAL
GLUCOSE UR STRIP-MCNC: NEGATIVE MG/DL
KETONES UR QL: NEGATIVE
LEUKOCYTE EST, POC: NEGATIVE
Lab: NORMAL
NITRITE UR-MCNC: NEGATIVE MG/ML
PH UR: 5 [PH] (ref 5–8)
PROT UR STRIP-MCNC: NEGATIVE MG/DL
RBC # UR STRIP: NEGATIVE /UL
SP GR UR: 1.01 (ref 1–1.03)
UROBILINOGEN UR QL: NORMAL

## 2023-08-25 RX ORDER — KETOROLAC TROMETHAMINE 30 MG/ML
30 INJECTION, SOLUTION INTRAMUSCULAR; INTRAVENOUS ONCE
Status: COMPLETED | OUTPATIENT
Start: 2023-08-25 | End: 2023-08-25

## 2023-08-25 RX ADMIN — KETOROLAC TROMETHAMINE 30 MG: 30 INJECTION, SOLUTION INTRAMUSCULAR; INTRAVENOUS at 12:27

## 2023-08-25 NOTE — PROGRESS NOTES
"Chief Complaint  Hip Pain and Difficulty Urinating    Subjective          Qiana Altman, 73 y.o. female presents to [facility]  History of Present Illness   Patient presents today for an acute visit.  She is a patient of LOYD Nolen.  She is complaining of hip soreness and possible UTI.  She states that when she went to see the podiatrist, she had to walk up a bunch of steps.  She states that she then had to walk back down the steps to get back to her car.  She states that the next day she started hurting in her left hip that went all the way down her left knee.  She had been taking Celebrex with Tylenol but it was not helping.  She has history of surgery in the left hip and has \"a salma from her left hip to her knee.\"  She went to [facility] emergency room on 8/23/2023 with complaints of hip pain.  She was given Toradol injection and given Toradol pills to take at home.  She has taken a few of the Toradol pills but she has had some stomach upset.  She is not currently taking Celebrex as she was told not to take it with the Toradol pills.  She was also given a prescription for Percocet but she states she is not taking Percocet.  She states she does not want to take Percocet unless she really has to.  She is also complaining of lower back pain and she thought she might have a UTI.  Her urinalysis in the office today is negative for UTI.  She states that her hip pain is somewhat better but is still bothering her.       Tobacco Use: Medium Risk    Smoking Tobacco Use: Former    Smokeless Tobacco Use: Never    Passive Exposure: Not on file      Objective   Vital Signs:   /62   Pulse 70   Temp 97.7 øF (36.5 øC)   Ht 157.5 cm (62\")   Wt 65.8 kg (145 lb)   SpO2 97%   BMI 26.52 kg/mý       Current Outpatient Medications:     Accu-Chek Graciela Plus test strip, 1 each by Other route Daily., Disp: , Rfl:     Accu-Chek Softclix Lancets lancets, 1 each by " Other route Daily., Disp: , Rfl:     cetirizine (zyrTEC) 10 MG tablet, Take 1 tablet by mouth Daily., Disp: 90 tablet, Rfl: 1    dicyclomine (BENTYL) 20 MG tablet, Take 1 tablet by mouth Every 6 (Six) Hours., Disp: 30 tablet, Rfl: 0    esomeprazole (nexIUM) 40 MG capsule, Take 1 capsule by mouth Daily., Disp: 90 capsule, Rfl: 1    fluticasone (FLONASE) 50 MCG/ACT nasal spray, 2 sprays by Each Nare route Daily., Disp: 16 g, Rfl: 2    ketorolac (TORADOL) 10 MG tablet, Take 1 tablet by mouth Every 6 (Six) Hours As Needed for Moderate Pain., Disp: 15 tablet, Rfl: 0    quinapril (ACCUPRIL) 10 MG tablet, TAKE ONE TABLET BY MOUTH TWICE DAILY, Disp: 180 tablet, Rfl: 1    celecoxib (CeleBREX) 100 MG capsule, TAKE ONE CAPSULE BY MOUTH TWICE DAILY (Patient not taking: Reported on 8/25/2023), Disp: 180 capsule, Rfl: 1    ondansetron ODT (ZOFRAN-ODT) 4 MG disintegrating tablet, Place 1 tablet on the tongue 4 (Four) Times a Day As Needed for Nausea or Vomiting., Disp: 15 tablet, Rfl: 0    oxyCODONE-acetaminophen (PERCOCET) 5-325 MG per tablet, Take 1 tablet by mouth Every 6 (Six) Hours As Needed for Severe Pain., Disp: 12 tablet, Rfl: 0  No current facility-administered medications for this visit.   Past Medical History:   Diagnosis Date    Allergic rhinitis due to allergen 09/10/2019    Anemia 09/10/2019    Anxiety disorder 06/09/2020    B12 deficiency 09/23/2020    Callus     COVID-19 06/23/2022    Depression     Diverticulosis     Essential hypertension 01/03/2019    Family history of myocardial infarction 01/19/2023    GERD (gastroesophageal reflux disease) 01/03/2019    Graves' disease     HLD (hyperlipidemia) 01/03/2019    Hypothyroidism     Inflammatory bowel disease     Major depressive disorder 01/03/2019    Osteoarthritis 09/23/2020    Osteopenia     T2DM (type 2 diabetes mellitus) 01/03/2019    Vitamin D deficiency 01/03/2019      Physical Exam  Vitals reviewed.   Constitutional:       Appearance: Normal appearance.  She is well-developed.   Neck:      Thyroid: No thyroid mass, thyromegaly or thyroid tenderness.   Cardiovascular:      Rate and Rhythm: Normal rate and regular rhythm.      Heart sounds: No murmur heard.    No friction rub. No gallop.   Pulmonary:      Effort: Pulmonary effort is normal.      Breath sounds: Normal breath sounds. No wheezing or rhonchi.   Musculoskeletal:      Lumbar back: Spasms present. No swelling or tenderness.   Lymphadenopathy:      Cervical: No cervical adenopathy.   Skin:     General: Skin is warm and dry.   Neurological:      Mental Status: She is alert and oriented to person, place, and time.      Cranial Nerves: No cranial nerve deficit.   Psychiatric:         Mood and Affect: Mood and affect normal.         Behavior: Behavior normal.         Thought Content: Thought content normal. Thought content does not include homicidal or suicidal ideation.         Judgment: Judgment normal.      Result Review :   {The following data was reviewed by LOYD Mcarthur    Time: 9:18 AM EDT  Date of encounter:  8/23/2023  Independent Historian/Clinical History and Information was obtained by:   Patient     History is limited by: N/A     Chief Complaint: Hip pain        History of Present Illness:  Patient is a 73 y.o. year old female who presents to the emergency department for evaluation of left hip pain that is gotten worse over the past several days.  The patient reports that she went up several stairs last week to get to her PCP office.  Patient has no chest pain or shortness of breath.  Patient has no cough hemoptysis.  Patient denies nausea, vomiting, and diarrhea.  Patient denies leg swelling.  Patient has no fever or chills.     HPI  XR Knee 1 or 2 View Left    Result Date: 8/23/2023     1. No acute abnormality  2. Mild medial compartment osteoarthritis  3. Old femur fracture      GORDON TURNER MD       Electronically Signed and Approved By: GORDON TURNER MD on 8/23/2023 at 8:27              CT Abdomen Pelvis With Contrast    Result Date: 7/23/2023    1. No acute abdominal or pelvic abnormality is identified. 2. Postoperative change of the proximal left femur with enlarging fluid collection posterior to the proximal femur.  Recommend orthopedic evaluation.      AUDRA CERVANTES MD       Electronically Signed and Approved By: AUDRA CERVANTES MD on 7/23/2023 at 3:02             XR Hip With or Without Pelvis 2 - 3 View Left    Result Date: 8/23/2023     1. Unremarkable left hip  2. Femoral salma for previous femur fracture fixation  3. Lumbar degenerative disease      GORDON TURNER MD       Electronically Signed and Approved By: GORDON TURNER MD on 8/23/2023 at 8:26           Medications Given in the Emergency Department:  Medications   ketorolac (TORADOL) injection 30 mg (30 mg Intramuscular Given 8/23/23 0855)      New Prescriptions       ketorolac 10 MG tablet  Commonly known as: TORADOL  Take 1 tablet by mouth Every 6 (Six) Hours As Needed for Moderate Pain.      oxyCODONE-acetaminophen 5-325 MG per tablet  Commonly known as: PERCOCET  Take 1 tablet by mouth Every 6 (Six) Hours As Needed for Severe Pain.                         Component  Ref Range & Units 11:28  (8/25/23) 1 mo ago  (7/23/23) 6 mo ago  (2/21/23) 6 mo ago  (2/20/23) 11 mo ago  (9/29/22) 11 mo ago  (9/29/22) 1 yr ago  (8/15/22)   Color  Yellow, Straw, Dark Yellow, Dav[place] Yellow Yellow R Dark Yellow Abnormal  R Dark Yellow Abnormal  R Yellow R Yellow    Clarity, UA  Clear Clear Clear Clear Cloudy Abnormal  Clear Clear    Specific Gravity  1.005 - 1.030 1.010 <=1.005 1.028 1.017 1.025 1.020    pH, Urine  5.0 - 8.0 5.0 5.5 5.5 5.5 5.5 5.5    Leukocytes  Negative Negative Negative Trace Abnormal  Moderate (2+) Abnormal  Negative Negative    Nitrite, UA  Negative Negative Negative Negative Negative Negative Negative    Protein, POC  Negative mg/dL Negative Negative R Negative R 30 mg/dL (1+) Abnormal  R Negative R Negative    Glucose,  UA  Negative mg/dL Negative Negative R Negative R Negative R Negative R Negative    Ketones, UA  Negative Negative Negative 15 mg/dL (1+) Abnormal  15 mg/dL (1+) Abnormal  Negative Negative    Urobilinogen, UA  Normal, 0.2 E.U./dL 0.2 E.U./dL 0.2 E.U./dL R 1.0 E.U./dL R 1.0 E.U./dL R 0.2 E.U./dL R 0.2 E.U./dL    Bilirubin  Negative Negative Negative Small (1+) Abnormal  Moderate (2+) Abnormal  Negative Negative    Blood, UA  Negative Negative Negative Negative Negative Negative Negative                      Assessment and Plan    Diagnoses and all orders for this visit:    1. Acute bilateral low back pain with left-sided sciatica (Primary)  -     POCT urinalysis dipstick, automated  -     ketorolac (TORADOL) injection 30 mg    2. Left hip pain  -     ketorolac (TORADOL) injection 30 mg    3. Acute pain of left knee  -     ketorolac (TORADOL) injection 30 mg    We discussed possible sciatic nerve pain as her pain is radiating down her left side.  I will give her another Toradol injection 30 mg IM in the office today.  Advised her that the Toradol pills are only meant to be taken for short time as they are very strong.  I also discussed with her not to take Celebrex and Toradol at the same time.  I offered to prescribe her a muscle relaxer but she declines.  Advised her to follow-up if no improvement or any worsening of symptoms.    Follow Up   Return if symptoms worsen or fail to improve.  Patient was given instructions and counseling regarding her condition or for health maintenance advice. Please see specific information pulled into the AVS if appropriate.     Parts of this note are electronic transcriptions/translations of spoken language to printed text using the Dragon Dictation system.      Treva Jeffery, APRN  08/25/2023

## 2023-09-02 ENCOUNTER — HOSPITAL ENCOUNTER (EMERGENCY)
Facility: HOSPITAL | Age: 73
Discharge: HOME | End: 2023-09-02
Attending: EMERGENCY MEDICINE
Payer: MEDICARE

## 2023-09-02 VITALS
HEIGHT: 62 IN | WEIGHT: 145.06 LBS | TEMPERATURE: 98 F | RESPIRATION RATE: 18 BRPM | SYSTOLIC BLOOD PRESSURE: 152 MMHG | OXYGEN SATURATION: 97 % | HEART RATE: 63 BPM | DIASTOLIC BLOOD PRESSURE: 76 MMHG | BODY MASS INDEX: 26.69 KG/M2

## 2023-09-02 DIAGNOSIS — M17.12 OSTEOARTHRITIS OF LEFT KNEE, UNSPECIFIED OSTEOARTHRITIS TYPE: Primary | ICD-10-CM

## 2023-09-02 PROCEDURE — 99282 EMERGENCY DEPT VISIT SF MDM: CPT

## 2023-09-02 PROCEDURE — 25010000002 KETOROLAC TROMETHAMINE PER 15 MG: Performed by: EMERGENCY MEDICINE

## 2023-09-02 PROCEDURE — 96372 THER/PROPH/DIAG INJ SC/IM: CPT

## 2023-09-02 RX ORDER — KETOROLAC TROMETHAMINE 30 MG/ML
30 INJECTION, SOLUTION INTRAMUSCULAR; INTRAVENOUS ONCE
Status: COMPLETED | OUTPATIENT
Start: 2023-09-02 | End: 2023-09-02

## 2023-09-02 RX ORDER — PREDNISONE 20 MG/1
20 TABLET ORAL DAILY
Qty: 7 TABLET | Refills: 0 | Status: SHIPPED | OUTPATIENT
Start: 2023-09-02

## 2023-09-02 RX ORDER — KETOROLAC TROMETHAMINE 30 MG/ML
15 INJECTION, SOLUTION INTRAMUSCULAR; INTRAVENOUS ONCE
Status: DISCONTINUED | OUTPATIENT
Start: 2023-09-02 | End: 2023-09-02

## 2023-09-02 RX ADMIN — KETOROLAC TROMETHAMINE 30 MG: 30 INJECTION, SOLUTION INTRAMUSCULAR; INTRAVENOUS at 17:38

## 2023-09-02 NOTE — ED PROVIDER NOTES
Time: 4:45 PM EDT  Date of encounter:  2023  Independent Historian/Clinical History and Information was obtained by:   Patient    History is limited by: N/A    Chief Complaint: Left knee pain      History of Present Illness:  Patient is a 73 y.o. year old female who presents to the emergency department for evaluation of 3 weeks of left knee pain.  She states is painful to bear weight.  She denies trauma.  She was seen here for this problem a few recently and prescribed anti-inflammatories which she states provided no relief.  She states she is also prescribed narcotic analgesics but did not fill the prescription.    HPI    Patient Care Team  Primary Care Provider: Zohreh Mcduffie APRN    Past Medical History:     Allergies   Allergen Reactions    Shellfish Allergy Anaphylaxis    Moxifloxacin Hcl Unknown - Low Severity    Scopolamine Swelling    Sulfa Antibiotics Unknown - Low Severity    Morphine Rash    Penicillins Rash     Past Medical History:   Diagnosis Date    Allergic rhinitis due to allergen 09/10/2019    Anemia 09/10/2019    Anxiety disorder 2020    B12 deficiency 2020    Callus     COVID-19 2022    Depression     Diverticulosis     Essential hypertension 2019    Family history of myocardial infarction 2023    GERD (gastroesophageal reflux disease) 2019    Graves' disease     HLD (hyperlipidemia) 2019    Hypothyroidism     Inflammatory bowel disease     Major depressive disorder 2019    Osteoarthritis 2020    Osteopenia     T2DM (type 2 diabetes mellitus) 2019    Vitamin D deficiency 2019     Past Surgical History:   Procedure Laterality Date    APPENDECTOMY       SECTION      CHOLECYSTECTOMY  2016    DR SCALES    COLON SURGERY      COLONOSCOPY      HIP FRACTURE SURGERY Left     REPAIR    HIP SURGERY      OOPHORECTOMY      SMALL INTESTINE SURGERY      TUBAL ABDOMINAL LIGATION      UPPER GASTROINTESTINAL ENDOSCOPY        Family History   Problem Relation Age of Onset    Heart disease Mother     Heart failure Mother     Arthritis Mother     Hyperlipidemia Mother     Hypertension Mother     Stroke Mother     Thyroid disease Mother     Heart disease Brother     Heart disease Brother     Rectal cancer Maternal Grandmother     Kidney disease Daughter     Miscarriages / Stillbirths Daughter     Lung cancer Other     Skin cancer Other        Home Medications:  Prior to Admission medications    Medication Sig Start Date End Date Taking? Authorizing Provider   Accu-Chek Graciela Plus test strip 1 each by Other route Daily. 3/1/23   Laverne Seals MD   Accu-Cherodrigue Softclix Lancets lancets 1 each by Other route Daily. 2/7/23   Laverne Seals MD   celecoxib (CeleBREX) 100 MG capsule TAKE ONE CAPSULE BY MOUTH TWICE DAILY  Patient not taking: Reported on 8/25/2023 3/23/23   Zohreh Mcduffie APRN   cetirizine (zyrTEC) 10 MG tablet Take 1 tablet by mouth Daily. 7/12/23   Zohreh Mcduffie APRN   dicyclomine (BENTYL) 20 MG tablet Take 1 tablet by mouth Every 6 (Six) Hours. 7/23/23   Lauren Stone APRN   esomeprazole (nexIUM) 40 MG capsule Take 1 capsule by mouth Daily. 7/12/23   Zohreh Mcduffie APRN   fluticasone (FLONASE) 50 MCG/ACT nasal spray 2 sprays by Each Nare route Daily. 7/12/23   Zohreh Mcduffie APRN   ketorolac (TORADOL) 10 MG tablet Take 1 tablet by mouth Every 6 (Six) Hours As Needed for Moderate Pain. 8/23/23   Clyde Clark MD   ondansetron ODT (ZOFRAN-ODT) 4 MG disintegrating tablet Place 1 tablet on the tongue 4 (Four) Times a Day As Needed for Nausea or Vomiting. 7/23/23   Lauren Stone APRN   oxyCODONE-acetaminophen (PERCOCET) 5-325 MG per tablet Take 1 tablet by mouth Every 6 (Six) Hours As Needed for Severe Pain. 8/23/23   Clyde Clark MD   quinapril (ACCUPRIL) 10 MG tablet TAKE ONE TABLET BY MOUTH TWICE DAILY 3/23/23   Zohreh Mcduffie APRN        Social History:   Social  "History     Tobacco Use    Smoking status: Former     Packs/day: 1.00     Years: 5.00     Pack years: 5.00     Types: Cigarettes     Start date: 1980     Quit date: 1985     Years since quittin.6    Smokeless tobacco: Never    Tobacco comments:     QUIT 28 YEARS AGO   Vaping Use    Vaping Use: Never used   Substance Use Topics    Alcohol use: Never    Drug use: Never         Review of Systems:  Review of Systems   Constitutional:  Negative for chills and fever.   HENT:  Negative for congestion, ear pain and sore throat.    Eyes:  Negative for pain.   Respiratory:  Negative for cough, chest tightness and shortness of breath.    Cardiovascular:  Negative for chest pain.   Gastrointestinal:  Negative for abdominal pain, diarrhea, nausea and vomiting.   Genitourinary:  Negative for flank pain and hematuria.   Musculoskeletal:  Positive for joint swelling.   Skin:  Negative for pallor.   Neurological:  Negative for seizures and headaches.   All other systems reviewed and are negative.     Physical Exam:  /79   Pulse 78   Temp 98 °F (36.7 °C) (Oral)   Resp 18   Ht 157.5 cm (62\")   Wt 65.8 kg (145 lb 1 oz)   SpO2 93%   BMI 26.53 kg/m²     Physical Exam  Constitutional:       Appearance: Normal appearance.   HENT:      Head: Normocephalic and atraumatic.      Nose: Nose normal.      Mouth/Throat:      Mouth: Mucous membranes are moist.   Eyes:      Extraocular Movements: Extraocular movements intact.      Conjunctiva/sclera: Conjunctivae normal.      Pupils: Pupils are equal, round, and reactive to light.   Cardiovascular:      Rate and Rhythm: Normal rate and regular rhythm.      Pulses: Normal pulses.      Heart sounds: Normal heart sounds.   Pulmonary:      Effort: Pulmonary effort is normal.      Breath sounds: Normal breath sounds. No wheezing.   Abdominal:      Palpations: Abdomen is soft.      Tenderness: There is no abdominal tenderness.   Musculoskeletal:         General: Normal range of " motion.      Cervical back: Normal range of motion and neck supple.      Right lower leg: No edema.      Left lower leg: No edema.      Comments: There is questionable mild tenderness to the knee there is no erythema or edema.   Skin:     General: Skin is warm and dry.      Capillary Refill: Capillary refill takes less than 2 seconds.      Findings: No rash.   Neurological:      General: No focal deficit present.      Mental Status: She is alert and oriented to person, place, and time. Mental status is at baseline.      Cranial Nerves: No cranial nerve deficit.      Sensory: No sensory deficit.      Motor: No weakness.   Psychiatric:         Mood and Affect: Mood normal.         Behavior: Behavior normal.                Procedures:  Procedures      Medical Decision Making:      Comorbidities that affect care:    Osteoarthritis    External Notes reviewed:    Previous Radiological Studies: Recent knee x-ray demonstrating mild osteoarthritis of the left knee.      The following orders were placed and all results were independently analyzed by me:  No orders of the defined types were placed in this encounter.      Medications Given in the Emergency Department:  Medications   ketorolac (TORADOL) injection 15 mg (has no administration in time range)   ketorolac (TORADOL) injection 30 mg (has no administration in time range)        ED Course:         Labs:    Lab Results (last 24 hours)       ** No results found for the last 24 hours. **             Imaging:    No Radiology Exams Resulted Within Past 24 Hours      Differential Diagnosis and Discussion:    Extremity Pain: Differential diagnosis includes but is not limited to soft tissue sprain, tendonitis, tendon injury, dislocation, fracture, deep vein thrombosis, arterial insufficiency, osteoarthritis, bursitis, and ligamentous damage.    All X-rays impressions were independently interpreted by me.    MDM           Patient Care Considerations:          Consultants/Shared  Management Plan:    None    Social Determinants of Health:    Patient is independent, reliable, and has access to care.       Disposition and Care Coordination:    Discharged: The patient is suitable and stable for discharge with no need for consideration of observation or admission.    I have explained discharge medications and the need for follow up with the patient/caretakers. This was also printed in the discharge instructions. Patient was discharged with the following medications and follow up:      Medication List        New Prescriptions      predniSONE 20 MG tablet  Commonly known as: DELTASONE  Take 1 tablet by mouth Daily.               Where to Get Your Medications        These medications were sent to [facility]/pharmacy #[id number] - [place], [place] - [address] - [phone]  - [phone] FX  [address] [place] [address] [place] [address]      Hours: 24-hours Phone: [phone]   predniSONE 20 MG tablet      Zohreh Mcduffie, LOYD  [address] [place] [address] [phone]             Final diagnoses:   Osteoarthritis of left knee, unspecified osteoarthritis type        ED Disposition       ED Disposition   Discharge    Condition   Stable    Comment   --               This medical record created using voice recognition software.             Eduin Washington MD  09/02/23 2103

## 2023-09-02 NOTE — DISCHARGE INSTRUCTIONS
Elevate the knee as needed for discomfort.  Follow-up with your primary care provider for consideration of MRI of the knee.  The previously prescribed pain medicine as directed and as needed for pain.

## 2023-09-06 ENCOUNTER — OFFICE VISIT (OUTPATIENT)
Dept: FAMILY MEDICINE CLINIC | Facility: CLINIC | Age: 73
End: 2023-09-06
Payer: MEDICARE

## 2023-09-06 VITALS
BODY MASS INDEX: 26.35 KG/M2 | SYSTOLIC BLOOD PRESSURE: 120 MMHG | WEIGHT: 143.2 LBS | HEART RATE: 78 BPM | TEMPERATURE: 97.5 F | HEIGHT: 62 IN | OXYGEN SATURATION: 100 % | RESPIRATION RATE: 16 BRPM | DIASTOLIC BLOOD PRESSURE: 80 MMHG

## 2023-09-06 DIAGNOSIS — M25.552 LEFT HIP PAIN: Primary | ICD-10-CM

## 2023-09-06 DIAGNOSIS — M25.562 ACUTE PAIN OF LEFT KNEE: ICD-10-CM

## 2023-09-06 DIAGNOSIS — Z87.81 HISTORY OF FEMUR FRACTURE: ICD-10-CM

## 2023-09-06 PROCEDURE — 3079F DIAST BP 80-89 MM HG: CPT | Performed by: NURSE PRACTITIONER

## 2023-09-06 PROCEDURE — 1160F RVW MEDS BY RX/DR IN RCRD: CPT | Performed by: NURSE PRACTITIONER

## 2023-09-06 PROCEDURE — 1159F MED LIST DOCD IN RCRD: CPT | Performed by: NURSE PRACTITIONER

## 2023-09-06 PROCEDURE — 99214 OFFICE O/P EST MOD 30 MIN: CPT | Performed by: NURSE PRACTITIONER

## 2023-09-06 PROCEDURE — 3074F SYST BP LT 130 MM HG: CPT | Performed by: NURSE PRACTITIONER

## 2023-09-06 NOTE — PROGRESS NOTES
Answers submitted by the patient for this visit:  Other (Submitted on 2023)  Please describe your symptoms.: Left knee leg and hip pain  Have you had these symptoms before?: Yes  How long have you been having these symptoms?: Greater than 2 weeks  Please list any medications you are currently taking for this condition.: Ketorlac prednisone  Primary Reason for Visit (Submitted on 2023)  What is the primary reason for your visit?: Other  Chief Complaint  Transitional Care Management and Knee Pain (Left knee pain and right hip pain)    Tyler Donovanjanes Altman presents to [facility]  History of Present Illness  She is here for hip/knee pain.  She saw Treva the other day for the knee but then ended up in the ER for knee pain.  She is having the pain on the inside.  She didn't take any pain meds from the ER but then she went back to the ER requesting a MRI but they told her she needs to follow up with me for the MRI.  She is having a hard time walking but she is trying to stay active.  She has been trying to do some stretches.  She has had a hx of a femur fracture after a serious accident in  and then in  she had another salma place in the left femur.  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  She has had issues with the left leg being shorter than the right since .    Depression: Not at risk    PHQ-2 Score: 0    and 2023               Allergies  Shellfish allergy, Moxifloxacin hcl, Scopolamine, Sulfa antibiotics, Morphine, and Penicillins    Social History     Tobacco Use    Smoking status: Former     Packs/day: 1.00     Years: 5.00     Pack years: 5.00     Types: Cigarettes     Start date: 1980     Quit date: 1985     Years since quittin.7    Smokeless tobacco: Never    Tobacco comments:     QUIT 28 YEARS AGO   Vaping Use    Vaping Use: Never used   Substance Use Topics  "   Alcohol use: Never    Drug use: Never       Family History   Problem Relation Age of Onset    Heart disease Mother     Heart failure Mother     Arthritis Mother     Hyperlipidemia Mother     Hypertension Mother     Stroke Mother     Thyroid disease Mother     Heart disease Brother     Heart disease Brother     Rectal cancer Maternal Grandmother     Kidney disease Daughter     Miscarriages / Stillbirths Daughter     Lung cancer Other     Skin cancer Other         There are no preventive care reminders to display for this patient.     Immunization History   Administered Date(s) Administered    COVID-19 (MODERNA) 1st,2nd,3rd Dose Monovalent 03/26/2021, 04/23/2021    Flu Vaccine Quad PF >36MO 09/09/2014, 12/29/2015    Fluzone >6mos 09/09/2014, 12/29/2015    Fluzone High-Dose 65+yrs 10/04/2021, 09/29/2022    Influenza, Unspecified 09/23/2020    Pneumococcal Conjugate 13-Valent (PCV13) 11/14/2017    Pneumococcal Polysaccharide (PPSV23) 09/23/2020    Tdap 06/22/2016       Review of Systems   Musculoskeletal:  Positive for arthralgias, back pain, gait problem and myalgias.      Objective       Vitals:    09/06/23 1109 09/06/23 1114   BP: 166/84 120/80   Pulse: 78    Resp: 16    Temp: 97.5 °F (36.4 °C)    SpO2: 100%    Weight: 65 kg (143 lb 3.2 oz)    Height: 157.5 cm (62\")        Body mass index is 26.19 kg/m².         Physical Exam  Constitutional:       Appearance: Normal appearance.   HENT:      Head: Normocephalic.   Pulmonary:      Effort: Pulmonary effort is normal.   Musculoskeletal:      Comments: There is tenderness noted on the anterior aspect of the knee.  The left knee is slt swollen.  She is walking with a cane.   Skin:     Findings: No bruising.   Neurological:      General: No focal deficit present.      Mental Status: She is alert and oriented to person, place, and time.   Psychiatric:         Mood and Affect: Mood normal.         Behavior: Behavior normal.         Thought Content: Thought content normal. "         Judgment: Judgment normal.           Result Review :     The following data was reviewed by: LOYD Nolen on 09/06/2023:              I did review her note from [facility] and 2 the ER visit.       Assessment and Plan      Diagnoses and all orders for this visit:    1. Left hip pain (Primary)  -     MRI Knee Left With & Without Contrast; Future  -     MRI Hip Left With & Without Contrast; Future  -     Ambulatory Referral to Orthopedic Surgery    2. Acute pain of left knee  -     MRI Knee Left With & Without Contrast; Future  -     MRI Hip Left With & Without Contrast; Future  -     Ambulatory Referral to Orthopedic Surgery    3. History of femur fracture  -     MRI Knee Left With & Without Contrast; Future  -     MRI Hip Left With & Without Contrast; Future  -     Ambulatory Referral to Orthopedic Surgery        I spent 32 minutes caring for Qiana on this date of service. This time includes time spent by me in the following activities:preparing for the visit, reviewing tests, obtaining and/or reviewing a separately obtained history, performing a medically appropriate examination and/or evaluation , counseling and educating the patient/family/caregiver, ordering medications, tests, or procedures, referring and communicating with other health care professionals , and documenting information in the medical record    Follow Up     Return if symptoms worsen or fail to improve.  We are doing a new referral to ortho.  She recently saw them but didn't hve the MRI done.  She is going to do the MRI of the knee and hip to check for any tears like labrum vs meniscus/ACL tear.  She is aware that the pain may becoming from the back and if the mri's are good in the hip and knee then we will do MRI of the back.  She will cont to be active.  She will cont to use the cane for support.  She was given the # to schedule the Appointment.  She has her inflammatory med, steroid and pain med.  She will cont those  meds and we will get the MRI.  She is to take tylenol 1000mg every 8 hours around the clock. She said none of the medication is really taking any of the pain away.  Patient was given instructions and counseling regarding her condition or for health maintenance advice. Please see specific information pulled into the AVS if appropriate.     Parts of this note are electronic transcriptions/translations of spoken language to printed text using the Dragon Dictation system.          Zohreh Mcduffie, LOYD  09/06/2023

## 2023-09-12 ENCOUNTER — OFFICE VISIT (OUTPATIENT)
Dept: GASTROENTEROLOGY | Facility: CLINIC | Age: 73
End: 2023-09-12
Payer: MEDICARE

## 2023-09-12 VITALS
SYSTOLIC BLOOD PRESSURE: 167 MMHG | BODY MASS INDEX: 25.95 KG/M2 | WEIGHT: 141 LBS | DIASTOLIC BLOOD PRESSURE: 72 MMHG | HEIGHT: 62 IN | HEART RATE: 73 BPM

## 2023-09-12 DIAGNOSIS — R74.8 ELEVATED LIVER ENZYMES: Primary | ICD-10-CM

## 2023-09-12 DIAGNOSIS — K21.9 GASTROESOPHAGEAL REFLUX DISEASE WITHOUT ESOPHAGITIS: ICD-10-CM

## 2023-09-12 DIAGNOSIS — Z86.010 HISTORY OF COLON POLYPS: ICD-10-CM

## 2023-09-12 PROCEDURE — 99214 OFFICE O/P EST MOD 30 MIN: CPT | Performed by: NURSE PRACTITIONER

## 2023-09-12 PROCEDURE — 3078F DIAST BP <80 MM HG: CPT | Performed by: NURSE PRACTITIONER

## 2023-09-12 PROCEDURE — 1160F RVW MEDS BY RX/DR IN RCRD: CPT | Performed by: NURSE PRACTITIONER

## 2023-09-12 PROCEDURE — 3077F SYST BP >= 140 MM HG: CPT | Performed by: NURSE PRACTITIONER

## 2023-09-12 PROCEDURE — 1159F MED LIST DOCD IN RCRD: CPT | Performed by: NURSE PRACTITIONER
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
## 2023-09-14 ENCOUNTER — OFFICE VISIT (OUTPATIENT)
Dept: ORTHOPEDIC SURGERY | Facility: CLINIC | Age: 73
End: 2023-09-14
Payer: MEDICARE

## 2023-09-14 VITALS
WEIGHT: 141 LBS | HEIGHT: 62 IN | HEART RATE: 68 BPM | SYSTOLIC BLOOD PRESSURE: 152 MMHG | DIASTOLIC BLOOD PRESSURE: 85 MMHG | OXYGEN SATURATION: 98 % | BODY MASS INDEX: 25.95 KG/M2

## 2023-09-14 DIAGNOSIS — M17.12 PRIMARY OSTEOARTHRITIS OF LEFT KNEE: Primary | ICD-10-CM

## 2023-09-14 RX ORDER — LIDOCAINE HYDROCHLORIDE 10 MG/ML
5 INJECTION, SOLUTION INFILTRATION; PERINEURAL
Status: COMPLETED | OUTPATIENT
Start: 2023-09-14 | End: 2023-09-14

## 2023-09-14 RX ORDER — TRIAMCINOLONE ACETONIDE 40 MG/ML
40 INJECTION, SUSPENSION INTRA-ARTICULAR; INTRAMUSCULAR
Status: COMPLETED | OUTPATIENT
Start: 2023-09-14 | End: 2023-09-14

## 2023-09-14 RX ADMIN — LIDOCAINE HYDROCHLORIDE 5 ML: 10 INJECTION, SOLUTION INFILTRATION; PERINEURAL at 09:19

## 2023-09-14 RX ADMIN — TRIAMCINOLONE ACETONIDE 40 MG: 40 INJECTION, SUSPENSION INTRA-ARTICULAR; INTRAMUSCULAR at 09:19

## 2023-09-19 DIAGNOSIS — I10 ESSENTIAL HYPERTENSION: ICD-10-CM

## 2023-09-19 DIAGNOSIS — M19.90 OSTEOARTHRITIS, UNSPECIFIED OSTEOARTHRITIS TYPE, UNSPECIFIED SITE: ICD-10-CM

## 2023-09-19 DIAGNOSIS — E78.2 MIXED HYPERLIPIDEMIA: ICD-10-CM

## 2023-09-19 RX ORDER — CELECOXIB 100 MG/1
CAPSULE ORAL
Qty: 180 CAPSULE | Refills: 1 | Status: SHIPPED | OUTPATIENT
Start: 2023-09-19

## 2023-09-19 RX ORDER — ATORVASTATIN CALCIUM 10 MG/1
TABLET, FILM COATED ORAL
Qty: 90 TABLET | Refills: 1 | OUTPATIENT
Start: 2023-09-19

## 2023-09-19 RX ORDER — QUINAPRIL 10 MG/1
TABLET ORAL
Qty: 180 TABLET | Refills: 1 | Status: SHIPPED | OUTPATIENT
Start: 2023-09-19

## 2023-10-03 ENCOUNTER — OFFICE VISIT (OUTPATIENT)
Dept: FAMILY MEDICINE CLINIC | Facility: CLINIC | Age: 73
End: 2023-10-03
Payer: MEDICARE

## 2023-10-03 VITALS
DIASTOLIC BLOOD PRESSURE: 80 MMHG | OXYGEN SATURATION: 99 % | TEMPERATURE: 97.1 F | HEART RATE: 71 BPM | SYSTOLIC BLOOD PRESSURE: 120 MMHG | RESPIRATION RATE: 18 BRPM

## 2023-10-03 DIAGNOSIS — J34.89 SINUS DRAINAGE: ICD-10-CM

## 2023-10-03 DIAGNOSIS — J01.00 ACUTE NON-RECURRENT MAXILLARY SINUSITIS: Primary | ICD-10-CM

## 2023-10-03 LAB
EXPIRATION DATE: NORMAL
FLUAV AG UPPER RESP QL IA.RAPID: NOT DETECTED
FLUBV AG UPPER RESP QL IA.RAPID: NOT DETECTED
INTERNAL CONTROL: NORMAL
Lab: NORMAL
SARS-COV-2 AG UPPER RESP QL IA.RAPID: NOT DETECTED

## 2023-10-03 PROCEDURE — 99213 OFFICE O/P EST LOW 20 MIN: CPT | Performed by: NURSE PRACTITIONER

## 2023-10-03 PROCEDURE — 1160F RVW MEDS BY RX/DR IN RCRD: CPT | Performed by: NURSE PRACTITIONER

## 2023-10-03 PROCEDURE — 87428 SARSCOV & INF VIR A&B AG IA: CPT | Performed by: NURSE PRACTITIONER

## 2023-10-03 PROCEDURE — 3079F DIAST BP 80-89 MM HG: CPT | Performed by: NURSE PRACTITIONER

## 2023-10-03 PROCEDURE — 3074F SYST BP LT 130 MM HG: CPT | Performed by: NURSE PRACTITIONER

## 2023-10-03 PROCEDURE — 1159F MED LIST DOCD IN RCRD: CPT | Performed by: NURSE PRACTITIONER

## 2023-10-03 RX ORDER — AZITHROMYCIN 250 MG/1
TABLET, FILM COATED ORAL
Qty: 6 TABLET | Refills: 0 | Status: SHIPPED | OUTPATIENT
Start: 2023-10-03

## 2023-10-03 RX ORDER — BROMPHENIRAMINE MALEATE, PSEUDOEPHEDRINE HYDROCHLORIDE, AND DEXTROMETHORPHAN HYDROBROMIDE 2; 30; 10 MG/5ML; MG/5ML; MG/5ML
5 SYRUP ORAL 4 TIMES DAILY PRN
Qty: 240 ML | Refills: 1 | Status: SHIPPED | OUTPATIENT
Start: 2023-10-03

## 2023-10-03 NOTE — PROGRESS NOTES
Chief Complaint  Sinusitis (States symptoms started ), Cough, Sore Throat, and Earache    Subjective          Qiana SERRATO Agapito presents to [facility]  History of Present Illness  Patient said that she started last week with just allergy based but then this  she started with more drainage itchy yellow to dark brownish.  She said that she has had increased drainage [place]che cough sinus congestion.  She is not had any nausea vomiting or fever.  She has had some sore throat.  She has not had any body aches.  She feels that this is went into a sinus infection.    Depression: Not at risk    PHQ-2 Score: 0    and 2023               Allergies  Shellfish allergy, Moxifloxacin hcl, Nitrous oxide, Scopolamine, Sulfa antibiotics, Morphine, and Penicillins    Social History     Tobacco Use    Smoking status: Former     Packs/day: 1.00     Years: 5.00     Pack years: 5.00     Types: Cigarettes     Start date: 1980     Quit date: 1985     Years since quittin.7    Smokeless tobacco: Never    Tobacco comments:     QUIT 28 YEARS AGO   Vaping Use    Vaping Use: Never used   Substance Use Topics    Alcohol use: Never    Drug use: Never       Family History   Problem Relation Age of Onset    Heart disease Mother     Heart failure Mother     Arthritis Mother     Hyperlipidemia Mother     Hypertension Mother     Stroke Mother     Thyroid disease Mother     Heart disease Brother     Heart disease Brother     Rectal cancer Maternal Grandmother     Kidney disease Daughter     Miscarriages / Stillbirths Daughter     Lung cancer Other     Skin cancer Other         Health Maintenance Due   Topic Date Due    INFLUENZA VACCINE  2023        Immunization History   Administered Date(s) Administered    COVID-19 (MODERNA) 1st,2nd,3rd Dose Monovalent 2021, 2021    Flu Vaccine Quad PF >36MO 2014, 2015    Fluzone (or Fluarix & Flulaval for VFC) >6mos 2014,  12/29/2015    Fluzone High-Dose 65+yrs 10/04/2021, 09/29/2022    Influenza, Unspecified 09/23/2020    Pneumococcal Conjugate 13-Valent (PCV13) 11/14/2017    Pneumococcal Polysaccharide (PPSV23) 09/23/2020    Tdap 06/22/2016       Review of Systems   Constitutional:  Negative for fatigue and fever.   HENT:  Positive for congestion, ear pain, postnasal drip, rhinorrhea, sinus pressure and sore throat.    Respiratory:  Positive for cough. Negative for shortness of breath.    Cardiovascular:  Negative for chest pain.   Gastrointestinal:  Negative for diarrhea, nausea and vomiting.      Objective       Vitals:    10/03/23 1021   BP: 120/80   Pulse: 71   Resp: 18   Temp: 97.1 °F (36.2 °C)   SpO2: 99%       There is no height or weight on file to calculate BMI.         Physical Exam  Vitals reviewed.   Constitutional:       Appearance: Normal appearance. She is well-developed.   HENT:      Right Ear: Tympanic membrane and ear canal normal. There is no impacted cerumen.      Left Ear: Tympanic membrane and ear canal normal. There is no impacted cerumen.      Nose: Congestion and rhinorrhea present.      Comments: Turbs worse on the right side than the left     Mouth/Throat:      Pharynx: Posterior oropharyngeal erythema present. No oropharyngeal exudate.   Eyes:      General: No scleral icterus.        Right eye: No discharge.         Left eye: No discharge.      Conjunctiva/sclera: Conjunctivae normal.   Cardiovascular:      Rate and Rhythm: Normal rate and regular rhythm.      Heart sounds: Normal heart sounds. No murmur heard.  Pulmonary:      Effort: Pulmonary effort is normal.      Breath sounds: Normal breath sounds. No wheezing or rhonchi.   Neurological:      Mental Status: She is alert and oriented to person, place, and time.      Cranial Nerves: No cranial nerve deficit.      Motor: No weakness.   Psychiatric:         Mood and Affect: Mood and affect normal.           Result Review :     The following data was  reviewed by: LOYD Nolen on 10/03/2023:    POC COVID/FLU   Lab Results   Component Value Date    SARSANTIGEN Not Detected 10/03/2023    FLUAAG Not Detected 10/03/2023    FLUBAG Not Detected 10/03/2023    INTCT Passed 10/03/2023    LOTNUMBER [id number] 10/03/2023    EXPIRATDTE 04/16/2024 10/03/2023                     Assessment and Plan      Diagnoses and all orders for this visit:    1. Acute non-recurrent maxillary sinusitis (Primary)  -     azithromycin (Zithromax Z-Evan) 250 MG tablet; Take 2 tablets by mouth on day 1, then 1 tablet daily on days 2-5  Dispense: 6 tablet; Refill: 0  -     brompheniramine-pseudoephedrine-DM 30-2-10 MG/5ML syrup; Take 5 mL by mouth 4 (Four) Times a Day As Needed for Cough or Congestion.  Dispense: 240 mL; Refill: 1    2. Sinus drainage  -     POCT SARS-CoV-2 Antigen RADHA + Flu            Follow Up     Return if symptoms worsen or fail to improve.  Discussed that yes she does have a sinus infection we will do antibiotics and cough medicine.  She just recently had steroids from the emergency room so we will hold from that.  Keep me posted if she is not feeling better in 3 to 5 days.  Push fluids.  Call with questions or concerns.  Patient was given instructions and counseling regarding her condition or for health maintenance advice. Please see specific information pulled into the AVS if appropriate.     Parts of this note are electronic transcriptions/translations of spoken language to printed text using the Dragon Dictation system.          LOYD Nolen  10/03/2023

## 2023-10-14 NOTE — PROGRESS NOTES
[facility]  Cardiology progress Note    Patient Name: Qiana Altman  : 1950    CHIEF COMPLAINT  Hypertension        Subjective   Subjective     HISTORY OF PRESENT ILLNESS    Qiana Altman is a 73 y.o. female with history of hypertension and shortness of breath.  Shortness of breath is stable.    REVIEW OF SYSTEMS    Constitutional:    No fever, no weight loss  Skin:     No rash  Otolaryngeal:    No difficulty swallowing  Cardiovascular: See HPI.  Pulmonary:    No cough, no sputum production    Personal History     Social History:    reports that she quit smoking about 38 years ago. Her smoking use included cigarettes. She started smoking about 43 years ago. She has a 5.00 pack-year smoking history. She has never used smokeless tobacco. She reports that she does not drink alcohol and does not use drugs.    Home Medications:  Current Outpatient Medications on File Prior to Visit   Medication Sig    Accu-Chek Graciela Plus test strip 1 each by Other route Daily.    Accu-Chek Softclix Lancets lancets 1 each by Other route Daily.    celecoxib (CeleBREX) 100 MG capsule TAKE ONE CAPSULE BY MOUTH TWICE DAILY    cetirizine (zyrTEC) 10 MG tablet Take 1 tablet by mouth Daily.    esomeprazole (nexIUM) 40 MG capsule Take 1 capsule by mouth Daily.    fluticasone (FLONASE) 50 MCG/ACT nasal spray 2 sprays by Each Nare route Daily.    quinapril (ACCUPRIL) 10 MG tablet TAKE ONE TABLET BY MOUTH TWICE DAILY    azithromycin (Zithromax Z-Evan) 250 MG tablet Take 2 tablets by mouth on day 1, then 1 tablet daily on days 2-5 (Patient not taking: Reported on 10/17/2023)    brompheniramine-pseudoephedrine-DM 30-2-10 MG/5ML syrup Take 5 mL by mouth 4 (Four) Times a Day As Needed for Cough or Congestion. (Patient not taking: Reported on 10/17/2023)     No current facility-administered medications on file prior to visit.       Past Medical History:   Diagnosis Date    Allergic rhinitis due to allergen 09/10/2019    Anemia  09/10/2019    Anxiety disorder 06/09/2020    B12 deficiency 09/23/2020    Callus     COVID-19 06/23/2022    Depression     Diverticulosis     Essential hypertension 01/03/2019    Family history of myocardial infarction 01/19/2023    GERD (gastroesophageal reflux disease) 01/03/2019    Graves' disease     HLD (hyperlipidemia) 01/03/2019    Hypothyroidism     Inflammatory bowel disease     Major depressive disorder 01/03/2019    Osteoarthritis 09/23/2020    Osteopenia     T2DM (type 2 diabetes mellitus) 01/03/2019    Vitamin D deficiency 01/03/2019       Allergies:  Allergies   Allergen Reactions    Shellfish Allergy Anaphylaxis    Moxifloxacin Hcl Unknown - Low Severity    Nitrous Oxide Other (See Comments)    Scopolamine Swelling    Sulfa Antibiotics Unknown - Low Severity    Morphine Rash    Penicillins Rash       Objective    Objective       Vitals:   Heart Rate:  [67] 67  BP: (132)/(83) 132/83  Body mass index is 26.23 kg/m².     PHYSICAL EXAM:    General Appearance:   well developed  well nourished  HENT:   oropharynx moist  lips not cyanotic  Neck:  thyroid not enlarged  supple  Respiratory:  no respiratory distress  normal breath sounds  no rales  Cardiovascular:  no jugular venous distention  regular rhythm  apical impulse normal  S1 normal, S2 normal  no S3, no S4   no murmur  no rub, no thrill  carotid pulses normal; no bruit  pedal pulses normal  lower extremity edema: none    Skin:   warm, dry  Psychiatric:  judgement and insight appropriate  normal mood and affect        Result Review:  I have personally reviewed the available results from  [x]  Laboratory  [x]  EKG  [x]  Cardiology  [x]  Medications  [x]  Old records  []  Other:     Procedures  Lab Results   Component Value Date    CHOL 180 07/12/2023    CHOL 136 04/18/2023    CHOL 118 01/19/2023     Lab Results   Component Value Date    TRIG 72 07/12/2023    TRIG 76 04/18/2023    TRIG 59 01/19/2023     Lab Results   Component Value Date    HDL 57  07/12/2023    HDL 38 (L) 04/18/2023    HDL 52 01/19/2023     Lab Results   Component Value Date     (H) 07/12/2023    LDL 83 04/18/2023    LDL 53 01/19/2023     Lab Results   Component Value Date    VLDL 13 07/12/2023    VLDL 15 04/18/2023    VLDL 13 01/19/2023        Impression/Plan:  1.  Essential hypertension controlled: Continue Accupril 10 mg once a day.  Monitor blood pressure regularly.  2.  Shortness of breath: Stable.           Lei Richmond MD   10/17/23   09:34 EDT

## 2023-10-17 ENCOUNTER — OFFICE VISIT (OUTPATIENT)
Dept: CARDIOLOGY | Facility: CLINIC | Age: 73
End: 2023-10-17
Payer: MEDICARE

## 2023-10-17 ENCOUNTER — RESEARCH ENCOUNTER (OUTPATIENT)
Dept: OTHER | Facility: OTHER | Age: 73
End: 2023-10-17
Payer: MEDICARE

## 2023-10-17 VITALS
HEIGHT: 62 IN | HEART RATE: 67 BPM | DIASTOLIC BLOOD PRESSURE: 83 MMHG | BODY MASS INDEX: 26.39 KG/M2 | SYSTOLIC BLOOD PRESSURE: 132 MMHG | WEIGHT: 143.4 LBS

## 2023-10-17 DIAGNOSIS — R06.02 SHORTNESS OF BREATH: ICD-10-CM

## 2023-10-17 DIAGNOSIS — I10 HYPERTENSION, ESSENTIAL: Primary | ICD-10-CM

## 2023-10-17 PROCEDURE — 3075F SYST BP GE 130 - 139MM HG: CPT | Performed by: SPECIALIST

## 2023-10-17 PROCEDURE — 1160F RVW MEDS BY RX/DR IN RCRD: CPT | Performed by: SPECIALIST

## 2023-10-17 PROCEDURE — 1159F MED LIST DOCD IN RCRD: CPT | Performed by: SPECIALIST

## 2023-10-17 PROCEDURE — 99213 OFFICE O/P EST LOW 20 MIN: CPT | Performed by: SPECIALIST

## 2023-10-17 PROCEDURE — 3079F DIAST BP 80-89 MM HG: CPT | Performed by: SPECIALIST

## 2023-10-18 ENCOUNTER — HOSPITAL ENCOUNTER (OUTPATIENT)
Dept: MRI IMAGING | Facility: HOSPITAL | Age: 73
Discharge: HOME | End: 2023-10-18
Payer: MEDICARE

## 2023-10-18 ENCOUNTER — TELEPHONE (OUTPATIENT)
Dept: FAMILY MEDICINE CLINIC | Facility: CLINIC | Age: 73
End: 2023-10-18
Payer: MEDICARE

## 2023-10-18 DIAGNOSIS — M25.552 LEFT HIP PAIN: ICD-10-CM

## 2023-10-18 DIAGNOSIS — Z87.81 HISTORY OF FEMUR FRACTURE: ICD-10-CM

## 2023-10-18 DIAGNOSIS — M25.562 ACUTE PAIN OF LEFT KNEE: ICD-10-CM

## 2023-10-18 PROCEDURE — 73721 MRI JNT OF LWR EXTRE W/O DYE: CPT

## 2023-10-18 NOTE — RESEARCH
Informed consent worksheet for the protocol:  Ascend 2    Consent version  4.0  with the approval dated  26 Sep 2023    Subject ID  [id number]      The patient was seen in the office today by Dr. Lei Richmond  and identified as a candidate for the  Ascend 2 .     The following people were present at the consent conference:   Patient: Qiana SERRATO Agapito   : Shaye Barrett LPN   Other:  Rosemarie Walter      The following was discussed with the patient prior to signing the informed consent.    The study purposes   Procedures   Duration of study participation  New findings   Risks   Discomforts  Any known side effects when applicable    The alternative treatments   Benefits   Patient and insurance costs   Payments if applicable     Patient's accountability and responsibilities    The voluntary nature of research participants     Right to withdraw consent    The withdrawal process    Confidentiality   Authorization to use and disclose information for research purposes - Including who can view information and the types of information shared.    The patient was informed of what to do in case of an illness or injury resulting from the study and who to contact for more trial information, or information on rights and welfare as a research volunteer. The patient was given the  contact Information and clinical trial contact information.     The patient was given opportunity for questions and opportunity to discuss this with family and friends. The  and or sub investigators were also available for any questions. The patient verbalizes understanding and is willing to sign the informed consent.     After all questions were satisfied the patient signed the informed consent and a copy of the signed main informed consent form & any sub-study informed consent forms were given to the patient.    No study-specific procedures were completed prior to patient signing study informed  consent form(s)    The  and or sub investigator is aware of the subject's participation status.

## 2023-10-19 ENCOUNTER — TELEPHONE (OUTPATIENT)
Dept: FAMILY MEDICINE CLINIC | Facility: CLINIC | Age: 73
End: 2023-10-19
Payer: MEDICARE

## 2023-10-19 NOTE — TELEPHONE ENCOUNTER
Caller: [facility] - [place] - [facility] [address] - [phone]  - [phone]    Relationship: Pharmacy    Best call back number: [phone]     Who are you requesting to speak with (clinical staff, provider,  specific staff member): MEDICAL STAFF    Do you know the name of the person who called: DE    What was the call regarding: PHARMACY CALLED YESTERDAY REQUESTING OFFICE VISIT NOTES FOR THE GLUCOSE MONITOR SCRIPT. THEY RECEIVED A FAX TODAY WITH THE SCRIPT AGAIN. THEY ARE STILL NEEDING THE OFFICE VISIT NOTES.

## 2023-10-20 ENCOUNTER — OFFICE VISIT (OUTPATIENT)
Dept: ORTHOPEDIC SURGERY | Facility: CLINIC | Age: 73
End: 2023-10-20
Payer: MEDICARE

## 2023-10-20 VITALS
HEIGHT: 62 IN | SYSTOLIC BLOOD PRESSURE: 139 MMHG | HEART RATE: 71 BPM | BODY MASS INDEX: 26.68 KG/M2 | DIASTOLIC BLOOD PRESSURE: 78 MMHG | OXYGEN SATURATION: 98 % | WEIGHT: 145 LBS

## 2023-10-20 DIAGNOSIS — S82.132A CLOSED FRACTURE OF MEDIAL PORTION OF LEFT TIBIAL PLATEAU, INITIAL ENCOUNTER: Primary | ICD-10-CM

## 2023-10-20 DIAGNOSIS — M17.12 PRIMARY OSTEOARTHRITIS OF LEFT KNEE: ICD-10-CM

## 2023-10-20 DIAGNOSIS — S83.242A ACUTE MEDIAL MENISCUS TEAR OF LEFT KNEE, INITIAL ENCOUNTER: ICD-10-CM

## 2023-10-20 RX ORDER — TRAMADOL HYDROCHLORIDE 50 MG/1
50 TABLET ORAL EVERY 6 HOURS PRN
Qty: 18 TABLET | Refills: 0 | Status: SHIPPED | OUTPATIENT
Start: 2023-10-20

## 2023-10-20 NOTE — PROGRESS NOTES
"Chief Complaint  Follow-up of the Left Hip and Follow-up of the Left Knee     Subjective      Qiana Altman presents to [facility] for follow up evaluation of the left lower extremity. She recently had an MRI and is here today for those results. To review, The patient reports left lower extremity pain. She locates most of her pain to the knee. She reports pain radiating up to her hip and across her back. She reports her left leg is shorter.     Allergies   Allergen Reactions    Shellfish Allergy Anaphylaxis    Moxifloxacin Hcl Unknown - Low Severity    Nitrous Oxide Other (See Comments)    Scopolamine Swelling    Sulfa Antibiotics Unknown - Low Severity    Morphine Rash    Penicillins Rash        Social History     Socioeconomic History    Marital status:    Tobacco Use    Smoking status: Former     Packs/day: 1.00     Years: 5.00     Additional pack years: 0.00     Total pack years: 5.00     Types: Cigarettes     Start date: 1980     Quit date: 1985     Years since quittin.8    Smokeless tobacco: Never    Tobacco comments:     QUIT 28 YEARS AGO   Vaping Use    Vaping Use: Never used   Substance and Sexual Activity    Alcohol use: Never    Drug use: Never    Sexual activity: Not Currently     Birth control/protection: Surgical, Post-menopausal, Tubal ligation        I reviewed the patient's chief complaint, history of present illness, review of systems, past medical history, surgical history, family history, social history, medications, and allergy list.     Review of Systems     Constitutional: Denies fevers, chills, weight loss  Cardiovascular: Denies chest pain, shortness of breath  Skin: Denies rashes, acute skin changes  Neurologic: Denies headache, loss of consciousness  MSK: left lower extremity pain      Vital Signs:   /78   Pulse 71   Ht 157.5 cm (62\")   Wt 65.8 kg (145 lb)   SpO2 98%   BMI 26.52 kg/m²          Physical Exam  General: Alert. No " acute distress    Ortho Exam      Left lower extremity - pain with knee extension. Pain to the anterior and medial knee with tenderness. Flexion 120 degrees. Positive EHL, FHL, GS and TA. Sensation intact to all 5 nerves of the foot. Positive pulses.  extension -5. Tender to the medial knee. Positive Yvonne's.     Procedures      Imaging Results (Most Recent)       None             Result Review :       MRI Hip Left Without Contrast    Result Date: 10/18/2023  Narrative: PROCEDURE: MRI HIP LEFT WO CONTRAST  COMPARISON:  [facility], CR, XR HIP W OR WO PELVIS 2-3 VIEW LEFT, 8/23/2023, 8:13. INDICATIONS: PAIN RADIATING FROM LEFT HIP TO LEFT KNEE FOR 3 MONTHS. PATIENT EXPLAINS THAT IT MAY BE DUE TO OVEREXERTION ON STAIRS. HISTORY OF LEFT HIP SURGERY IN 1982.      TECHNIQUE: A comprehensive examination was performed utilizing a variety of imaging planes and imaging parameters to optimize visualization of suspected pathology.  Images were performed without contrast.  FINDINGS:  There is an intramedullary nail in the femur.  The nail extends beyond the cortex of the greater trochanter and femoral neck by 6.1 centimeters.  This is better seen on radiograph.  This likely causes mechanical changes in the gluteal muscles and adjacent soft tissues.  The metallic artifact limits evaluation of the hip.  Along the posterior superior portion of the nail in the atrophic gluteal muscles on the right there is a low T1 signal intensity and high T2 signal intensity collection with some internal low T2 signal intensity.  This measures 4.7 x 3.1 by 3.9 centimeters.  There is severe atrophy of the left gluteal muscles most pronounced the medius and deric.  The intramedullary nail extent into the gluteus medius and deric muscles.  The bone marrow signal intensity is normal allowing for limitations.  There is moderate joint space narrowing of both hips.  The sacroiliac joints and pubic symphysis are normal appearance.   There is disc desiccation and disc height loss throughout the lumbar spine.  There are no hip joint effusions.  There is high signal intensity in bilateral hamstring muscle origins consistent with chronic tendinosis versus partial-thickness tearing.  There is mild atrophy of the left adductor muscles      Impression:   1. The intramedullary nail extends beyond the cortex of the femur by over 6 centimeters.  Extends into the gluteal muscles on the left.  There is associated severe muscle atrophy.  There is a complex fluid collection along the posterior proximal nail measuring up to 4.7 centimeters. 2. Moderate arthritis of the hips.  Moderate degenerative disc disease lumbar spine. 3. Mild atrophy of the left adductor muscles     MISTI ROGERS MD       Electronically Signed and Approved By: MISTI ROGERS MD on 10/18/2023 at 14:50             MRI Knee Left Without Contrast    Result Date: 10/18/2023  Narrative: PROCEDURE: MRI KNEE LEFT  WO CONTRAST  COMPARISON: [facility], CR, XR KNEE 1 OR 2 VW LEFT, 8/23/2023, 8:13.  INDICATIONS: knee pain pain left lower extremity from hip to knee.      TECHNIQUE: A complete multi-planar MRI was performed.   FINDINGS:  An intramedullary nail in the femur has metallic susceptibility artifact which limits evaluation of the knee.  Delete that there is a subchondral fracture of the medial tibial plateau.  It measures 1.5 centimeters medial to lateral by approximately 1.8 centimeters anterior-posterior.  There is associated edema.  There is mild subchondral edema in the medial femoral condyle without definite fracture line on this limited evaluation.  The remainder of the bone marrow signal intensity is normal given the limitations as described.  Small joint effusion.  Small Baker's cyst without evidence of recent rupture.  There is mild medial and patellofemoral compartment joint space narrowing.  The anterior cruciate ligament posterior cruciate ligament are intact.  The  medial collateral ligament and lateral collateral and complex are intact.  There is a tear of the posterior horn medial meniscus which has a horizontal and vertical component without significant displacement.  There is truncation of the free edge of the posterior horn medial meniscus. There are no lateral meniscal tears.  The patella has anatomic position.  The extensor mechanism is intact.  There is full-thickness cartilage loss in weight-bearing portion medial compartment.  There is thinning of cartilage at the patellofemoral and lateral compartment.  Popliteus muscle and tendon are normal in appearance.  The iliotibial band is normal.      Impression:   1. Limited evaluation the knee due to metallic susceptibility artifact from intramedullary nail within the femur. 2. There is a subchondral nondisplaced fracture of the medial tibial plateau.  There is subchondral edema in the medial femoral condyle.  The line there is mild medial compartment arthritis with small joint effusion and small Baker's cyst. 3. There is a flap tear of the posterior horn of the medial meniscus. 4. Moderate chondromalacia patellofemoral compartment.     MISTI ROGERS MD       Electronically Signed and Approved By: MISTI ROGERS MD on 10/18/2023 at 14:45                     Assessment and Plan     Diagnoses and all orders for this visit:    1. Closed fracture of medial portion of left tibial plateau, initial encounter (Primary)    2. Acute medial meniscus tear of left knee, initial encounter    3. Primary osteoarthritis of left knee        Discussed the treatment plan with the patient.  I reviewed the MRI with the patient. The patient was placed into a knee brace today. Activity restrictions discussed. Plan to use the walker. Prescription for tramadol given today    Call or return if worsening symptoms.    Follow Up     4 weeks with repeat x-rays      Patient was given instructions and counseling regarding her condition or for health  maintenance advice. Please see specific information pulled into the AVS if appropriate.     Scribed for Demetrio Ken MD by Kayla Means.  10/20/23   14:27 EDT    I have personally performed the services described in this document as scribed by the above individual and it is both accurate and complete. Demetrio Ken MD 10/21/23

## 2023-10-25 NOTE — TELEPHONE ENCOUNTER
PHARMACY CALLED AGAIN TO CHECK STATUS OF THIS REQUEST THEY STILL NEED THE NOTES, THEY WOULD LIKE TO GET THEM TODAY

## 2023-10-30 ENCOUNTER — TELEPHONE (OUTPATIENT)
Dept: FAMILY MEDICINE CLINIC | Facility: CLINIC | Age: 73
End: 2023-10-30
Payer: MEDICARE

## 2023-10-30 NOTE — TELEPHONE ENCOUNTER
Per [facility] unable to get quinapril. Asking to change to another medication.

## 2023-10-31 ENCOUNTER — FLU SHOT (OUTPATIENT)
Dept: FAMILY MEDICINE CLINIC | Facility: CLINIC | Age: 73
End: 2023-10-31
Payer: MEDICARE

## 2023-10-31 DIAGNOSIS — Z23 NEED FOR INFLUENZA VACCINATION: Primary | ICD-10-CM

## 2023-10-31 PROCEDURE — 90662 IIV NO PRSV INCREASED AG IM: CPT | Performed by: NURSE PRACTITIONER

## 2023-10-31 PROCEDURE — G0008 ADMIN INFLUENZA VIRUS VAC: HCPCS | Performed by: NURSE PRACTITIONER

## 2023-10-31 RX ORDER — LISINOPRIL 10 MG/1
10 TABLET ORAL NIGHTLY
COMMUNITY
End: 2023-10-31 | Stop reason: SDUPTHER

## 2023-10-31 RX ORDER — LISINOPRIL 10 MG/1
10 TABLET ORAL NIGHTLY
Qty: 30 TABLET | Refills: 5 | Status: SHIPPED | OUTPATIENT
Start: 2023-10-31

## 2023-11-02 ENCOUNTER — TELEPHONE (OUTPATIENT)
Dept: FAMILY MEDICINE CLINIC | Facility: CLINIC | Age: 73
End: 2023-11-02
Payer: MEDICARE

## 2023-11-21 ENCOUNTER — OFFICE VISIT (OUTPATIENT)
Dept: ORTHOPEDIC SURGERY | Facility: CLINIC | Age: 73
End: 2023-11-21
Payer: MEDICARE

## 2023-11-21 VITALS
HEART RATE: 67 BPM | BODY MASS INDEX: 26.69 KG/M2 | WEIGHT: 145.06 LBS | OXYGEN SATURATION: 98 % | HEIGHT: 62 IN | SYSTOLIC BLOOD PRESSURE: 135 MMHG | DIASTOLIC BLOOD PRESSURE: 83 MMHG

## 2023-11-21 DIAGNOSIS — M17.12 PRIMARY OSTEOARTHRITIS OF LEFT KNEE: ICD-10-CM

## 2023-11-21 DIAGNOSIS — S83.242D ACUTE MEDIAL MENISCUS TEAR OF LEFT KNEE, SUBSEQUENT ENCOUNTER: ICD-10-CM

## 2023-11-21 DIAGNOSIS — S82.132D CLOSED FRACTURE OF MEDIAL PORTION OF LEFT TIBIAL PLATEAU WITH ROUTINE HEALING, SUBSEQUENT ENCOUNTER: Primary | ICD-10-CM

## 2023-11-21 DIAGNOSIS — M25.562 LEFT KNEE PAIN, UNSPECIFIED CHRONICITY: ICD-10-CM

## 2023-11-21 PROBLEM — S82.132A CLOSED FRACTURE OF MEDIAL PLATEAU OF LEFT TIBIA: Status: ACTIVE | Noted: 2023-11-21

## 2023-11-21 PROBLEM — S83.242A ACUTE MEDIAL MENISCUS TEAR OF LEFT KNEE: Status: ACTIVE | Noted: 2023-11-21

## 2023-11-21 NOTE — PROGRESS NOTES
Chief Complaint  Pain and Follow-up of the Left Knee    Subjective          History of Present Illness      Qiana Altman is a 73 y.o. female  presents to [facility] for     Patient presents for follow-up evaluation of left knee pain, left knee medial tibial plateau fracture and osteoarthritis/medial meniscus tear.  Dr. Ken reviewed the patient MRI with her at last visit she was advised to modify activities, wear brace, protected weightbearing.  She states her knee is feeling a lot better than it did when this all originally began.  She denies need for pain medication denies swelling, denies locking catching or buckling.      Allergies   Allergen Reactions    Shellfish Allergy Anaphylaxis    Moxifloxacin Hcl Unknown - Low Severity    Nitrous Oxide Other (See Comments)    Scopolamine Swelling    Sulfa Antibiotics Unknown - Low Severity    Morphine Rash    Penicillins Rash        Social History     Socioeconomic History    Marital status:    Tobacco Use    Smoking status: Former     Packs/day: 1.00     Years: 5.00     Additional pack years: 0.00     Total pack years: 5.00     Types: Cigarettes     Start date: 1980     Quit date: 1985     Years since quittin.9    Smokeless tobacco: Never    Tobacco comments:     QUIT 28 YEARS AGO   Vaping Use    Vaping Use: Never used   Substance and Sexual Activity    Alcohol use: Never    Drug use: Never    Sexual activity: Not Currently     Birth control/protection: Surgical, Post-menopausal, Tubal ligation        REVIEW OF SYSTEMS    Constitutional: Awake alert and oriented x3, no acute distress, denies fevers, chills, weight loss  Respiratory: No respiratory distress  Vascular: Brisk cap refill, Intact distal pulses, No cyanosis, compartments soft with no signs or symptoms of compartment syndrome or DVT.   Cardiovascular: Denies chest pain, shortness of breath  Skin: Denies rashes, acute skin changes  Neurologic: Denies  "headache, loss of consciousness  MSK: Left knee pain      Objective   Vital Signs:   /83   Pulse 67   Ht 157.5 cm (62\")   Wt 65.8 kg (145 lb 1 oz)   SpO2 98%   BMI 26.53 kg/m²     Body mass index is 26.53 kg/m².    Physical Exam       Left knee: Nontender to palpation, no pain with range of motion, full extension flexion 120, stable to varus/valgus stress stable anterior/posterior drawer, nontender to palpation at fracture site,.      Procedures    Imaging Results (Most Recent)       Procedure Component Value Units Date/Time    XR Knee AP & Lateral [[id number]] Resulted: 11/21/23 0904     Updated: 11/21/23 0905    Narrative:      View:AP/Lateral view(s)  Site: Left knee  Indication: Left knee pain  Study: X-rays ordered, taken in the office, and reviewed today  X-ray: Old femur fracture, with femoral medullary salma.  Good healing of   medial tibial plateau fracture fracture alignment remains stable compared   to previous studies  Comparative data: Previous studies             Result Review :   The following data was reviewed by: JANNA Emmanuel on 11/21/2023:  Data reviewed : Radiologic studies reviewed by me with the patient              Assessment and Plan    Diagnoses and all orders for this visit:    1. Closed fracture of medial portion of left tibial plateau with routine healing, subsequent encounter (Primary)    2. Acute medial meniscus tear of left knee, subsequent encounter    3. Primary osteoarthritis of left knee    4. Left knee pain, unspecified chronicity  -     XR Knee AP & Lateral        Reviewed x-rays with the patient discussed diagnosis and treatment options she will continue conservative treatment, continue bracing, we discussed starting therapy she declined this, she will continue activity as tolerated in the brace follow-up in 6 weeks for recheck.  X-ray at next visit    Call or return if worsening symptoms.    Follow Up   Return in about 6 weeks (around 1/2/2024) for " Recheck.  Patient was given instructions and counseling regarding her condition or for health maintenance advice. Please see specific information pulled into the AVS if appropriate.       EMR Dragon/Transcription disclaimer:  Much of this encounter note is an electronic transcription/translation of spoken language to printed text, aka voice recognition.  The electronic translation of spoken language may permit erroneous or at times nonsensical words or phrases to be inadvertently transcribed; although I have reviewed the note for such errors, some may still exist so please interpret based on surrounding text content.

## 2023-12-06 ENCOUNTER — TELEPHONE (OUTPATIENT)
Dept: GASTROENTEROLOGY | Facility: CLINIC | Age: 73
End: 2023-12-06
Payer: MEDICARE

## 2023-12-06 NOTE — TELEPHONE ENCOUNTER
Contacted pt and advised provider prefers labs be completed before next appt, pt states understanding

## 2023-12-06 NOTE — TELEPHONE ENCOUNTER
Caller: Qiana Altman    Relationship to patient: Self    Best call back number:  [phone]    Patient is needing: PATIENT IS ASKING IF LABS NEED TO BE COMPLETED BEFORE UPCOMING APPT. PLEASE CALL PATIENT BACK.

## 2023-12-07 ENCOUNTER — LAB (OUTPATIENT)
Dept: LAB | Facility: HOSPITAL | Age: 73
End: 2023-12-07
Payer: MEDICARE

## 2023-12-07 DIAGNOSIS — R79.89 ELEVATED LFTS: ICD-10-CM

## 2023-12-07 LAB
ALBUMIN SERPL-MCNC: 4 G/DL (ref 3.5–5.2)
ALBUMIN/GLOB SERPL: 1.3 G/DL
ALP SERPL-CCNC: 75 U/L (ref 39–117)
ALT SERPL W P-5'-P-CCNC: 14 U/L (ref 1–33)
ANION GAP SERPL CALCULATED.3IONS-SCNC: 11.1 MMOL/L (ref 5–15)
AST SERPL-CCNC: 22 U/L (ref 1–32)
BILIRUB SERPL-MCNC: 0.9 MG/DL (ref 0–1.2)
BUN SERPL-MCNC: 8 MG/DL (ref 8–23)
BUN/CREAT SERPL: 7.8 (ref 7–25)
CALCIUM SPEC-SCNC: 9.6 MG/DL (ref 8.6–10.5)
CHLORIDE SERPL-SCNC: 105 MMOL/L (ref 98–107)
CO2 SERPL-SCNC: 24.9 MMOL/L (ref 22–29)
CREAT SERPL-MCNC: 1.03 MG/DL (ref 0.57–1)
EGFRCR SERPLBLD CKD-EPI 2021: 58.3 ML/MIN/1.73
GLOBULIN UR ELPH-MCNC: 3.2 GM/DL
GLUCOSE SERPL-MCNC: 101 MG/DL (ref 65–99)
POTASSIUM SERPL-SCNC: 3.9 MMOL/L (ref 3.5–5.2)
PROT SERPL-MCNC: 7.2 G/DL (ref 6–8.5)
SODIUM SERPL-SCNC: 141 MMOL/L (ref 136–145)

## 2023-12-07 PROCEDURE — 36415 COLL VENOUS BLD VENIPUNCTURE: CPT

## 2023-12-07 PROCEDURE — 80053 COMPREHEN METABOLIC PANEL: CPT

## 2023-12-12 ENCOUNTER — OFFICE VISIT (OUTPATIENT)
Dept: GASTROENTEROLOGY | Facility: CLINIC | Age: 73
End: 2023-12-12
Payer: MEDICARE

## 2023-12-12 VITALS
HEIGHT: 60 IN | SYSTOLIC BLOOD PRESSURE: 144 MMHG | DIASTOLIC BLOOD PRESSURE: 69 MMHG | BODY MASS INDEX: 30.7 KG/M2 | HEART RATE: 69 BPM | WEIGHT: 156.4 LBS

## 2023-12-12 DIAGNOSIS — Z86.010 HISTORY OF COLON POLYPS: ICD-10-CM

## 2023-12-12 DIAGNOSIS — R74.8 ELEVATED LIVER ENZYMES: Primary | ICD-10-CM

## 2023-12-12 DIAGNOSIS — K21.9 GASTROESOPHAGEAL REFLUX DISEASE WITHOUT ESOPHAGITIS: ICD-10-CM

## 2023-12-12 PROCEDURE — 3078F DIAST BP <80 MM HG: CPT | Performed by: NURSE PRACTITIONER

## 2023-12-12 PROCEDURE — 3077F SYST BP >= 140 MM HG: CPT | Performed by: NURSE PRACTITIONER

## 2023-12-12 PROCEDURE — 99214 OFFICE O/P EST MOD 30 MIN: CPT | Performed by: NURSE PRACTITIONER

## 2023-12-12 PROCEDURE — 1159F MED LIST DOCD IN RCRD: CPT | Performed by: NURSE PRACTITIONER

## 2023-12-12 PROCEDURE — 1160F RVW MEDS BY RX/DR IN RCRD: CPT | Performed by: NURSE PRACTITIONER

## 2023-12-12 NOTE — PROGRESS NOTES
Chief Complaint   Elevated Hepatic Enzymes    History of Present Illness       Qiana Altman is a 73 y.o. female who presents to [facility] for follow-up for elevated liver enzymes.  She was last seen in the office by me on 9/12/2023.    Went to the ER at [facility] in Feb of this year with worsening abd pain and back pain. Initially diagnosed with elevated liver enzymes and UTI. Admits she had covid 3 times previously. Was jaundiced on admission. Labs showed alkaline phosphatase at 177, ALT 1126, AST 1265 and total bilirubin at 3.7.     Normal CT abd/pelvis and MRCP. LFTs are now trending down. Patient is happy to report her abd pain has completely resolved. Still having some chronic back pain secondary to arthritis.      Has been taking celebrex daily for OA pain.      Hx GERD/HH--taking Nexium 40 mg daily. Denies any breakthrough reflux. Denies any N&V, abd pain, reflux, constipation, diarrhea, rectal bleeding or melena. Good appetite.      Is currently off LIPITOR since elevated liver enzymes. Has also been off Tylenol and Zyrtec. Most recent LFTs since last visit were back to normal. She is feeling a lot better. Still trying to get her strength and energy back.      Last EGD and colonoscopy---Was done at [facility] 9 years ago. Has hx colon polyps. Had negative COLOGARD recently.      GI FH--Maternal grandmother with rectal cancer.           Most recent LFTs done on 12/7/23 were all normal. Patient doing well. Denies any GI issues today.  Having more issues with her left leg and needs in a brace today.  Having a lot of arthritis pain.      Results       Result Review :       CMP          7/12/2023    07:24 7/23/2023    01:48 12/7/2023    09:40   CMP   Glucose 119  98  101    BUN 9  9  8    Creatinine 0.90  0.85  1.03    EGFR 68.5  73.4  58.3    Sodium 141  136  141    Potassium 4.0  3.5  3.9    Chloride 106  101  105    Calcium 9.7  9.5  9.6    Total Protein 7.8  7.8  7.2   "  Albumin 4.1  4.0  4.0    Globulin 3.7  3.8  3.2    Total Bilirubin 0.6  0.6  0.9    Alkaline Phosphatase 77  87  75    AST (SGOT) 30  21  22    ALT (SGPT) 17  15  14    Albumin/Globulin Ratio 1.1  1.1  1.3    BUN/Creatinine Ratio 10.0  10.6  7.8    Anion Gap 10.2  8.4  11.1      CBC          2023    09:02 2023    07:24 2023    01:48   CBC   WBC 3.45  3.86  4.42    RBC 4.35  4.55  4.41    Hemoglobin 12.8  12.9  12.7    Hematocrit 39.1  38.9  37.8    MCV 89.9  85.5  85.7    MCH 29.4  28.4  28.8    MCHC 32.7  33.2  33.6    RDW 15.0  12.3  13.2    Platelets 284  197  171      Lipid Panel          2023    09:05 2023    09:02 2023    07:24   Lipid Panel   Total Cholesterol 118  136  180    Triglycerides 59  76  72    HDL Cholesterol 52  38  57    VLDL Cholesterol 13  15  13    LDL Cholesterol  53  83  110    LDL/HDL Ratio 1.04  2.18       TSH          2023    09:05 2023    07:24   TSH   TSH 1.660  3.160        Lipase   Lipase   Date Value Ref Range Status   2023 51 13 - 60 U/L Final     Amylase No results found for: \"AMYLASE\"            Past Medical History       Past Medical History:   Diagnosis Date    Allergic rhinitis due to allergen 09/10/2019    Anemia 09/10/2019    Anxiety disorder 2020    B12 deficiency 2020    Callus     COVID-19 2022    Depression     Diverticulosis     Essential hypertension 2019    Family history of myocardial infarction 2023    GERD (gastroesophageal reflux disease) 2019    Graves' disease     HLD (hyperlipidemia) 2019    Hypothyroidism     Inflammatory bowel disease     Major depressive disorder 2019    Osteoarthritis 2020    Osteopenia     T2DM (type 2 diabetes mellitus) 2019    Vitamin D deficiency 2019       Past Surgical History:   Procedure Laterality Date    APPENDECTOMY       SECTION      CHOLECYSTECTOMY  2016    DR SCALES    COLON SURGERY      COLONOSCOPY  "     HIP FRACTURE SURGERY Left     REPAIR    HIP SURGERY      OOPHORECTOMY      SMALL INTESTINE SURGERY      TUBAL ABDOMINAL LIGATION      UPPER GASTROINTESTINAL ENDOSCOPY           Current Outpatient Medications:     Accu-Chek Graciela Plus test strip, 1 each by Other route Daily., Disp: , Rfl:     Accu-Chek Softclix Lancets lancets, 1 each by Other route Daily., Disp: , Rfl:     celecoxib (CeleBREX) 100 MG capsule, TAKE ONE CAPSULE BY MOUTH TWICE DAILY, Disp: 180 capsule, Rfl: 1    cetirizine (zyrTEC) 10 MG tablet, Take 1 tablet by mouth Daily., Disp: 90 tablet, Rfl: 1    esomeprazole (nexIUM) 40 MG capsule, Take 1 capsule by mouth Daily., Disp: 90 capsule, Rfl: 1    fluticasone (FLONASE) 50 MCG/ACT nasal spray, 2 sprays by Each Nare route Daily., Disp: 16 g, Rfl: 2    lisinopril (PRINIVIL,ZESTRIL) 10 MG tablet, Take 1 tablet by mouth Every Night., Disp: 30 tablet, Rfl: 5    quinapril (ACCUPRIL) 10 MG tablet, TAKE ONE TABLET BY MOUTH TWICE DAILY, Disp: 180 tablet, Rfl: 1    traMADol (ULTRAM) 50 MG tablet, Take 1 tablet by mouth Every 6 (Six) Hours As Needed for Moderate Pain., Disp: 18 tablet, Rfl: 0     Allergies   Allergen Reactions    Shellfish Allergy Anaphylaxis    Moxifloxacin Hcl Unknown - Low Severity    Nitrous Oxide Other (See Comments)    Scopolamine Swelling    Sulfa Antibiotics Unknown - Low Severity    Morphine Rash    Penicillins Rash       Family History   Problem Relation Age of Onset    Heart disease Mother     Heart failure Mother     Arthritis Mother     Hyperlipidemia Mother     Hypertension Mother     Stroke Mother     Thyroid disease Mother     Heart disease Brother     Heart disease Brother     Rectal cancer Maternal Grandmother     Kidney disease Daughter     Miscarriages / Stillbirths Daughter     Lung cancer Other     Skin cancer Other         Social History     Social History Narrative    Not on file       Objective       Review of Systems   Constitutional:  Negative for appetite change,  "fatigue, fever, unexpected weight gain and unexpected weight loss.   HENT:  Negative for trouble swallowing.    Respiratory:  Negative for cough, choking, chest tightness, shortness of breath, wheezing and stridor.    Cardiovascular:  Negative for chest pain, palpitations and leg swelling.   Gastrointestinal:  Negative for abdominal distention, abdominal pain, anal bleeding, blood in stool, constipation, diarrhea, nausea, rectal pain, vomiting, GERD and indigestion.   Musculoskeletal:  Positive for arthralgias, gait problem and myalgias.        Vital Signs:   /69 (BP Location: Left arm, Patient Position: Sitting, Cuff Size: Adult)   Pulse 69   Ht 152.4 cm (60\")   Wt 70.9 kg (156 lb 6.4 oz)   BMI 30.54 kg/m²       Physical Exam  Constitutional:       General: She is not in acute distress.     Appearance: She is well-developed. She is not ill-appearing.   HENT:      Head: Normocephalic.   Eyes:      Pupils: Pupils are equal, round, and reactive to light.   Cardiovascular:      Rate and Rhythm: Normal rate and regular rhythm.      Heart sounds: Normal heart sounds.   Pulmonary:      Effort: Pulmonary effort is normal.      Breath sounds: Normal breath sounds.   Abdominal:      General: Bowel sounds are normal. There is no distension.      Palpations: Abdomen is soft. There is no mass.      Tenderness: There is no abdominal tenderness. There is no guarding or rebound.      Hernia: No hernia is present.   Musculoskeletal:         General: Normal range of motion.   Skin:     General: Skin is warm and dry.   Neurological:      Mental Status: She is alert and oriented to person, place, and time.   Psychiatric:         Speech: Speech normal.         Behavior: Behavior normal.         Judgment: Judgment normal.           Assessment & Plan          Assessment and Plan    Diagnoses and all orders for this visit:    1. Elevated liver enzymes (Primary)    2. Gastroesophageal reflux disease without esophagitis    3. " History of colon polyps    Reviewed most recent lab work with her today.  Most recent LFTs done on 12/7/2023 were all completely normal.  GERD seems well-controlled on Nexium.  Continue GERD precautions.  Bowels moving well currently.  Patient to call the office with any issues.  Patient to follow-up with me in 6 months.  Patient is agreeable to the plan.            Follow Up       Follow Up   Return in about 6 months (around 6/12/2024) for ELEVATED LIVER ENZYMES.  Patient was given instructions and counseling regarding her condition or for health maintenance advice. Please see specific information pulled into the AVS if appropriate.

## 2023-12-16 ENCOUNTER — HOSPITAL ENCOUNTER (OUTPATIENT)
Dept: MAMMOGRAPHY | Facility: HOSPITAL | Age: 73
Discharge: HOME | End: 2023-12-16
Admitting: NURSE PRACTITIONER
Payer: MEDICARE

## 2023-12-16 DIAGNOSIS — Z12.31 SCREENING MAMMOGRAM FOR BREAST CANCER: ICD-10-CM

## 2023-12-16 PROCEDURE — 77067 SCR MAMMO BI INCL CAD: CPT

## 2023-12-16 PROCEDURE — 77063 BREAST TOMOSYNTHESIS BI: CPT

## 2023-12-19 DIAGNOSIS — J30.9 ALLERGIC RHINITIS, UNSPECIFIED SEASONALITY, UNSPECIFIED TRIGGER: ICD-10-CM

## 2023-12-19 RX ORDER — FLUTICASONE PROPIONATE 50 MCG
2 SPRAY, SUSPENSION (ML) NASAL DAILY
Qty: 16 G | Refills: 2 | OUTPATIENT
Start: 2023-12-19

## 2024-01-31 ENCOUNTER — OFFICE VISIT (OUTPATIENT)
Dept: FAMILY MEDICINE CLINIC | Facility: CLINIC | Age: 74
End: 2024-01-31
Payer: MEDICARE

## 2024-01-31 VITALS
OXYGEN SATURATION: 98 % | SYSTOLIC BLOOD PRESSURE: 158 MMHG | TEMPERATURE: 97.9 F | BODY MASS INDEX: 28.54 KG/M2 | HEART RATE: 72 BPM | RESPIRATION RATE: 16 BRPM | WEIGHT: 145.4 LBS | HEIGHT: 60 IN | DIASTOLIC BLOOD PRESSURE: 68 MMHG

## 2024-01-31 DIAGNOSIS — M19.90 OSTEOARTHRITIS, UNSPECIFIED OSTEOARTHRITIS TYPE, UNSPECIFIED SITE: ICD-10-CM

## 2024-01-31 DIAGNOSIS — E53.8 B12 DEFICIENCY: ICD-10-CM

## 2024-01-31 DIAGNOSIS — E78.2 MIXED HYPERLIPIDEMIA: ICD-10-CM

## 2024-01-31 DIAGNOSIS — K21.9 GASTROESOPHAGEAL REFLUX DISEASE WITHOUT ESOPHAGITIS: ICD-10-CM

## 2024-01-31 DIAGNOSIS — Z86.39 HISTORY OF GRAVES' DISEASE: ICD-10-CM

## 2024-01-31 DIAGNOSIS — I10 ESSENTIAL HYPERTENSION: ICD-10-CM

## 2024-01-31 DIAGNOSIS — R73.09 ELEVATED GLUCOSE: ICD-10-CM

## 2024-01-31 DIAGNOSIS — J30.9 ALLERGIC RHINITIS, UNSPECIFIED SEASONALITY, UNSPECIFIED TRIGGER: ICD-10-CM

## 2024-01-31 DIAGNOSIS — Z00.00 MEDICARE ANNUAL WELLNESS VISIT, SUBSEQUENT: Primary | ICD-10-CM

## 2024-01-31 DIAGNOSIS — E55.9 VITAMIN D DEFICIENCY: ICD-10-CM

## 2024-01-31 LAB
25(OH)D3 SERPL-MCNC: 25 NG/ML (ref 30–100)
ALBUMIN SERPL-MCNC: 4.2 G/DL (ref 3.5–5.2)
ALBUMIN/GLOB SERPL: 1.6 G/DL
ALP SERPL-CCNC: 76 U/L (ref 39–117)
ALT SERPL W P-5'-P-CCNC: 12 U/L (ref 1–33)
ANION GAP SERPL CALCULATED.3IONS-SCNC: 9 MMOL/L (ref 5–15)
AST SERPL-CCNC: 18 U/L (ref 1–32)
BASOPHILS # BLD AUTO: 0.02 10*3/MM3 (ref 0–0.2)
BASOPHILS NFR BLD AUTO: 0.7 % (ref 0–1.5)
BILIRUB SERPL-MCNC: 0.7 MG/DL (ref 0–1.2)
BUN SERPL-MCNC: 14 MG/DL (ref 8–23)
BUN/CREAT SERPL: 15.7 (ref 7–25)
CALCIUM SPEC-SCNC: 9.2 MG/DL (ref 8.6–10.5)
CHLORIDE SERPL-SCNC: 106 MMOL/L (ref 98–107)
CHOLEST SERPL-MCNC: 156 MG/DL (ref 0–200)
CO2 SERPL-SCNC: 24 MMOL/L (ref 22–29)
CREAT SERPL-MCNC: 0.89 MG/DL (ref 0.57–1)
DEPRECATED RDW RBC AUTO: 36.9 FL (ref 37–54)
EGFRCR SERPLBLD CKD-EPI 2021: 69.4 ML/MIN/1.73
EOSINOPHIL # BLD AUTO: 0.06 10*3/MM3 (ref 0–0.4)
EOSINOPHIL NFR BLD AUTO: 2 % (ref 0.3–6.2)
ERYTHROCYTE [DISTWIDTH] IN BLOOD BY AUTOMATED COUNT: 12.6 % (ref 12.3–15.4)
FOLATE SERPL-MCNC: 9.4 NG/ML (ref 4.78–24.2)
GLOBULIN UR ELPH-MCNC: 2.7 GM/DL
GLUCOSE SERPL-MCNC: 111 MG/DL (ref 65–99)
HBA1C MFR BLD: 5.8 % (ref 4.8–5.6)
HCT VFR BLD AUTO: 38.4 % (ref 34–46.6)
HDLC SERPL-MCNC: 52 MG/DL (ref 40–60)
HGB BLD-MCNC: 12.2 G/DL (ref 12–15.9)
IMM GRANULOCYTES # BLD AUTO: 0.01 10*3/MM3 (ref 0–0.05)
IMM GRANULOCYTES NFR BLD AUTO: 0.3 % (ref 0–0.5)
LDLC SERPL CALC-MCNC: 88 MG/DL (ref 0–100)
LDLC/HDLC SERPL: 1.67 {RATIO}
LYMPHOCYTES # BLD AUTO: 0.94 10*3/MM3 (ref 0.7–3.1)
LYMPHOCYTES NFR BLD AUTO: 31.3 % (ref 19.6–45.3)
MCH RBC QN AUTO: 26 PG (ref 26.6–33)
MCHC RBC AUTO-ENTMCNC: 31.8 G/DL (ref 31.5–35.7)
MCV RBC AUTO: 81.9 FL (ref 79–97)
MONOCYTES # BLD AUTO: 0.23 10*3/MM3 (ref 0.1–0.9)
MONOCYTES NFR BLD AUTO: 7.7 % (ref 5–12)
NEUTROPHILS NFR BLD AUTO: 1.74 10*3/MM3 (ref 1.7–7)
NEUTROPHILS NFR BLD AUTO: 58 % (ref 42.7–76)
NRBC BLD AUTO-RTO: 0 /100 WBC (ref 0–0.2)
PLATELET # BLD AUTO: 180 10*3/MM3 (ref 140–450)
PMV BLD AUTO: 9.2 FL (ref 6–12)
POTASSIUM SERPL-SCNC: 3.7 MMOL/L (ref 3.5–5.2)
PROT SERPL-MCNC: 6.9 G/DL (ref 6–8.5)
RBC # BLD AUTO: 4.69 10*6/MM3 (ref 3.77–5.28)
SODIUM SERPL-SCNC: 139 MMOL/L (ref 136–145)
T4 FREE SERPL-MCNC: 1.19 NG/DL (ref 0.93–1.7)
TRIGL SERPL-MCNC: 85 MG/DL (ref 0–150)
TSH SERPL DL<=0.05 MIU/L-ACNC: 1.65 UIU/ML (ref 0.27–4.2)
VIT B12 BLD-MCNC: 242 PG/ML (ref 211–946)
VLDLC SERPL-MCNC: 16 MG/DL (ref 5–40)
WBC NRBC COR # BLD AUTO: 3 10*3/MM3 (ref 3.4–10.8)

## 2024-01-31 PROCEDURE — 82746 ASSAY OF FOLIC ACID SERUM: CPT | Performed by: NURSE PRACTITIONER

## 2024-01-31 PROCEDURE — 82306 VITAMIN D 25 HYDROXY: CPT | Performed by: NURSE PRACTITIONER

## 2024-01-31 PROCEDURE — 85025 COMPLETE CBC W/AUTO DIFF WBC: CPT | Performed by: NURSE PRACTITIONER

## 2024-01-31 PROCEDURE — 84439 ASSAY OF FREE THYROXINE: CPT | Performed by: NURSE PRACTITIONER

## 2024-01-31 PROCEDURE — 82607 VITAMIN B-12: CPT | Performed by: NURSE PRACTITIONER

## 2024-01-31 PROCEDURE — 83036 HEMOGLOBIN GLYCOSYLATED A1C: CPT | Performed by: NURSE PRACTITIONER

## 2024-01-31 PROCEDURE — 84443 ASSAY THYROID STIM HORMONE: CPT | Performed by: NURSE PRACTITIONER

## 2024-01-31 PROCEDURE — 80061 LIPID PANEL: CPT | Performed by: NURSE PRACTITIONER

## 2024-01-31 PROCEDURE — 80053 COMPREHEN METABOLIC PANEL: CPT | Performed by: NURSE PRACTITIONER

## 2024-01-31 RX ORDER — ACETAMINOPHEN 325 MG/1
650 TABLET ORAL EVERY 6 HOURS PRN
COMMUNITY

## 2024-01-31 RX ORDER — CELECOXIB 100 MG/1
100 CAPSULE ORAL 2 TIMES DAILY
Qty: 180 CAPSULE | Refills: 1 | Status: SHIPPED | OUTPATIENT
Start: 2024-01-31

## 2024-01-31 RX ORDER — FLUTICASONE PROPIONATE 50 MCG
2 SPRAY, SUSPENSION (ML) NASAL DAILY
Qty: 16 G | Refills: 2 | Status: SHIPPED | OUTPATIENT
Start: 2024-01-31

## 2024-01-31 RX ORDER — CETIRIZINE HYDROCHLORIDE 10 MG/1
10 TABLET ORAL DAILY
Qty: 90 TABLET | Refills: 1 | Status: SHIPPED | OUTPATIENT
Start: 2024-01-31

## 2024-01-31 RX ORDER — ESOMEPRAZOLE MAGNESIUM 40 MG/1
40 CAPSULE, DELAYED RELEASE ORAL DAILY
Qty: 90 CAPSULE | Refills: 1 | Status: SHIPPED | OUTPATIENT
Start: 2024-01-31

## 2024-01-31 NOTE — PROGRESS NOTES
The ABCs of the Annual Wellness Visit  Subsequent Medicare Wellness Visit    Subjective    Qiana Altman is a 73 y.o. female who presents for a Subsequent Medicare Wellness Visit.    The following portions of the patient's history were reviewed and   updated as appropriate: allergies, current medications, past family history, past medical history, past social history, past surgical history, and problem list.    Compared to one year ago, the patient feels her physical   health is the same.    Compared to one year ago, the patient feels her mental   health is the same.    Recent Hospitalizations:  This patient has had a [facility] admission record on file within the last 365 days.    Current Medical Providers:  Patient Care Team:  Zohreh Mcduffie APRN as PCP - General (Nurse Practitioner)  Anna Oquendo APRN as Nurse Practitioner (Nurse Practitioner)    Outpatient Medications Prior to Visit   Medication Sig Dispense Refill    Accu-Chek Graciela Plus test strip 1 each by Other route Daily.      Accu-Chek Softclix Lancets lancets 1 each by Other route Daily.      acetaminophen (TYLENOL) 325 MG tablet Take 2 tablets by mouth Every 6 (Six) Hours As Needed for Mild Pain.      lisinopril (PRINIVIL,ZESTRIL) 10 MG tablet Take 1 tablet by mouth Every Night. 30 tablet 5    celecoxib (CeleBREX) 100 MG capsule TAKE ONE CAPSULE BY MOUTH TWICE DAILY 180 capsule 1    cetirizine (zyrTEC) 10 MG tablet Take 1 tablet by mouth Daily. 90 tablet 1    esomeprazole (nexIUM) 40 MG capsule Take 1 capsule by mouth Daily. 90 capsule 1    fluticasone (FLONASE) 50 MCG/ACT nasal spray 2 sprays by Each Nare route Daily. 16 g 2    quinapril (ACCUPRIL) 10 MG tablet TAKE ONE TABLET BY MOUTH TWICE DAILY 180 tablet 1    traMADol (ULTRAM) 50 MG tablet Take 1 tablet by mouth Every 6 (Six) Hours As Needed for Moderate Pain. 18 tablet 0     No facility-administered medications prior to visit.       No opioid medication identified on  "active medication list. I have reviewed chart for other potential  high risk medication/s and harmful drug interactions in the elderly.        Aspirin is not on active medication list.  Aspirin use is not indicated based on review of current medical condition/s. Risk of harm outweighs potential benefits.  .    Patient Active Problem List   Diagnosis    Allergic rhinitis due to allergen    Essential hypertension    GERD (gastroesophageal reflux disease)    Hyperlipidemia    Major depressive disorder    Vitamin D deficiency    Osteoarthritis    B12 deficiency    Anxiety disorder    Anemia    Overweight (BMI 25.0-29.9)    Acute medial meniscus tear of left knee    Closed fracture of medial plateau of left tibia     Advance Care Planning   Advance Care Planning     Advance Directive is on file.  ACP discussion was declined by the patient. Patient has an advance directive in EMR which is still valid.      Objective    Vitals:    24 0948 24 0953   BP: 154/70 158/68   BP Location: Right arm    Patient Position: Sitting    Cuff Size: Adult    Pulse: 72    Resp: 16    Temp: 97.9 °F (36.6 °C)    SpO2: 98%    Weight: 66 kg (145 lb 6.4 oz)    Height: 152.4 cm (60\")      Estimated body mass index is 28.4 kg/m² as calculated from the following:    Height as of this encounter: 152.4 cm (60\").    Weight as of this encounter: 66 kg (145 lb 6.4 oz).           Does the patient have evidence of cognitive impairment? No          HEALTH RISK ASSESSMENT    Smoking Status:  Social History     Tobacco Use   Smoking Status Former    Packs/day: 1.00    Years: 5.00    Additional pack years: 0.00    Total pack years: 5.00    Types: Cigarettes    Start date: 1980    Quit date: 1985    Years since quittin.1   Smokeless Tobacco Never   Tobacco Comments    QUIT 28 YEARS AGO     Alcohol Consumption:  Social History     Substance and Sexual Activity   Alcohol Use Never     Fall Risk Screen:    VANIA Fall Risk Assessment was " completed, and patient is at LOW risk for falls.Assessment completed on:2024    Depression Screenin/31/2024    10:00 AM   PHQ-2/PHQ-9 Depression Screening   Little Interest or Pleasure in Doing Things 0-->not at all   Feeling Down, Depressed or Hopeless 0-->not at all   PHQ-9: Brief Depression Severity Measure Score 0       Health Habits and Functional and Cognitive Screenin/31/2024     9:57 AM   Functional & Cognitive Status   Do you have difficulty preparing food and eating? No   Do you have difficulty bathing yourself, getting dressed or grooming yourself? No   Do you have difficulty using the toilet? No   Do you have difficulty moving around from place to place? No   Do you have trouble with steps or getting out of a bed or a chair? No   Current Diet Well Balanced Diet   Dental Exam Not up to date   Eye Exam Up to date        Eye Exam Comment 10/2023 [employer]   Exercise (times per week) Other        Exercise Frequency Comment states plenty of exercise   Current Exercises Include House Cleaning;Walking        Exercise Comment grocery shopping and outdoor work   Do you need help using the phone?  No   Are you deaf or do you have serious difficulty hearing?  No   Do you need help to go to places out of walking distance? No   Do you need help shopping? No   Do you need help preparing meals?  No   Do you need help with housework?  No   Do you need help with laundry? No   Do you need help taking your medications? No   Do you need help managing money? No   Do you ever drive or ride in a car without wearing a seat belt? No   Who do you live with? Child   If you need help, do you have trouble finding someone available to you? No   Have you been bothered in the last four weeks by sexual problems? No   Do you have difficulty concentrating, remembering or making decisions? No       Age-appropriate Screening Schedule:  Refer to the list below for future screening recommendations based on patient's age,  sex and/or medical conditions. Orders for these recommended tests are listed in the plan section. The patient has been provided with a written plan.    Health Maintenance   Topic Date Due    COVID-19 Vaccine (3 - 2023-24 season) 04/21/2024 (Originally 9/1/2023)    ZOSTER VACCINE (1 of 2) 07/12/2024 (Originally 3/14/2002)    LIPID PANEL  07/12/2024    BMI FOLLOWUP  07/12/2024    DXA SCAN  08/05/2024    ANNUAL WELLNESS VISIT  01/31/2025    COLORECTAL CANCER SCREENING  03/28/2025    MAMMOGRAM  12/16/2025    TDAP/TD VACCINES (2 - Td or Tdap) 06/22/2026    HEPATITIS C SCREENING  Completed    INFLUENZA VACCINE  Completed    Pneumococcal Vaccine 65+  Completed                  CMS Preventative Services Quick Reference  Risk Factors Identified During Encounter  Fall Risk-High or Moderate: Discussed Fall Prevention in the home  Immunizations Discussed/Encouraged: Tdap, Influenza, Prevnar 20 (Pneumococcal 20-valent conjugate), Shingrix, COVID19, and RSV (Respiratory Syncytial Virus)  Dental Screening Recommended  Vision Screening Recommended  The above risks/problems have been discussed with the patient.  Pertinent information has been shared with the patient in the After Visit Summary.  An After Visit Summary and PPPS were made available to the patient.    Follow Up:   Next Medicare Wellness visit to be scheduled in 1 year.       Additional E&M Note during same encounter follows:  Patient has multiple medical problems which are significant and separately identifiable that require additional work above and beyond the Medicare Wellness Visit.      Chief Complaint  Hyperlipidemia, Hypertension, and Medicare Wellness-subsequent    Subjective        HPI  Qiana AMA Agapito is also being seen today for   The left knee is still causing issues and is seeing ortho.  She needs to go back as she cancelled her last Appointment with ortho.  She is still wearing her brace.    Anxiety:  She is doing ok.  She is not having any SI/HI.  She has  "her ups and downs but is doing better  LIPID:  She is doing good overall but she has been off since Feb since her liver enymzes are elevated.     HTN- She is doing ok but her quinapril and no issues and is currently not have any CP/SOA.   Arthritis: She is taking tylenol and celebrex.  B12 def and anemia- on iron supplements and b12 monthly and needs to have her labs checked.  She is doing OTC.    GERD:  She is taking her nexium and is doing good.  Allergies:  She is taking her zyrtec and flonase.  Graves disease--she used to take med but no issues recently (has issues with eyes)--she see's Dr Salinas--he told her to have her thyroid checked again.  He treated her for the Graves disease   Osteopenia she is going to do the calcium.  DEXA is due in 2024.  Review of Systems   Constitutional:  Negative for fatigue.   Respiratory:  Negative for cough and shortness of breath.    Cardiovascular:  Negative for chest pain and leg swelling.   Gastrointestinal:  Negative for nausea and vomiting.   Psychiatric/Behavioral:  Negative for hallucinations and suicidal ideas.        Objective   Vital Signs:  /68   Pulse 72   Temp 97.9 °F (36.6 °C)   Resp 16   Ht 152.4 cm (60\")   Wt 66 kg (145 lb 6.4 oz)   SpO2 98%   BMI 28.40 kg/m²     Physical Exam  Vitals reviewed.   Constitutional:       Appearance: Normal appearance. She is well-developed.   Cardiovascular:      Rate and Rhythm: Normal rate and regular rhythm.      Heart sounds: Normal heart sounds. No murmur heard.  Pulmonary:      Effort: Pulmonary effort is normal.      Breath sounds: Normal breath sounds.   Neurological:      Mental Status: She is alert and oriented to person, place, and time.      Cranial Nerves: No cranial nerve deficit.      Motor: No weakness.   Psychiatric:         Mood and Affect: Mood and affect normal.          The following data was reviewed by: LOYD Nolen on 01/31/2024:  Common labs          7/12/2023    07:24 7/23/2023    " 01:48 12/7/2023    09:40   Common Labs   Glucose 119  98  101    BUN 9  9  8    Creatinine 0.90  0.85  1.03    Sodium 141  136  141    Potassium 4.0  3.5  3.9    Chloride 106  101  105    Calcium 9.7  9.5  9.6    Albumin 4.1  4.0  4.0    Total Bilirubin 0.6  0.6  0.9    Alkaline Phosphatase 77  87  75    AST (SGOT) 30  21  22    ALT (SGPT) 17  15  14    WBC 3.86  4.42     Hemoglobin 12.9  12.7     Hematocrit 38.9  37.8     Platelets 197  171     Total Cholesterol 180      Triglycerides 72      HDL Cholesterol 57      LDL Cholesterol  110      Hemoglobin A1C 6.00                   Assessment and Plan   Diagnoses and all orders for this visit:    1. Medicare annual wellness visit, subsequent (Primary)    2. Allergic rhinitis, unspecified seasonality, unspecified trigger  -     fluticasone (FLONASE) 50 MCG/ACT nasal spray; 2 sprays by Each Nare route Daily.  Dispense: 16 g; Refill: 2  -     cetirizine (zyrTEC) 10 MG tablet; Take 1 tablet by mouth Daily.  Dispense: 90 tablet; Refill: 1    3. Gastroesophageal reflux disease without esophagitis  -     esomeprazole (nexIUM) 40 MG capsule; Take 1 capsule by mouth Daily.  Dispense: 90 capsule; Refill: 1    4. Osteoarthritis, unspecified osteoarthritis type, unspecified site  -     celecoxib (CeleBREX) 100 MG capsule; Take 1 capsule by mouth 2 (Two) Times a Day.  Dispense: 180 capsule; Refill: 1    5. B12 deficiency  -     Vitamin B12 & Folate    6. Vitamin D deficiency  -     Vitamin D,25-Hydroxy    7. Essential hypertension  -     CBC & Differential  -     Comprehensive Metabolic Panel  -     TSH  -     Lipid Panel    8. History of Graves' disease  -     T4, Free    9. Mixed hyperlipidemia  -     CBC & Differential  -     Comprehensive Metabolic Panel  -     TSH  -     Lipid Panel    10. Elevated glucose  -     Hemoglobin A1c             Follow Up   Return in about 6 months (around 7/31/2024).  Patient was given instructions and counseling regarding her condition or for  health maintenance advice. Please see specific information pulled into the AVS if appropriate.   Follow-up in 6 months for labs and appt. Call with any concerns or questions that you may have regarding your medications or history.    I have reviewed all medications and at this time no medications changes need to be adjusted for all chronic conditions.  Zohreh Mcduffie, APRN  01/31/2024

## 2024-03-15 RX ORDER — LISINOPRIL 10 MG/1
10 TABLET ORAL
Qty: 30 TABLET | Refills: 1 | Status: SHIPPED | OUTPATIENT
Start: 2024-03-15

## 2024-03-27 DIAGNOSIS — J30.9 ALLERGIC RHINITIS, UNSPECIFIED SEASONALITY, UNSPECIFIED TRIGGER: ICD-10-CM

## 2024-03-27 RX ORDER — FLUTICASONE PROPIONATE 50 MCG
2 SPRAY, SUSPENSION (ML) NASAL DAILY
Qty: 16 G | Refills: 2 | Status: SHIPPED | OUTPATIENT
Start: 2024-03-27

## 2024-04-18 ENCOUNTER — OFFICE VISIT (OUTPATIENT)
Dept: FAMILY MEDICINE CLINIC | Facility: CLINIC | Age: 74
End: 2024-04-18
Payer: MEDICARE

## 2024-04-18 VITALS
SYSTOLIC BLOOD PRESSURE: 134 MMHG | HEIGHT: 60 IN | TEMPERATURE: 97.9 F | HEART RATE: 64 BPM | DIASTOLIC BLOOD PRESSURE: 52 MMHG | OXYGEN SATURATION: 97 % | WEIGHT: 145 LBS | BODY MASS INDEX: 28.47 KG/M2

## 2024-04-18 DIAGNOSIS — J02.9 PHARYNGITIS, UNSPECIFIED ETIOLOGY: Primary | ICD-10-CM

## 2024-04-18 DIAGNOSIS — Z20.818 EXPOSURE TO STREP THROAT: ICD-10-CM

## 2024-04-18 LAB
EXPIRATION DATE: NORMAL
INTERNAL CONTROL: NORMAL
Lab: NORMAL
S PYO AG THROAT QL: NEGATIVE

## 2024-04-18 PROCEDURE — 3075F SYST BP GE 130 - 139MM HG: CPT | Performed by: NURSE PRACTITIONER

## 2024-04-18 PROCEDURE — 1160F RVW MEDS BY RX/DR IN RCRD: CPT | Performed by: NURSE PRACTITIONER

## 2024-04-18 PROCEDURE — 99213 OFFICE O/P EST LOW 20 MIN: CPT | Performed by: NURSE PRACTITIONER

## 2024-04-18 PROCEDURE — 3078F DIAST BP <80 MM HG: CPT | Performed by: NURSE PRACTITIONER

## 2024-04-18 PROCEDURE — 1159F MED LIST DOCD IN RCRD: CPT | Performed by: NURSE PRACTITIONER

## 2024-04-18 PROCEDURE — 87880 STREP A ASSAY W/OPTIC: CPT | Performed by: NURSE PRACTITIONER

## 2024-04-18 PROCEDURE — 87081 CULTURE SCREEN ONLY: CPT | Performed by: NURSE PRACTITIONER

## 2024-04-18 RX ORDER — MELATONIN
1000 DAILY
COMMUNITY

## 2024-04-18 RX ORDER — CEPHALEXIN 500 MG/1
500 CAPSULE ORAL 2 TIMES DAILY
Qty: 20 CAPSULE | Refills: 0 | Status: SHIPPED | OUTPATIENT
Start: 2024-04-18 | End: 2024-04-28

## 2024-04-18 RX ORDER — LANOLIN ALCOHOL/MO/W.PET/CERES
1000 CREAM (GRAM) TOPICAL DAILY
COMMUNITY

## 2024-04-18 NOTE — PROGRESS NOTES
"Chief Complaint  Sore Throat (Started 4/17/24), Headache, and Fatigue    Subjective            Qiana Altman is a 74 y.o. female who presents to [facility]   History of Present Illness  She is here today for an acute visit.  She is a patient of LOYD Nolen.  She is complaining of sore throat, fatigue, headache.  She states she has been exposed to strep throat.  She states her symptoms started yesterday.  She states that her throat hurts really bad.  In office strep swab is negative. She is allergic to penicillin.  She is not sure if she can take amoxicillin.      Tobacco Use: Medium Risk (4/18/2024)    Patient History     Smoking Tobacco Use: Former     Smokeless Tobacco Use: Never     Passive Exposure: Not on file      E-cigarette/Vaping    E-cigarette/Vaping Use Never User      E-cigarette/Vaping Substances    Nicotine No     THC No     CBD No     Flavoring No      E-cigarette/Vaping Devices    Disposable No     Pre-filled or Refillable Cartridge No     Refillable Tank No     Pre-filled Pod No        Alcohol Use: Not At Risk (2/21/2023)    AUDIT-C     Frequency of Alcohol Consumption: Never     Average Number of Drinks: Patient does not drink     Frequency of Binge Drinking: Never         Objective   Vital Signs:   Vitals:    04/18/24 1415   BP: 134/52   BP Location: Right arm   Patient Position: Sitting   Cuff Size: Adult   Pulse: 64   Temp: 97.9 °F (36.6 °C)   SpO2: 97%   Weight: 65.8 kg (145 lb)   Height: 152.4 cm (60\")     Body mass index is 28.32 kg/m².    Wt Readings from Last 3 Encounters:   04/18/24 65.8 kg (145 lb)   01/31/24 66 kg (145 lb 6.4 oz)   12/12/23 70.9 kg (156 lb 6.4 oz)     BP Readings from Last 3 Encounters:   04/18/24 134/52   01/31/24 158/68   12/12/23 144/69       Health Maintenance   Topic Date Due    RSV Vaccine - Adults (1 - 1-dose 60+ series) Never done    COVID-19 Vaccine (3 - 2023-24 season) 04/21/2024 (Originally 9/1/2023)    ZOSTER " "VACCINE (1 of 2) 07/12/2024 (Originally 3/14/2002)    BMI FOLLOWUP  07/12/2024    INFLUENZA VACCINE  08/01/2024    DXA SCAN  08/05/2024    ANNUAL WELLNESS VISIT  01/31/2025    LIPID PANEL  01/31/2025    COLORECTAL CANCER SCREENING  03/28/2025    MAMMOGRAM  12/16/2025    TDAP/TD VACCINES (2 - Td or Tdap) 06/22/2026    HEPATITIS C SCREENING  Completed    Pneumococcal Vaccine 65+  Completed       /52 (BP Location: Right arm, Patient Position: Sitting, Cuff Size: Adult)   Pulse 64   Temp 97.9 °F (36.6 °C)   Ht 152.4 cm (60\")   Wt 65.8 kg (145 lb)   SpO2 97%   BMI 28.32 kg/m²       Current Outpatient Medications:     Accu-Chek Graciela Plus test strip, 1 each by Other route Daily., Disp: , Rfl:     Accu-Chek Softclix Lancets lancets, 1 each by Other route Daily., Disp: , Rfl:     acetaminophen (TYLENOL) 325 MG tablet, Take 2 tablets by mouth Every 6 (Six) Hours As Needed for Mild Pain., Disp: , Rfl:     celecoxib (CeleBREX) 100 MG capsule, Take 1 capsule by mouth 2 (Two) Times a Day., Disp: 180 capsule, Rfl: 1    cetirizine (zyrTEC) 10 MG tablet, Take 1 tablet by mouth Daily., Disp: 90 tablet, Rfl: 1    Cholecalciferol 25 MCG (1000 UT) tablet, Take 1 tablet by mouth Daily., Disp: , Rfl:     esomeprazole (nexIUM) 40 MG capsule, Take 1 capsule by mouth Daily., Disp: 90 capsule, Rfl: 1    fluticasone (FLONASE) 50 MCG/ACT nasal spray, 2 sprays by Each Nare route Daily., Disp: 16 g, Rfl: 2    lisinopril (PRINIVIL,ZESTRIL) 10 MG tablet, TAKE ONE TABLET BY MOUTH EVERY NIGHT, Disp: 30 tablet, Rfl: 1    vitamin B-12 (CYANOCOBALAMIN) 1000 MCG tablet, Take 1 tablet by mouth Daily., Disp: , Rfl:     cephalexin (Keflex) 500 MG capsule, Take 1 capsule by mouth 2 (Two) Times a Day for 10 days. Indications: strep, Disp: 20 capsule, Rfl: 0   Past Medical History:   Diagnosis Date    Allergic rhinitis due to allergen 09/10/2019    Anemia 09/10/2019    Anxiety disorder 06/09/2020    B12 deficiency 09/23/2020    Callus     Cataract "     COVID-19 06/23/2022    Depression     Diverticulosis     Essential hypertension 01/03/2019    Family history of myocardial infarction 01/19/2023    GERD (gastroesophageal reflux disease) 01/03/2019    Graves' disease     HLD (hyperlipidemia) 01/03/2019    Hyperthyroidism     Hypothyroidism     Inflammatory bowel disease     Major depressive disorder 01/03/2019    Osteoarthritis 09/23/2020    Osteopenia     Peptic ulceration     T2DM (type 2 diabetes mellitus) 01/03/2019    Tuberculosis     Took meds will never need to be tested again    Vitamin D deficiency 01/03/2019        Physical Exam  Vitals reviewed.   Constitutional:       Appearance: Normal appearance. She is well-developed.   HENT:      Right Ear: Tympanic membrane, ear canal and external ear normal.      Left Ear: Tympanic membrane, ear canal and external ear normal.      Mouth/Throat:      Mouth: Mucous membranes are moist.      Pharynx: Pharyngeal swelling and posterior oropharyngeal erythema present. No oropharyngeal exudate.   Neck:      Thyroid: No thyroid mass, thyromegaly or thyroid tenderness.      Comments: Tender on palpation.  Cardiovascular:      Rate and Rhythm: Normal rate and regular rhythm.      Heart sounds: No murmur heard.     No friction rub. No gallop.   Pulmonary:      Effort: Pulmonary effort is normal.      Breath sounds: Normal breath sounds. No wheezing or rhonchi.   Lymphadenopathy:      Cervical: Cervical adenopathy present.      Right cervical: Superficial cervical adenopathy present.      Left cervical: Superficial cervical adenopathy present.   Skin:     General: Skin is warm and dry.   Neurological:      Mental Status: She is alert and oriented to person, place, and time.      Cranial Nerves: No cranial nerve deficit.   Psychiatric:         Mood and Affect: Mood and affect normal.         Behavior: Behavior normal.         Thought Content: Thought content normal. Thought content does not include homicidal or suicidal  ideation.         Judgment: Judgment normal.          Result Review :    The following data was reviewed by: LOYD Mcarthur on 04/18/2024:  RAPIDSTREP   Strep          4/18/2024    14:39   Common Labs   POC Strep A, Molecular Negative      Assessment & Plan  Pharyngitis, unspecified etiology    Exposure to strep throat    Due to her positive exposure and symptoms, I will treat her with Keflex 500 mg twice daily for 10 days.  I will go ahead and send the strep swab for culture and if it is negative, we will call her and have her stop antibiotic.  Discussed strep throat contagious for 24 hours after starting antibiotics.  Do not allow others to eat/drink after you.  Change toothbrush tomorrow.  Finish all of antibiotics even when feeling better.  May take Tylenol and/or Ibuprofen as needed for pain.    Orders Placed This Encounter   Procedures    Throat / Upper Respiratory Culture - Swab, Throat    POCT rapid strep A     New Medications Ordered This Visit   Medications    cephalexin (Keflex) 500 MG capsule     Sig: Take 1 capsule by mouth 2 (Two) Times a Day for 10 days. Indications: strep     Dispense:  20 capsule     Refill:  0           Diagnosis Plan   1. Pharyngitis, unspecified etiology  POCT rapid strep A    cephalexin (Keflex) 500 MG capsule    Throat / Upper Respiratory Culture - Swab, Throat      2. Exposure to strep throat  cephalexin (Keflex) 500 MG capsule    Throat / Upper Respiratory Culture - Swab, Throat            FOLLOW UP  Return if symptoms worsen or fail to improve.  Patient was given instructions and counseling regarding her condition or for health maintenance advice. Please see specific information pulled into the AVS if appropriate.       CURRENT & DISCONTINUED MEDICATIONS  Current Outpatient Medications   Medication Instructions    Accu-Chek Graciela Plus test strip 1 each, Other, Daily    Accu-Chek Softclix Lancets lancets 1 each, Other, Daily    acetaminophen (TYLENOL) 650 mg, Oral,  Every 6 Hours PRN    celecoxib (CELEBREX) 100 mg, Oral, 2 Times Daily    cephalexin (KEFLEX) 500 mg, Oral, 2 Times Daily    cetirizine (ZYRTEC) 10 mg, Oral, Daily    cholecalciferol 1,000 Units, Oral, Daily    esomeprazole (NEXIUM) 40 mg, Oral, Daily    fluticasone (FLONASE) 50 MCG/ACT nasal spray 2 sprays, Each Nare, Daily    lisinopril (PRINIVIL,ZESTRIL) 10 mg, Oral, Every Night at Bedtime    vitamin B-12 (CYANOCOBALAMIN) 1,000 mcg, Oral, Daily       There are no discontinued medications.     Parts of this note are electronic transcriptions/translations of spoken language to printed text using the Dragon Dictation system.    LOYD Mcarthur  04/18/24  16:37 EDT

## 2024-04-20 LAB — BACTERIA SPEC AEROBE CULT: NORMAL

## 2024-04-24 ENCOUNTER — PATIENT ROUNDING (BHMG ONLY) (OUTPATIENT)
Dept: FAMILY MEDICINE CLINIC | Facility: CLINIC | Age: 74
End: 2024-04-24
Payer: MEDICARE

## 2024-04-24 NOTE — PROGRESS NOTES
A My-Chart message has been sent to the patient for PATIENT ROUNDING with [facility].

## 2024-05-17 RX ORDER — LISINOPRIL 10 MG/1
10 TABLET ORAL
Qty: 30 TABLET | Refills: 1 | Status: SHIPPED | OUTPATIENT
Start: 2024-05-17

## 2024-06-11 ENCOUNTER — TELEPHONE (OUTPATIENT)
Dept: GASTROENTEROLOGY | Facility: CLINIC | Age: 74
End: 2024-06-11

## 2024-06-11 ENCOUNTER — TELEPHONE (OUTPATIENT)
Dept: GASTROENTEROLOGY | Facility: CLINIC | Age: 74
End: 2024-06-11
Payer: MEDICARE

## 2024-06-11 DIAGNOSIS — K21.9 GASTROESOPHAGEAL REFLUX DISEASE WITHOUT ESOPHAGITIS: ICD-10-CM

## 2024-06-11 DIAGNOSIS — I10 ESSENTIAL HYPERTENSION: Primary | ICD-10-CM

## 2024-06-11 RX ORDER — LISINOPRIL 10 MG/1
10 TABLET ORAL
Qty: 90 TABLET | Refills: 1 | Status: SHIPPED | OUTPATIENT
Start: 2024-06-11

## 2024-06-11 RX ORDER — ESOMEPRAZOLE MAGNESIUM 40 MG/1
40 CAPSULE, DELAYED RELEASE ORAL DAILY
Qty: 90 CAPSULE | Refills: 1 | Status: SHIPPED | OUTPATIENT
Start: 2024-06-11

## 2024-06-11 NOTE — TELEPHONE ENCOUNTER
Hub staff attempted to follow warm transfer process and was unsuccessful     Caller: Qiana Altman    Relationship to patient: Self    Best call back number: [phone]    Patient is needing: PATIENT WOULD LIKE TO KNOW SHOULD SHE HAVE LABS DRAWN BEFORE COMING TO SEE TAN ZAMAN TOMORROW 6-12-24

## 2024-06-11 NOTE — TELEPHONE ENCOUNTER
Caller: Qiana Altman     Relationship: self    Best call back number: [phone]    What is your medical concern? Pt has appt. Tomorrow 6.12 and is wondering if she needs to have blood work done for it.     How long has this issue been going on?     Is your provider already aware of this issue?     Have you been treated for this issue?

## 2024-06-12 ENCOUNTER — OFFICE VISIT (OUTPATIENT)
Dept: GASTROENTEROLOGY | Facility: CLINIC | Age: 74
End: 2024-06-12
Payer: MEDICARE

## 2024-06-12 VITALS
WEIGHT: 144 LBS | BODY MASS INDEX: 28.27 KG/M2 | DIASTOLIC BLOOD PRESSURE: 69 MMHG | SYSTOLIC BLOOD PRESSURE: 144 MMHG | HEIGHT: 60 IN | HEART RATE: 70 BPM

## 2024-06-12 DIAGNOSIS — R74.8 ELEVATED LIVER ENZYMES: ICD-10-CM

## 2024-06-12 DIAGNOSIS — Z86.010 HISTORY OF COLON POLYPS: ICD-10-CM

## 2024-06-12 DIAGNOSIS — K21.9 GASTROESOPHAGEAL REFLUX DISEASE WITHOUT ESOPHAGITIS: Primary | ICD-10-CM

## 2024-06-12 PROCEDURE — 1160F RVW MEDS BY RX/DR IN RCRD: CPT | Performed by: NURSE PRACTITIONER

## 2024-06-12 PROCEDURE — 99213 OFFICE O/P EST LOW 20 MIN: CPT | Performed by: NURSE PRACTITIONER

## 2024-06-12 PROCEDURE — 3077F SYST BP >= 140 MM HG: CPT | Performed by: NURSE PRACTITIONER

## 2024-06-12 PROCEDURE — 3078F DIAST BP <80 MM HG: CPT | Performed by: NURSE PRACTITIONER

## 2024-06-12 PROCEDURE — 1159F MED LIST DOCD IN RCRD: CPT | Performed by: NURSE PRACTITIONER

## 2024-06-12 NOTE — PROGRESS NOTES
Chief Complaint   Elevated Hepatic Enzymes    History of Present Illness       Qiana Altman is a 74 y.o. female who presents to [facility] for follow-up for GERD.  She was last seen in the office by me on 12/12/2023.    Went to the ER at [place] in Feb of this year with worsening abd pain and back pain. Initially diagnosed with elevated liver enzymes and UTI. Admits she had covid 3 times previously. Was jaundiced on admission. Labs showed alkaline phosphatase at 177, ALT 1126, AST 1265 and total bilirubin at 3.7.     Normal CT abd/pelvis and MRCP. LFTs are now trending down. Patient is happy to report her abd pain has completely resolved. Still having some chronic back pain secondary to arthritis.      Has been taking celebrex daily for OA pain.      Hx GERD/HH--taking Nexium 40 mg daily. Denies any breakthrough reflux. Denies any N&V, abd pain, reflux, constipation, diarrhea, rectal bleeding or melena. Good appetite.      Is currently off LIPITOR since elevated liver enzymes. Has also been off Tylenol and Zyrtec. Most recent LFTs since last visit were back to normal. She is feeling a lot better. Still trying to get her strength and energy back.      Last EGD and colonoscopy---Was done at [facility] 9 years ago. Has hx colon polyps. Had negative COLOGARD recently.      GI FH--Maternal grandmother with rectal cancer.            Most recent LFTs done on 12/7/23 were all normal. Patient doing well. Denies any GI issues today.  Having more issues with her left leg and needs in a brace today.  Having a lot of arthritis pain.     1. Elevated liver enzymes (Primary)     2. Gastroesophageal reflux disease without esophagitis     3. History of colon polyps     Reviewed most recent lab work with her today.  Most recent LFTs done on 12/7/2023 were all completely normal.  GERD seems well-controlled on Nexium.  Continue GERD precautions.  Bowels moving well currently.  Patient to call  the office with any issues.  Patient to follow-up with me in 6 months.  Patient is agreeable to the plan.    Denies any GI issues today. Most recent LFTs this past January were normal. HGBA1c was better. Bowels moving well and appetite is good.      Results       Result Review :       CMP          7/23/2023    01:48 12/7/2023    09:40 1/31/2024    10:28   CMP   Glucose 98  101  111    BUN 9  8  14    Creatinine 0.85  1.03  0.89    EGFR 73.4  58.3  69.4    Sodium 136  141  139    Potassium 3.5  3.9  3.7    Chloride 101  105  106    Calcium 9.5  9.6  9.2    Total Protein 7.8  7.2  6.9    Albumin 4.0  4.0  4.2    Globulin 3.8  3.2  2.7    Total Bilirubin 0.6  0.9  0.7    Alkaline Phosphatase 87  75  76    AST (SGOT) 21  22  18    ALT (SGPT) 15  14  12    Albumin/Globulin Ratio 1.1  1.3  1.6    BUN/Creatinine Ratio 10.6  7.8  15.7    Anion Gap 8.4  11.1  9.0      CBC          7/12/2023    07:24 7/23/2023    01:48 1/31/2024    10:28   CBC   WBC 3.86  4.42  3.00    RBC 4.55  4.41  4.69    Hemoglobin 12.9  12.7  12.2    Hematocrit 38.9  37.8  38.4    MCV 85.5  85.7  81.9    MCH 28.4  28.8  26.0    MCHC 33.2  33.6  31.8    RDW 12.3  13.2  12.6    Platelets 197  171  180      CBC w/diff          7/12/2023    07:24 7/23/2023    01:48 1/31/2024    10:28   CBC w/Diff   WBC 3.86  4.42  3.00    RBC 4.55  4.41  4.69    Hemoglobin 12.9  12.7  12.2    Hematocrit 38.9  37.8  38.4    MCV 85.5  85.7  81.9    MCH 28.4  28.8  26.0    MCHC 33.2  33.6  31.8    RDW 12.3  13.2  12.6    Platelets 197  171  180    Neutrophil Rel % 53.1  56.1  58.0    Immature Granulocyte Rel % 0.0  0.2  0.3    Lymphocyte Rel % 35.0  32.6  31.3    Monocyte Rel % 8.0  9.0  7.7    Eosinophil Rel % 2.6  1.4  2.0    Basophil Rel % 1.3  0.7  0.7      Lipid Panel          7/12/2023    07:24 1/31/2024    10:28   Lipid Panel   Total Cholesterol 180  156    Triglycerides 72  85    HDL Cholesterol 57  52    VLDL Cholesterol 13  16    LDL Cholesterol  110  88    LDL/HDL  "Ratio  1.67      TSH          7/12/2023    07:24 1/31/2024    10:28   TSH   TSH 3.160  1.650        Lipase   Lipase   Date Value Ref Range Status   07/23/2023 51 13 - 60 U/L Final     Amylase No results found for: \"AMYLASE\"  Iron Profile   Iron   Date Value Ref Range Status   07/12/2023 63 37 - 145 mcg/dL Final     TIBC   Date Value Ref Range Status   07/12/2023 556 (H) 298 - 536 mcg/dL Final     Iron Saturation (TSAT)   Date Value Ref Range Status   07/12/2023 11 (L) 20 - 50 % Final     Transferrin   Date Value Ref Range Status   07/12/2023 373 (H) 200 - 360 mg/dL Final     Ferritin   Ferritin   Date Value Ref Range Status   01/19/2023 30.60 13.00 - 150.00 ng/mL Final     Liver Workup   Smooth Muscle Ab   Date Value Ref Range Status   02/21/2023 45 (H) 0 - 19 Units Final     Comment:                      Negative                     0 - 19                   Weak positive               20 - 30                   Moderate to strong positive     >30   Actin Antibodies are found in 52-85% of patients with   autoimmune hepatitis or chronic active hepatitis and   in 22% of patients with primary biliary cirrhosis.     Ceruloplasmin   Date Value Ref Range Status   02/21/2023 32 19 - 39 mg/dL Final     Ferritin   Date Value Ref Range Status   01/19/2023 30.60 13.00 - 150.00 ng/mL Final     Iron   Date Value Ref Range Status   07/12/2023 63 37 - 145 mcg/dL Final     TIBC   Date Value Ref Range Status   07/12/2023 556 (H) 298 - 536 mcg/dL Final     Iron Saturation (TSAT)   Date Value Ref Range Status   07/12/2023 11 (L) 20 - 50 % Final     Transferrin   Date Value Ref Range Status   07/12/2023 373 (H) 200 - 360 mg/dL Final     Mitochondrial Ab   Date Value Ref Range Status   02/21/2023 <20.0 0.0 - 20.0 Units Final     Comment:                                     Negative    0.0 - 20.0                                  Equivocal  20.1 - 24.9                                  Positive         >24.9  Mitochondrial (M2) Antibodies " are found in 90-96% of  patients with primary biliary cirrhosis.     Protime   Date Value Ref Range Status   2023 13.9 11.8 - 14.9 Seconds Final     INR   Date Value Ref Range Status   2023 1.06 0.86 - 1.15 Final               Past Medical History       Past Medical History:   Diagnosis Date    Allergic rhinitis due to allergen 09/10/2019    Anemia 09/10/2019    Anxiety disorder 2020    B12 deficiency 2020    Callus     Cataract     COVID-19 2022    Depression     Diverticulosis     Essential hypertension 2019    Family history of myocardial infarction 2023    GERD (gastroesophageal reflux disease) 2019    Graves' disease     HLD (hyperlipidemia) 2019    Hyperthyroidism     Hypothyroidism     Inflammatory bowel disease     Major depressive disorder 2019    Osteoarthritis 2020    Osteopenia     Peptic ulceration     T2DM (type 2 diabetes mellitus) 2019    Tuberculosis     Took meds will never need to be tested again    Vitamin D deficiency 2019       Past Surgical History:   Procedure Laterality Date    APPENDECTOMY       SECTION      CHOLECYSTECTOMY  2016    DR SCALES    COLON SURGERY      COLONOSCOPY      HIP FRACTURE SURGERY Left     REPAIR    HIP SURGERY      OOPHORECTOMY      SMALL INTESTINE SURGERY      TUBAL ABDOMINAL LIGATION      UPPER GASTROINTESTINAL ENDOSCOPY           Current Outpatient Medications:     Accu-Chek Graciela Plus test strip, 1 each by Other route Daily., Disp: , Rfl:     Accu-Chek Softclix Lancets lancets, 1 each by Other route Daily., Disp: , Rfl:     acetaminophen (TYLENOL) 325 MG tablet, Take 2 tablets by mouth Every 6 (Six) Hours As Needed for Mild Pain., Disp: , Rfl:     celecoxib (CeleBREX) 100 MG capsule, Take 1 capsule by mouth 2 (Two) Times a Day., Disp: 180 capsule, Rfl: 1    cetirizine (zyrTEC) 10 MG tablet, Take 1 tablet by mouth Daily., Disp: 90 tablet, Rfl: 1    Cholecalciferol 25 MCG  (1000 UT) tablet, Take 1 tablet by mouth Daily., Disp: , Rfl:     esomeprazole (nexIUM) 40 MG capsule, TAKE ONE CAPSULE BY MOUTH ONCE DAILY, Disp: 90 capsule, Rfl: 1    fluticasone (FLONASE) 50 MCG/ACT nasal spray, 2 sprays by Each Nare route Daily., Disp: 16 g, Rfl: 2    lisinopril (PRINIVIL,ZESTRIL) 10 MG tablet, TAKE ONE TABLET BY MOUTH EVERY NIGHT, Disp: 90 tablet, Rfl: 1    vitamin B-12 (CYANOCOBALAMIN) 1000 MCG tablet, Take 1 tablet by mouth Daily., Disp: , Rfl:      Allergies   Allergen Reactions    Shellfish Allergy Anaphylaxis    Moxifloxacin Hcl Unknown - Low Severity    Nitrous Oxide Other (See Comments)    Scopolamine Swelling    Sulfa Antibiotics Unknown - Low Severity    Morphine Rash    Penicillins Rash       Family History   Problem Relation Age of Onset    Heart disease Mother     Heart failure Mother     Arthritis Mother     Hyperlipidemia Mother     Hypertension Mother     Stroke Mother     Thyroid disease Mother     Heart disease Brother     Heart disease Brother     Rectal cancer Maternal Grandmother     Kidney disease Daughter     Miscarriages / Stillbirths Daughter     Birth defects Daughter         Cleft palate    Lung cancer Other     Skin cancer Other         Social History     Social History Narrative    Not on file       Objective       Review of Systems   Constitutional:  Negative for appetite change, fatigue, fever, unexpected weight gain and unexpected weight loss.   HENT:  Negative for trouble swallowing.    Respiratory:  Negative for cough, choking, chest tightness, shortness of breath, wheezing and stridor.    Cardiovascular:  Negative for chest pain, palpitations and leg swelling.   Gastrointestinal:  Negative for abdominal distention, abdominal pain, anal bleeding, blood in stool, constipation, diarrhea, nausea, rectal pain, vomiting, GERD and indigestion.        Vital Signs:   /69 (BP Location: Left arm, Patient Position: Sitting, Cuff Size: Adult)   Pulse 70   Ht 152.4  "cm (60\")   Wt 65.3 kg (144 lb)   BMI 28.12 kg/m²       Physical Exam  Constitutional:       General: She is not in acute distress.     Appearance: She is well-developed. She is not ill-appearing.   HENT:      Head: Normocephalic.   Eyes:      Pupils: Pupils are equal, round, and reactive to light.   Cardiovascular:      Rate and Rhythm: Normal rate and regular rhythm.      Heart sounds: Normal heart sounds.   Pulmonary:      Effort: Pulmonary effort is normal.      Breath sounds: Normal breath sounds.   Abdominal:      General: Bowel sounds are normal. There is no distension.      Palpations: Abdomen is soft. There is no mass.      Tenderness: There is no abdominal tenderness. There is no guarding or rebound.      Hernia: No hernia is present.   Musculoskeletal:         General: Normal range of motion.   Skin:     General: Skin is warm and dry.   Neurological:      Mental Status: She is alert and oriented to person, place, and time.   Psychiatric:         Speech: Speech normal.         Behavior: Behavior normal.         Judgment: Judgment normal.           Assessment & Plan          Assessment and Plan    Diagnoses and all orders for this visit:    1. Gastroesophageal reflux disease without esophagitis (Primary)    2. Elevated liver enzymes    3. History of colon polyps      Reviewed most recent lab results with her today.  LFTs look great.  All back to normal.  GERD seems well-controlled currently.  Bowels moving well.  Continue GERD precautions.  Patient to call the office with any issues.  Patient to follow-up with me in 1 year.  Patient is agreeable to the plan.          Follow Up       Follow Up   Return in about 1 year (around 6/12/2025) for GERD, ELEVATED LIVER ENZYMES.  Patient was given instructions and counseling regarding her condition or for health maintenance advice. Please see specific information pulled into the AVS if appropriate.       "

## 2024-06-14 NOTE — PROGRESS NOTES
[facility]  Cardiology progress Note    Patient Name: Qiana Altman  : 1950    CHIEF COMPLAINT  Hypertension        Subjective   Subjective     HISTORY OF PRESENT ILLNESS    Qiana Altman is a 74 y.o. female with history of hypertension.  No chest pain or shortness of breath.    REVIEW OF SYSTEMS    Constitutional:    No fever, no weight loss  Skin:     No rash  Otolaryngeal:    No difficulty swallowing  Cardiovascular: See HPI.  Pulmonary:    No cough, no sputum production    Personal History     Social History:    reports that she quit smoking about 39 years ago. Her smoking use included cigarettes. She started smoking about 44 years ago. She has a 5 pack-year smoking history. She has never used smokeless tobacco. She reports that she does not drink alcohol and does not use drugs.    Home Medications:  Current Outpatient Medications on File Prior to Visit   Medication Sig    Accu-Chek Graciela Plus test strip 1 each by Other route Daily.    Accu-Chek Softclix Lancets lancets 1 each by Other route Daily.    acetaminophen (TYLENOL) 325 MG tablet Take 2 tablets by mouth Every 6 (Six) Hours As Needed for Mild Pain.    celecoxib (CeleBREX) 100 MG capsule Take 1 capsule by mouth 2 (Two) Times a Day.    cetirizine (zyrTEC) 10 MG tablet Take 1 tablet by mouth Daily.    Cholecalciferol 25 MCG (1000 UT) tablet Take 1 tablet by mouth Daily.    esomeprazole (nexIUM) 40 MG capsule TAKE ONE CAPSULE BY MOUTH ONCE DAILY    fluticasone (FLONASE) 50 MCG/ACT nasal spray 2 sprays by Each Nare route Daily.    lisinopril (PRINIVIL,ZESTRIL) 10 MG tablet TAKE ONE TABLET BY MOUTH EVERY NIGHT    vitamin B-12 (CYANOCOBALAMIN) 1000 MCG tablet Take 1 tablet by mouth Daily.     No current facility-administered medications on file prior to visit.       Past Medical History:   Diagnosis Date    Allergic rhinitis due to allergen 09/10/2019    Anemia 09/10/2019    Anxiety disorder 2020    B12 deficiency 2020     Callus     Cataract     COVID-19 06/23/2022    Depression     Diverticulosis     Essential hypertension 01/03/2019    Family history of myocardial infarction 01/19/2023    GERD (gastroesophageal reflux disease) 01/03/2019    Graves' disease     HLD (hyperlipidemia) 01/03/2019    Hyperthyroidism     Hypothyroidism     Inflammatory bowel disease     Major depressive disorder 01/03/2019    Osteoarthritis 09/23/2020    Osteopenia     Peptic ulceration     T2DM (type 2 diabetes mellitus) 01/03/2019    Tuberculosis     Took meds will never need to be tested again    Vitamin D deficiency 01/03/2019       Allergies:  Allergies   Allergen Reactions    Shellfish Allergy Anaphylaxis    Moxifloxacin Hcl Unknown - Low Severity    Nitrous Oxide Other (See Comments)    Scopolamine Swelling    Sulfa Antibiotics Unknown - Low Severity    Morphine Rash    Penicillins Rash       Objective    Objective       Vitals:   Heart Rate:  [68] 68  BP: (142)/(72) 142/72  Body mass index is 28.32 kg/m².     PHYSICAL EXAM:    General Appearance:   well developed  well nourished  HENT:   oropharynx moist  lips not cyanotic  Neck:  thyroid not enlarged  supple  Respiratory:  no respiratory distress  normal breath sounds  no rales  Cardiovascular:  no jugular venous distention  regular rhythm  apical impulse normal  S1 normal, S2 normal  no S3, no S4     no rub, no thrill  carotid pulses normal; no bruit  pedal pulses normal  lower extremity edema: none    Skin:   warm, dry  Psychiatric:  judgement and insight appropriate  normal mood and affect        Result Review:  I have personally reviewed the available results from  [x]  Laboratory  [x]  EKG  [x]  Cardiology  [x]  Medications  [x]  Old records  []  Other:     Procedures  Lab Results   Component Value Date    CHOL 156 01/31/2024    CHOL 180 07/12/2023    CHOL 136 04/18/2023     Lab Results   Component Value Date    TRIG 85 01/31/2024    TRIG 72 07/12/2023    TRIG 76 04/18/2023     Lab Results    Component Value Date    HDL 52 01/31/2024    HDL 57 07/12/2023    HDL 38 (L) 04/18/2023     Lab Results   Component Value Date    LDL 88 01/31/2024     (H) 07/12/2023    LDL 83 04/18/2023     Lab Results   Component Value Date    VLDL 16 01/31/2024    VLDL 13 07/12/2023    VLDL 15 04/18/2023        Impression/Plan:  1.  Essential hypertension controlled: Continue Accupril 10 mg once a day.  Monitor blood pressure regularly.  2.  Mitral regurgitation: Repeat echocardiogram to evaluate any progression of valvular heart disease        Lei Richmond MD   06/18/24   09:25 EDT

## 2024-06-18 ENCOUNTER — OFFICE VISIT (OUTPATIENT)
Dept: CARDIOLOGY | Facility: CLINIC | Age: 74
End: 2024-06-18
Payer: MEDICARE

## 2024-06-18 VITALS
SYSTOLIC BLOOD PRESSURE: 142 MMHG | HEIGHT: 60 IN | DIASTOLIC BLOOD PRESSURE: 72 MMHG | WEIGHT: 145 LBS | BODY MASS INDEX: 28.47 KG/M2 | HEART RATE: 68 BPM

## 2024-06-18 DIAGNOSIS — I34.0 NONRHEUMATIC MITRAL VALVE REGURGITATION: ICD-10-CM

## 2024-06-18 DIAGNOSIS — I10 HYPERTENSION, ESSENTIAL: Primary | ICD-10-CM

## 2024-06-18 PROCEDURE — 3078F DIAST BP <80 MM HG: CPT | Performed by: SPECIALIST

## 2024-06-18 PROCEDURE — 3077F SYST BP >= 140 MM HG: CPT | Performed by: SPECIALIST

## 2024-06-18 PROCEDURE — 99213 OFFICE O/P EST LOW 20 MIN: CPT | Performed by: SPECIALIST

## 2024-07-16 ENCOUNTER — HOSPITAL ENCOUNTER (OUTPATIENT)
Dept: CARDIOLOGY | Facility: HOSPITAL | Age: 74
Discharge: HOME | End: 2024-07-16
Admitting: SPECIALIST
Payer: MEDICARE

## 2024-07-16 DIAGNOSIS — I34.0 NONRHEUMATIC MITRAL VALVE REGURGITATION: ICD-10-CM

## 2024-07-16 LAB
ASCENDING AORTA: 2.7 CM
BH CV ECHO MEAS - AO MAX PG: 7.2 MMHG
BH CV ECHO MEAS - AO MEAN PG: 4 MMHG
BH CV ECHO MEAS - AO ROOT DIAM: 2.8 CM
BH CV ECHO MEAS - AO V2 MAX: 134 CM/SEC
BH CV ECHO MEAS - AO V2 VTI: 30.3 CM
BH CV ECHO MEAS - AVA(I,D): 2.5 CM2
BH CV ECHO MEAS - EDV(CUBED): 59.8 ML
BH CV ECHO MEAS - EDV(MOD-SP2): 30.9 ML
BH CV ECHO MEAS - EDV(MOD-SP4): 47.5 ML
BH CV ECHO MEAS - EF(MOD-BP): 58.2 %
BH CV ECHO MEAS - EF(MOD-SP2): 60.2 %
BH CV ECHO MEAS - EF(MOD-SP4): 59.6 %
BH CV ECHO MEAS - ESV(CUBED): 17.4 ML
BH CV ECHO MEAS - ESV(MOD-SP2): 12.3 ML
BH CV ECHO MEAS - ESV(MOD-SP4): 19.2 ML
BH CV ECHO MEAS - FS: 33.8 %
BH CV ECHO MEAS - IVS/LVPW: 1.31 CM
BH CV ECHO MEAS - IVSD: 1.35 CM
BH CV ECHO MEAS - LA DIMENSION: 4.1 CM
BH CV ECHO MEAS - LAT PEAK E' VEL: 7.7 CM/SEC
BH CV ECHO MEAS - LV DIASTOLIC VOL/BSA (35-75): 29.2 CM2
BH CV ECHO MEAS - LV MASS(C)D: 157.9 GRAMS
BH CV ECHO MEAS - LV MAX PG: 4.2 MMHG
BH CV ECHO MEAS - LV MEAN PG: 2 MMHG
BH CV ECHO MEAS - LV SYSTOLIC VOL/BSA (12-30): 11.8 CM2
BH CV ECHO MEAS - LV V1 MAX: 102 CM/SEC
BH CV ECHO MEAS - LV V1 VTI: 24.5 CM
BH CV ECHO MEAS - LVIDD: 3.9 CM
BH CV ECHO MEAS - LVIDS: 2.6 CM
BH CV ECHO MEAS - LVOT AREA: 3.1 CM2
BH CV ECHO MEAS - LVOT DIAM: 2 CM
BH CV ECHO MEAS - LVPWD: 1.03 CM
BH CV ECHO MEAS - MED PEAK E' VEL: 5.3 CM/SEC
BH CV ECHO MEAS - MV A MAX VEL: 90.3 CM/SEC
BH CV ECHO MEAS - MV DEC SLOPE: 344 CM/SEC2
BH CV ECHO MEAS - MV DEC TIME: 0.23 SEC
BH CV ECHO MEAS - MV E MAX VEL: 80.4 CM/SEC
BH CV ECHO MEAS - MV E/A: 0.89
BH CV ECHO MEAS - MV MAX PG: 3.6 MMHG
BH CV ECHO MEAS - MV MEAN PG: 2 MMHG
BH CV ECHO MEAS - MV P1/2T: 84.1 MSEC
BH CV ECHO MEAS - MV V2 VTI: 29.4 CM
BH CV ECHO MEAS - MVA(P1/2T): 2.6 CM2
BH CV ECHO MEAS - MVA(VTI): 2.6 CM2
BH CV ECHO MEAS - RAP SYSTOLE: 3 MMHG
BH CV ECHO MEAS - RVDD: 2.6 CM
BH CV ECHO MEAS - RVSP: 18.1 MMHG
BH CV ECHO MEAS - SV(LVOT): 77 ML
BH CV ECHO MEAS - SV(MOD-SP2): 18.6 ML
BH CV ECHO MEAS - SV(MOD-SP4): 28.3 ML
BH CV ECHO MEAS - SVI(LVOT): 47.3 ML/M2
BH CV ECHO MEAS - SVI(MOD-SP2): 11.4 ML/M2
BH CV ECHO MEAS - SVI(MOD-SP4): 17.4 ML/M2
BH CV ECHO MEAS - TR MAX PG: 15.1 MMHG
BH CV ECHO MEAS - TR MAX VEL: 194 CM/SEC
BH CV ECHO MEASUREMENTS AVERAGE E/E' RATIO: 12.37
LEFT ATRIUM VOLUME INDEX: 18.8 ML/M2

## 2024-07-16 PROCEDURE — 93306 TTE W/DOPPLER COMPLETE: CPT

## 2024-07-29 ENCOUNTER — APPOINTMENT (OUTPATIENT)
Dept: CT IMAGING | Facility: HOSPITAL | Age: 74
DRG: 442 | End: 2024-07-29
Payer: MEDICARE

## 2024-07-29 ENCOUNTER — HOSPITAL ENCOUNTER (INPATIENT)
Facility: HOSPITAL | Age: 74
LOS: 7 days | Discharge: HOME | DRG: 442 | End: 2024-08-05
Attending: EMERGENCY MEDICINE | Admitting: HOSPITALIST
Payer: MEDICARE

## 2024-07-29 ENCOUNTER — APPOINTMENT (OUTPATIENT)
Dept: ULTRASOUND IMAGING | Facility: HOSPITAL | Age: 74
DRG: 442 | End: 2024-07-29
Payer: MEDICARE

## 2024-07-29 DIAGNOSIS — R10.9 ABDOMINAL PAIN, UNSPECIFIED ABDOMINAL LOCATION: Primary | ICD-10-CM

## 2024-07-29 DIAGNOSIS — E87.1 HYPONATREMIA: ICD-10-CM

## 2024-07-29 DIAGNOSIS — R26.2 DIFFICULTY WALKING: ICD-10-CM

## 2024-07-29 DIAGNOSIS — R79.89 ELEVATED LFTS: ICD-10-CM

## 2024-07-29 DIAGNOSIS — Z78.9 IMPAIRED MOBILITY AND ADLS: ICD-10-CM

## 2024-07-29 DIAGNOSIS — Z74.09 IMPAIRED MOBILITY AND ADLS: ICD-10-CM

## 2024-07-29 DIAGNOSIS — R74.8 ABNORMAL LIVER ENZYMES: ICD-10-CM

## 2024-07-29 LAB
ALBUMIN SERPL-MCNC: 3.5 G/DL (ref 3.5–5.2)
ALBUMIN/GLOB SERPL: 0.8 G/DL
ALP SERPL-CCNC: 127 U/L (ref 39–117)
ALT SERPL W P-5'-P-CCNC: 321 U/L (ref 1–33)
ANION GAP SERPL CALCULATED.3IONS-SCNC: 12 MMOL/L (ref 5–15)
ANION GAP SERPL CALCULATED.3IONS-SCNC: 9 MMOL/L (ref 5–15)
AST SERPL-CCNC: 394 U/L (ref 1–32)
BACTERIA UR QL AUTO: NORMAL /HPF
BASOPHILS # BLD AUTO: 0.03 10*3/MM3 (ref 0–0.2)
BASOPHILS NFR BLD AUTO: 0.8 % (ref 0–1.5)
BILIRUB SERPL-MCNC: 1.8 MG/DL (ref 0–1.2)
BILIRUB UR QL STRIP: NEGATIVE
BUN SERPL-MCNC: 5 MG/DL (ref 8–23)
BUN SERPL-MCNC: 6 MG/DL (ref 8–23)
BUN/CREAT SERPL: 6.5 (ref 7–25)
BUN/CREAT SERPL: 6.8 (ref 7–25)
CALCIUM SPEC-SCNC: 8.6 MG/DL (ref 8.6–10.5)
CALCIUM SPEC-SCNC: 9.2 MG/DL (ref 8.6–10.5)
CHLORIDE SERPL-SCNC: 89 MMOL/L (ref 98–107)
CHLORIDE SERPL-SCNC: 94 MMOL/L (ref 98–107)
CLARITY UR: CLEAR
CO2 SERPL-SCNC: 22 MMOL/L (ref 22–29)
CO2 SERPL-SCNC: 23 MMOL/L (ref 22–29)
COLOR UR: YELLOW
CORTIS SERPL-MCNC: 13.25 MCG/DL
CREAT SERPL-MCNC: 0.73 MG/DL (ref 0.57–1)
CREAT SERPL-MCNC: 0.92 MG/DL (ref 0.57–1)
CRP SERPL-MCNC: <0.3 MG/DL (ref 0–0.5)
D-LACTATE SERPL-SCNC: 1.3 MMOL/L (ref 0.5–2)
DEPRECATED RDW RBC AUTO: 47.3 FL (ref 37–54)
EGFRCR SERPLBLD CKD-EPI 2021: 66.3 ML/MIN/1.73
EGFRCR SERPLBLD CKD-EPI 2021: 87.5 ML/MIN/1.73
EOSINOPHIL # BLD AUTO: 0.02 10*3/MM3 (ref 0–0.4)
EOSINOPHIL NFR BLD AUTO: 0.5 % (ref 0.3–6.2)
ERYTHROCYTE [DISTWIDTH] IN BLOOD BY AUTOMATED COUNT: 15.2 % (ref 12.3–15.4)
ERYTHROCYTE [SEDIMENTATION RATE] IN BLOOD: 7 MM/HR (ref 0–30)
GGT SERPL-CCNC: 194 U/L (ref 5–36)
GLOBULIN UR ELPH-MCNC: 4.6 GM/DL
GLUCOSE BLDC GLUCOMTR-MCNC: 128 MG/DL (ref 70–99)
GLUCOSE SERPL-MCNC: 115 MG/DL (ref 65–99)
GLUCOSE SERPL-MCNC: 130 MG/DL (ref 65–99)
GLUCOSE UR STRIP-MCNC: NEGATIVE MG/DL
HCT VFR BLD AUTO: 38.8 % (ref 34–46.6)
HGB BLD-MCNC: 12.8 G/DL (ref 12–15.9)
HGB UR QL STRIP.AUTO: NEGATIVE
HOLD SPECIMEN: NORMAL
HOLD SPECIMEN: NORMAL
HYALINE CASTS UR QL AUTO: NORMAL /LPF
IMM GRANULOCYTES # BLD AUTO: 0.01 10*3/MM3 (ref 0–0.05)
IMM GRANULOCYTES NFR BLD AUTO: 0.3 % (ref 0–0.5)
INR PPP: 1.11 (ref 0.86–1.15)
KETONES UR QL STRIP: ABNORMAL
LDH SERPL-CCNC: 225 U/L (ref 135–214)
LEUKOCYTE ESTERASE UR QL STRIP.AUTO: ABNORMAL
LIPASE SERPL-CCNC: 54 U/L (ref 13–60)
LYMPHOCYTES # BLD AUTO: 0.76 10*3/MM3 (ref 0.7–3.1)
LYMPHOCYTES NFR BLD AUTO: 20.3 % (ref 19.6–45.3)
MCH RBC QN AUTO: 28.1 PG (ref 26.6–33)
MCHC RBC AUTO-ENTMCNC: 33 G/DL (ref 31.5–35.7)
MCV RBC AUTO: 85.3 FL (ref 79–97)
MONOCYTES # BLD AUTO: 0.35 10*3/MM3 (ref 0.1–0.9)
MONOCYTES NFR BLD AUTO: 9.3 % (ref 5–12)
NEUTROPHILS NFR BLD AUTO: 2.58 10*3/MM3 (ref 1.7–7)
NEUTROPHILS NFR BLD AUTO: 68.8 % (ref 42.7–76)
NITRITE UR QL STRIP: NEGATIVE
NRBC BLD AUTO-RTO: 0 /100 WBC (ref 0–0.2)
OSMOLALITY SERPL: 270 MOSM/KG (ref 280–301)
OSMOLALITY UR: 564 MOSM/KG (ref 50–1400)
PH UR STRIP.AUTO: 6 [PH] (ref 5–8)
PHOSPHATE SERPL-MCNC: 3.3 MG/DL (ref 2.5–4.5)
PLATELET # BLD AUTO: 198 10*3/MM3 (ref 140–450)
PMV BLD AUTO: 8.9 FL (ref 6–12)
POTASSIUM SERPL-SCNC: 4.2 MMOL/L (ref 3.5–5.2)
POTASSIUM SERPL-SCNC: 4.2 MMOL/L (ref 3.5–5.2)
PROT SERPL-MCNC: 8.1 G/DL (ref 6–8.5)
PROT UR QL STRIP: NEGATIVE
PROTHROMBIN TIME: 14.5 SECONDS (ref 11.8–14.9)
RBC # BLD AUTO: 4.55 10*6/MM3 (ref 3.77–5.28)
RBC # UR STRIP: NORMAL /HPF
REF LAB TEST METHOD: NORMAL
SODIUM SERPL-SCNC: 123 MMOL/L (ref 136–145)
SODIUM SERPL-SCNC: 126 MMOL/L (ref 136–145)
SODIUM SERPL-SCNC: 126 MMOL/L (ref 136–145)
SODIUM UR-SCNC: 124 MMOL/L
SP GR UR STRIP: 1.01 (ref 1–1.03)
SQUAMOUS #/AREA URNS HPF: NORMAL /HPF
TSH SERPL DL<=0.05 MIU/L-ACNC: 1.58 UIU/ML (ref 0.27–4.2)
UROBILINOGEN UR QL STRIP: ABNORMAL
WBC # UR STRIP: NORMAL /HPF
WBC NRBC COR # BLD AUTO: 3.75 10*3/MM3 (ref 3.4–10.8)
WHOLE BLOOD HOLD COAG: NORMAL
WHOLE BLOOD HOLD SPECIMEN: NORMAL

## 2024-07-29 PROCEDURE — G0378 HOSPITAL OBSERVATION PER HR: HCPCS

## 2024-07-29 PROCEDURE — 84075 ASSAY ALKALINE PHOSPHATASE: CPT | Performed by: HOSPITALIST

## 2024-07-29 PROCEDURE — 99223 1ST HOSP IP/OBS HIGH 75: CPT | Performed by: HOSPITALIST

## 2024-07-29 PROCEDURE — 25010000002 HYDROMORPHONE 1 MG/ML SOLUTION: Performed by: EMERGENCY MEDICINE

## 2024-07-29 PROCEDURE — 84295 ASSAY OF SERUM SODIUM: CPT | Performed by: HOSPITALIST

## 2024-07-29 PROCEDURE — 36415 COLL VENOUS BLD VENIPUNCTURE: CPT | Performed by: HOSPITALIST

## 2024-07-29 PROCEDURE — 82533 TOTAL CORTISOL: CPT | Performed by: HOSPITALIST

## 2024-07-29 PROCEDURE — 84300 ASSAY OF URINE SODIUM: CPT | Performed by: HOSPITALIST

## 2024-07-29 PROCEDURE — 82948 REAGENT STRIP/BLOOD GLUCOSE: CPT

## 2024-07-29 PROCEDURE — 83615 LACTATE (LD) (LDH) ENZYME: CPT | Performed by: INTERNAL MEDICINE

## 2024-07-29 PROCEDURE — 99285 EMERGENCY DEPT VISIT HI MDM: CPT

## 2024-07-29 PROCEDURE — 87040 BLOOD CULTURE FOR BACTERIA: CPT | Performed by: HOSPITALIST

## 2024-07-29 PROCEDURE — 25010000002 CEFTRIAXONE PER 250 MG: Performed by: HOSPITALIST

## 2024-07-29 PROCEDURE — 86225 DNA ANTIBODY NATIVE: CPT | Performed by: INTERNAL MEDICINE

## 2024-07-29 PROCEDURE — 25510000001 IOPAMIDOL PER 1 ML: Performed by: EMERGENCY MEDICINE

## 2024-07-29 PROCEDURE — 86038 ANTINUCLEAR ANTIBODIES: CPT | Performed by: INTERNAL MEDICINE

## 2024-07-29 PROCEDURE — 74177 CT ABD & PELVIS W/CONTRAST: CPT

## 2024-07-29 PROCEDURE — 86140 C-REACTIVE PROTEIN: CPT | Performed by: INTERNAL MEDICINE

## 2024-07-29 PROCEDURE — 99214 OFFICE O/P EST MOD 30 MIN: CPT | Performed by: INTERNAL MEDICINE

## 2024-07-29 PROCEDURE — 81001 URINALYSIS AUTO W/SCOPE: CPT | Performed by: EMERGENCY MEDICINE

## 2024-07-29 PROCEDURE — 82746 ASSAY OF FOLIC ACID SERUM: CPT | Performed by: INTERNAL MEDICINE

## 2024-07-29 PROCEDURE — 83690 ASSAY OF LIPASE: CPT | Performed by: EMERGENCY MEDICINE

## 2024-07-29 PROCEDURE — 85610 PROTHROMBIN TIME: CPT | Performed by: HOSPITALIST

## 2024-07-29 PROCEDURE — 82607 VITAMIN B-12: CPT | Performed by: INTERNAL MEDICINE

## 2024-07-29 PROCEDURE — 80053 COMPREHEN METABOLIC PANEL: CPT | Performed by: EMERGENCY MEDICINE

## 2024-07-29 PROCEDURE — 84443 ASSAY THYROID STIM HORMONE: CPT | Performed by: HOSPITALIST

## 2024-07-29 PROCEDURE — 83915 ASSAY OF NUCLEOTIDASE: CPT | Performed by: HOSPITALIST

## 2024-07-29 PROCEDURE — 83935 ASSAY OF URINE OSMOLALITY: CPT | Performed by: HOSPITALIST

## 2024-07-29 PROCEDURE — 82977 ASSAY OF GGT: CPT | Performed by: HOSPITALIST

## 2024-07-29 PROCEDURE — 25810000003 SODIUM CHLORIDE 0.9 % SOLUTION: Performed by: EMERGENCY MEDICINE

## 2024-07-29 PROCEDURE — 84100 ASSAY OF PHOSPHORUS: CPT | Performed by: HOSPITALIST

## 2024-07-29 PROCEDURE — 82784 ASSAY IGA/IGD/IGG/IGM EACH: CPT | Performed by: INTERNAL MEDICINE

## 2024-07-29 PROCEDURE — 80074 ACUTE HEPATITIS PANEL: CPT | Performed by: INTERNAL MEDICINE

## 2024-07-29 PROCEDURE — 86015 ACTIN ANTIBODY EACH: CPT | Performed by: INTERNAL MEDICINE

## 2024-07-29 PROCEDURE — 83930 ASSAY OF BLOOD OSMOLALITY: CPT | Performed by: HOSPITALIST

## 2024-07-29 PROCEDURE — 85025 COMPLETE CBC W/AUTO DIFF WBC: CPT | Performed by: EMERGENCY MEDICINE

## 2024-07-29 PROCEDURE — 86376 MICROSOMAL ANTIBODY EACH: CPT | Performed by: INTERNAL MEDICINE

## 2024-07-29 PROCEDURE — 85652 RBC SED RATE AUTOMATED: CPT | Performed by: INTERNAL MEDICINE

## 2024-07-29 PROCEDURE — 84080 ASSAY ALKALINE PHOSPHATASES: CPT | Performed by: HOSPITALIST

## 2024-07-29 PROCEDURE — 83605 ASSAY OF LACTIC ACID: CPT | Performed by: EMERGENCY MEDICINE

## 2024-07-29 RX ORDER — SODIUM CHLORIDE 9 MG/ML
40 INJECTION, SOLUTION INTRAVENOUS AS NEEDED
Status: DISCONTINUED | OUTPATIENT
Start: 2024-07-29 | End: 2024-08-05 | Stop reason: HOSPADM

## 2024-07-29 RX ORDER — ACETYLCYSTEINE 200 MG/ML
1200 SOLUTION ORAL; RESPIRATORY (INHALATION) EVERY 12 HOURS SCHEDULED
Status: COMPLETED | OUTPATIENT
Start: 2024-07-29 | End: 2024-08-03

## 2024-07-29 RX ORDER — AMOXICILLIN 250 MG
2 CAPSULE ORAL 2 TIMES DAILY
Status: DISCONTINUED | OUTPATIENT
Start: 2024-07-29 | End: 2024-07-29

## 2024-07-29 RX ORDER — ONDANSETRON 4 MG/1
4 TABLET, ORALLY DISINTEGRATING ORAL EVERY 6 HOURS PRN
Status: DISCONTINUED | OUTPATIENT
Start: 2024-07-29 | End: 2024-07-30

## 2024-07-29 RX ORDER — SODIUM CHLORIDE 0.9 % (FLUSH) 0.9 %
10 SYRINGE (ML) INJECTION AS NEEDED
Status: DISCONTINUED | OUTPATIENT
Start: 2024-07-29 | End: 2024-08-05 | Stop reason: HOSPADM

## 2024-07-29 RX ORDER — POLYETHYLENE GLYCOL 3350 17 G/17G
17 POWDER, FOR SOLUTION ORAL DAILY PRN
Status: DISCONTINUED | OUTPATIENT
Start: 2024-07-29 | End: 2024-07-29

## 2024-07-29 RX ORDER — FLUTICASONE PROPIONATE 50 MCG
2 SPRAY, SUSPENSION (ML) NASAL DAILY
Status: DISCONTINUED | OUTPATIENT
Start: 2024-07-29 | End: 2024-08-05 | Stop reason: HOSPADM

## 2024-07-29 RX ORDER — UREA 10 %
1000 LOTION (ML) TOPICAL DAILY
Status: DISCONTINUED | OUTPATIENT
Start: 2024-07-29 | End: 2024-08-02

## 2024-07-29 RX ORDER — CETIRIZINE HYDROCHLORIDE 10 MG/1
10 TABLET ORAL DAILY
Status: DISCONTINUED | OUTPATIENT
Start: 2024-07-29 | End: 2024-08-05 | Stop reason: HOSPADM

## 2024-07-29 RX ORDER — SODIUM CHLORIDE 0.9 % (FLUSH) 0.9 %
10 SYRINGE (ML) INJECTION EVERY 12 HOURS SCHEDULED
Status: DISCONTINUED | OUTPATIENT
Start: 2024-07-29 | End: 2024-08-05 | Stop reason: HOSPADM

## 2024-07-29 RX ORDER — ONDANSETRON 2 MG/ML
4 INJECTION INTRAMUSCULAR; INTRAVENOUS EVERY 6 HOURS PRN
Status: DISCONTINUED | OUTPATIENT
Start: 2024-07-29 | End: 2024-07-30

## 2024-07-29 RX ORDER — BISACODYL 5 MG/1
5 TABLET, DELAYED RELEASE ORAL DAILY PRN
Status: DISCONTINUED | OUTPATIENT
Start: 2024-07-29 | End: 2024-07-29

## 2024-07-29 RX ORDER — PANTOPRAZOLE SODIUM 40 MG/1
40 TABLET, DELAYED RELEASE ORAL
Status: DISCONTINUED | OUTPATIENT
Start: 2024-07-30 | End: 2024-08-05 | Stop reason: HOSPADM

## 2024-07-29 RX ORDER — BISACODYL 10 MG
10 SUPPOSITORY, RECTAL RECTAL DAILY PRN
Status: DISCONTINUED | OUTPATIENT
Start: 2024-07-29 | End: 2024-08-05 | Stop reason: HOSPADM

## 2024-07-29 RX ORDER — CHOLECALCIFEROL (VITAMIN D3) 25 MCG
1000 TABLET ORAL DAILY
Status: DISCONTINUED | OUTPATIENT
Start: 2024-07-29 | End: 2024-08-05 | Stop reason: HOSPADM

## 2024-07-29 RX ORDER — HYDRALAZINE HYDROCHLORIDE 20 MG/ML
10 INJECTION INTRAMUSCULAR; INTRAVENOUS EVERY 6 HOURS PRN
Status: DISCONTINUED | OUTPATIENT
Start: 2024-07-29 | End: 2024-07-30

## 2024-07-29 RX ORDER — DICYCLOMINE HYDROCHLORIDE 10 MG/1
20 CAPSULE ORAL 4 TIMES DAILY
Status: DISCONTINUED | OUTPATIENT
Start: 2024-07-29 | End: 2024-08-05 | Stop reason: HOSPADM

## 2024-07-29 RX ORDER — CELECOXIB 100 MG/1
100 CAPSULE ORAL 2 TIMES DAILY
Status: DISCONTINUED | OUTPATIENT
Start: 2024-07-29 | End: 2024-07-29

## 2024-07-29 RX ADMIN — CYANOCOBALAMIN TAB 500 MCG 1000 MCG: 500 TAB at 17:31

## 2024-07-29 RX ADMIN — Medication 1000 UNITS: at 17:31

## 2024-07-29 RX ADMIN — IOPAMIDOL 90 ML: 755 INJECTION, SOLUTION INTRAVENOUS at 12:08

## 2024-07-29 RX ADMIN — DICYCLOMINE HYDROCHLORIDE 20 MG: 10 CAPSULE ORAL at 21:18

## 2024-07-29 RX ADMIN — SODIUM CHLORIDE 1000 ML: 9 INJECTION, SOLUTION INTRAVENOUS at 11:45

## 2024-07-29 RX ADMIN — AVOBENZONE, HOMOSALATE, OCTISALATE, OCTOCRYLENE, AND OXYBENZONE 1 PACKET: 29.4; 147; 49; 25.4; 58.8 LOTION TOPICAL at 21:18

## 2024-07-29 RX ADMIN — CETIRIZINE HYDROCHLORIDE 10 MG: 10 TABLET, FILM COATED ORAL at 17:31

## 2024-07-29 RX ADMIN — FLUTICASONE PROPIONATE 2 SPRAY: 50 SPRAY, METERED NASAL at 17:31

## 2024-07-29 RX ADMIN — CEFTRIAXONE SODIUM 1000 MG: 1 INJECTION, POWDER, FOR SOLUTION INTRAMUSCULAR; INTRAVENOUS at 17:30

## 2024-07-29 RX ADMIN — Medication 10 ML: at 21:18

## 2024-07-29 RX ADMIN — HYDROMORPHONE HYDROCHLORIDE 1 MG: 1 INJECTION, SOLUTION INTRAMUSCULAR; INTRAVENOUS; SUBCUTANEOUS at 11:45

## 2024-07-29 RX ADMIN — ACETYLCYSTEINE 1200 MG: 200 SOLUTION ORAL; RESPIRATORY (INHALATION) at 21:29

## 2024-07-29 RX ADMIN — Medication 5 MG: at 21:17

## 2024-07-29 NOTE — CONSULTS
[facility]  Initial Inpatient Consult Note    Referring Provider: Whitney Sharp DO    Reason for Consultation: Abnormal liver enzyme and hepatosplenomegaly    History of present illness:  Patient is 74 y.o. female with known medical history of hypertension, hypothyroidism, diabetes mellitus, GERD, PUD, diverticulosis of colon, IBS, s/p right hemicolectomy, anxiety, depression, osteoarthritis admitted with 2 days history of abdominal pain, and nausea.  Lab workup on arrival noted to have sodium level of 123, alkaline phosphatase 127, ,  and total bilirubin level of 1.8.  CT abdomen and pelvis reported hepatic steatosis, mild hepatosplenomegaly, small gastroesophageal varices.    Patient describes that she usually has altered bowel habits and 2 days ago she ate something that causing generalized abdominal pain with constipation followed by liquidy stools after taking docusate followed by liquidy stool.  She feels that abdominal pain rotating anteriorly under the right cage and on her back.  No fever or chills.  No sick contact.  No recent travel. She had similar episode in the past with elevated LFTs and yellow discoloration of the skin and at that time it was thought it is likely viral and she was positive for SMA and CMV IgG.  She does not drink alcohol, no history of illicit or IV drug abuse, no history of NSAIDs intake.  No history of hepatitis or blood transfusion.  She has history of GERD and takes Nexium    Patient further mentioned that on Thursday she was feeling lethargic, weak and having lightheadedness.    Past Medical History:  Past Medical History:   Diagnosis Date    Allergic rhinitis due to allergen 09/10/2019    Anemia 09/10/2019    Anxiety disorder 06/09/2020    B12 deficiency 09/23/2020    Callus     Cataract     COVID-19 06/23/2022    Depression     Diverticulosis     Essential hypertension 01/03/2019    Family history of myocardial infarction 01/19/2023     GERD (gastroesophageal reflux disease) 2019    Graves' disease     HLD (hyperlipidemia) 2019    Hyperthyroidism     Hypothyroidism     Inflammatory bowel disease     Major depressive disorder 2019    Osteoarthritis 2020    Osteopenia     Peptic ulceration     T2DM (type 2 diabetes mellitus) 2019    Tuberculosis     Took meds will never need to be tested again    Vitamin D deficiency 2019     Past Surgical History:  Past Surgical History:   Procedure Laterality Date    APPENDECTOMY       SECTION      CHOLECYSTECTOMY  2016    DR SCALES    COLON SURGERY      COLONOSCOPY      HIP FRACTURE SURGERY Left     REPAIR    HIP SURGERY      OOPHORECTOMY      SMALL INTESTINE SURGERY      TUBAL ABDOMINAL LIGATION      UPPER GASTROINTESTINAL ENDOSCOPY        Social History:   Social History     Tobacco Use    Smoking status: Former     Current packs/day: 0.00     Average packs/day: 1 pack/day for 5.0 years (5.0 ttl pk-yrs)     Types: Cigarettes     Start date: 1980     Quit date: 1985     Years since quittin.6     Passive exposure: Past    Smokeless tobacco: Never    Tobacco comments:     QUIT 28 YEARS AGO   Substance Use Topics    Alcohol use: Never      Family History:  Family History   Problem Relation Age of Onset    Heart disease Mother     Heart failure Mother     Arthritis Mother     Hyperlipidemia Mother     Hypertension Mother     Stroke Mother     Thyroid disease Mother     Heart disease Brother     Heart disease Brother     Rectal cancer Maternal Grandmother     Kidney disease Daughter     Miscarriages / Stillbirths Daughter     Birth defects Daughter         Cleft palate    Lung cancer Other     Skin cancer Other      Home Meds:  Medications Prior to Admission   Medication Sig Dispense Refill Last Dose    acetaminophen (TYLENOL) 325 MG tablet Take 2 tablets by mouth Every 6 (Six) Hours As Needed for Mild Pain.       celecoxib (CeleBREX) 100 MG capsule Take  1 capsule by mouth 2 (Two) Times a Day. 180 capsule 1 Past Week    cetirizine (zyrTEC) 10 MG tablet Take 1 tablet by mouth Daily. 90 tablet 1 Past Week    Cholecalciferol 25 MCG (1000 UT) tablet Take 1 tablet by mouth Daily.   Past Week    esomeprazole (nexIUM) 40 MG capsule TAKE ONE CAPSULE BY MOUTH ONCE DAILY 90 capsule 1 7/28/2024    fluticasone (FLONASE) 50 MCG/ACT nasal spray 2 sprays by Each Nare route Daily. 16 g 2     lisinopril (PRINIVIL,ZESTRIL) 10 MG tablet TAKE ONE TABLET BY MOUTH EVERY NIGHT 90 tablet 1 7/28/2024    vitamin B-12 (CYANOCOBALAMIN) 1000 MCG tablet Take 1 tablet by mouth Daily.   Past Week     Current Meds:   cefTRIAXone, 1,000 mg, Intravenous, Q24H  cetirizine, 10 mg, Oral, Daily  cholecalciferol, 1,000 Units, Oral, Daily  fluticasone, 2 spray, Each Nare, Daily  melatonin, 5 mg, Oral, Nightly  [START ON 7/30/2024] pantoprazole, 40 mg, Oral, Q AM  senna-docusate sodium, 2 tablet, Oral, BID  sodium chloride, 10 mL, Intravenous, Q12H  vitamin B-12, 1,000 mcg, Oral, Daily      Allergies:  Allergies   Allergen Reactions    Shellfish Allergy Anaphylaxis    Moxifloxacin Hcl Unknown - Low Severity    Nitrous Oxide Other (See Comments)    Scopolamine Swelling    Sulfa Antibiotics Unknown - Low Severity    Morphine Rash    Penicillins Rash       Objective     Vital Signs  Temp:  [97.3 °F (36.3 °C)-97.4 °F (36.3 °C)] 97.3 °F (36.3 °C)  Heart Rate:  [70-77] 77  Resp:  [18] 18  BP: (124-139)/(61-73) 138/66  Physical Exam:  General Appearance:    Alert and oriented, normal affect   Head:    Normocephalic, without obvious abnormality, atraumatic   Eyes:          conjunctivae and sclerae normal, no icterus, pupils round and reactive to light   Oral cavity: Moist and intact, normal dentation   Neck:   Supple, no adenopathy   Chest Wall/Lungs:    No abnormalities observed, equal on expansion bilaterally, no use of extrarespiratory muscles noted   Abdomen:     Soft, nondistended, generalized tenderness;  normal bowel sounds, no hepatospleenomegaly, no ascites   Extremities:   no edema, clubbing, or cyanosis   Skin:   No bruising or rash   Psychiatric:  normal mood and insight     Results Review:   I reviewed the patient's new clinical results.    Results from last 7 days   Lab Units 07/29/24  1030   WBC 10*3/mm3 3.75   HEMOGLOBIN g/dL 12.8   MCV fL 85.3   PLATELETS 10*3/mm3 198         Lab 07/29/24  1030   NEUTROS ABS 2.58     Results from last 7 days   Lab Units 07/29/24  1503 07/29/24  1030   BUN mg/dL 5* 6*   CREATININE mg/dL 0.73 0.92   SODIUM mmol/L 126*  126* 123*   POTASSIUM mmol/L 4.2 4.2   CHLORIDE mmol/L 94* 89*   CO2 mmol/L 23.0 22.0   GLUCOSE mg/dL 115* 130*      Results from last 7 days   Lab Units 07/29/24  1503   PROTIME Seconds 14.5   INR  1.11     Results from last 7 days   Lab Units 07/29/24  1030   AST (SGOT) U/L 394*   ALT (SGPT) U/L 321*   ALK PHOS U/L 127*   BILIRUBIN mg/dL 1.8*   TOTAL PROTEIN g/dL 8.1   ALBUMIN g/dL 3.5                Radiology:  CT Abdomen Pelvis With Contrast    Result Date: 7/29/2024  Impression: 1. Hepatic steatosis. 2. Mild hepatosplenomegaly. There may be small gastroesophageal varices. I would recommend clinical correlation with regard to signs and symptoms of portal hypertension. 3. Status post a right hemicolectomy. 4. Calcified uterine fibroid. 5. Suggestion of a small hiatal hernia. 6. Mild dependent atelectasis in the lung bases. Electronically Signed: Kevin Ortega MD  7/29/2024 12:30 PM EDT  Workstation ID: [id number]     Impression:     Liver enzyme elevation    Elevated LFTs    Hyponatremia     Impression:    Elevated LFTs with hepatosplenomegaly  Abnormal CT abdomen and pelvis  Abdominal pain  Altered bowel habits  Nausea  GERD  Hyponatremia    Plan and Recommendations: Patient admitted with 2 days history of abdominal pain with altered bowel habits, nausea and heartburn.  She had CT abdomen and pelvis with contrast that reported mild hepatosplenomegaly with  small gastroesophageal varices.  Will check ESR, CRP, LDH, gamma GT and ultrasound of liver.  The probable etiology seems to be ischemic, although viral or drug-induced etiology is possible.  Will check RENZO, SMA, liver kidney microsomal antibody, quantitative immunoglobulins, acute hepatitis panel.  Will start NAC for elevated LFTs.    For abdominal pain, will start Bentyl and to regulate her BM will give Metamucil.  For nausea, she has to be on scheduled Zofran and for GERD will start Protonix.    Patient had colonoscopy and had right hemicolectomy.  Unable to find the record or patient unable to describe the exact diagnosis and time it was done    I discussed the patients findings and my recommendations with patient, nursing staff, and consulting provider.      Electronically signed by Peter Osorio MD, 07/29/24, 5:47 PM EDT.

## 2024-07-29 NOTE — PLAN OF CARE
Goal Outcome Evaluation:  Plan of Care Reviewed With: daughter        Progress: no change  Outcome Evaluation: Patient alert and oriented x4, respirations even and unlabored on room air, up in room and to bathroom independently. no c/o pain.

## 2024-07-29 NOTE — ED PROVIDER NOTES
Time: 3:17 PM EDT  Date of encounter:  2024  Independent Historian/Clinical History and Information was obtained by:   Patient    History is limited by: N/A    Chief Complaint: Abdominal pain      History of Present Illness:  Patient is a 74 y.o. year old female who presents to the emergency department for evaluation of abdominal pain accompanied by nausea and vomiting that started 2 days ago.  Patient denies fever and chills.  Patient has no chest pain or shortness of breath.  Patient has no cough hemoptysis.  Patient denies dysuria and urinary frequency.    HPI    Patient Care Team  Primary Care Provider: Zohreh Mcduffie APRN    Past Medical History:     Allergies   Allergen Reactions    Shellfish Allergy Anaphylaxis    Moxifloxacin Hcl Unknown - Low Severity    Nitrous Oxide Other (See Comments)    Scopolamine Swelling    Sulfa Antibiotics Unknown - Low Severity    Morphine Rash    Penicillins Rash     Past Medical History:   Diagnosis Date    Allergic rhinitis due to allergen 09/10/2019    Anemia 09/10/2019    Anxiety disorder 2020    B12 deficiency 2020    Callus     Cataract     COVID-19 2022    Depression     Diverticulosis     Essential hypertension 2019    Family history of myocardial infarction 2023    GERD (gastroesophageal reflux disease) 2019    Graves' disease     HLD (hyperlipidemia) 2019    Hyperthyroidism     Hypothyroidism     Inflammatory bowel disease     Major depressive disorder 2019    Osteoarthritis 2020    Osteopenia     Peptic ulceration     T2DM (type 2 diabetes mellitus) 2019    Tuberculosis     Took meds will never need to be tested again    Vitamin D deficiency 2019     Past Surgical History:   Procedure Laterality Date    APPENDECTOMY       SECTION      CHOLECYSTECTOMY  2016    DR SCALES    COLON SURGERY      COLONOSCOPY      HIP FRACTURE SURGERY Left     REPAIR    HIP SURGERY       OOPHORECTOMY      SMALL INTESTINE SURGERY      TUBAL ABDOMINAL LIGATION      UPPER GASTROINTESTINAL ENDOSCOPY       Family History   Problem Relation Age of Onset    Heart disease Mother     Heart failure Mother     Arthritis Mother     Hyperlipidemia Mother     Hypertension Mother     Stroke Mother     Thyroid disease Mother     Heart disease Brother     Heart disease Brother     Rectal cancer Maternal Grandmother     Kidney disease Daughter     Miscarriages / Stillbirths Daughter     Birth defects Daughter         Cleft palate    Lung cancer Other     Skin cancer Other        Home Medications:  Prior to Admission medications    Medication Sig Start Date End Date Taking? Authorizing Provider   acetaminophen (TYLENOL) 325 MG tablet Take 2 tablets by mouth Every 6 (Six) Hours As Needed for Mild Pain.   Yes Laverne Seals MD   celecoxib (CeleBREX) 100 MG capsule Take 1 capsule by mouth 2 (Two) Times a Day. 1/31/24  Yes Zohreh Mcduffie APRN   cetirizine (zyrTEC) 10 MG tablet Take 1 tablet by mouth Daily. 1/31/24  Yes Zohreh Mcduffie APRN   Cholecalciferol 25 MCG (1000 UT) tablet Take 1 tablet by mouth Daily.   Yes Laverne Seals MD   esomeprazole (nexIUM) 40 MG capsule TAKE ONE CAPSULE BY MOUTH ONCE DAILY 6/11/24  Yes Zohreh Mcduffie APRN   fluticasone (FLONASE) 50 MCG/ACT nasal spray 2 sprays by Each Nare route Daily. 3/27/24  Yes Zohreh Mcduffie APRN   lisinopril (PRINIVIL,ZESTRIL) 10 MG tablet TAKE ONE TABLET BY MOUTH EVERY NIGHT 6/11/24  Yes Zohreh Mcduffie APRN   vitamin B-12 (CYANOCOBALAMIN) 1000 MCG tablet Take 1 tablet by mouth Daily.   Yes Laverne Seals MD   Accu-Cherodrigue Graciela Plus test strip 1 each by Other route Daily. 3/1/23 7/29/24  Laverne Seals MD   Accu-Javi Softclix Lancets lancets 1 each by Other route Daily. 2/7/23 7/29/24  Laverne Seals MD        Social History:   Social History     Tobacco Use    Smoking status: Former      "Current packs/day: 0.00     Average packs/day: 1 pack/day for 5.0 years (5.0 ttl pk-yrs)     Types: Cigarettes     Start date: 1980     Quit date: 1985     Years since quittin.6    Smokeless tobacco: Never    Tobacco comments:     QUIT 28 YEARS AGO   Vaping Use    Vaping status: Never Used   Substance Use Topics    Alcohol use: Never    Drug use: Never         Review of Systems:  Review of Systems   Constitutional:  Negative for chills and fever.   HENT:  Negative for congestion, rhinorrhea and sore throat.    Eyes:  Negative for pain and visual disturbance.   Respiratory:  Negative for apnea, cough, chest tightness and shortness of breath.    Cardiovascular:  Negative for chest pain and palpitations.   Gastrointestinal:  Positive for abdominal pain, nausea and vomiting. Negative for diarrhea.   Genitourinary:  Negative for difficulty urinating and dysuria.   Musculoskeletal:  Negative for joint swelling and myalgias.   Skin:  Negative for color change.   Neurological:  Negative for seizures and headaches.   Psychiatric/Behavioral: Negative.     All other systems reviewed and are negative.       Physical Exam:  /61   Pulse 74   Temp 97.4 °F (36.3 °C) (Oral)   Resp 18   Ht 152.4 cm (60\")   Wt 64.2 kg (141 lb 8.6 oz)   SpO2 92%   BMI 27.64 kg/m²     Physical Exam  Vitals and nursing note reviewed.   Constitutional:       General: She is not in acute distress.     Appearance: Normal appearance. She is not toxic-appearing.   HENT:      Head: Normocephalic and atraumatic.      Jaw: There is normal jaw occlusion.   Eyes:      General: Lids are normal.      Extraocular Movements: Extraocular movements intact.      Conjunctiva/sclera: Conjunctivae normal.      Pupils: Pupils are equal, round, and reactive to light.   Cardiovascular:      Rate and Rhythm: Normal rate and regular rhythm.      Pulses: Normal pulses.      Heart sounds: Normal heart sounds.   Pulmonary:      Effort: Pulmonary effort is " normal. No respiratory distress.      Breath sounds: Normal breath sounds. No wheezing or rhonchi.   Abdominal:      General: Abdomen is flat.      Palpations: Abdomen is soft.      Tenderness: There is no abdominal tenderness. There is no guarding or rebound.   Musculoskeletal:         General: Normal range of motion.      Cervical back: Normal range of motion and neck supple.      Right lower leg: No edema.      Left lower leg: No edema.   Skin:     General: Skin is warm and dry.   Neurological:      Mental Status: She is alert and oriented to person, place, and time. Mental status is at baseline.   Psychiatric:         Mood and Affect: Mood normal.                  Procedures:  Procedures      Medical Decision Making:      Comorbidities that affect care:    Diverticulosis    External Notes reviewed:    Previous Labs: Patient had normal liver enzymes 6 months ago.      The following orders were placed and all results were independently analyzed by me:  Orders Placed This Encounter   Procedures    CT Abdomen Pelvis With Contrast    [place] Draw    Comprehensive Metabolic Panel    Lipase    Urinalysis With Microscopic If Indicated (No Culture) - Urine, Clean Catch    Lactic Acid, Plasma    CBC Auto Differential    Urinalysis, Microscopic Only - Urine, Clean Catch    Basic Metabolic Panel    Magnesium    Phosphorus    Sodium, Urine, Random - Urine, Clean Catch    Osmolality, Serum    Osmolality, Urine - Urine, Clean Catch    Cortisol    TSH Rfx On Abnormal To Free T4    Protime-INR    Sodium    NPO Diet NPO Type: Strict NPO    Undress & Gown    Vital Signs    Intake & Output    Weigh Patient    Oral Care    Saline Lock & Maintain IV Access    Place Sequential Compression Device    Maintain Sequential Compression Device    Code Status and Medical Interventions: CPR (Attempt to Resuscitate); Full Support    Inpatient Hospitalist Consult    Inpatient Nephrology Consult    Insert Peripheral IV    Insert Peripheral IV     Inpatient Admission    CBC & Differential    Green Top (Gel)    Lavender Top    Gold Top - SST    Light Blue Top    CBC & Differential       Medications Given in the Emergency Department:  Medications   sodium chloride 0.9 % flush 10 mL (has no administration in time range)   sodium chloride 0.9 % flush 10 mL (has no administration in time range)   sodium chloride 0.9 % flush 10 mL (has no administration in time range)   sodium chloride 0.9 % infusion 40 mL (has no administration in time range)   ondansetron ODT (ZOFRAN-ODT) disintegrating tablet 4 mg (has no administration in time range)     Or   ondansetron (ZOFRAN) injection 4 mg (has no administration in time range)   melatonin tablet 5 mg (has no administration in time range)   sennosides-docusate (PERICOLACE) 8.6-50 MG per tablet 2 tablet (has no administration in time range)     And   polyethylene glycol (MIRALAX) packet 17 g (has no administration in time range)     And   bisacodyl (DULCOLAX) EC tablet 5 mg (has no administration in time range)     And   bisacodyl (DULCOLAX) suppository 10 mg (has no administration in time range)   HYDROmorphone (DILAUDID) injection 1 mg (has no administration in time range)   celecoxib (CeleBREX) capsule 100 mg (has no administration in time range)   cetirizine (zyrTEC) tablet 10 mg (has no administration in time range)   pantoprazole (PROTONIX) EC tablet 40 mg (has no administration in time range)   fluticasone (FLONASE) 50 MCG/ACT nasal spray 2 spray (has no administration in time range)   vitamin B-12 (CYANOCOBALAMIN) tablet 1,000 mcg (has no administration in time range)   cholecalciferol (VITAMIN D3) tablet 1,000 Units (has no administration in time range)   sodium chloride 0.9 % bolus 1,000 mL (1,000 mL Intravenous New Bag 7/29/24 1145)   HYDROmorphone (DILAUDID) injection 1 mg (1 mg Intravenous Given 7/29/24 1145)   iopamidol (ISOVUE-370) 76 % injection 100 mL (90 mL Intravenous Given 7/29/24 1208)        ED  Course:         Labs:    Lab Results (last 24 hours)       Procedure Component Value Units Date/Time    CBC & Differential [[id number]]  (Normal) Collected: 07/29/24 1030    Specimen: Blood Updated: 07/29/24 1041    Narrative:      The following orders were created for panel order CBC & Differential.  Procedure                               Abnormality         Status                     ---------                               -----------         ------                     CBC Auto Differential[[id number]]        Normal              Final result                 Please view results for these tests on the individual orders.    Comprehensive Metabolic Panel [[id number]]  (Abnormal) Collected: 07/29/24 1030    Specimen: Blood Updated: 07/29/24 1055     Glucose 130 mg/dL      BUN 6 mg/dL      Creatinine 0.92 mg/dL      Sodium 123 mmol/L      Potassium 4.2 mmol/L      Chloride 89 mmol/L      CO2 22.0 mmol/L      Calcium 9.2 mg/dL      Total Protein 8.1 g/dL      Albumin 3.5 g/dL      ALT (SGPT) 321 U/L      AST (SGOT) 394 U/L      Alkaline Phosphatase 127 U/L      Total Bilirubin 1.8 mg/dL      Globulin 4.6 gm/dL      A/G Ratio 0.8 g/dL      BUN/Creatinine Ratio 6.5     Anion Gap 12.0 mmol/L      eGFR 66.3 mL/min/1.73     Narrative:      GFR Normal >60  Chronic Kidney Disease <60  Kidney Failure <15    The GFR formula is only valid for adults with stable renal function between ages 18 and 70.    Lipase [[id number]]  (Normal) Collected: 07/29/24 1030    Specimen: Blood Updated: 07/29/24 1055     Lipase 54 U/L     Lactic Acid, Plasma [[id number]]  (Normal) Collected: 07/29/24 1030    Specimen: Blood Updated: 07/29/24 1051     Lactate 1.3 mmol/L     CBC Auto Differential [[id number]]  (Normal) Collected: 07/29/24 1030    Specimen: Blood Updated: 07/29/24 1041     WBC 3.75 10*3/mm3      RBC 4.55 10*6/mm3      Hemoglobin 12.8 g/dL      Hematocrit 38.8 %      MCV 85.3 fL      MCH 28.1 pg      MCHC 33.0 g/dL      RDW 15.2 %      RDW-SD  47.3 fl      MPV 8.9 fL      Platelets 198 10*3/mm3      Neutrophil % 68.8 %      Lymphocyte % 20.3 %      Monocyte % 9.3 %      Eosinophil % 0.5 %      Basophil % 0.8 %      Immature Grans % 0.3 %      Neutrophils, Absolute 2.58 10*3/mm3      Lymphocytes, Absolute 0.76 10*3/mm3      Monocytes, Absolute 0.35 10*3/mm3      Eosinophils, Absolute 0.02 10*3/mm3      Basophils, Absolute 0.03 10*3/mm3      Immature Grans, Absolute 0.01 10*3/mm3      nRBC 0.0 /100 WBC     Urinalysis With Microscopic If Indicated (No Culture) - Urine, Clean Catch [[id number]]  (Abnormal) Collected: 07/29/24 1144    Specimen: Urine, Clean Catch Updated: 07/29/24 1155     Color, UA Yellow     Appearance, UA Clear     pH, UA 6.0     Specific Gravity, UA 1.008     Glucose, UA Negative     Ketones, UA Trace     Bilirubin, UA Negative     Blood, UA Negative     Protein, UA Negative     Leuk Esterase, UA Trace     Nitrite, UA Negative     Urobilinogen, UA 2.0 E.U./dL    Urinalysis, Microscopic Only - Urine, Clean Catch [[id number]] Collected: 07/29/24 1144    Specimen: Urine, Clean Catch Updated: 07/29/24 1155     RBC, UA 0-2 /HPF      WBC, UA 0-2 /HPF      Bacteria, UA None Seen /HPF      Squamous Epithelial Cells, UA 0-2 /HPF      Hyaline Casts, UA 3-6 /LPF      Methodology Automated Microscopy    Basic Metabolic Panel [[id number]] Collected: 07/29/24 1503    Specimen: Blood from Arm, Right Updated: 07/29/24 1507    Phosphorus [[id number]] Collected: 07/29/24 1503    Specimen: Blood from Arm, Right Updated: 07/29/24 1507    Osmolality, Serum [[id number]] Collected: 07/29/24 1503    Specimen: Blood from Arm, Right Updated: 07/29/24 1507    Cortisol [[id number]] Collected: 07/29/24 1503    Specimen: Blood from Arm, Right Updated: 07/29/24 1507    TSH Rfx On Abnormal To Free T4 [[id number]] Collected: 07/29/24 1503    Specimen: Blood from Arm, Right Updated: 07/29/24 1507    Protime-INR [[id number]] Collected: 07/29/24 1504    Specimen: Blood from Arm,  Right Updated: 07/29/24 1507    Sodium [[id number]] Collected: 07/29/24 1503    Specimen: Blood from Arm, Right Updated: 07/29/24 1507             Imaging:    CT Abdomen Pelvis With Contrast    Result Date: 7/29/2024  CT ABDOMEN PELVIS W CONTRAST Date of Exam: 7/29/2024 12:07 PM EDT Indication: Abdominal pain, previous right hemicolectomy.. Comparison: 7/23/2024. Technique: Axial CT images were obtained of the abdomen and pelvis after the uneventful intravenous administration of iodinated contrast. Reconstructed coronal and sagittal images were also obtained. Automated exposure control and iterative construction methods were used. Findings: There is hepatic steatosis. The liver is mildly enlarged. The spleen is also enlarged. The portal venous system is patent. There may be some small gastroesophageal varices. I would recommend clinical correlation with regard to signs or symptoms of portal  hypertension. The adrenal glands, kidneys, and pancreas are normal. The gallbladder is surgically absent. The patient has had a previous right hemicolectomy. There are no dilated loops of bowel to indicate an obstructive process. There is no definite abnormal bowel wall thickening. There may be a small hiatal hernia. There are atherosclerotic vascular calcifications in the abdomen and pelvis. There is a calcified fibroid in the posterior body of the uterus. The uterus, adnexal structures, and urinary  bladder are otherwise normal. There are no enlarged lymph nodes or abnormal fluid collections. There is scatter artifact from surgical hardware in the visualized proximal left femoral shaft. There are old healed posterior left rib fractures. There are underlying degenerative changes. There are no suspicious osteolytic or sclerotic lesions within the bony structures. There is mild dependent atelectasis in the lung bases.     Impression: 1. Hepatic steatosis. 2. Mild hepatosplenomegaly. There may be small gastroesophageal varices. I  would recommend clinical correlation with regard to signs and symptoms of portal hypertension. 3. Status post a right hemicolectomy. 4. Calcified uterine fibroid. 5. Suggestion of a small hiatal hernia. 6. Mild dependent atelectasis in the lung bases. Electronically Signed: Kevin Ortega MD  7/29/2024 12:30 PM EDT  Workstation ID: [id number]       Differential Diagnosis and Discussion:    Abdominal Pain: Based on the patient's signs and symptoms, I considered abdominal aortic aneurysm, small bowel obstruction, pancreatitis, acute cholecystitis, acute appendecitis, peptic ulcer disease, gastritis, colitis, endocrine disorders, irritable bowel syndrome and other differential diagnosis an etiology of the patient's abdominal pain.    All labs were reviewed and interpreted by me.    MDM     Amount and/or Complexity of Data Reviewed  Decide to obtain previous medical records or to obtain history from someone other than the patient: yes    The patient´s CBC that was reviewed and interpreted by me shows no abnormalities of critical concern. Of note, there is no anemia requiring a blood transfusion and the platelet count is acceptable.  The patient´s CMP that was reviewed and interpretted by me shows no abnormalities of critical concern.  Patient does have a sodium of 123.  The patient´s liver enzymes are elevated. The patient´s renal function (creatinine) is preserved. The patient has a normal anion gap.  CT scan is negative for acute intra-abdominal pathology.            Patient Care Considerations:    I considered starting antibiotics, however no bacterial focus of infection was found.      Consultants/Shared Management Plan:    Case was discussed with Dr. Miller who agrees to consult.  Case was discussed with Dr. Sharp who agrees with admission.    Social Determinants of Health:    Patient is independent, reliable, and has access to care.       Disposition and Care Coordination:    Admit:   Through independent evaluation of  the patient's history, physical, and imperical data, the patient meets criteria for inpatient admission to the hospital.        Final diagnoses:   Abdominal pain, unspecified abdominal location        ED Disposition       ED Disposition   Decision to Admit    Condition   --    Comment   Level of Care: Telemetry [5]   Diagnosis: Liver enzyme elevation [[id number]]   Admitting Physician: MIGEL ESTRADA [[id number]]   Attending Physician: MIGEL ESTRADA [[id number]]   Certification: I Certify That Inpatient Hospital Services Are Medically Necessary For Greater Than 2 Midnights                 This medical record created using voice recognition software.             Clyde Clark MD  07/29/24 [id number]

## 2024-07-29 NOTE — H&P
[facility]  Date: [facility]   Patient Name: Qiana Altman  : 1950  MRN: [id number]  Primary Care Physician:  Zohreh Mcduffie APRN  Date of admission: 2024    Subjective abdominal pain  Subjective   Chief Complaint: Abdominal pain    HPI: Patient is a 74-year-old female who presents to the emergency room for evaluation of abdominal pain with nausea and vomiting that started 2 days ago.  Patient denies any fevers or chills.    On arrival to the ED, patient is temperature 97.4, pulse 74, respiratory rate of 18, blood pressure 139/73, and she saturating 100% on room air.  On labs, patient's sodium is 123, chloride is 88, creatinine 0.92, glucose is 130, alkaline phosphatase is 127, AST is 394, ALT is 321, total bilirubin is 1.8.  Lactate is 1.3.  Lipase is 54.  Patient's serum osmolality is 270.  White blood cell count is 3.75.  Hemoglobin is 12.8.  Urine shows trace ketones and trace leukocytes.    CT of the abdomen shows:  1. Hepatic steatosis.   2. Mild hepatosplenomegaly. There may be small gastroesophageal varices. I would recommend clinical correlation with regard to signs and symptoms of portal hypertension.   3. Status post a right hemicolectomy.   4. Calcified uterine fibroid.   5. Suggestion of a small hiatal hernia.   6. Mild dependent atelectasis in the lung bases.   Personal History     Past Medical History:  Past Medical History:   Diagnosis Date   • Allergic rhinitis due to allergen 09/10/2019   • Anemia 09/10/2019   • Anxiety disorder 2020   • B12 deficiency 2020   • Callus    • Cataract    • COVID-19 2022   • Depression    • Diverticulosis    • Essential hypertension 2019   • Family history of myocardial infarction 2023   • GERD (gastroesophageal reflux disease) 2019   • Graves' disease    • HLD (hyperlipidemia) 2019   • Hyperthyroidism    • Hypothyroidism    • Inflammatory bowel disease    • Major  depressive disorder 2019   • Osteoarthritis 2020   • Osteopenia    • Peptic ulceration    • T2DM (type 2 diabetes mellitus) 2019   • Tuberculosis     Took meds will never need to be tested again   • Vitamin D deficiency 2019       Past Surgical History:  Past Surgical History:   Procedure Laterality Date   • APPENDECTOMY     •  SECTION     • CHOLECYSTECTOMY  2016    DR SCALES   • COLON SURGERY     • COLONOSCOPY     • HIP FRACTURE SURGERY Left     REPAIR   • HIP SURGERY     • OOPHORECTOMY     • SMALL INTESTINE SURGERY     • TUBAL ABDOMINAL LIGATION     • UPPER GASTROINTESTINAL ENDOSCOPY         Family History:   Family History   Problem Relation Age of Onset   • Heart disease Mother    • Heart failure Mother    • Arthritis Mother    • Hyperlipidemia Mother    • Hypertension Mother    • Stroke Mother    • Thyroid disease Mother    • Heart disease Brother    • Heart disease Brother    • Rectal cancer Maternal Grandmother    • Kidney disease Daughter    • Miscarriages / Stillbirths Daughter    • Birth defects Daughter         Cleft palate   • Lung cancer Other    • Skin cancer Other        Social History:   Social History     Socioeconomic History   • Marital status:    Tobacco Use   • Smoking status: Former     Current packs/day: 0.00     Average packs/day: 1 pack/day for 5.0 years (5.0 ttl pk-yrs)     Types: Cigarettes     Start date: 1980     Quit date: 1985     Years since quittin.6   • Smokeless tobacco: Never   • Tobacco comments:     QUIT 28 YEARS AGO   Vaping Use   • Vaping status: Never Used   Substance and Sexual Activity   • Alcohol use: Never   • Drug use: Never   • Sexual activity: Not Currently     Partners: Male     Birth control/protection: Surgical, Post-menopausal, Tubal ligation       Home Medications:  acetaminophen, celecoxib, cetirizine, cholecalciferol, esomeprazole, fluticasone, lisinopril, and vitamin B-12    Allergies:  Allergies    Allergen Reactions   • Shellfish Allergy Anaphylaxis   • Moxifloxacin Hcl Unknown - Low Severity   • Nitrous Oxide Other (See Comments)   • Scopolamine Swelling   • Sulfa Antibiotics Unknown - Low Severity   • Morphine Rash   • Penicillins Rash       Review of Systems   All systems were reviewed and negative except for: Abdominal pain    Objective   Objective     Vitals:   Temp:  [97.4 °F (36.3 °C)] 97.4 °F (36.3 °C)  Heart Rate:  [70-77] 74  Resp:  [18] 18  BP: (124-139)/(61-73) 124/61    Physical Exam    Constitutional: Awake, alert, no acute distress   Eyes: Pupils equal, sclerae anicteric, no conjunctival injection   HENT: NCAT, mucous membranes moist   Neck: Supple, no thyromegaly, no lymphadenopathy, trachea midline   Respiratory: Clear to auscultation bilaterally, nonlabored respirations    Cardiovascular: RRR, no murmurs, rubs, or gallops, palpable pedal pulses bilaterally   Gastrointestinal: Positive bowel sounds, soft, nontender, nondistended   Musculoskeletal: No bilateral ankle edema, no clubbing or cyanosis to extremities   Psychiatric: Appropriate affect, cooperative   Neurologic: Oriented x 3, strength symmetric in all extremities, Cranial Nerves grossly intact to confrontation, speech clear   Skin: No rashes     Result Review    Result Review:  I have personally reviewed the results from the time of this admission to 7/29/2024 14:48 EDT and agree with these findings:  [x]  Laboratory  [x]  Microbiology  [x]  Radiology  [x]  EKG/Telemetry   [x]  Cardiology/Vascular   [x]  Pathology  [x]  Old records  []  Other:      Assessment & Plan   Assessment / Plan   #1 abdominal pain-CT abdomen shows hepatic steatosis, mild hepatomegaly, status post right hemicolectomy.  No acute changes seen.    #2 elevated LFTs-  -GI consult. Appreciate Dr. Villagomez assistance.   -Check GGT, 5 nucleotidase, will check alkaline phosphatase isoenzymes.  AST is greater than ALT.  US liver.   -Patient's platelets and INR normal.  However, has hepatitis steatitis and possible esophageal varices.      #3 hyponatremia-patient received a liter of fluids.  Sodium improving too quickly.  Will hold fluids for now.    -Check urine studies, TSH is normal, cortisol is normal.    -Nephrology consulted.  Appreciate Dr. Correia's assistance  -Serial sodium checks ordered.    -Will check orthostatics.  I believe it is hypotonic hyponatremia.    #4 hypertension will cover with as needed hydralazine    VTE Prophylaxis:  Mechanical VTE prophylaxis orders are present.    CODE STATUS:    Level Of Support Discussed With: Patient  Code Status (Patient has no pulse and is not breathing): CPR (Attempt to Resuscitate)  Medical Interventions (Patient has pulse or is breathing): Full Support      Admission Status:  I believe this patient meets observation status.    Electronically signed by Whitney Sharp DO, 07/29/24, 2:48 PM EDT.

## 2024-07-30 ENCOUNTER — APPOINTMENT (OUTPATIENT)
Dept: ULTRASOUND IMAGING | Facility: HOSPITAL | Age: 74
DRG: 442 | End: 2024-07-30
Payer: MEDICARE

## 2024-07-30 PROBLEM — R74.8 ABNORMAL LIVER ENZYMES: Status: ACTIVE | Noted: 2024-07-30

## 2024-07-30 LAB
ALBUMIN SERPL-MCNC: 3.2 G/DL (ref 3.5–5.2)
ALP SERPL-CCNC: 113 U/L (ref 39–117)
ALT SERPL W P-5'-P-CCNC: 266 U/L (ref 1–33)
ANION GAP SERPL CALCULATED.3IONS-SCNC: 8.2 MMOL/L (ref 5–15)
AST SERPL-CCNC: 334 U/L (ref 1–32)
BASOPHILS # BLD AUTO: 0.02 10*3/MM3 (ref 0–0.2)
BASOPHILS NFR BLD AUTO: 0.7 % (ref 0–1.5)
BILIRUB CONJ SERPL-MCNC: 0.6 MG/DL (ref 0–0.3)
BILIRUB CONJ SERPL-MCNC: 0.6 MG/DL (ref 0–0.3)
BILIRUB INDIRECT SERPL-MCNC: 0.4 MG/DL
BILIRUB INDIRECT SERPL-MCNC: 0.4 MG/DL
BILIRUB SERPL-MCNC: 1 MG/DL (ref 0–1.2)
BILIRUB SERPL-MCNC: 1 MG/DL (ref 0–1.2)
BUN SERPL-MCNC: 7 MG/DL (ref 8–23)
BUN/CREAT SERPL: 9.1 (ref 7–25)
CALCIUM SPEC-SCNC: 8.7 MG/DL (ref 8.6–10.5)
CHLORIDE SERPL-SCNC: 96 MMOL/L (ref 98–107)
CO2 SERPL-SCNC: 23.8 MMOL/L (ref 22–29)
CREAT SERPL-MCNC: 0.77 MG/DL (ref 0.57–1)
DEPRECATED RDW RBC AUTO: 47.8 FL (ref 37–54)
EGFRCR SERPLBLD CKD-EPI 2021: 82.1 ML/MIN/1.73
EOSINOPHIL # BLD AUTO: 0.05 10*3/MM3 (ref 0–0.4)
EOSINOPHIL NFR BLD AUTO: 1.8 % (ref 0.3–6.2)
ERYTHROCYTE [DISTWIDTH] IN BLOOD BY AUTOMATED COUNT: 15.1 % (ref 12.3–15.4)
GLUCOSE BLDC GLUCOMTR-MCNC: 105 MG/DL (ref 70–99)
GLUCOSE BLDC GLUCOMTR-MCNC: 117 MG/DL (ref 70–99)
GLUCOSE BLDC GLUCOMTR-MCNC: 124 MG/DL (ref 70–99)
GLUCOSE BLDC GLUCOMTR-MCNC: 92 MG/DL (ref 70–99)
GLUCOSE SERPL-MCNC: 101 MG/DL (ref 65–99)
HAV IGM SERPL QL IA: NORMAL
HBA1C MFR BLD: 5.9 % (ref 4.8–5.6)
HBV CORE IGM SERPL QL IA: NORMAL
HBV SURFACE AG SERPL QL IA: NORMAL
HCT VFR BLD AUTO: 33.8 % (ref 34–46.6)
HCV AB SER QL: NORMAL
HGB BLD-MCNC: 10.8 G/DL (ref 12–15.9)
IGA1 MFR SER: 232 MG/DL (ref 70–400)
IGG1 SER-MCNC: 2736 MG/DL (ref 700–1600)
IGM SERPL-MCNC: 110 MG/DL (ref 40–230)
IMM GRANULOCYTES # BLD AUTO: 0 10*3/MM3 (ref 0–0.05)
IMM GRANULOCYTES NFR BLD AUTO: 0 % (ref 0–0.5)
LYMPHOCYTES # BLD AUTO: 0.97 10*3/MM3 (ref 0.7–3.1)
LYMPHOCYTES NFR BLD AUTO: 34.2 % (ref 19.6–45.3)
MAGNESIUM SERPL-MCNC: 1.9 MG/DL (ref 1.6–2.4)
MCH RBC QN AUTO: 27.4 PG (ref 26.6–33)
MCHC RBC AUTO-ENTMCNC: 32 G/DL (ref 31.5–35.7)
MCV RBC AUTO: 85.8 FL (ref 79–97)
MONOCYTES # BLD AUTO: 0.35 10*3/MM3 (ref 0.1–0.9)
MONOCYTES NFR BLD AUTO: 12.3 % (ref 5–12)
NEUTROPHILS NFR BLD AUTO: 1.45 10*3/MM3 (ref 1.7–7)
NEUTROPHILS NFR BLD AUTO: 51 % (ref 42.7–76)
NRBC BLD AUTO-RTO: 0 /100 WBC (ref 0–0.2)
PHOSPHATE SERPL-MCNC: 3.3 MG/DL (ref 2.5–4.5)
PLATELET # BLD AUTO: 167 10*3/MM3 (ref 140–450)
PMV BLD AUTO: 8.9 FL (ref 6–12)
POTASSIUM SERPL-SCNC: 4.8 MMOL/L (ref 3.5–5.2)
PROT SERPL-MCNC: 7.2 G/DL (ref 6–8.5)
RBC # BLD AUTO: 3.94 10*6/MM3 (ref 3.77–5.28)
SODIUM SERPL-SCNC: 123 MMOL/L (ref 136–145)
SODIUM SERPL-SCNC: 128 MMOL/L (ref 136–145)
WBC NRBC COR # BLD AUTO: 2.84 10*3/MM3 (ref 3.4–10.8)

## 2024-07-30 PROCEDURE — 83735 ASSAY OF MAGNESIUM: CPT | Performed by: HOSPITALIST

## 2024-07-30 PROCEDURE — 85025 COMPLETE CBC W/AUTO DIFF WBC: CPT | Performed by: HOSPITALIST

## 2024-07-30 PROCEDURE — 83036 HEMOGLOBIN GLYCOSYLATED A1C: CPT | Performed by: INTERNAL MEDICINE

## 2024-07-30 PROCEDURE — 76705 ECHO EXAM OF ABDOMEN: CPT

## 2024-07-30 PROCEDURE — 84295 ASSAY OF SERUM SODIUM: CPT | Performed by: INTERNAL MEDICINE

## 2024-07-30 PROCEDURE — 25810000003 SODIUM CHLORIDE 0.9 % SOLUTION: Performed by: INTERNAL MEDICINE

## 2024-07-30 PROCEDURE — 84100 ASSAY OF PHOSPHORUS: CPT | Performed by: HOSPITALIST

## 2024-07-30 PROCEDURE — 82948 REAGENT STRIP/BLOOD GLUCOSE: CPT | Performed by: HOSPITALIST

## 2024-07-30 PROCEDURE — 99233 SBSQ HOSP IP/OBS HIGH 50: CPT | Performed by: INTERNAL MEDICINE

## 2024-07-30 PROCEDURE — 80076 HEPATIC FUNCTION PANEL: CPT | Performed by: INTERNAL MEDICINE

## 2024-07-30 PROCEDURE — 82247 BILIRUBIN TOTAL: CPT | Performed by: INTERNAL MEDICINE

## 2024-07-30 PROCEDURE — 82248 BILIRUBIN DIRECT: CPT | Performed by: INTERNAL MEDICINE

## 2024-07-30 PROCEDURE — 82948 REAGENT STRIP/BLOOD GLUCOSE: CPT

## 2024-07-30 PROCEDURE — 80048 BASIC METABOLIC PNL TOTAL CA: CPT | Performed by: HOSPITALIST

## 2024-07-30 RX ORDER — POLYETHYLENE GLYCOL 3350 17 G/17G
17 POWDER, FOR SOLUTION ORAL 2 TIMES DAILY PRN
Status: DISCONTINUED | OUTPATIENT
Start: 2024-07-30 | End: 2024-08-01

## 2024-07-30 RX ORDER — LIDOCAINE 4 G/G
1 PATCH TOPICAL DAILY PRN
Status: DISCONTINUED | OUTPATIENT
Start: 2024-07-30 | End: 2024-08-05 | Stop reason: HOSPADM

## 2024-07-30 RX ORDER — ACETAMINOPHEN 325 MG/1
650 TABLET ORAL 2 TIMES DAILY PRN
Status: DISCONTINUED | OUTPATIENT
Start: 2024-07-30 | End: 2024-08-05 | Stop reason: HOSPADM

## 2024-07-30 RX ORDER — HYDROXYZINE HYDROCHLORIDE 25 MG/1
25 TABLET, FILM COATED ORAL 3 TIMES DAILY PRN
Status: DISCONTINUED | OUTPATIENT
Start: 2024-07-30 | End: 2024-08-05 | Stop reason: HOSPADM

## 2024-07-30 RX ORDER — PROCHLORPERAZINE EDISYLATE 5 MG/ML
5 INJECTION INTRAMUSCULAR; INTRAVENOUS EVERY 6 HOURS PRN
Status: DISCONTINUED | OUTPATIENT
Start: 2024-07-30 | End: 2024-08-05 | Stop reason: HOSPADM

## 2024-07-30 RX ORDER — SODIUM CHLORIDE 9 MG/ML
75 INJECTION, SOLUTION INTRAVENOUS CONTINUOUS
Status: DISCONTINUED | OUTPATIENT
Start: 2024-07-30 | End: 2024-07-30

## 2024-07-30 RX ORDER — ONDANSETRON 2 MG/ML
4 INJECTION INTRAMUSCULAR; INTRAVENOUS EVERY 4 HOURS PRN
Status: DISCONTINUED | OUTPATIENT
Start: 2024-07-30 | End: 2024-08-05 | Stop reason: HOSPADM

## 2024-07-30 RX ORDER — ALUMINA, MAGNESIA, AND SIMETHICONE 2400; 2400; 240 MG/30ML; MG/30ML; MG/30ML
15 SUSPENSION ORAL EVERY 6 HOURS PRN
Status: DISCONTINUED | OUTPATIENT
Start: 2024-07-30 | End: 2024-08-05 | Stop reason: HOSPADM

## 2024-07-30 RX ADMIN — AVOBENZONE, HOMOSALATE, OCTISALATE, OCTOCRYLENE, AND OXYBENZONE 1 PACKET: 29.4; 147; 49; 25.4; 58.8 LOTION TOPICAL at 08:10

## 2024-07-30 RX ADMIN — Medication 1000 UNITS: at 08:10

## 2024-07-30 RX ADMIN — ACETYLCYSTEINE 1200 MG: 200 SOLUTION ORAL; RESPIRATORY (INHALATION) at 08:16

## 2024-07-30 RX ADMIN — Medication 5 MG: at 20:34

## 2024-07-30 RX ADMIN — DICYCLOMINE HYDROCHLORIDE 20 MG: 10 CAPSULE ORAL at 20:34

## 2024-07-30 RX ADMIN — AVOBENZONE, HOMOSALATE, OCTISALATE, OCTOCRYLENE, AND OXYBENZONE 1 PACKET: 29.4; 147; 49; 25.4; 58.8 LOTION TOPICAL at 17:36

## 2024-07-30 RX ADMIN — SODIUM CHLORIDE 75 ML/HR: 9 INJECTION, SOLUTION INTRAVENOUS at 09:11

## 2024-07-30 RX ADMIN — Medication 10 ML: at 20:34

## 2024-07-30 RX ADMIN — CYANOCOBALAMIN TAB 500 MCG 1000 MCG: 500 TAB at 08:10

## 2024-07-30 RX ADMIN — DICYCLOMINE HYDROCHLORIDE 20 MG: 10 CAPSULE ORAL at 11:49

## 2024-07-30 RX ADMIN — ACETYLCYSTEINE 1200 MG: 200 SOLUTION ORAL; RESPIRATORY (INHALATION) at 20:35

## 2024-07-30 RX ADMIN — FLUTICASONE PROPIONATE 2 SPRAY: 50 SPRAY, METERED NASAL at 08:10

## 2024-07-30 RX ADMIN — PANTOPRAZOLE SODIUM 40 MG: 40 TABLET, DELAYED RELEASE ORAL at 05:01

## 2024-07-30 RX ADMIN — Medication 10 ML: at 08:10

## 2024-07-30 RX ADMIN — DICYCLOMINE HYDROCHLORIDE 20 MG: 10 CAPSULE ORAL at 17:36

## 2024-07-30 RX ADMIN — CETIRIZINE HYDROCHLORIDE 10 MG: 10 TABLET, FILM COATED ORAL at 08:10

## 2024-07-30 RX ADMIN — DICYCLOMINE HYDROCHLORIDE 20 MG: 10 CAPSULE ORAL at 08:10

## 2024-07-30 NOTE — PROGRESS NOTES
[facility]   Hospitalist Progress Note    Date of admission: 7/29/2024  Patient Name: Qiana Altman  1950  Date: 7/30/2024      Subjective     Chief Complaint   Patient presents with    Abdominal Pain    Nausea    Constipation       Interval Followup: She looks doing little better today, no severe abdominal discomfort, diet starting to improve.  Denies fevers chills      Objective     Vitals:   Temp:  [97.9 °F (36.6 °C)-98.2 °F (36.8 °C)] 98.1 °F (36.7 °C)  Heart Rate:  [60-73] 73  Resp:  [18] 18  BP: (111-124)/(57-85) 114/57    Physical Exam  Awake conversant  CTAB no wheezing  RRR no lower extremity pitting edema  Abdomen soft nontender nondistended no guarding      Result Review:  Vital signs, labs and recent relevant imaging reviewed.      CBC          1/31/2024    10:28 7/29/2024    10:30 7/30/2024    04:59   CBC   WBC 3.00  3.75  2.84    RBC 4.69  4.55  3.94    Hemoglobin 12.2  12.8  10.8    Hematocrit 38.4  38.8  33.8    MCV 81.9  85.3  85.8    MCH 26.0  28.1  27.4    MCHC 31.8  33.0  32.0    RDW 12.6  15.2  15.1    Platelets 180  198  167      CMP          1/31/2024    10:28 7/29/2024    10:30 7/29/2024    15:03 7/30/2024    04:59 7/30/2024    16:54   CMP   Glucose 111  130  115  101     BUN 14  6  5  7     Creatinine 0.89  0.92  0.73  0.77     EGFR 69.4  66.3  87.5  82.1     Sodium 139  123  126     126  128  123    Potassium 3.7  4.2  4.2  4.8     Chloride 106  89  94  96     Calcium 9.2  9.2  8.6  8.7     Total Protein 6.9  8.1   7.2     Albumin 4.2  3.5   3.2     Globulin 2.7  4.6       Total Bilirubin 0.7  1.8   1.0     1.0     Alkaline Phosphatase 76  127   113     AST (SGOT) 18  394   334     ALT (SGPT) 12  321   266     Albumin/Globulin Ratio 1.6  0.8       BUN/Creatinine Ratio 15.7  6.5  6.8  9.1     Anion Gap 9.0  12.0  9.0  8.2           acetaminophen    aluminum-magnesium hydroxide-simethicone    [DISCONTINUED] senna-docusate sodium **AND** [DISCONTINUED] polyethylene glycol **AND**  [DISCONTINUED] bisacodyl **AND** bisacodyl    Diclofenac Sodium    hydrOXYzine    Lidocaine    melatonin    ondansetron    polyethylene glycol    prochlorperazine    sodium chloride    sodium chloride    sodium chloride    acetylcysteine, 1,200 mg, Oral, Q12H  cetirizine, 10 mg, Oral, Daily  cholecalciferol, 1,000 Units, Oral, Daily  dicyclomine, 20 mg, Oral, 4x Daily  fluticasone, 2 spray, Each Nare, Daily  melatonin, 5 mg, Oral, Nightly  pantoprazole, 40 mg, Oral, Q AM  Psyllium, 1 packet, Oral, BID AC  sodium chloride, 10 mL, Intravenous, Q12H  vitamin B-12, 1,000 mcg, Oral, Daily        US Liver    Result Date: 7/30/2024  Impression: No evidence of hepatosplenomegaly. Negative exam. Electronically Signed: Summer Diallo MD  7/30/2024 7:59 AM EDT  Workstation ID: [id number]    CT Abdomen Pelvis With Contrast    Result Date: 7/29/2024  Impression: 1. Hepatic steatosis. 2. Mild hepatosplenomegaly. There may be small gastroesophageal varices. I would recommend clinical correlation with regard to signs and symptoms of portal hypertension. 3. Status post a right hemicolectomy. 4. Calcified uterine fibroid. 5. Suggestion of a small hiatal hernia. 6. Mild dependent atelectasis in the lung bases. Electronically Signed: Kevin Ortega MD  7/29/2024 12:30 PM EDT  Workstation ID: [id number]     Assessment / Plan     Summary: 74 y.o. with history of inflammatory bowel disease, GERD, diverticulosis who presented with abdominal pain nausea and vomiting for approximately 2 days prior to admission, initial imaging showing hepatic steatosis prior colectomy, initial LFTs elevated, GI consulted treated with NAC and additional autoimmune workup sent    Assessment/Plan (clinically significant if listed here)  Elevated liver function test with hepatosplenomegaly  Abdominal pain/nausea/vomiting question viral GE versus ischemic versus other  Hyponatremia, suspect SIADH  DM2  History of Graves' disease  History of cholecystectomy and right  hemicolectomy    Continue to monitor liver enzymes, seem to start to downtrend, monitor trend/repeat in morning, continue supportive care, additional autoimmune workup testing pending as per GI, continues on NAC  Liver US: negative/no hsm/patent flow, prior choly   Acute hep panel negative  continue with fluid restriction, monitor sodium, started to improve, p.o. intake starting to improve as well appears likely SIADH, avoiding additional IV fluids for now, appreciate additional nephrology assistance, recommendations reviewed  Tsh and cortisol wnl, cont ur sodium trend   Urine sodium 124 ur osm 564  Cont nac, bentyl for symptoms,   Leukopenia wbc 2.8, question in setting of viral illness, repeat in am,   Holding lisinopril, bp stable  Cont ppi  Hx of dm, A1c 5.9, no acute medications needed  PT/OT  Check a.m. CBC, BMP, magnesium, phosphorus  Continue hospitalization at current level of care    Dispo: likely home once medically stable.   Will discuss plan of care/dispo with clinical .     VTE Prophylaxis:  Mechanical VTE prophylaxis orders are present.      Level Of Support Discussed With: Patient  Code Status (Patient has no pulse and is not breathing): CPR (Attempt to Resuscitate)  Medical Interventions (Patient has pulse or is breathing): Full Support    Greater than 50 minutes on this encounter including review of labs, imaging, documentation, orders, vitals, monitoring and adjusting treatment and orders as indicated, discussion with the patient at length at bedside, and documenting.

## 2024-07-30 NOTE — SIGNIFICANT NOTE
07/30/24 1115   Coping/Psychosocial   Observed Emotional State calm;cooperative   Verbalized Emotional State hopefulness   Trust Relationship/Rapport empathic listening provided   Involvement in Care interacting with patient   Additional Documentation Spiritual Care (Group)   Spiritual Care   Use of Spiritual Resources spirituality for coping, indicated strong use of   Spiritual Care Source  initiative   Spiritual Care Follow-Up follow-up, none required as presently assessed   Response to Spiritual Care receptive of support   Spiritual Care Interventions supportive conversation provided   Spiritual Care Visit Type initial   Receptivity to Spiritual Care visit welcomed

## 2024-07-30 NOTE — CONSULTS
Patient Care Team:  Zohreh Mcduffie APRN as PCP - General (Nurse Practitioner)  Anna Oquendo APRN as Nurse Practitioner (Nurse Practitioner)    Chief complaint: Hyponatremia    Consults   Subjective     History of Present Illness  73yo presented to ER with abdominal pain and n/v for several days.  In ER she had sodium noted at 123 and we were asked to see her. She is without any acute complaints.  Sitting up in chair.  Tolerating po now.    Review of Systems   Constitutional:  Negative for fatigue and fever.   Respiratory:  Negative for cough and shortness of breath.    Cardiovascular:  Negative for chest pain and leg swelling.   Gastrointestinal:  Negative for abdominal pain, diarrhea and nausea.   Genitourinary:  Negative for dysuria.   Musculoskeletal:  Negative for back pain.   Hematological:  Negative for adenopathy.   Psychiatric/Behavioral:  Negative for agitation.         Past Medical History:   Diagnosis Date    Allergic rhinitis due to allergen 09/10/2019    Anemia 09/10/2019    Anxiety disorder 2020    B12 deficiency 2020    Callus     Cataract     COVID-19 2022    Depression     Diverticulosis     Essential hypertension 2019    Family history of myocardial infarction 2023    GERD (gastroesophageal reflux disease) 2019    Graves' disease     HLD (hyperlipidemia) 2019    Hyperthyroidism     Hypothyroidism     Inflammatory bowel disease     Major depressive disorder 2019    Osteoarthritis 2020    Osteopenia     Peptic ulceration     T2DM (type 2 diabetes mellitus) 2019    Tuberculosis     Took meds will never need to be tested again    Vitamin D deficiency 2019   ,   Past Surgical History:   Procedure Laterality Date    APPENDECTOMY       SECTION      CHOLECYSTECTOMY  2016    DR SCALES    COLON SURGERY      COLONOSCOPY      HIP FRACTURE SURGERY Left     REPAIR    HIP SURGERY      OOPHORECTOMY      SMALL  INTESTINE SURGERY      TUBAL ABDOMINAL LIGATION      UPPER GASTROINTESTINAL ENDOSCOPY     ,   Family History   Problem Relation Age of Onset    Heart disease Mother     Heart failure Mother     Arthritis Mother     Hyperlipidemia Mother     Hypertension Mother     Stroke Mother     Thyroid disease Mother     Heart disease Brother     Heart disease Brother     Rectal cancer Maternal Grandmother     Kidney disease Daughter     Miscarriages / Stillbirths Daughter     Birth defects Daughter         Cleft palate    Lung cancer Other     Skin cancer Other    ,   Social History     Socioeconomic History    Marital status:    Tobacco Use    Smoking status: Former     Current packs/day: 0.00     Average packs/day: 1 pack/day for 5.0 years (5.0 ttl pk-yrs)     Types: Cigarettes     Start date: 1980     Quit date: 1985     Years since quittin.6     Passive exposure: Past    Smokeless tobacco: Never    Tobacco comments:     QUIT 28 YEARS AGO   Vaping Use    Vaping status: Never Used   Substance and Sexual Activity    Alcohol use: Never    Drug use: Never    Sexual activity: Not Currently     Partners: Male     Birth control/protection: Surgical, Post-menopausal, Tubal ligation     E-cigarette/Vaping    E-cigarette/Vaping Use Never User      E-cigarette/Vaping Substances    Nicotine No     THC No     CBD No     Flavoring No      E-cigarette/Vaping Devices    Disposable No     Pre-filled or Refillable Cartridge No     Refillable Tank No     Pre-filled Pod No          ,   Medications Prior to Admission   Medication Sig Dispense Refill Last Dose    acetaminophen (TYLENOL) 325 MG tablet Take 2 tablets by mouth Every 6 (Six) Hours As Needed for Mild Pain.       celecoxib (CeleBREX) 100 MG capsule Take 1 capsule by mouth 2 (Two) Times a Day. 180 capsule 1 Past Week    cetirizine (zyrTEC) 10 MG tablet Take 1 tablet by mouth Daily. 90 tablet 1 Past Week    Cholecalciferol 25 MCG (1000 UT) tablet Take 1 tablet by  mouth Daily.   Past Week    esomeprazole (nexIUM) 40 MG capsule TAKE ONE CAPSULE BY MOUTH ONCE DAILY 90 capsule 1 7/28/2024    fluticasone (FLONASE) 50 MCG/ACT nasal spray 2 sprays by Each Nare route Daily. 16 g 2     lisinopril (PRINIVIL,ZESTRIL) 10 MG tablet TAKE ONE TABLET BY MOUTH EVERY NIGHT 90 tablet 1 7/28/2024    vitamin B-12 (CYANOCOBALAMIN) 1000 MCG tablet Take 1 tablet by mouth Daily.   Past Week   , Scheduled Meds:  acetylcysteine, 1,200 mg, Oral, Q12H  cetirizine, 10 mg, Oral, Daily  cholecalciferol, 1,000 Units, Oral, Daily  dicyclomine, 20 mg, Oral, 4x Daily  fluticasone, 2 spray, Each Nare, Daily  melatonin, 5 mg, Oral, Nightly  pantoprazole, 40 mg, Oral, Q AM  Psyllium, 1 packet, Oral, BID AC  sodium chloride, 10 mL, Intravenous, Q12H  vitamin B-12, 1,000 mcg, Oral, Daily   , Continuous Infusions:  sodium chloride, 75 mL/hr   , PRN Meds:    acetaminophen    aluminum-magnesium hydroxide-simethicone    [DISCONTINUED] senna-docusate sodium **AND** [DISCONTINUED] polyethylene glycol **AND** [DISCONTINUED] bisacodyl **AND** bisacodyl    Diclofenac Sodium    hydrOXYzine    Lidocaine    melatonin    ondansetron    polyethylene glycol    prochlorperazine    sodium chloride    sodium chloride    sodium chloride, and Allergies:  Shellfish allergy, Moxifloxacin hcl, Nitrous oxide, Scopolamine, Sulfa antibiotics, Morphine, and Penicillins    Objective     Vital Signs  Temp:  [97.3 °F (36.3 °C)-98.2 °F (36.8 °C)] 98.2 °F (36.8 °C)  Heart Rate:  [62-77] 67  Resp:  [18] 18  BP: (117-139)/(58-85) 121/58    No intake/output data recorded.  No intake/output data recorded.    Physical Exam  Constitutional:       Appearance: Normal appearance.   HENT:      Mouth/Throat:      Mouth: Mucous membranes are moist. Mucous membranes are dry.   Eyes:      General: No scleral icterus.     Pupils: Pupils are equal, round, and reactive to light.   Cardiovascular:      Rate and Rhythm: Normal rate and regular rhythm.   Pulmonary:  "     Effort: Pulmonary effort is normal.      Breath sounds: Normal breath sounds.   Abdominal:      General: Abdomen is flat.   Musculoskeletal:      Right lower leg: No edema.      Left lower leg: No edema.   Skin:     General: Skin is warm and dry.   Neurological:      General: No focal deficit present.      Mental Status: She is alert.         Results Review:    I reviewed the patient's new clinical results.  I reviewed the patient's new imaging results and agree with the interpretation.  I reviewed the patient's other test results and agree with the interpretation    WBC WBC   Date Value Ref Range Status   07/30/2024 2.84 (L) 3.40 - 10.80 10*3/mm3 Final   07/29/2024 3.75 3.40 - 10.80 10*3/mm3 Final      HGB Hemoglobin   Date Value Ref Range Status   07/30/2024 10.8 (L) 12.0 - 15.9 g/dL Final   07/29/2024 12.8 12.0 - 15.9 g/dL Final      HCT Hematocrit   Date Value Ref Range Status   07/30/2024 33.8 (L) 34.0 - 46.6 % Final   07/29/2024 38.8 34.0 - 46.6 % Final      Platlets No results found for: \"LABPLAT\"   MCV MCV   Date Value Ref Range Status   07/30/2024 85.8 79.0 - 97.0 fL Final   07/29/2024 85.3 79.0 - 97.0 fL Final          Sodium Sodium   Date Value Ref Range Status   07/30/2024 128 (L) 136 - 145 mmol/L Final   07/29/2024 126 (L) 136 - 145 mmol/L Final   07/29/2024 126 (L) 136 - 145 mmol/L Final   07/29/2024 123 (L) 136 - 145 mmol/L Final      Potassium Potassium   Date Value Ref Range Status   07/30/2024 4.8 3.5 - 5.2 mmol/L Final   07/29/2024 4.2 3.5 - 5.2 mmol/L Final   07/29/2024 4.2 3.5 - 5.2 mmol/L Final      Chloride Chloride   Date Value Ref Range Status   07/30/2024 96 (L) 98 - 107 mmol/L Final   07/29/2024 94 (L) 98 - 107 mmol/L Final   07/29/2024 89 (L) 98 - 107 mmol/L Final      CO2 CO2   Date Value Ref Range Status   07/30/2024 23.8 22.0 - 29.0 mmol/L Final   07/29/2024 23.0 22.0 - 29.0 mmol/L Final   07/29/2024 22.0 22.0 - 29.0 mmol/L Final      BUN BUN   Date Value Ref Range Status " "  07/30/2024 7 (L) 8 - 23 mg/dL Final   07/29/2024 5 (L) 8 - 23 mg/dL Final   07/29/2024 6 (L) 8 - 23 mg/dL Final      Creatinine Creatinine   Date Value Ref Range Status   07/30/2024 0.77 0.57 - 1.00 mg/dL Final   07/29/2024 0.73 0.57 - 1.00 mg/dL Final   07/29/2024 0.92 0.57 - 1.00 mg/dL Final      Calcium Calcium   Date Value Ref Range Status   07/30/2024 8.7 8.6 - 10.5 mg/dL Final   07/29/2024 8.6 8.6 - 10.5 mg/dL Final   07/29/2024 9.2 8.6 - 10.5 mg/dL Final      PO4 No results found for: \"CAPO4\"   Albumin Albumin   Date Value Ref Range Status   07/30/2024 3.2 (L) 3.5 - 5.2 g/dL Final   07/29/2024 3.5 3.5 - 5.2 g/dL Final      Magnesium Magnesium   Date Value Ref Range Status   07/30/2024 1.9 1.6 - 2.4 mg/dL Final      Uric Acid No results found for: \"URICACID\"         Assessment & Plan       Liver enzyme elevation    Elevated LFTs    Hyponatremia      Assessment & Plan  Hyponatremia-  sodium trending up to 128 now.  Urine sodium of 124, urine osm of 564.  Appears SIADH.  Continue fluid restriction.  Would avoid IVF's with urine osm of 564.    Abd Pain- CT with no acut problems.  Elevated LFT's with GI consulted.    HTN-  controlled on current meds.    I discussed the patients findings and my recommendations with patient, nursing staff, and primary care team    Lico Correia MD  07/30/24  08:42 EDT          "

## 2024-07-30 NOTE — PLAN OF CARE
Goal Outcome Evaluation:  Plan of Care Reviewed With: patient           Outcome Evaluation: No complaints through the shift. NPO for liver US today

## 2024-07-30 NOTE — PLAN OF CARE
Goal Outcome Evaluation:  Plan of Care Reviewed With: patient        Progress: improving  Outcome Evaluation: Patient had liver US completed this shift. Diet was resumed and patient tolerating well. Patient was seen by nephrology this shift, plan to continue fluid restriction. Patient has been ambulating in room without difficulty. No complaints of pain this shift. Will continue with POC.

## 2024-07-31 LAB
ALBUMIN SERPL-MCNC: 3.4 G/DL (ref 3.5–5.2)
ALBUMIN/GLOB SERPL: 0.7 G/DL
ALP SERPL-CCNC: 118 U/L (ref 39–117)
ALT SERPL W P-5'-P-CCNC: 337 U/L (ref 1–33)
ANA SER QL: POSITIVE
ANION GAP SERPL CALCULATED.3IONS-SCNC: 9.7 MMOL/L (ref 5–15)
AST SERPL-CCNC: 457 U/L (ref 1–32)
BASOPHILS # BLD AUTO: 0.03 10*3/MM3 (ref 0–0.2)
BASOPHILS NFR BLD AUTO: 0.9 % (ref 0–1.5)
BILIRUB SERPL-MCNC: 1.6 MG/DL (ref 0–1.2)
BUN SERPL-MCNC: 7 MG/DL (ref 8–23)
BUN/CREAT SERPL: 9 (ref 7–25)
CALCIUM SPEC-SCNC: 9.2 MG/DL (ref 8.6–10.5)
CHLORIDE SERPL-SCNC: 92 MMOL/L (ref 98–107)
CO2 SERPL-SCNC: 24.3 MMOL/L (ref 22–29)
CREAT SERPL-MCNC: 0.78 MG/DL (ref 0.57–1)
DEPRECATED RDW RBC AUTO: 47 FL (ref 37–54)
DSDNA AB SER-ACNC: 1 IU/ML (ref 0–9)
EGFRCR SERPLBLD CKD-EPI 2021: 80.8 ML/MIN/1.73
EOSINOPHIL # BLD AUTO: 0.07 10*3/MM3 (ref 0–0.4)
EOSINOPHIL NFR BLD AUTO: 2.2 % (ref 0.3–6.2)
ERYTHROCYTE [DISTWIDTH] IN BLOOD BY AUTOMATED COUNT: 15.2 % (ref 12.3–15.4)
FOLATE SERPL-MCNC: 15.2 NG/ML (ref 4.78–24.2)
GLOBULIN UR ELPH-MCNC: 4.6 GM/DL
GLUCOSE SERPL-MCNC: 94 MG/DL (ref 65–99)
HCT VFR BLD AUTO: 36.1 % (ref 34–46.6)
HGB BLD-MCNC: 11.7 G/DL (ref 12–15.9)
IMM GRANULOCYTES # BLD AUTO: 0.01 10*3/MM3 (ref 0–0.05)
IMM GRANULOCYTES NFR BLD AUTO: 0.3 % (ref 0–0.5)
LKM-1 AB SER-ACNC: 1.5 UNITS (ref 0–20)
LYMPHOCYTES # BLD AUTO: 1.27 10*3/MM3 (ref 0.7–3.1)
LYMPHOCYTES NFR BLD AUTO: 39.8 % (ref 19.6–45.3)
Lab: NORMAL
MAGNESIUM SERPL-MCNC: 1.9 MG/DL (ref 1.6–2.4)
MCH RBC QN AUTO: 27.5 PG (ref 26.6–33)
MCHC RBC AUTO-ENTMCNC: 32.4 G/DL (ref 31.5–35.7)
MCV RBC AUTO: 84.7 FL (ref 79–97)
MONOCYTES # BLD AUTO: 0.4 10*3/MM3 (ref 0.1–0.9)
MONOCYTES NFR BLD AUTO: 12.5 % (ref 5–12)
NEUTROPHILS NFR BLD AUTO: 1.41 10*3/MM3 (ref 1.7–7)
NEUTROPHILS NFR BLD AUTO: 44.3 % (ref 42.7–76)
NRBC BLD AUTO-RTO: 0 /100 WBC (ref 0–0.2)
PHOSPHATE SERPL-MCNC: 3.7 MG/DL (ref 2.5–4.5)
PLATELET # BLD AUTO: 192 10*3/MM3 (ref 140–450)
PMV BLD AUTO: 9 FL (ref 6–12)
POTASSIUM SERPL-SCNC: 4.2 MMOL/L (ref 3.5–5.2)
PROT SERPL-MCNC: 8 G/DL (ref 6–8.5)
RBC # BLD AUTO: 4.26 10*6/MM3 (ref 3.77–5.28)
SMA IGG SER-ACNC: 58 UNITS (ref 0–19)
SODIUM SERPL-SCNC: 126 MMOL/L (ref 136–145)
VIT B12 BLD-MCNC: 1131 PG/ML (ref 211–946)
WBC NRBC COR # BLD AUTO: 3.19 10*3/MM3 (ref 3.4–10.8)

## 2024-07-31 PROCEDURE — 99233 SBSQ HOSP IP/OBS HIGH 50: CPT | Performed by: INTERNAL MEDICINE

## 2024-07-31 PROCEDURE — 85025 COMPLETE CBC W/AUTO DIFF WBC: CPT | Performed by: HOSPITALIST

## 2024-07-31 PROCEDURE — 84100 ASSAY OF PHOSPHORUS: CPT | Performed by: HOSPITALIST

## 2024-07-31 PROCEDURE — 36415 COLL VENOUS BLD VENIPUNCTURE: CPT | Performed by: INTERNAL MEDICINE

## 2024-07-31 PROCEDURE — 80053 COMPREHEN METABOLIC PANEL: CPT | Performed by: INTERNAL MEDICINE

## 2024-07-31 PROCEDURE — 97161 PT EVAL LOW COMPLEX 20 MIN: CPT

## 2024-07-31 PROCEDURE — 83735 ASSAY OF MAGNESIUM: CPT | Performed by: HOSPITALIST

## 2024-07-31 PROCEDURE — 97165 OT EVAL LOW COMPLEX 30 MIN: CPT

## 2024-07-31 RX ORDER — POLYETHYLENE GLYCOL 3350 17 G/17G
17 POWDER, FOR SOLUTION ORAL 2 TIMES DAILY
Status: DISCONTINUED | OUTPATIENT
Start: 2024-07-31 | End: 2024-08-01

## 2024-07-31 RX ADMIN — Medication 1000 UNITS: at 08:54

## 2024-07-31 RX ADMIN — FLUTICASONE PROPIONATE 2 SPRAY: 50 SPRAY, METERED NASAL at 08:55

## 2024-07-31 RX ADMIN — ACETYLCYSTEINE 1200 MG: 200 SOLUTION ORAL; RESPIRATORY (INHALATION) at 08:54

## 2024-07-31 RX ADMIN — PANTOPRAZOLE SODIUM 40 MG: 40 TABLET, DELAYED RELEASE ORAL at 05:51

## 2024-07-31 RX ADMIN — Medication 10 ML: at 08:54

## 2024-07-31 RX ADMIN — AVOBENZONE, HOMOSALATE, OCTISALATE, OCTOCRYLENE, AND OXYBENZONE 1 PACKET: 29.4; 147; 49; 25.4; 58.8 LOTION TOPICAL at 08:54

## 2024-07-31 RX ADMIN — DICYCLOMINE HYDROCHLORIDE 20 MG: 10 CAPSULE ORAL at 08:54

## 2024-07-31 RX ADMIN — CYANOCOBALAMIN TAB 500 MCG 1000 MCG: 500 TAB at 08:54

## 2024-07-31 RX ADMIN — ACETYLCYSTEINE 1200 MG: 200 SOLUTION ORAL; RESPIRATORY (INHALATION) at 20:59

## 2024-07-31 RX ADMIN — Medication 10 ML: at 21:04

## 2024-07-31 RX ADMIN — AVOBENZONE, HOMOSALATE, OCTISALATE, OCTOCRYLENE, AND OXYBENZONE 1 PACKET: 29.4; 147; 49; 25.4; 58.8 LOTION TOPICAL at 17:52

## 2024-07-31 RX ADMIN — DICYCLOMINE HYDROCHLORIDE 20 MG: 10 CAPSULE ORAL at 17:52

## 2024-07-31 RX ADMIN — CETIRIZINE HYDROCHLORIDE 10 MG: 10 TABLET, FILM COATED ORAL at 08:54

## 2024-07-31 RX ADMIN — Medication 5 MG: at 20:58

## 2024-07-31 RX ADMIN — DICYCLOMINE HYDROCHLORIDE 20 MG: 10 CAPSULE ORAL at 11:56

## 2024-07-31 RX ADMIN — DICYCLOMINE HYDROCHLORIDE 20 MG: 10 CAPSULE ORAL at 20:58

## 2024-07-31 RX ADMIN — POLYETHYLENE GLYCOL 3350 17 G: 17 POWDER, FOR SOLUTION ORAL at 21:08

## 2024-07-31 NOTE — PLAN OF CARE
Goal Outcome Evaluation:  Plan of Care Reviewed With: patient           Outcome Evaluation: Patient is able to complete all transfers and bed mobilities indepedently in the room. Pt is able to ambulate ad paresh in the room. Pt does not need inpatient PT services. Recommend DC home.      Anticipated Discharge Disposition (PT): home

## 2024-07-31 NOTE — PROGRESS NOTES
" LOS: 2 days   Patient Care Team:  Zohreh Mcduffie APRN as PCP - General (Nurse Practitioner)  Anna Oquendo APRN as Nurse Practitioner (Nurse Practitioner)    Chief Complaint: Hyponatremia    Subjective     History of Present Illness  Pt without any acute complaints.    Subjective:  Symptoms:  No shortness of breath, chest pain, chest pressure or anxiety.        History taken from: patient chart RN    Objective     Vital Sign Min/Max for last 24 hours  Temp  Min: 97.7 °F (36.5 °C)  Max: 98.1 °F (36.7 °C)   BP  Min: 111/60  Max: 133/58   Pulse  Min: 58  Max: 73   Resp  Min: 18  Max: 20   SpO2  Min: 96 %  Max: 100 %   No data recorded   No data recorded     Flowsheet Rows      Flowsheet Row First Filed Value   Admission Height 152.4 cm (60\") Documented at 07/29/2024 1015   Admission Weight 64.9 kg (143 lb 1.3 oz) Documented at 07/29/2024 1015            No intake/output data recorded.  I/O last 3 completed shifts:  In: 660 [P.O.:660]  Out: -     Objective:  General Appearance:  Comfortable.    Vital signs: (most recent): Blood pressure 117/68, pulse 68, temperature 97.9 °F (36.6 °C), temperature source Oral, resp. rate 20, height 152.4 cm (60\"), weight 64.2 kg (141 lb 8.6 oz), SpO2 99%.  Vital signs are normal.    HEENT: Normal HEENT exam.    Lungs:  Normal effort and normal respiratory rate.    Heart: Normal rate.    Abdomen: Abdomen is soft.  Bowel sounds are normal.   There is no abdominal tenderness.     Extremities: Normal range of motion.    Pulses: Distal pulses are intact.    Neurological: Patient is alert and oriented to person, place and time.    Pupils:  Pupils are equal, round, and reactive to light.    Skin:  Warm and dry.                Results Review:     I reviewed the patient's new clinical results.  I reviewed the patient's new imaging results and agree with the interpretation.  I reviewed the patient's other test results and agree with the interpretation    WBC WBC   Date Value Ref " "Range Status   07/31/2024 3.19 (L) 3.40 - 10.80 10*3/mm3 Final   07/30/2024 2.84 (L) 3.40 - 10.80 10*3/mm3 Final   07/29/2024 3.75 3.40 - 10.80 10*3/mm3 Final      HGB Hemoglobin   Date Value Ref Range Status   07/31/2024 11.7 (L) 12.0 - 15.9 g/dL Final   07/30/2024 10.8 (L) 12.0 - 15.9 g/dL Final   07/29/2024 12.8 12.0 - 15.9 g/dL Final      HCT Hematocrit   Date Value Ref Range Status   07/31/2024 36.1 34.0 - 46.6 % Final   07/30/2024 33.8 (L) 34.0 - 46.6 % Final   07/29/2024 38.8 34.0 - 46.6 % Final      Platlets No results found for: \"LABPLAT\"   MCV MCV   Date Value Ref Range Status   07/31/2024 84.7 79.0 - 97.0 fL Final   07/30/2024 85.8 79.0 - 97.0 fL Final   07/29/2024 85.3 79.0 - 97.0 fL Final          Sodium Sodium   Date Value Ref Range Status   07/31/2024 126 (L) 136 - 145 mmol/L Final   07/30/2024 123 (L) 136 - 145 mmol/L Final   07/30/2024 128 (L) 136 - 145 mmol/L Final   07/29/2024 126 (L) 136 - 145 mmol/L Final   07/29/2024 126 (L) 136 - 145 mmol/L Final   07/29/2024 123 (L) 136 - 145 mmol/L Final      Potassium Potassium   Date Value Ref Range Status   07/31/2024 4.2 3.5 - 5.2 mmol/L Final   07/30/2024 4.8 3.5 - 5.2 mmol/L Final   07/29/2024 4.2 3.5 - 5.2 mmol/L Final   07/29/2024 4.2 3.5 - 5.2 mmol/L Final      Chloride Chloride   Date Value Ref Range Status   07/31/2024 92 (L) 98 - 107 mmol/L Final   07/30/2024 96 (L) 98 - 107 mmol/L Final   07/29/2024 94 (L) 98 - 107 mmol/L Final   07/29/2024 89 (L) 98 - 107 mmol/L Final      CO2 CO2   Date Value Ref Range Status   07/31/2024 24.3 22.0 - 29.0 mmol/L Final   07/30/2024 23.8 22.0 - 29.0 mmol/L Final   07/29/2024 23.0 22.0 - 29.0 mmol/L Final   07/29/2024 22.0 22.0 - 29.0 mmol/L Final      BUN BUN   Date Value Ref Range Status   07/31/2024 7 (L) 8 - 23 mg/dL Final   07/30/2024 7 (L) 8 - 23 mg/dL Final   07/29/2024 5 (L) 8 - 23 mg/dL Final   07/29/2024 6 (L) 8 - 23 mg/dL Final      Creatinine Creatinine   Date Value Ref Range Status   07/31/2024 0.78 " "0.57 - 1.00 mg/dL Final   07/30/2024 0.77 0.57 - 1.00 mg/dL Final   07/29/2024 0.73 0.57 - 1.00 mg/dL Final   07/29/2024 0.92 0.57 - 1.00 mg/dL Final      Calcium Calcium   Date Value Ref Range Status   07/31/2024 9.2 8.6 - 10.5 mg/dL Final   07/30/2024 8.7 8.6 - 10.5 mg/dL Final   07/29/2024 8.6 8.6 - 10.5 mg/dL Final   07/29/2024 9.2 8.6 - 10.5 mg/dL Final      PO4 No results found for: \"CAPO4\"   Albumin Albumin   Date Value Ref Range Status   07/31/2024 3.4 (L) 3.5 - 5.2 g/dL Final   07/30/2024 3.2 (L) 3.5 - 5.2 g/dL Final   07/29/2024 3.5 3.5 - 5.2 g/dL Final      Magnesium Magnesium   Date Value Ref Range Status   07/31/2024 1.9 1.6 - 2.4 mg/dL Final   07/30/2024 1.9 1.6 - 2.4 mg/dL Final      Uric Acid No results found for: \"URICACID\"     Medication Review:   acetylcysteine, 1,200 mg, Oral, Q12H  cetirizine, 10 mg, Oral, Daily  cholecalciferol, 1,000 Units, Oral, Daily  dicyclomine, 20 mg, Oral, 4x Daily  fluticasone, 2 spray, Each Nare, Daily  melatonin, 5 mg, Oral, Nightly  pantoprazole, 40 mg, Oral, Q AM  Psyllium, 1 packet, Oral, BID AC  sodium chloride, 10 mL, Intravenous, Q12H  vitamin B-12, 1,000 mcg, Oral, Daily          Assessment & Plan       Liver enzyme elevation    Elevated LFTs    Hyponatremia    Abnormal liver enzymes      Assessment & Plan  Hyponatremia-  sodium trending up to 128 yesterday.  Decreased back to 123 after ivf's started by primary.  Trending back up now.  Urine sodium of 124, urine osm of 564.  Appears SIADH.  Continue fluid restriction.  Would avoid IVF's with urine osm of 564.     Abd Pain- CT with no acut problems.  Elevated LFT's with GI consulted.     HTN-  controlled on current meds.  Avoid thiazides.    Lico Correia MD  07/31/24  11:23 EDT          "

## 2024-07-31 NOTE — THERAPY EVALUATION
Patient Name: Qiana Altman  : 1950    MRN: [id number]                              Today's Date: 2024       Admit Date: 2024    Visit Dx:     ICD-10-CM ICD-9-CM   1. Abdominal pain, unspecified abdominal location  R10.9 789.00   2. Impaired mobility and ADLs  Z74.09 V49.89    Z78.9      Patient Active Problem List   Diagnosis    Allergic rhinitis due to allergen    Essential hypertension    GERD (gastroesophageal reflux disease)    Hyperlipidemia    Major depressive disorder    Vitamin D deficiency    Osteoarthritis    B12 deficiency    Anxiety disorder    Anemia    Overweight (BMI 25.0-29.9)    Acute medial meniscus tear of left knee    Closed fracture of medial plateau of left tibia    Liver enzyme elevation    Elevated LFTs    Hyponatremia    Abnormal liver enzymes     Past Medical History:   Diagnosis Date    Allergic rhinitis due to allergen 09/10/2019    Anemia 09/10/2019    Anxiety disorder 2020    B12 deficiency 2020    Callus     Cataract     COVID-19 2022    Depression     Diverticulosis     Essential hypertension 2019    Family history of myocardial infarction 2023    GERD (gastroesophageal reflux disease) 2019    Graves' disease     HLD (hyperlipidemia) 2019    Hyperthyroidism     Hypothyroidism     Inflammatory bowel disease     Major depressive disorder 2019    Osteoarthritis 2020    Osteopenia     Peptic ulceration     T2DM (type 2 diabetes mellitus) 2019    Tuberculosis     Took meds will never need to be tested again    Vitamin D deficiency 2019     Past Surgical History:   Procedure Laterality Date    APPENDECTOMY       SECTION      CHOLECYSTECTOMY  2016    DR SCALES    COLON SURGERY      COLONOSCOPY      HIP FRACTURE SURGERY Left     REPAIR    HIP SURGERY      OOPHORECTOMY      SMALL INTESTINE SURGERY      TUBAL ABDOMINAL LIGATION      UPPER GASTROINTESTINAL ENDOSCOPY        General Information        Row Name 07/31/24 1239          OT Time and Intention    Document Type evaluation  -     Mode of Treatment individual therapy;occupational therapy  -       Row Name 07/31/24 1239          General Information    Patient Profile Reviewed yes  -     Prior Level of Function independent:  -     Existing Precautions/Restrictions no known precautions/restrictions  -     Barriers to Rehab none identified  -       Row Name 07/31/24 1239          Occupational Profile    Reason for Services/Referral (Occupational Profile) Pt. is a 74 year old female admitted for the above diagnosis. Pt. referred to OT services to assess independence with ADLs and adl transfers/fx'l mobility. No previous OT services for current condition.  -       Row Name 07/31/24 1239          Living Environment    People in Home child(tiffanie), adult  -       Row Name 07/31/24 1239          Cognition    Orientation Status (Cognition) oriented x 4  -       Row Name 07/31/24 1239          Safety Issues, Functional Mobility    Comment, Safety Issues/Impairments (Mobility) none identified  -               User Key  (r) = Recorded By, (t) = Taken By, (c) = Cosigned By      Initials Name Provider Type    AC Sridevi Tobar, OT Occupational Therapist                     Mobility/ADL's       Row Name 07/31/24 1241          Bed Mobility    Comment, (Bed Mobility) patient sitting in recliner upon OT arrival in the room.  -       Row Name 07/31/24 1241          Transfers    Transfers sit-stand transfer  -       Row Name 07/31/24 1241          Sit-Stand Transfer    Sit-Stand [place] (Transfers) independent  -       Row Name 07/31/24 1241          Functional Mobility    Functional Mobility- Ind. Level independent  -       Row Name 07/31/24 1241          Activities of Daily Living    BADL Assessment/Intervention --  patient is independent with bathing/dressing, (I) with grooming, (I) with self-feeding, (I) with toileting.  -                User Key  (r) = Recorded By, (t) = Taken By, (c) = Cosigned By      Initials Name Provider Type    Sridevi Dickey OT Occupational Therapist                   Obj/Interventions       Row Name 07/31/24 1246          Sensory Assessment (Somatosensory)    Sensory Assessment (Somatosensory) UE sensation intact  -AC       Row Name 07/31/24 1246          Vision Assessment/Intervention    Visual Impairment/Limitations WFL  -[facility] 07/31/24 1246          Range of Motion Comprehensive    General Range of Motion bilateral upper extremity ROM WNL  -AC       Row Name 07/31/24 1246          Strength Comprehensive (MMT)    General Manual Muscle Testing (MMT) Assessment no strength deficits identified  -AC       Row Name 07/31/24 1246          Motor Skills    Motor Skills coordination;functional endurance  -     Coordination WFL  -     Functional Endurance fair plus/good for adls  -AC       Row Name 07/31/24 1246          Balance    Balance Assessment standing dynamic balance  -     Dynamic Standing Balance independent  -     Position/Device Used, Standing Balance unsupported  -               User Key  (r) = Recorded By, (t) = Taken By, (c) = Cosigned By      Initials Name Provider Type    Sridevi Dickey OT Occupational Therapist                   Goals/Plan    No documentation.                  Clinical Impression       Row Name 07/31/24 1246          Pain Assessment    Pretreatment Pain Rating 0/10 - no pain  -     Posttreatment Pain Rating 0/10 - no pain  -AC       Row Name 07/31/24 1246          Plan of Care Review    Plan of Care Reviewed With patient  -LC     Progress no change  -     Outcome Evaluation Patient not appropriate for skilled OT services at this time as patient has not had a decline in ADLs or ADL transfers/fx'l mobility. patient safe to discharge to previous setting when medically appropriate. Discharge occupational therapy services.  -AC       Row Name 07/31/24 1246           Therapy Assessment/Plan (OT)    Patient/Family Therapy Goal Statement (OT) NA  -AC     Rehab Potential (OT) --  NA  -     Criteria for Skilled Therapeutic Interventions Met (OT) no;no problems identified which require skilled intervention  -     Therapy Frequency (OT) evaluation only  -       Row Name 07/31/24 1246          Therapy Plan Review/Discharge Plan (OT)    Anticipated Discharge Disposition (OT) home  -       Row Name 07/31/24 1246          Positioning and Restraints    Pre-Treatment Position sitting in chair/recliner  -     Post Treatment Position chair  -AC     In Chair reclined;call light within reach;encouraged to call for assist;exit alarm on;legs elevated  -               User Key  (r) = Recorded By, (t) = Taken By, (c) = Cosigned By      Initials Name Provider Type    AC Sridevi Tobar, ASHWIN Occupational Therapist                   Outcome Measures       Row Name 07/31/24 1247          How much help from another is currently needed...    Putting on and taking off regular lower body clothing? 4  -AC     Bathing (including washing, rinsing, and drying) 4  -AC     Toileting (which includes using toilet bed pan or urinal) 4  -AC     Putting on and taking off regular upper body clothing 4  -AC     Taking care of personal grooming (such as brushing teeth) 4  -AC     Eating meals 4  -AC     AM-PAC 6 Clicks Score (OT) 24  -       Row Name 07/31/24 0715          How much help from another person do you currently need...    Turning from your back to your side while in flat bed without using bedrails? 4  -AJ     Moving from lying on back to sitting on the side of a flat bed without bedrails? 4  -AJ     Moving to and from a bed to a chair (including a wheelchair)? 4  -AJ     Standing up from a chair using your arms (e.g., wheelchair, bedside chair)? 4  -AJ     Climbing 3-5 steps with a railing? 4  -AJ     To walk in hospital room? 4  -AJ     AM-PAC 6 Clicks Score (PT) 24  -AJ     Highest Level of  Mobility Goal 8 --> Walked 250 feet or more  -LETICIA       Row Name 07/31/24 1247          Functional Assessment    Outcome Measure Options AM-PAC 6 Clicks Daily Activity (OT);Optimal Instrument  -AC       Row Name 07/31/24 1247          Optimal Instrument    Optimal Instrument Optimal - 3  -AC     Bending/Stooping 1  -AC     Standing 1  -AC     Reaching 1  -AC     From the list, choose the 3 activities you would most like to be able to do without any difficulty Bending/stooping;Standing;Reaching  -AC     Total Score Optimal - 3 3  -AC               User Key  (r) = Recorded By, (t) = Taken By, (c) = Cosigned By      Initials Name Provider Type    Aric Juan, RN Registered Nurse    Sridevi Dickey OT Occupational Therapist                    Occupational Therapy Education       Title: PT OT SLP Therapies (Done)       Topic: Occupational Therapy (Done)       Point: ADL training (Done)       Description:   Instruct learner(s) on proper safety adaptation and remediation techniques during self care or transfers.   Instruct in proper use of assistive devices.                  Learning Progress Summary             Patient Acceptance, E, VU by  at 7/31/2024 1248                         Point: Home exercise program (Done)       Description:   Instruct learner(s) on appropriate technique for monitoring, assisting and/or progressing therapeutic exercises/activities.                  Learning Progress Summary             Patient Acceptance, E, VU by  at 7/31/2024 1248                         Point: Precautions (Done)       Description:   Instruct learner(s) on prescribed precautions during self-care and functional transfers.                  Learning Progress Summary             Patient Acceptance, E, VU by LC at 7/31/2024 1248                         Point: Body mechanics (Done)       Description:   Instruct learner(s) on proper positioning and spine alignment during self-care, functional mobility activities and/or  exercises.                  Learning Progress Summary             Patient Acceptance, E, VU by  at 7/31/2024 1248                                         User Key       Initials Effective Dates Name Provider Type Discipline     06/16/21 -  Sridevi Tobar OT Occupational Therapist OT                  OT Recommendation and Plan  Therapy Frequency (OT): evaluation only  Plan of Care Review  Plan of Care Reviewed With: patient  Progress: no change  Outcome Evaluation: Patient not appropriate for skilled OT services at this time as patient has not had a decline in ADLs or ADL transfers/fx'l mobility. patient safe to discharge to previous setting when medically appropriate. Discharge occupational therapy services.     Time Calculation:   Evaluation Complexity (OT)  Review Occupational Profile/Medical/Therapy History Complexity: brief/low complexity  Assessment, Occupational Performance/Identification of Deficit Complexity: 1-3 performance deficits  Clinical Decision Making Complexity (OT): problem focused assessment/low complexity  Overall Complexity of Evaluation (OT): low complexity     Time Calculation- OT       Row Name 07/31/24 1249             Time Calculation- OT    OT Received On 07/31/24  -AC         Untimed Charges    OT Eval/Re-eval Minutes 26  -AC         Total Minutes    Untimed Charges Total Minutes 26  -AC       Total Minutes 26  -AC                User Key  (r) = Recorded By, (t) = Taken By, (c) = Cosigned By      Initials Name Provider Type     Sridevi Tobar OT Occupational Therapist                  Therapy Charges for Today       Code Description Service Date Service Provider Modifiers Qty    [id number] HC OT EVAL LOW COMPLEXITY 2 7/31/2024 Sridevi Tobar OT GO 1                 Sridevi Tobar OT  7/31/2024

## 2024-07-31 NOTE — PLAN OF CARE
Goal Outcome Evaluation:  Plan of Care Reviewed With: patient           Outcome Evaluation: Pt. up adlib, tolerating liquid diet well. absent of complaints.

## 2024-07-31 NOTE — THERAPY EVALUATION
Acute Care - Physical Therapy Initial Evaluation   [place]     Patient Name: Qiana Altman  : 1950  MRN: [id number]  Today's Date: 2024      Visit Dx:     ICD-10-CM ICD-9-CM   1. Abdominal pain, unspecified abdominal location  R10.9 789.00   2. Impaired mobility and ADLs  Z74.09 V49.89    Z78.9    3. Difficulty walking  R26.2 719.7     Patient Active Problem List   Diagnosis    Allergic rhinitis due to allergen    Essential hypertension    GERD (gastroesophageal reflux disease)    Hyperlipidemia    Major depressive disorder    Vitamin D deficiency    Osteoarthritis    B12 deficiency    Anxiety disorder    Anemia    Overweight (BMI 25.0-29.9)    Acute medial meniscus tear of left knee    Closed fracture of medial plateau of left tibia    Liver enzyme elevation    Elevated LFTs    Hyponatremia    Abnormal liver enzymes     Past Medical History:   Diagnosis Date    Allergic rhinitis due to allergen 09/10/2019    Anemia 09/10/2019    Anxiety disorder 2020    B12 deficiency 2020    Callus     Cataract     COVID-19 2022    Depression     Diverticulosis     Essential hypertension 2019    Family history of myocardial infarction 2023    GERD (gastroesophageal reflux disease) 2019    Graves' disease     HLD (hyperlipidemia) 2019    Hyperthyroidism     Hypothyroidism     Inflammatory bowel disease     Major depressive disorder 2019    Osteoarthritis 2020    Osteopenia     Peptic ulceration     T2DM (type 2 diabetes mellitus) 2019    Tuberculosis     Took meds will never need to be tested again    Vitamin D deficiency 2019     Past Surgical History:   Procedure Laterality Date    APPENDECTOMY       SECTION      CHOLECYSTECTOMY  2016    DR SCALES    COLON SURGERY      COLONOSCOPY      HIP FRACTURE SURGERY Left     REPAIR    HIP SURGERY      OOPHORECTOMY      SMALL INTESTINE SURGERY      TUBAL ABDOMINAL LIGATION      UPPER  GASTROINTESTINAL ENDOSCOPY       PT Assessment (Last 12 Hours)       PT Evaluation and Treatment       Row Name 07/31/24 1300          Physical Therapy Time and Intention    Subjective Information no complaints  -DP     Document Type evaluation  -DP     Mode of Treatment individual therapy;physical therapy  -DP     Patient Effort good  -DP       Row Name 07/31/24 1300          General Information    Patient Profile Reviewed yes  -DP     Patient Observations alert;cooperative;agree to therapy  -DP     Prior Level of Function independent:;gait;transfer;bed mobility;ADL's  -DP     Equipment Currently Used at Home none  -DP     Existing Precautions/Restrictions no known precautions/restrictions  -DP     Barriers to Rehab none identified  -DP       Row Name 07/31/24 1300          Living Environment    Current Living Arrangements home  -DP     People in Home child(tiffanie), adult  -DP       Row Name 07/31/24 1300          Cognition    Orientation Status (Cognition) oriented x 3  -DP       Row Name 07/31/24 1300          Range of Motion Comprehensive    General Range of Motion bilateral lower extremity ROM WFL  -DP       Row Name 07/31/24 1300          Strength (Manual Muscle Testing)    Strength (Manual Muscle Testing) bilateral lower extremities;strength is WFL  -DP       Row Name 07/31/24 1300          Bed Mobility    Bed Mobility supine-sit-supine  -DP     Supine-Sit-Supine [place] (Bed Mobility) independent  -DP       Row Name 07/31/24 1300          Transfers    Transfers sit-stand transfer  -DP       Row Name 07/31/24 1300          Sit-Stand Transfer    Sit-Stand [place] (Transfers) independent  -DP       Row Name 07/31/24 1300          Gait/Stairs (Locomotion)    Gait/Stairs Locomotion gait/ambulation independence  -DP     [place] Level (Gait) modified independence  -DP     Assistive Device (Gait) --  none  -DP     Patient was able to Ambulate yes  -DP     Distance in Feet (Gait) 200  -DP       Row Name  07/31/24 1300          Balance    Balance Assessment standing dynamic balance  -DP     Dynamic Standing Balance supervision  -DP       Row Name 07/31/24 1300          Plan of Care Review    Plan of Care Reviewed With patient  -DP     Outcome Evaluation Patient is able to complete all transfers and bed mobilities indepedently in the room. Pt is able to ambulate ad paresh in the room. Pt does not need inpatient PT services. Recommend DC home.  -DP       Row Name 07/31/24 1300          Therapy Assessment/Plan (PT)    Criteria for Skilled Interventions Met (PT) no problems identified which require skilled intervention  -DP     Therapy Frequency (PT) evaluation only  -DP       Row Name 07/31/24 1300          PT Evaluation Complexity    History, PT Evaluation Complexity no personal factors and/or comorbidities  -DP     Examination of Body Systems (PT Eval Complexity) total of 4 or more elements  -DP     Clinical Presentation (PT Evaluation Complexity) stable  -DP     Clinical Decision Making (PT Evaluation Complexity) low complexity  -DP     Overall Complexity (PT Evaluation Complexity) low complexity  -DP       Row Name 07/31/24 1300          Physical Therapy Goals    Problem Specific Goal Selection (PT) problem specific goal 1, PT  -DP       Row Name 07/31/24 1300          Problem Specific Goal 1 (PT)    Problem Specific Goal 1 (PT) Complete PT evaluation.  -DP     Time Frame (Problem Specific Goal 1, PT) 1 day  -DP     Progress/Outcome (Problem Specific Goal 1, PT) goal met  -DP               User Key  (r) = Recorded By, (t) = Taken By, (c) = Cosigned By      Initials Name Provider Type    Peggy Torres, PT Physical Therapist                      PT Recommendation and Plan  Anticipated Discharge Disposition (PT): home  Therapy Frequency (PT): evaluation only  Plan of Care Reviewed With: patient  Outcome Evaluation: Patient is able to complete all transfers and bed mobilities indepedently in the room. Pt is able to  ambulate ad paresh in the room. Pt does not need inpatient PT services. Recommend DC home.   Outcome Measures       Row Name 07/31/24 1300             How much help from another person do you currently need...    Turning from your back to your side while in flat bed without using bedrails? 4  -DP      Moving from lying on back to sitting on the side of a flat bed without bedrails? 4  -DP      Moving to and from a bed to a chair (including a wheelchair)? 4  -DP      Standing up from a chair using your arms (e.g., wheelchair, bedside chair)? 4  -DP      Climbing 3-5 steps with a railing? 4  -DP      To walk in hospital room? 4  -DP      AM-PAC 6 Clicks Score (PT) 24  -DP      Highest Level of Mobility Goal 8 --> Walked 250 feet or more  -DP         Functional Assessment    Outcome Measure Options AM-PAC 6 Clicks Basic Mobility (PT)  -DP                User Key  (r) = Recorded By, (t) = Taken By, (c) = Cosigned By      Initials Name Provider Type    Peggy Torres, PT Physical Therapist                     Time Calculation:    PT Charges       Row Name 07/31/24 1312             Time Calculation    PT Received On 07/31/24  -DP         Untimed Charges    PT Eval/Re-eval Minutes 40  -DP         Total Minutes    Untimed Charges Total Minutes 40  -DP       Total Minutes 40  -DP                User Key  (r) = Recorded By, (t) = Taken By, (c) = Cosigned By      Initials Name Provider Type    Peggy Torres, PT Physical Therapist                      PT G-Codes  Outcome Measure Options: AM-PAC 6 Clicks Basic Mobility (PT)  AM-PAC 6 Clicks Score (PT): 24  AM-PAC 6 Clicks Score (OT): 24    Peggy Sharp, PT  7/31/2024

## 2024-07-31 NOTE — PROGRESS NOTES
[facility]   Hospitalist Progress Note    Date of admission: 7/29/2024  Patient Name: Qiana Altman  1950  Date: 7/31/2024      Subjective     Chief Complaint   Patient presents with    Abdominal Pain    Nausea    Constipation       Interval Followup: Notes mild remittent abdominal discomfort but feeling much better overall has been up and ambulating.  Notes some constipation type [feels like Metamucil makes it worse would like to use MiraLAX instead.  She looks doing little better today, no severe abdominal discomfort, diet starting to improve.  Denies fevers chills      Objective     Vitals:   Temp:  [97.7 °F (36.5 °C)-98.1 °F (36.7 °C)] 98.1 °F (36.7 °C)  Heart Rate:  [58-69] 61  Resp:  [20] 20  BP: (103-133)/(49-73) 103/49    Physical Exam  Awake conversant  CTAB no wheezing  RRR no lower extremity pitting edema  Abdomen soft minimal very nonspecific tenderness in the lower abdominal midline right upper quadrant no guarding positive bowel sounds      Result Review:  Vital signs, labs and recent relevant imaging reviewed.      CBC          7/29/2024    10:30 7/30/2024    04:59 7/31/2024    04:49   CBC   WBC 3.75  2.84  3.19    RBC 4.55  3.94  4.26    Hemoglobin 12.8  10.8  11.7    Hematocrit 38.8  33.8  36.1    MCV 85.3  85.8  84.7    MCH 28.1  27.4  27.5    MCHC 33.0  32.0  32.4    RDW 15.2  15.1  15.2    Platelets 198  167  192      CMP          7/29/2024    10:30 7/29/2024    15:03 7/30/2024    04:59 7/30/2024    16:54 7/31/2024    04:49   CMP   Glucose 130  115  101   94    BUN 6  5  7   7    Creatinine 0.92  0.73  0.77   0.78    EGFR 66.3  87.5  82.1   80.8    Sodium 123  126     126  128  123  126    Potassium 4.2  4.2  4.8   4.2    Chloride 89  94  96   92    Calcium 9.2  8.6  8.7   9.2    Total Protein 8.1   7.2   8.0    Albumin 3.5   3.2   3.4    Globulin 4.6     4.6    Total Bilirubin 1.8   1.0     1.0   1.6    Alkaline Phosphatase 127   113   118    AST (SGOT) 394   334   457    ALT (SGPT)  321   266   337    Albumin/Globulin Ratio 0.8     0.7    BUN/Creatinine Ratio 6.5  6.8  9.1   9.0    Anion Gap 12.0  9.0  8.2   9.7          acetaminophen    aluminum-magnesium hydroxide-simethicone    [DISCONTINUED] senna-docusate sodium **AND** [DISCONTINUED] polyethylene glycol **AND** [DISCONTINUED] bisacodyl **AND** bisacodyl    Diclofenac Sodium    hydrOXYzine    Lidocaine    melatonin    ondansetron    polyethylene glycol    prochlorperazine    sodium chloride    sodium chloride    sodium chloride    acetylcysteine, 1,200 mg, Oral, Q12H  cetirizine, 10 mg, Oral, Daily  cholecalciferol, 1,000 Units, Oral, Daily  dicyclomine, 20 mg, Oral, 4x Daily  fluticasone, 2 spray, Each Nare, Daily  melatonin, 5 mg, Oral, Nightly  pantoprazole, 40 mg, Oral, Q AM  Psyllium, 1 packet, Oral, BID AC  sodium chloride, 10 mL, Intravenous, Q12H  vitamin B-12, 1,000 mcg, Oral, Daily        US Liver    Result Date: 7/30/2024  Impression: No evidence of hepatosplenomegaly. Negative exam. Electronically Signed: Summer Diallo MD  7/30/2024 7:59 AM EDT  Workstation ID: [id number]    CT Abdomen Pelvis With Contrast    Result Date: 7/29/2024  Impression: 1. Hepatic steatosis. 2. Mild hepatosplenomegaly. There may be small gastroesophageal varices. I would recommend clinical correlation with regard to signs and symptoms of portal hypertension. 3. Status post a right hemicolectomy. 4. Calcified uterine fibroid. 5. Suggestion of a small hiatal hernia. 6. Mild dependent atelectasis in the lung bases. Electronically Signed: Kevin Ortega MD  7/29/2024 12:30 PM EDT  Workstation ID: [id number]     Assessment / Plan     Summary: 74 y.o. with history of inflammatory bowel disease, GERD, diverticulosis who presented with abdominal pain nausea and vomiting for approximately 2 days prior to admission, initial imaging showing hepatic steatosis prior colectomy, initial LFTs elevated, GI consulted treated with NAC and additional autoimmune workup  sent    Assessment/Plan (clinically significant if listed here)  Concern for autoimmune hepatitis  Elevated liver function test with hepatosplenomegaly  Abdominal pain/nausea/vomiting in setting of above  Hyponatremia, suspect SIADH  DM2  History of Graves' disease  History of cholecystectomy and right hemicolectomy    LFTs worse today, liver ultrasound was negative for acute findings, IgG elevated RENZO and smooth muscle elevated, suspicious for autoimmune hepatitis.  Reached out to GI today deferring additional acute treatment currently is going to reevaluate in the morning likely outpatient liver biopsy.  Reassess in morning labs may warrant additional treatment depending on trend  Cont NAC for now  Liver US: negative/no hsm/patent flow, prior choly. Acute hep panel negative  Wbc variable, up to 3.2, likely in setting of above, monitor  Sodium still somewhat variable, did worsen yesterday briefly unclear if from brief amount of IV fluid she had yesterday, avoiding IV fluids continue with fluid restriction  continue with fluid restriction, monitor sodium, started to improve, p.o. intake starting to improve as well appears likely SIADH, avoiding additional IV fluids for now, appreciate additional nephrology assistance, recommendations reviewed  Tsh and cortisol wnl, cont ur sodium trend   Urine sodium 124 ur osm 564  Holding lisinopril, monitor bp  Cont ppi  Hx of dm, A1c 5.9, no acute medications needed  PT/OT  Check a.m. CBC, BMP, magnesium, phosphorus  Continue hospitalization at current level of care    Dispo: likely home once medically stable.   Will discuss plan of care/dispo with clinical .     VTE Prophylaxis:  Mechanical VTE prophylaxis orders are present.

## 2024-07-31 NOTE — PLAN OF CARE
Goal Outcome Evaluation:  Plan of Care Reviewed With: patient        Progress: improving  Outcome Evaluation: Patient is alert and oriented on room air. Patient still complaining of lower abdominal tenderness upon palpation. Has been ambulating in hallway and room without difficulty, Fluids restricted. Will continue with POC.

## 2024-07-31 NOTE — PLAN OF CARE
Goal Outcome Evaluation:  Plan of Care Reviewed With: patient        Progress: no change  Outcome Evaluation: Patient not appropriate for skilled OT services at this time as patient has not had a decline in ADLs or ADL transfers/fx'l mobility. patient safe to discharge to previous setting when medically appropriate. Discharge occupational therapy services.      Anticipated Discharge Disposition (OT): home

## 2024-08-01 ENCOUNTER — APPOINTMENT (OUTPATIENT)
Dept: CT IMAGING | Facility: HOSPITAL | Age: 74
DRG: 442 | End: 2024-08-01
Payer: MEDICARE

## 2024-08-01 LAB
ALBUMIN SERPL-MCNC: 3.2 G/DL (ref 3.5–5.2)
ALBUMIN/GLOB SERPL: 0.8 G/DL
ALP BONE CFR SERPL: 46 % (ref 14–68)
ALP INTEST CFR SERPL: 4 % (ref 0–18)
ALP LIVER CFR SERPL: 50 % (ref 18–85)
ALP SERPL-CCNC: 112 U/L (ref 39–117)
ALP SERPL-CCNC: 116 IU/L (ref 44–121)
ALT SERPL W P-5'-P-CCNC: 329 U/L (ref 1–33)
ANION GAP SERPL CALCULATED.3IONS-SCNC: 7.3 MMOL/L (ref 5–15)
APTT PPP: 39.5 SECONDS (ref 78–95.9)
AST SERPL-CCNC: 442 U/L (ref 1–32)
BASOPHILS # BLD AUTO: 0.02 10*3/MM3 (ref 0–0.2)
BASOPHILS # BLD AUTO: 0.02 10*3/MM3 (ref 0–0.2)
BASOPHILS NFR BLD AUTO: 0.8 % (ref 0–1.5)
BASOPHILS NFR BLD AUTO: 0.8 % (ref 0–1.5)
BILIRUB SERPL-MCNC: 1.6 MG/DL (ref 0–1.2)
BUN SERPL-MCNC: 7 MG/DL (ref 8–23)
BUN/CREAT SERPL: 8.6 (ref 7–25)
CALCIUM SPEC-SCNC: 8.9 MG/DL (ref 8.6–10.5)
CHLORIDE SERPL-SCNC: 93 MMOL/L (ref 98–107)
CO2 SERPL-SCNC: 25.7 MMOL/L (ref 22–29)
CREAT SERPL-MCNC: 0.81 MG/DL (ref 0.57–1)
DEPRECATED RDW RBC AUTO: 46.3 FL (ref 37–54)
DEPRECATED RDW RBC AUTO: 47 FL (ref 37–54)
EGFRCR SERPLBLD CKD-EPI 2021: 77.2 ML/MIN/1.73
EOSINOPHIL # BLD AUTO: 0.03 10*3/MM3 (ref 0–0.4)
EOSINOPHIL # BLD AUTO: 0.06 10*3/MM3 (ref 0–0.4)
EOSINOPHIL NFR BLD AUTO: 1.1 % (ref 0.3–6.2)
EOSINOPHIL NFR BLD AUTO: 2.3 % (ref 0.3–6.2)
ERYTHROCYTE [DISTWIDTH] IN BLOOD BY AUTOMATED COUNT: 14.9 % (ref 12.3–15.4)
ERYTHROCYTE [DISTWIDTH] IN BLOOD BY AUTOMATED COUNT: 15.2 % (ref 12.3–15.4)
GLOBULIN UR ELPH-MCNC: 4.2 GM/DL
GLUCOSE SERPL-MCNC: 100 MG/DL (ref 65–99)
HCT VFR BLD AUTO: 33.1 % (ref 34–46.6)
HCT VFR BLD AUTO: 35 % (ref 34–46.6)
HGB BLD-MCNC: 10.9 G/DL (ref 12–15.9)
HGB BLD-MCNC: 11.6 G/DL (ref 12–15.9)
IMM GRANULOCYTES # BLD AUTO: 0 10*3/MM3 (ref 0–0.05)
IMM GRANULOCYTES # BLD AUTO: 0.01 10*3/MM3 (ref 0–0.05)
IMM GRANULOCYTES NFR BLD AUTO: 0 % (ref 0–0.5)
IMM GRANULOCYTES NFR BLD AUTO: 0.4 % (ref 0–0.5)
INR PPP: 1.18 (ref 0.86–1.15)
LYMPHOCYTES # BLD AUTO: 0.68 10*3/MM3 (ref 0.7–3.1)
LYMPHOCYTES # BLD AUTO: 1.05 10*3/MM3 (ref 0.7–3.1)
LYMPHOCYTES NFR BLD AUTO: 25.7 % (ref 19.6–45.3)
LYMPHOCYTES NFR BLD AUTO: 39.9 % (ref 19.6–45.3)
MAGNESIUM SERPL-MCNC: 1.8 MG/DL (ref 1.6–2.4)
MCH RBC QN AUTO: 27.5 PG (ref 26.6–33)
MCH RBC QN AUTO: 28 PG (ref 26.6–33)
MCHC RBC AUTO-ENTMCNC: 32.9 G/DL (ref 31.5–35.7)
MCHC RBC AUTO-ENTMCNC: 33.1 G/DL (ref 31.5–35.7)
MCV RBC AUTO: 83.4 FL (ref 79–97)
MCV RBC AUTO: 84.3 FL (ref 79–97)
MONOCYTES # BLD AUTO: 0.35 10*3/MM3 (ref 0.1–0.9)
MONOCYTES # BLD AUTO: 0.35 10*3/MM3 (ref 0.1–0.9)
MONOCYTES NFR BLD AUTO: 13.2 % (ref 5–12)
MONOCYTES NFR BLD AUTO: 13.3 % (ref 5–12)
NEUTROPHILS NFR BLD AUTO: 1.15 10*3/MM3 (ref 1.7–7)
NEUTROPHILS NFR BLD AUTO: 1.56 10*3/MM3 (ref 1.7–7)
NEUTROPHILS NFR BLD AUTO: 43.7 % (ref 42.7–76)
NEUTROPHILS NFR BLD AUTO: 58.8 % (ref 42.7–76)
NRBC BLD AUTO-RTO: 0 /100 WBC (ref 0–0.2)
NRBC BLD AUTO-RTO: 0 /100 WBC (ref 0–0.2)
PLATELET # BLD AUTO: 158 10*3/MM3 (ref 140–450)
PLATELET # BLD AUTO: 167 10*3/MM3 (ref 140–450)
PMV BLD AUTO: 8.4 FL (ref 6–12)
PMV BLD AUTO: 8.6 FL (ref 6–12)
POTASSIUM SERPL-SCNC: 4.2 MMOL/L (ref 3.5–5.2)
PROT SERPL-MCNC: 7.4 G/DL (ref 6–8.5)
PROTHROMBIN TIME: 15.3 SECONDS (ref 11.8–14.9)
RBC # BLD AUTO: 3.97 10*6/MM3 (ref 3.77–5.28)
RBC # BLD AUTO: 4.15 10*6/MM3 (ref 3.77–5.28)
SODIUM SERPL-SCNC: 126 MMOL/L (ref 136–145)
WBC NRBC COR # BLD AUTO: 2.63 10*3/MM3 (ref 3.4–10.8)
WBC NRBC COR # BLD AUTO: 2.65 10*3/MM3 (ref 3.4–10.8)

## 2024-08-01 PROCEDURE — 80053 COMPREHEN METABOLIC PANEL: CPT | Performed by: INTERNAL MEDICINE

## 2024-08-01 PROCEDURE — 85025 COMPLETE CBC W/AUTO DIFF WBC: CPT | Performed by: INTERNAL MEDICINE

## 2024-08-01 PROCEDURE — 36415 COLL VENOUS BLD VENIPUNCTURE: CPT | Performed by: INTERNAL MEDICINE

## 2024-08-01 PROCEDURE — 83516 IMMUNOASSAY NONANTIBODY: CPT | Performed by: INTERNAL MEDICINE

## 2024-08-01 PROCEDURE — 86381 MITOCHONDRIAL ANTIBODY EACH: CPT | Performed by: INTERNAL MEDICINE

## 2024-08-01 PROCEDURE — 99232 SBSQ HOSP IP/OBS MODERATE 35: CPT | Performed by: INTERNAL MEDICINE

## 2024-08-01 PROCEDURE — 85025 COMPLETE CBC W/AUTO DIFF WBC: CPT | Performed by: HOSPITALIST

## 2024-08-01 PROCEDURE — 86037 ANCA TITER EACH ANTIBODY: CPT | Performed by: INTERNAL MEDICINE

## 2024-08-01 PROCEDURE — 83735 ASSAY OF MAGNESIUM: CPT | Performed by: HOSPITALIST

## 2024-08-01 PROCEDURE — 85730 THROMBOPLASTIN TIME PARTIAL: CPT | Performed by: RADIOLOGY

## 2024-08-01 PROCEDURE — 85610 PROTHROMBIN TIME: CPT | Performed by: INTERNAL MEDICINE

## 2024-08-01 RX ADMIN — Medication 5 MG: at 21:43

## 2024-08-01 RX ADMIN — Medication 15 G: at 10:43

## 2024-08-01 RX ADMIN — DICYCLOMINE HYDROCHLORIDE 20 MG: 10 CAPSULE ORAL at 21:44

## 2024-08-01 RX ADMIN — Medication 1000 UNITS: at 08:26

## 2024-08-01 RX ADMIN — Medication 15 G: at 21:42

## 2024-08-01 RX ADMIN — DICYCLOMINE HYDROCHLORIDE 20 MG: 10 CAPSULE ORAL at 12:04

## 2024-08-01 RX ADMIN — DICYCLOMINE HYDROCHLORIDE 20 MG: 10 CAPSULE ORAL at 08:26

## 2024-08-01 RX ADMIN — HYDROXYZINE HYDROCHLORIDE 25 MG: 25 TABLET ORAL at 21:43

## 2024-08-01 RX ADMIN — CETIRIZINE HYDROCHLORIDE 10 MG: 10 TABLET, FILM COATED ORAL at 08:26

## 2024-08-01 RX ADMIN — ACETAMINOPHEN 650 MG: 325 TABLET ORAL at 00:04

## 2024-08-01 RX ADMIN — HYDROXYZINE HYDROCHLORIDE 25 MG: 25 TABLET ORAL at 00:04

## 2024-08-01 RX ADMIN — POLYETHYLENE GLYCOL 3350 17 G: 17 POWDER, FOR SOLUTION ORAL at 08:26

## 2024-08-01 RX ADMIN — CYANOCOBALAMIN TAB 500 MCG 1000 MCG: 500 TAB at 08:26

## 2024-08-01 RX ADMIN — DICYCLOMINE HYDROCHLORIDE 20 MG: 10 CAPSULE ORAL at 17:30

## 2024-08-01 RX ADMIN — PANTOPRAZOLE SODIUM 40 MG: 40 TABLET, DELAYED RELEASE ORAL at 06:30

## 2024-08-01 RX ADMIN — Medication 10 ML: at 08:26

## 2024-08-01 RX ADMIN — ACETYLCYSTEINE 1200 MG: 200 SOLUTION ORAL; RESPIRATORY (INHALATION) at 10:43

## 2024-08-01 RX ADMIN — ACETYLCYSTEINE 1200 MG: 200 SOLUTION ORAL; RESPIRATORY (INHALATION) at 21:42

## 2024-08-01 RX ADMIN — Medication 10 ML: at 21:43

## 2024-08-01 RX ADMIN — FLUTICASONE PROPIONATE 2 SPRAY: 50 SPRAY, METERED NASAL at 10:43

## 2024-08-01 NOTE — PROGRESS NOTES
[facility]  Inpatient Progress Note    Reason for Follow Up: Abnormally elevated LFTs, abdominal pain and altered bowel habits.    Interval History:     No abdominal pain.  She had BM yesterday x 2 and earlier this morning  She is on free water restriction for hyponatremia.  Most recent sodium 126  LFTs are again abnormal and reduced from patient initially has elevated LFTs that came down and again raised    Total bilirubin 1.6  Alkaline phosphatase 112  -[phone]  -[phone]  Gamma   INR 1.18  SMA 58  RENZO positive  AMA pending  Quantitative immunoglobulin IgG 2736 (hypergammaglobulinemia)  Acute hepatitis panel- Negative  Ultrasound liver - no evidence of hepatosplenomegaly      Current Facility-Administered Medications:     acetaminophen (TYLENOL) tablet 650 mg, 650 mg, Oral, BID PRN, Stuart Baker MD, 650 mg at 08/01/24 0004    acetylcysteine (MUCOMYST) 20 % solution 1,200 mg, 1,200 mg, Oral, Q12H, Peter Osorio MD, 1,200 mg at 08/01/24 1043    aluminum-magnesium hydroxide-simethicone (MAALOX MAX) 400-400-40 MG/5ML suspension 15 mL, 15 mL, Oral, Q6H PRN, Stuart Baker MD    [DISCONTINUED] sennosides-docusate (PERICOLACE) 8.6-50 MG per tablet 2 tablet, 2 tablet, Oral, BID **AND** [DISCONTINUED] polyethylene glycol (MIRALAX) packet 17 g, 17 g, Oral, Daily PRN **AND** [DISCONTINUED] bisacodyl (DULCOLAX) EC tablet 5 mg, 5 mg, Oral, Daily PRN **AND** bisacodyl (DULCOLAX) suppository 10 mg, 10 mg, Rectal, Daily PRN, Sharp, Dimpi, DO    cetirizine (zyrTEC) tablet 10 mg, 10 mg, Oral, Daily, Sharp, Dimpi, DO, 10 mg at 08/01/24 0826    cholecalciferol (VITAMIN D3) tablet 1,000 Units, 1,000 Units, Oral, Daily, Sharp, Dimpi, DO, 1,000 Units at 08/01/24 0826    Diclofenac Sodium (VOLTAREN) 1 % gel 2 g, 2 g, Topical, 4x Daily PRN, Stuart Baker MD    dicyclomine (BENTYL) capsule 20 mg, 20 mg, Oral, 4x Daily, Peter Osorio MD, 20 mg at 08/01/24 1730    fluticasone  (FLONASE) 50 MCG/ACT nasal spray 2 spray, 2 spray, Each Nare, Daily, Sharp, Dimpi, DO, 2 spray at 08/01/24 1043    hydrOXYzine (ATARAX) tablet 25 mg, 25 mg, Oral, TID PRN, Stuart Baker MD, 25 mg at 08/01/24 0004    Lidocaine 4 % 1 patch, 1 patch, Transdermal, Daily PRN, Stuart Baker MD    melatonin tablet 5 mg, 5 mg, Oral, Nightly, Sharp, Dimpi, DO, 5 mg at 07/31/24 2058    melatonin tablet 5 mg, 5 mg, Oral, Nightly PRN, Stuart Baker MD    ondansetron (ZOFRAN) injection 4 mg, 4 mg, Intravenous, Q4H PRN, Stuart Baker MD    pantoprazole (PROTONIX) EC tablet 40 mg, 40 mg, Oral, Q AM, Sharp, Dimpi, DO, 40 mg at 08/01/24 0630    polyethylene glycol (MIRALAX) packet 17 g, 17 g, Oral, BID PRN, Stuart Baker MD    polyethylene glycol (MIRALAX) packet 17 g, 17 g, Oral, BID, Stuart Baker MD, 17 g at 08/01/24 0826    prochlorperazine (COMPAZINE) injection 5 mg, 5 mg, Intravenous, Q6H PRN, Stuart Baker MD    sodium chloride 0.9 % flush 10 mL, 10 mL, Intravenous, PRN, Clyde Clark MD    sodium chloride 0.9 % flush 10 mL, 10 mL, Intravenous, Q12H, Sharp, Dimpi, DO, 10 mL at 08/01/24 0826    sodium chloride 0.9 % flush 10 mL, 10 mL, Intravenous, PRN, Sharp, Dimpi, DO    sodium chloride 0.9 % infusion 40 mL, 40 mL, Intravenous, PRN, Sharp, Dimpi, DO    Urea (URE-NA) packet 15 g, 15 g, Oral, BID, Lico Correia MD, 15 g at 08/01/24 1043    vitamin B-12 (CYANOCOBALAMIN) tablet 1,000 mcg, 1,000 mcg, Oral, Daily, Sharp, Dimpi, DO, 1,000 mcg at 08/01/24 0826    Objective     Vital Signs  Temp:  [97.3 °F (36.3 °C)-98.1 °F (36.7 °C)] 97.5 °F (36.4 °C)  Heart Rate:  [58-70] 70  Resp:  [16-18] 18  BP: (105-128)/(54-64) 115/54  Body mass index is 27.9 kg/m².    Intake/Output Summary (Last 24 hours) at 8/1/2024 1841  Last data filed at 8/1/2024 1420  Gross per 24 hour   Intake 760 ml   Output --   Net 760 ml     I/O this shift:  In: 640 [P.O.:640]  Out: -      Physical Exam:  General     Alert and oriented, normal  "affect   Head:    Normocephalic, without obvious abnormality, atraumatic   Eyes:          conjunctivae and sclerae normal, no icterus, pupils round and reactive to light   Oral cavity: Moist and intact, normal dentation   Neck:   Supple, no adenopathy   Chest Wall/Lungs:    No abnormalities observed, equal on expansion bilaterally, No use of extrarespiratory muscles noted   Abdomen:     Soft, nondistended, nontender; normal bowel sounds, no hepatospleenomegaly, no ascites   Extremities:   no edema, clubbing, or cyanosis   Skin:   No bruising or rash   Psychiatric:  Normal mood and insight       Results Review:      Results from last 7 days   Lab Units 08/01/24  1438 08/01/24  0534 07/31/24  0449   WBC 10*3/mm3 2.65* 2.63* 3.19*   HEMOGLOBIN g/dL 11.6* 10.9* 11.7*   HEMATOCRIT % 35.0 33.1* 36.1   PLATELETS 10*3/mm3 167 158 192     Results from last 7 days   Lab Units 08/01/24  0534 07/31/24  0449 07/30/24  1654 07/30/24  0459   SODIUM mmol/L 126* 126* 123* 128*   POTASSIUM mmol/L 4.2 4.2  --  4.8   CHLORIDE mmol/L 93* 92*  --  96*   CO2 mmol/L 25.7 24.3  --  23.8   BUN mg/dL 7* 7*  --  7*   CREATININE mg/dL 0.81 0.78  --  0.77   CALCIUM mg/dL 8.9 9.2  --  8.7   BILIRUBIN mg/dL 1.6* 1.6*  --  1.0  1.0   ALK PHOS U/L 112 118*  --  113   ALT (SGPT) U/L 329* 337*  --  266*   AST (SGOT) U/L 442* 457*  --  334*   GLUCOSE mg/dL 100* 94  --  101*     Invalid input(s): \"3\"   Lab Results   Lab Value Date/Time    LIPASE 54 07/29/2024 1030    LIPASE 51 07/23/2023 0148    LIPASE 40 02/20/2023 2319    LIPASE 17 03/16/2019 1946       Radiology:  No radiology results for the last day     Impression:      Abnormally elevated LFTs  SMA, RENZO positive  Hypergammaglobulinemia  Altered bowel habits    Plan and Recommendations: Patient admitted with 2 days history of abdominal pain with altered bowel habits, nausea and heartburn.  The patient's symptoms has been improved by adding Bentyl, Metamucil and Protonix.  She had CT scan that " showed hepatosplenomegaly.  Patient has fluctuating elevated LFTs that has been trending upward.  In the meantime, she is found to be positive for SMA and RENZO with hypergammaglobulinemia.  She does qualify biochemical criteria for autoimmune hepatitis type I.  Will check p-ANCA, AMA, Antisoluble liver antigen/liver pancreas antibodies (SLA/LP).  Antiliver side to stool antibody, antiactin antibodies are not widely available to differentiate between type I and type II autoimmune hepatitis.  Will plan for ultrasound-guided liver biopsy by IR and if pathology showed confirmation of autoimmune hepatitis then patient should be started on prednisone 40 mg orally daily with weekly monitoring of LFTs and IgG for a month and then twice a month with slow tapering of prednisone over 6 months followed by azathioprine.  Patient should be vaccinated for HAV and HBV before starting glucocorticoid treatment.  Patient has still closely followed by the hepatologist and will make an arrangement as an outpatient    For altered bowel habits she should take Metamucil 1 packet twice daily before meal.    I discussed the patients findings and my recommendations with patient, nursing staff, and primary care team.      Electronically signed by Peter Osorio MD, 08/01/24, 6:41 PM EDT.

## 2024-08-01 NOTE — PLAN OF CARE
Goal Outcome Evaluation:  Plan of Care Reviewed With: patient        Progress: no change  Outcome Evaluation: Patient had no complaints during shift. Tolerating solid food without complaints. Patient is compliant on fluid restriction. Patient expresses some anxiety about procedure tomorrow

## 2024-08-01 NOTE — PLAN OF CARE
Goal Outcome Evaluation:  Plan of Care Reviewed With: patient        Progress: no change  Outcome Evaluation: Vss. Medicated for c/o headache and mild anxiety. Rested well overnight. No acute events noted

## 2024-08-01 NOTE — PROGRESS NOTES
" [facility]   Hospitalist Progress Note    Date of admission: 7/29/2024  Patient Name: Qiana SERRATO Agapito  1950  Date: 8/1/2024      Subjective     Chief Complaint   Patient presents with    Abdominal Pain    Nausea    Constipation       Interval Followup: No significant abdominal pain today, feels like she has been a little \"jittery \"at times but overall feels like she is improving.  Bowels are moving adequately for her.  Is doing okay with fluid restriction thus far.  Is anxious to talk with GI regarding further treatment plan      Objective     Vitals:   Temp:  [97.3 °F (36.3 °C)-98.1 °F (36.7 °C)] 97.5 °F (36.4 °C)  Heart Rate:  [58-70] 70  Resp:  [16-18] 18  BP: (105-128)/(54-64) 115/54    Physical Exam  Awake conversant  CTAB no wheezing  RRR no lower extremity pitting edema  Abdomen soft nontender nondistended no guarding    Result Review:  Vital signs, labs and recent relevant imaging reviewed.      CBC          7/30/2024    04:59 7/31/2024    04:49 8/1/2024    05:34 8/1/2024    14:38   CBC   WBC 2.84  3.19  2.63  2.65    RBC 3.94  4.26  3.97  4.15    Hemoglobin 10.8  11.7  10.9  11.6    Hematocrit 33.8  36.1  33.1  35.0    MCV 85.8  84.7  83.4  84.3    MCH 27.4  27.5  27.5  28.0    MCHC 32.0  32.4  32.9  33.1    RDW 15.1  15.2  14.9  15.2    Platelets 167  192  158  167      CMP          7/30/2024    04:59 7/30/2024    16:54 7/31/2024    04:49 8/1/2024    05:34   CMP   Glucose 101   94  100    BUN 7   7  7    Creatinine 0.77   0.78  0.81    EGFR 82.1   80.8  77.2    Sodium 128  123  126  126    Potassium 4.8   4.2  4.2    Chloride 96   92  93    Calcium 8.7   9.2  8.9    Total Protein 7.2   8.0  7.4    Albumin 3.2   3.4  3.2    Globulin   4.6  4.2    Total Bilirubin 1.0     1.0   1.6  1.6    Alkaline Phosphatase 113   118  112    AST (SGOT) 334   457  442    ALT (SGPT) 266   337  329    Albumin/Globulin Ratio   0.7  0.8    BUN/Creatinine Ratio 9.1   9.0  8.6    Anion Gap 8.2   9.7  7.3          " acetaminophen    aluminum-magnesium hydroxide-simethicone    [DISCONTINUED] senna-docusate sodium **AND** [DISCONTINUED] polyethylene glycol **AND** [DISCONTINUED] bisacodyl **AND** bisacodyl    Diclofenac Sodium    hydrOXYzine    Lidocaine    melatonin    ondansetron    polyethylene glycol    prochlorperazine    sodium chloride    sodium chloride    sodium chloride    acetylcysteine, 1,200 mg, Oral, Q12H  cetirizine, 10 mg, Oral, Daily  cholecalciferol, 1,000 Units, Oral, Daily  dicyclomine, 20 mg, Oral, 4x Daily  fluticasone, 2 spray, Each Nare, Daily  melatonin, 5 mg, Oral, Nightly  pantoprazole, 40 mg, Oral, Q AM  polyethylene glycol, 17 g, Oral, BID  sodium chloride, 10 mL, Intravenous, Q12H  Urea, 15 g, Oral, BID  vitamin B-12, 1,000 mcg, Oral, Daily        US Liver    Result Date: 7/30/2024  Impression: No evidence of hepatosplenomegaly. Negative exam. Electronically Signed: Summer Diallo MD  7/30/2024 7:59 AM EDT  Workstation ID: [id number]    CT Abdomen Pelvis With Contrast    Result Date: 7/29/2024  Impression: 1. Hepatic steatosis. 2. Mild hepatosplenomegaly. There may be small gastroesophageal varices. I would recommend clinical correlation with regard to signs and symptoms of portal hypertension. 3. Status post a right hemicolectomy. 4. Calcified uterine fibroid. 5. Suggestion of a small hiatal hernia. 6. Mild dependent atelectasis in the lung bases. Electronically Signed: Kevin Ortega MD  7/29/2024 12:30 PM EDT  Workstation ID: [id number]     Assessment / Plan     Summary: 74 y.o. with history of inflammatory bowel disease, GERD, diverticulosis who presented with abdominal pain nausea and vomiting for approximately 2 days prior to admission, initial imaging showing hepatic steatosis prior colectomy, initial LFTs elevated, GI consulted treated with NAC and additional autoimmune workup sent    Assessment/Plan (clinically significant if listed here)  Concern for autoimmune hepatitis  Elevated liver function  test with hepatosplenomegaly  Abdominal pain/nausea/vomiting in setting of above  Hyponatremia, suspect SIADH  DM2  History of Graves' disease  History of cholecystectomy and right hemicolectomy    LFTs started to plateau at this point, 442/329 with T. bili still at 1.6  Continue NAC  Suspicion for autoimmune hepatitis, follow-up GI evaluation today regarding additional need for potential steroid/treatment and need plans regarding liver biopsy  Liver US: negative/no hsm/patent flow, prior choly. Acute hep panel negative  Still with leukopenia, variable, suspect in setting of above, monitor  Fluid restriction decreased down to 1 L today, continue on urea, sodium seems to be plateauing near 126, appreciate nephrology assistance  Avoiding IV fluids  Tsh and cortisol wnl, cont ur sodium trend   Urine sodium 124 ur osm 564  Holding lisinopril, monitor bp, blood pressure stable without this likely can avoid   Cont ppi  Hx of dm, A1c 5.9, no acute medications needed  PT/OT  Check a.m. CBC, BMP, magnesium, phosphorus, lfts    Continue hospitalization at current level of care    Dispo: likely home once medically stable.   Will discuss plan of care/dispo with clinical .     VTE Prophylaxis:  Mechanical VTE prophylaxis orders are present.

## 2024-08-01 NOTE — PROGRESS NOTES
" LOS: 3 days   Patient Care Team:  Zohreh Mcduffie APRN as PCP - General (Nurse Practitioner)  Anna Oquendo APRN as Nurse Practitioner (Nurse Practitioner)    Chief Complaint: Hyponatremia    Subjective     History of Present Illness  Pt without any acute complaints.    Subjective:  Symptoms:  No shortness of breath, chest pain, chest pressure or anxiety.        History taken from: patient chart RN    Objective     Vital Sign Min/Max for last 24 hours  Temp  Min: 97.3 °F (36.3 °C)  Max: 98.1 °F (36.7 °C)   BP  Min: 103/49  Max: 128/64   Pulse  Min: 60  Max: 68   Resp  Min: 16  Max: 20   SpO2  Min: 96 %  Max: 100 %   No data recorded   Weight  Min: 64.8 kg (142 lb 13.7 oz)  Max: 64.8 kg (142 lb 13.7 oz)     Flowsheet Rows      Flowsheet Row First Filed Value   Admission Height 152.4 cm (60\") Documented at 07/29/2024 1015   Admission Weight 64.9 kg (143 lb 1.3 oz) Documented at 07/29/2024 1015            No intake/output data recorded.  I/O last 3 completed shifts:  In: 180 [P.O.:180]  Out: -     Objective:  General Appearance:  Comfortable.    Vital signs: (most recent): Blood pressure 116/60, pulse 60, temperature 97.3 °F (36.3 °C), temperature source Oral, resp. rate 16, height 152.4 cm (60\"), weight 64.8 kg (142 lb 13.7 oz), SpO2 96%.  Vital signs are normal.    HEENT: Normal HEENT exam.    Lungs:  Normal effort and normal respiratory rate.    Heart: Normal rate.    Abdomen: Abdomen is soft.  Bowel sounds are normal.   There is no abdominal tenderness.     Extremities: Normal range of motion.    Pulses: Distal pulses are intact.    Neurological: Patient is alert and oriented to person, place and time.    Pupils:  Pupils are equal, round, and reactive to light.    Skin:  Warm and dry.                Results Review:     I reviewed the patient's new clinical results.  I reviewed the patient's new imaging results and agree with the interpretation.  I reviewed the patient's other test results and agree " "with the interpretation    WBC WBC   Date Value Ref Range Status   08/01/2024 2.63 (L) 3.40 - 10.80 10*3/mm3 Final   07/31/2024 3.19 (L) 3.40 - 10.80 10*3/mm3 Final   07/30/2024 2.84 (L) 3.40 - 10.80 10*3/mm3 Final   07/29/2024 3.75 3.40 - 10.80 10*3/mm3 Final      HGB Hemoglobin   Date Value Ref Range Status   08/01/2024 10.9 (L) 12.0 - 15.9 g/dL Final   07/31/2024 11.7 (L) 12.0 - 15.9 g/dL Final   07/30/2024 10.8 (L) 12.0 - 15.9 g/dL Final   07/29/2024 12.8 12.0 - 15.9 g/dL Final      HCT Hematocrit   Date Value Ref Range Status   08/01/2024 33.1 (L) 34.0 - 46.6 % Final   07/31/2024 36.1 34.0 - 46.6 % Final   07/30/2024 33.8 (L) 34.0 - 46.6 % Final   07/29/2024 38.8 34.0 - 46.6 % Final      Platlets No results found for: \"LABPLAT\"   MCV MCV   Date Value Ref Range Status   08/01/2024 83.4 79.0 - 97.0 fL Final   07/31/2024 84.7 79.0 - 97.0 fL Final   07/30/2024 85.8 79.0 - 97.0 fL Final   07/29/2024 85.3 79.0 - 97.0 fL Final          Sodium Sodium   Date Value Ref Range Status   08/01/2024 126 (L) 136 - 145 mmol/L Final   07/31/2024 126 (L) 136 - 145 mmol/L Final   07/30/2024 123 (L) 136 - 145 mmol/L Final   07/30/2024 128 (L) 136 - 145 mmol/L Final   07/29/2024 126 (L) 136 - 145 mmol/L Final   07/29/2024 126 (L) 136 - 145 mmol/L Final   07/29/2024 123 (L) 136 - 145 mmol/L Final      Potassium Potassium   Date Value Ref Range Status   08/01/2024 4.2 3.5 - 5.2 mmol/L Final   07/31/2024 4.2 3.5 - 5.2 mmol/L Final   07/30/2024 4.8 3.5 - 5.2 mmol/L Final   07/29/2024 4.2 3.5 - 5.2 mmol/L Final   07/29/2024 4.2 3.5 - 5.2 mmol/L Final      Chloride Chloride   Date Value Ref Range Status   08/01/2024 93 (L) 98 - 107 mmol/L Final   07/31/2024 92 (L) 98 - 107 mmol/L Final   07/30/2024 96 (L) 98 - 107 mmol/L Final   07/29/2024 94 (L) 98 - 107 mmol/L Final   07/29/2024 89 (L) 98 - 107 mmol/L Final      CO2 CO2   Date Value Ref Range Status   08/01/2024 25.7 22.0 - 29.0 mmol/L Final   07/31/2024 24.3 22.0 - 29.0 mmol/L Final " "  07/30/2024 23.8 22.0 - 29.0 mmol/L Final   07/29/2024 23.0 22.0 - 29.0 mmol/L Final   07/29/2024 22.0 22.0 - 29.0 mmol/L Final      BUN BUN   Date Value Ref Range Status   08/01/2024 7 (L) 8 - 23 mg/dL Final   07/31/2024 7 (L) 8 - 23 mg/dL Final   07/30/2024 7 (L) 8 - 23 mg/dL Final   07/29/2024 5 (L) 8 - 23 mg/dL Final   07/29/2024 6 (L) 8 - 23 mg/dL Final      Creatinine Creatinine   Date Value Ref Range Status   08/01/2024 0.81 0.57 - 1.00 mg/dL Final   07/31/2024 0.78 0.57 - 1.00 mg/dL Final   07/30/2024 0.77 0.57 - 1.00 mg/dL Final   07/29/2024 0.73 0.57 - 1.00 mg/dL Final   07/29/2024 0.92 0.57 - 1.00 mg/dL Final      Calcium Calcium   Date Value Ref Range Status   08/01/2024 8.9 8.6 - 10.5 mg/dL Final   07/31/2024 9.2 8.6 - 10.5 mg/dL Final   07/30/2024 8.7 8.6 - 10.5 mg/dL Final   07/29/2024 8.6 8.6 - 10.5 mg/dL Final   07/29/2024 9.2 8.6 - 10.5 mg/dL Final      PO4 No results found for: \"CAPO4\"   Albumin Albumin   Date Value Ref Range Status   08/01/2024 3.2 (L) 3.5 - 5.2 g/dL Final   07/31/2024 3.4 (L) 3.5 - 5.2 g/dL Final   07/30/2024 3.2 (L) 3.5 - 5.2 g/dL Final   07/29/2024 3.5 3.5 - 5.2 g/dL Final      Magnesium Magnesium   Date Value Ref Range Status   08/01/2024 1.8 1.6 - 2.4 mg/dL Final   07/31/2024 1.9 1.6 - 2.4 mg/dL Final   07/30/2024 1.9 1.6 - 2.4 mg/dL Final      Uric Acid No results found for: \"URICACID\"     Medication Review:   acetylcysteine, 1,200 mg, Oral, Q12H  cetirizine, 10 mg, Oral, Daily  cholecalciferol, 1,000 Units, Oral, Daily  dicyclomine, 20 mg, Oral, 4x Daily  fluticasone, 2 spray, Each Nare, Daily  melatonin, 5 mg, Oral, Nightly  pantoprazole, 40 mg, Oral, Q AM  polyethylene glycol, 17 g, Oral, BID  sodium chloride, 10 mL, Intravenous, Q12H  vitamin B-12, 1,000 mcg, Oral, Daily          Assessment & Plan       Liver enzyme elevation    Elevated LFTs    Hyponatremia    Abnormal liver enzymes      Assessment & Plan  Hyponatremia-  sodium trending up to 126 now.  On 1.5L/day " fluid restriction, will reduce to 1L/day since about same last few checks.  Will add urea and monitor.  Urine sodium of 124, urine osm of 564.  Appears SIADH.  Continue fluid restriction.  Would avoid IVF's with urine osm of 564.     Abd Pain- CT with no acut problems.  Elevated LFT's with GI consulted.  Possible autoimmune hepatitis.  On NAC.     HTN-  controlled off meds now.    Lico Correia MD  08/01/24  08:58 EDT

## 2024-08-02 ENCOUNTER — APPOINTMENT (OUTPATIENT)
Dept: CT IMAGING | Facility: HOSPITAL | Age: 74
DRG: 442 | End: 2024-08-02
Payer: MEDICARE

## 2024-08-02 LAB
5NT SERPL-CCNC: 8 IU/L (ref 0–13)
ALBUMIN SERPL-MCNC: 3.3 G/DL (ref 3.5–5.2)
ALBUMIN/GLOB SERPL: 0.8 G/DL
ALP SERPL-CCNC: 151 U/L (ref 39–117)
ALT SERPL W P-5'-P-CCNC: 337 U/L (ref 1–33)
ANION GAP SERPL CALCULATED.3IONS-SCNC: 8.8 MMOL/L (ref 5–15)
AST SERPL-CCNC: 434 U/L (ref 1–32)
BASOPHILS # BLD AUTO: 0.03 10*3/MM3 (ref 0–0.2)
BASOPHILS NFR BLD AUTO: 1 % (ref 0–1.5)
BILIRUB SERPL-MCNC: 1.1 MG/DL (ref 0–1.2)
BUN SERPL-MCNC: 28 MG/DL (ref 8–23)
BUN/CREAT SERPL: 31.1 (ref 7–25)
CALCIUM SPEC-SCNC: 8.9 MG/DL (ref 8.6–10.5)
CHLORIDE SERPL-SCNC: 95 MMOL/L (ref 98–107)
CO2 SERPL-SCNC: 24.2 MMOL/L (ref 22–29)
CREAT SERPL-MCNC: 0.9 MG/DL (ref 0.57–1)
DEPRECATED RDW RBC AUTO: 46.7 FL (ref 37–54)
EGFRCR SERPLBLD CKD-EPI 2021: 68.1 ML/MIN/1.73
EOSINOPHIL # BLD AUTO: 0.04 10*3/MM3 (ref 0–0.4)
EOSINOPHIL NFR BLD AUTO: 1.4 % (ref 0.3–6.2)
ERYTHROCYTE [DISTWIDTH] IN BLOOD BY AUTOMATED COUNT: 15.3 % (ref 12.3–15.4)
GLOBULIN UR ELPH-MCNC: 4 GM/DL
GLUCOSE SERPL-MCNC: 113 MG/DL (ref 65–99)
HCT VFR BLD AUTO: 33.3 % (ref 34–46.6)
HGB BLD-MCNC: 11.1 G/DL (ref 12–15.9)
IMM GRANULOCYTES # BLD AUTO: 0 10*3/MM3 (ref 0–0.05)
IMM GRANULOCYTES NFR BLD AUTO: 0 % (ref 0–0.5)
LYMPHOCYTES # BLD AUTO: 0.98 10*3/MM3 (ref 0.7–3.1)
LYMPHOCYTES NFR BLD AUTO: 34 % (ref 19.6–45.3)
MCH RBC QN AUTO: 28.1 PG (ref 26.6–33)
MCHC RBC AUTO-ENTMCNC: 33.3 G/DL (ref 31.5–35.7)
MCV RBC AUTO: 84.3 FL (ref 79–97)
MONOCYTES # BLD AUTO: 0.39 10*3/MM3 (ref 0.1–0.9)
MONOCYTES NFR BLD AUTO: 13.5 % (ref 5–12)
NEUTROPHILS NFR BLD AUTO: 1.44 10*3/MM3 (ref 1.7–7)
NEUTROPHILS NFR BLD AUTO: 50.1 % (ref 42.7–76)
NRBC BLD AUTO-RTO: 0 /100 WBC (ref 0–0.2)
PLATELET # BLD AUTO: 196 10*3/MM3 (ref 140–450)
PMV BLD AUTO: 9.2 FL (ref 6–12)
POTASSIUM SERPL-SCNC: 4.3 MMOL/L (ref 3.5–5.2)
PROT SERPL-MCNC: 7.3 G/DL (ref 6–8.5)
RBC # BLD AUTO: 3.95 10*6/MM3 (ref 3.77–5.28)
SODIUM SERPL-SCNC: 128 MMOL/L (ref 136–145)
WBC NRBC COR # BLD AUTO: 2.88 10*3/MM3 (ref 3.4–10.8)
WHOLE BLOOD HOLD SPECIMEN: NORMAL

## 2024-08-02 PROCEDURE — 77012 CT SCAN FOR NEEDLE BIOPSY: CPT

## 2024-08-02 PROCEDURE — 99232 SBSQ HOSP IP/OBS MODERATE 35: CPT | Performed by: INTERNAL MEDICINE

## 2024-08-02 PROCEDURE — 88307 TISSUE EXAM BY PATHOLOGIST: CPT | Performed by: INTERNAL MEDICINE

## 2024-08-02 PROCEDURE — 88313 SPECIAL STAINS GROUP 2: CPT | Performed by: INTERNAL MEDICINE

## 2024-08-02 PROCEDURE — 25010000002 MIDAZOLAM PER 1MG: Performed by: RADIOLOGY

## 2024-08-02 PROCEDURE — 25010000002 FENTANYL CITRATE (PF) 50 MCG/ML SOLUTION: Performed by: RADIOLOGY

## 2024-08-02 PROCEDURE — 0FB03ZX EXCISION OF LIVER, PERCUTANEOUS APPROACH, DIAGNOSTIC: ICD-10-PCS | Performed by: RADIOLOGY

## 2024-08-02 PROCEDURE — 85025 COMPLETE CBC W/AUTO DIFF WBC: CPT | Performed by: INTERNAL MEDICINE

## 2024-08-02 PROCEDURE — 88312 SPECIAL STAINS GROUP 1: CPT | Performed by: INTERNAL MEDICINE

## 2024-08-02 PROCEDURE — 80053 COMPREHEN METABOLIC PANEL: CPT | Performed by: INTERNAL MEDICINE

## 2024-08-02 RX ORDER — FENTANYL CITRATE 50 UG/ML
INJECTION, SOLUTION INTRAMUSCULAR; INTRAVENOUS AS NEEDED
Status: COMPLETED | OUTPATIENT
Start: 2024-08-02 | End: 2024-08-02

## 2024-08-02 RX ORDER — MIDAZOLAM HYDROCHLORIDE 2 MG/2ML
INJECTION, SOLUTION INTRAMUSCULAR; INTRAVENOUS AS NEEDED
Status: COMPLETED | OUTPATIENT
Start: 2024-08-02 | End: 2024-08-02

## 2024-08-02 RX ORDER — LIDOCAINE HYDROCHLORIDE 20 MG/ML
INJECTION, SOLUTION INFILTRATION; PERINEURAL
Status: DISPENSED
Start: 2024-08-02 | End: 2024-08-02

## 2024-08-02 RX ORDER — UREA 10 %
500 LOTION (ML) TOPICAL DAILY
Status: DISCONTINUED | OUTPATIENT
Start: 2024-08-03 | End: 2024-08-05 | Stop reason: HOSPADM

## 2024-08-02 RX ADMIN — Medication 10 ML: at 21:58

## 2024-08-02 RX ADMIN — Medication 10 ML: at 08:43

## 2024-08-02 RX ADMIN — Medication 15 G: at 21:58

## 2024-08-02 RX ADMIN — Medication 5 MG: at 21:57

## 2024-08-02 RX ADMIN — ACETAMINOPHEN 650 MG: 325 TABLET ORAL at 11:01

## 2024-08-02 RX ADMIN — DICYCLOMINE HYDROCHLORIDE 20 MG: 10 CAPSULE ORAL at 21:57

## 2024-08-02 RX ADMIN — ACETYLCYSTEINE 1200 MG: 200 SOLUTION ORAL; RESPIRATORY (INHALATION) at 11:01

## 2024-08-02 RX ADMIN — ACETAMINOPHEN 650 MG: 325 TABLET ORAL at 21:57

## 2024-08-02 RX ADMIN — FLUTICASONE PROPIONATE 2 SPRAY: 50 SPRAY, METERED NASAL at 11:02

## 2024-08-02 RX ADMIN — DICYCLOMINE HYDROCHLORIDE 20 MG: 10 CAPSULE ORAL at 17:47

## 2024-08-02 RX ADMIN — Medication 15 G: at 11:01

## 2024-08-02 RX ADMIN — CETIRIZINE HYDROCHLORIDE 10 MG: 10 TABLET, FILM COATED ORAL at 11:02

## 2024-08-02 RX ADMIN — FENTANYL CITRATE 25 MCG: 50 INJECTION, SOLUTION INTRAMUSCULAR; INTRAVENOUS at 09:43

## 2024-08-02 RX ADMIN — Medication 1000 UNITS: at 11:02

## 2024-08-02 RX ADMIN — ACETYLCYSTEINE 1200 MG: 200 SOLUTION ORAL; RESPIRATORY (INHALATION) at 21:58

## 2024-08-02 RX ADMIN — HYDROXYZINE HYDROCHLORIDE 25 MG: 25 TABLET ORAL at 21:57

## 2024-08-02 RX ADMIN — MIDAZOLAM HYDROCHLORIDE 1 MG: 1 INJECTION, SOLUTION INTRAMUSCULAR; INTRAVENOUS at 09:52

## 2024-08-02 RX ADMIN — DICYCLOMINE HYDROCHLORIDE 20 MG: 10 CAPSULE ORAL at 11:02

## 2024-08-02 NOTE — PROGRESS NOTES
[facility]   Hospitalist Progress Note    Date of admission: 7/29/2024  Patient Name: Qiana Altman  1950  Date: 8/2/2024      Subjective     Chief Complaint   Patient presents with    Abdominal Pain    Nausea    Constipation       Interval Followup: Seen following biopsy, denies any pain from this states was a lot easier than she expected.  No nausea or vomiting.      Objective     Vitals:   Temp:  [97.4 °F (36.3 °C)-98.5 °F (36.9 °C)] 97.6 °F (36.4 °C)  Heart Rate:  [58-71] 66  Resp:  [16-20] 16  BP: ()/(47-64) 118/53    Physical Exam  Awake conversant  CTAB no wheezing  RRR no lower extremity pitting edema  Abdomen nontender no guarding    Result Review:  Vital signs, labs and recent relevant imaging reviewed.      CBC          7/31/2024    04:49 8/1/2024    05:34 8/1/2024    14:38 8/2/2024    05:29   CBC   WBC 3.19  2.63  2.65  2.88    RBC 4.26  3.97  4.15  3.95    Hemoglobin 11.7  10.9  11.6  11.1    Hematocrit 36.1  33.1  35.0  33.3    MCV 84.7  83.4  84.3  84.3    MCH 27.5  27.5  28.0  28.1    MCHC 32.4  32.9  33.1  33.3    RDW 15.2  14.9  15.2  15.3    Platelets 192  158  167  196      CMP          7/31/2024    04:49 8/1/2024    05:34 8/2/2024    05:29   CMP   Glucose 94  100  113    BUN 7  7  28    Creatinine 0.78  0.81  0.90    EGFR 80.8  77.2  68.1    Sodium 126  126  128    Potassium 4.2  4.2  4.3    Chloride 92  93  95    Calcium 9.2  8.9  8.9    Total Protein 8.0  7.4  7.3    Albumin 3.4  3.2  3.3    Globulin 4.6  4.2  4.0    Total Bilirubin 1.6  1.6  1.1    Alkaline Phosphatase 118  112  151    AST (SGOT) 457  442  434    ALT (SGPT) 337  329  337    Albumin/Globulin Ratio 0.7  0.8  0.8    BUN/Creatinine Ratio 9.0  8.6  31.1    Anion Gap 9.7  7.3  8.8          acetaminophen    aluminum-magnesium hydroxide-simethicone    [DISCONTINUED] senna-docusate sodium **AND** [DISCONTINUED] polyethylene glycol **AND** [DISCONTINUED] bisacodyl **AND** bisacodyl    Diclofenac Sodium     hydrOXYzine    lidocaine    Lidocaine    melatonin    ondansetron    prochlorperazine    sodium chloride    sodium chloride    sodium chloride    acetylcysteine, 1,200 mg, Oral, Q12H  cetirizine, 10 mg, Oral, Daily  cholecalciferol, 1,000 Units, Oral, Daily  dicyclomine, 20 mg, Oral, 4x Daily  fluticasone, 2 spray, Each Nare, Daily  lidocaine, , ,   melatonin, 5 mg, Oral, Nightly  pantoprazole, 40 mg, Oral, Q AM  Psyllium, 1 packet, Oral, BID AC  sodium chloride, 10 mL, Intravenous, Q12H  Urea, 15 g, Oral, BID  [START ON 8/3/2024] vitamin B-12, 500 mcg, Oral, Daily        CT Needle Biopsy Liver    Result Date: 8/2/2024  Impression: 1.Successful CT-guided percutaneous liver biopsy without evidence of complication. Electronically Signed: Guillaume Santos MD  8/2/2024 11:13 AM EDT  Workstation ID: [id number]    US Liver    Result Date: 7/30/2024  Impression: No evidence of hepatosplenomegaly. Negative exam. Electronically Signed: Summer Daillo MD  7/30/2024 7:59 AM EDT  Workstation ID: [id number]    CT Abdomen Pelvis With Contrast    Result Date: 7/29/2024  Impression: 1. Hepatic steatosis. 2. Mild hepatosplenomegaly. There may be small gastroesophageal varices. I would recommend clinical correlation with regard to signs and symptoms of portal hypertension. 3. Status post a right hemicolectomy. 4. Calcified uterine fibroid. 5. Suggestion of a small hiatal hernia. 6. Mild dependent atelectasis in the lung bases. Electronically Signed: Kevin Ortega MD  7/29/2024 12:30 PM EDT  Workstation ID: [id number]     Assessment / Plan     Summary: 74 y.o. with history of inflammatory bowel disease, GERD, diverticulosis who presented with abdominal pain nausea and vomiting for approximately 2 days prior to admission, initial imaging showing hepatic steatosis prior colectomy, initial LFTs elevated, GI consulted treated with NAC and additional autoimmune workup sent    Assessment/Plan (clinically significant if listed here)  Concern for  autoimmune hepatitis  Elevated liver function test with hepatosplenomegaly  Abdominal pain/nausea/vomiting in setting of above  Hyponatremia, suspect SIADH  DM2  History of Graves' disease  History of cholecystectomy and right hemicolectomy    AST ALT similar to yesterday, T. bili within normal limits, still with leukopenia white count slightly better today,   IR liver biopsy today to confirm diagnosis of suspected autoimmune hepatitis, deferring initiation of steroids at this time, depending on continued trend may warrant empiric treatment while pending results of biopsy follow-up repeat LFTs in a.m.  Continue NAC  GI recommendations reviewed, additional studies sent appreciate assistance  Liver US: negative/no hsm/patent flow, prior choly. Acute hep panel negative  Still with leukopenia, variable, suspect in setting of above, monitor  Fluid restriction decreased down to 1 L today, continue on urea, sodium seems to be plateauing near 126, appreciate nephrology assistance  Avoiding IV fluids continue with fluid restriction, sodium slightly better on trend, urea,  Appreciate nephrology assistance  Tsh and cortisol wnl, cont ur sodium trend   Holding lisinopril, monitor bp, blood pressure stable without this likely can avoid   Cont ppi  Hx of dm, A1c 5.9, no acute medications needed  PT/OT  Check a.m. CBC, BMP, magnesium, phosphorus, lfts    Continue hospitalization at current level of care    Dispo: likely home once medically stable.   Will discuss plan of care/dispo with clinical .     VTE Prophylaxis:  Mechanical VTE prophylaxis orders are present.

## 2024-08-02 NOTE — PLAN OF CARE
Goal Outcome Evaluation:  Plan of Care Reviewed With: patient        Progress: no change  Outcome Evaluation: Patient had no c/o pain or discomfort, patient NPO since midnight for biopsy.

## 2024-08-02 NOTE — PLAN OF CARE
Goal Outcome Evaluation:  Plan of Care Reviewed With: patient        Progress: improving  Outcome Evaluation: Patient treated for pain once during shift. Patient dressing from biopsy dry and intact with no signs of bleeding.

## 2024-08-02 NOTE — H&P
[facility]   Interventional Radiology H&P    Patient Name: Qiana Altman  : 1950  MRN: [id number]  Primary Care Physician:  Zohreh Mcduffie APRN  Referring Physician: No ref. provider found  Date of admission: 2024    Subjective   Subjective     HPI:  Qiana Altman is a 74 y.o. female eleavted lfts    Review of Systems:   Constitutional no fever,  no weight loss       Otolaryngeal no difficulty swallowing   Cardiovascular no chest pain   Pulmonary no cough, no sputum production   Gastrointestinal no constipation, no diarrhea                         Personal History       Past Medical/Surgical History:   Past Medical History:   Diagnosis Date    Allergic rhinitis due to allergen 09/10/2019    Anemia 09/10/2019    Anxiety disorder 2020    B12 deficiency 2020    Callus     Cataract     COVID-19 2022    Depression     Diverticulosis     Essential hypertension 2019    Family history of myocardial infarction 2023    GERD (gastroesophageal reflux disease) 2019    Graves' disease     HLD (hyperlipidemia) 2019    Hyperthyroidism     Hypothyroidism     Inflammatory bowel disease     Major depressive disorder 2019    Osteoarthritis 2020    Osteopenia     Peptic ulceration     T2DM (type 2 diabetes mellitus) 2019    Tuberculosis     Took meds will never need to be tested again    Vitamin D deficiency 2019     Past Surgical History:   Procedure Laterality Date    APPENDECTOMY       SECTION      CHOLECYSTECTOMY  2016    DR SCALES    COLON SURGERY      COLONOSCOPY      HIP FRACTURE SURGERY Left     REPAIR    HIP SURGERY      OOPHORECTOMY      SMALL INTESTINE SURGERY      TUBAL ABDOMINAL LIGATION      UPPER GASTROINTESTINAL ENDOSCOPY         Social History:  reports that she quit smoking about 39 years ago. Her smoking use included cigarettes. She started smoking about 44 years ago. She has a 5 pack-year smoking history.  She has been exposed to tobacco smoke. She has never used smokeless tobacco. She reports that she does not drink alcohol and does not use drugs.    Medications:  Medications Prior to Admission   Medication Sig Dispense Refill Last Dose    acetaminophen (TYLENOL) 325 MG tablet Take 2 tablets by mouth Every 6 (Six) Hours As Needed for Mild Pain.       celecoxib (CeleBREX) 100 MG capsule Take 1 capsule by mouth 2 (Two) Times a Day. 180 capsule 1 Past Week    cetirizine (zyrTEC) 10 MG tablet Take 1 tablet by mouth Daily. 90 tablet 1 Past Week    Cholecalciferol 25 MCG (1000 UT) tablet Take 1 tablet by mouth Daily.   Past Week    esomeprazole (nexIUM) 40 MG capsule TAKE ONE CAPSULE BY MOUTH ONCE DAILY 90 capsule 1 7/28/2024    fluticasone (FLONASE) 50 MCG/ACT nasal spray 2 sprays by Each Nare route Daily. 16 g 2     lisinopril (PRINIVIL,ZESTRIL) 10 MG tablet TAKE ONE TABLET BY MOUTH EVERY NIGHT 90 tablet 1 7/28/2024    vitamin B-12 (CYANOCOBALAMIN) 1000 MCG tablet Take 1 tablet by mouth Daily.   Past Week     Current medications:  acetylcysteine, 1,200 mg, Oral, Q12H  cetirizine, 10 mg, Oral, Daily  cholecalciferol, 1,000 Units, Oral, Daily  dicyclomine, 20 mg, Oral, 4x Daily  fluticasone, 2 spray, Each Nare, Daily  melatonin, 5 mg, Oral, Nightly  pantoprazole, 40 mg, Oral, Q AM  Psyllium, 1 packet, Oral, BID AC  sodium chloride, 10 mL, Intravenous, Q12H  Urea, 15 g, Oral, BID  vitamin B-12, 1,000 mcg, Oral, Daily      Current IV drips:       Allergies:  Allergies   Allergen Reactions    Shellfish Allergy Anaphylaxis    Moxifloxacin Hcl Unknown - Low Severity    Nitrous Oxide Other (See Comments)    Scopolamine Swelling    Sulfa Antibiotics Unknown - Low Severity    Morphine Rash    Penicillins Rash       Objective    Objective     Vitals:   Temp:  [97.3 °F (36.3 °C)-98.5 °F (36.9 °C)] 98.5 °F (36.9 °C)  Heart Rate:  [58-71] 69  Resp:  [16-20] 20  BP: (106-119)/(51-60) 119/60      Physical Exam:   Constitutional: Awake,  "alert, No acute distress    Respiratory: Clear to auscultation bilaterally, nonlabored respirations    Cardiovascular: RRR, no murmurs, rubs, or gallops, palpable pedal pulses bilaterally   Gastrointestinal: Positive bowel sounds, soft, nontender, nondistended        ASA SCALE ASSESSMENT:  2-Mild to moderate systemic disease, medically well controlled, with no functional limitation    MALLAMPATI CLASSIFICATION:  3-Only the soft palate and base of uvula are visible       Result Review        Result Review:     Sodium   Date Value Ref Range Status   08/02/2024 128 (L) 136 - 145 mmol/L Final   08/01/2024 126 (L) 136 - 145 mmol/L Final   07/31/2024 126 (L) 136 - 145 mmol/L Final   07/30/2024 123 (L) 136 - 145 mmol/L Final       Potassium   Date Value Ref Range Status   08/02/2024 4.3 3.5 - 5.2 mmol/L Final   08/01/2024 4.2 3.5 - 5.2 mmol/L Final   07/31/2024 4.2 3.5 - 5.2 mmol/L Final       Chloride   Date Value Ref Range Status   08/02/2024 95 (L) 98 - 107 mmol/L Final   08/01/2024 93 (L) 98 - 107 mmol/L Final   07/31/2024 92 (L) 98 - 107 mmol/L Final       No results found for: \"PLASMABICARB\"    BUN   Date Value Ref Range Status   08/02/2024 28 (H) 8 - 23 mg/dL Final   08/01/2024 7 (L) 8 - 23 mg/dL Final   07/31/2024 7 (L) 8 - 23 mg/dL Final       Creatinine   Date Value Ref Range Status   08/02/2024 0.90 0.57 - 1.00 mg/dL Final   08/01/2024 0.81 0.57 - 1.00 mg/dL Final   07/31/2024 0.78 0.57 - 1.00 mg/dL Final       Calcium   Date Value Ref Range Status   08/02/2024 8.9 8.6 - 10.5 mg/dL Final   08/01/2024 8.9 8.6 - 10.5 mg/dL Final   07/31/2024 9.2 8.6 - 10.5 mg/dL Final           No components found for: \"GLUCOSE.*\"  Results from last 7 days   Lab Units 08/01/24  1438   WBC 10*3/mm3 2.65*   HEMOGLOBIN g/dL 11.6*   HEMATOCRIT % 35.0   PLATELETS 10*3/mm3 167      Results from last 7 days   Lab Units 08/01/24  1438   INR  1.18*           Assessment / Plan     Assesment:   Elevated lft      Plan:   Ct guided random " liver biopsy    The risks and benefits of the procedure were discussed with the patient.    Electronically signed by Guillaume Santos MD, 08/02/24, 9:27 AM EDT.

## 2024-08-02 NOTE — PROGRESS NOTES
" LOS: 4 days   Patient Care Team:  Zohreh Mcduffie APRN as PCP - General (Nurse Practitioner)  Anna Oquendo APRN as Nurse Practitioner (Nurse Practitioner)    Chief Complaint: Hyponatremia    Subjective     History of Present Illness  Pt without any acute complaints.    Subjective:  Symptoms:  No shortness of breath, chest pain, chest pressure or anxiety.        History taken from: patient chart RN    Objective     Vital Sign Min/Max for last 24 hours  Temp  Min: 97.3 °F (36.3 °C)  Max: 98.5 °F (36.9 °C)   BP  Min: 106/58  Max: 117/51   Pulse  Min: 58  Max: 71   Resp  Min: 16  Max: 18   SpO2  Min: 92 %  Max: 98 %   No data recorded   No data recorded     Flowsheet Rows      Flowsheet Row First Filed Value   Admission Height 152.4 cm (60\") Documented at 07/29/2024 1015   Admission Weight 64.9 kg (143 lb 1.3 oz) Documented at 07/29/2024 1015            No intake/output data recorded.  I/O last 3 completed shifts:  In: 970 [P.O.:970]  Out: -     Objective:  General Appearance:  Comfortable.    Vital signs: (most recent): Blood pressure 125/52, pulse 58, temperature 97.4 °F (36.3 °C), temperature source Oral, resp. rate 17, height 152.4 cm (60\"), weight 64.8 kg (142 lb 13.7 oz), SpO2 97%.  Vital signs are normal.    HEENT: Normal HEENT exam.    Lungs:  Normal effort and normal respiratory rate.    Heart: Normal rate.    Abdomen: Abdomen is soft.  Bowel sounds are normal.   There is no abdominal tenderness.     Extremities: Normal range of motion.    Pulses: Distal pulses are intact.    Neurological: Patient is alert and oriented to person, place and time.    Pupils:  Pupils are equal, round, and reactive to light.    Skin:  Warm and dry.                Results Review:     I reviewed the patient's new clinical results.  I reviewed the patient's new imaging results and agree with the interpretation.  I reviewed the patient's other test results and agree with the interpretation    WBC WBC   Date Value Ref " "Range Status   08/01/2024 2.65 (L) 3.40 - 10.80 10*3/mm3 Final   08/01/2024 2.63 (L) 3.40 - 10.80 10*3/mm3 Final   07/31/2024 3.19 (L) 3.40 - 10.80 10*3/mm3 Final      HGB Hemoglobin   Date Value Ref Range Status   08/01/2024 11.6 (L) 12.0 - 15.9 g/dL Final   08/01/2024 10.9 (L) 12.0 - 15.9 g/dL Final   07/31/2024 11.7 (L) 12.0 - 15.9 g/dL Final      HCT Hematocrit   Date Value Ref Range Status   08/01/2024 35.0 34.0 - 46.6 % Final   08/01/2024 33.1 (L) 34.0 - 46.6 % Final   07/31/2024 36.1 34.0 - 46.6 % Final      Platlets No results found for: \"LABPLAT\"   MCV MCV   Date Value Ref Range Status   08/01/2024 84.3 79.0 - 97.0 fL Final   08/01/2024 83.4 79.0 - 97.0 fL Final   07/31/2024 84.7 79.0 - 97.0 fL Final          Sodium Sodium   Date Value Ref Range Status   08/02/2024 128 (L) 136 - 145 mmol/L Final   08/01/2024 126 (L) 136 - 145 mmol/L Final   07/31/2024 126 (L) 136 - 145 mmol/L Final   07/30/2024 123 (L) 136 - 145 mmol/L Final      Potassium Potassium   Date Value Ref Range Status   08/02/2024 4.3 3.5 - 5.2 mmol/L Final   08/01/2024 4.2 3.5 - 5.2 mmol/L Final   07/31/2024 4.2 3.5 - 5.2 mmol/L Final      Chloride Chloride   Date Value Ref Range Status   08/02/2024 95 (L) 98 - 107 mmol/L Final   08/01/2024 93 (L) 98 - 107 mmol/L Final   07/31/2024 92 (L) 98 - 107 mmol/L Final      CO2 CO2   Date Value Ref Range Status   08/02/2024 24.2 22.0 - 29.0 mmol/L Final   08/01/2024 25.7 22.0 - 29.0 mmol/L Final   07/31/2024 24.3 22.0 - 29.0 mmol/L Final      BUN BUN   Date Value Ref Range Status   08/02/2024 28 (H) 8 - 23 mg/dL Final   08/01/2024 7 (L) 8 - 23 mg/dL Final   07/31/2024 7 (L) 8 - 23 mg/dL Final      Creatinine Creatinine   Date Value Ref Range Status   08/02/2024 0.90 0.57 - 1.00 mg/dL Final   08/01/2024 0.81 0.57 - 1.00 mg/dL Final   07/31/2024 0.78 0.57 - 1.00 mg/dL Final      Calcium Calcium   Date Value Ref Range Status   08/02/2024 8.9 8.6 - 10.5 mg/dL Final   08/01/2024 8.9 8.6 - 10.5 mg/dL Final " "  07/31/2024 9.2 8.6 - 10.5 mg/dL Final      PO4 No results found for: \"CAPO4\"   Albumin Albumin   Date Value Ref Range Status   08/02/2024 3.3 (L) 3.5 - 5.2 g/dL Final   08/01/2024 3.2 (L) 3.5 - 5.2 g/dL Final   07/31/2024 3.4 (L) 3.5 - 5.2 g/dL Final      Magnesium Magnesium   Date Value Ref Range Status   08/01/2024 1.8 1.6 - 2.4 mg/dL Final   07/31/2024 1.9 1.6 - 2.4 mg/dL Final      Uric Acid No results found for: \"URICACID\"     Medication Review:   acetylcysteine, 1,200 mg, Oral, Q12H  cetirizine, 10 mg, Oral, Daily  cholecalciferol, 1,000 Units, Oral, Daily  dicyclomine, 20 mg, Oral, 4x Daily  fluticasone, 2 spray, Each Nare, Daily  melatonin, 5 mg, Oral, Nightly  pantoprazole, 40 mg, Oral, Q AM  Psyllium, 1 packet, Oral, BID AC  sodium chloride, 10 mL, Intravenous, Q12H  Urea, 15 g, Oral, BID  vitamin B-12, 1,000 mcg, Oral, Daily          Assessment & Plan       Liver enzyme elevation    Elevated LFTs    Hyponatremia    Abnormal liver enzymes      Assessment & Plan  Hyponatremia-  sodium trending up to 126->128 now.  On 1L/day fluid restriction and urea.  Urine sodium of 124, urine osm of 564.  Appears SIADH.  Continue fluid restriction.  Would avoid IVF's with urine osm of 564.     Abd Pain- CT with no acut problems.  Elevated LFT's with GI consulted.  Possible autoimmune hepatitis.  On NAC.     HTN-  controlled off meds now.    Lico Correia MD  08/02/24  08:27 EDT          "

## 2024-08-03 LAB
ALBUMIN SERPL-MCNC: 3.3 G/DL (ref 3.5–5.2)
ALBUMIN/GLOB SERPL: 0.8 G/DL
ALP SERPL-CCNC: 130 U/L (ref 39–117)
ALT SERPL W P-5'-P-CCNC: 351 U/L (ref 1–33)
ANION GAP SERPL CALCULATED.3IONS-SCNC: 8.8 MMOL/L (ref 5–15)
AST SERPL-CCNC: 418 U/L (ref 1–32)
BACTERIA SPEC AEROBE CULT: NORMAL
BACTERIA SPEC AEROBE CULT: NORMAL
BASOPHILS # BLD AUTO: 0.04 10*3/MM3 (ref 0–0.2)
BASOPHILS NFR BLD AUTO: 1.5 % (ref 0–1.5)
BILIRUB SERPL-MCNC: 1.2 MG/DL (ref 0–1.2)
BUN SERPL-MCNC: 35 MG/DL (ref 8–23)
BUN/CREAT SERPL: 46.7 (ref 7–25)
CALCIUM SPEC-SCNC: 9.3 MG/DL (ref 8.6–10.5)
CHLORIDE SERPL-SCNC: 96 MMOL/L (ref 98–107)
CO2 SERPL-SCNC: 23.2 MMOL/L (ref 22–29)
CREAT SERPL-MCNC: 0.75 MG/DL (ref 0.57–1)
DEPRECATED RDW RBC AUTO: 48.4 FL (ref 37–54)
EGFRCR SERPLBLD CKD-EPI 2021: 84.7 ML/MIN/1.73
EOSINOPHIL # BLD AUTO: 0.05 10*3/MM3 (ref 0–0.4)
EOSINOPHIL NFR BLD AUTO: 1.8 % (ref 0.3–6.2)
ERYTHROCYTE [DISTWIDTH] IN BLOOD BY AUTOMATED COUNT: 15.5 % (ref 12.3–15.4)
GLOBULIN UR ELPH-MCNC: 4.1 GM/DL
GLUCOSE SERPL-MCNC: 102 MG/DL (ref 65–99)
HCT VFR BLD AUTO: 35.1 % (ref 34–46.6)
HGB BLD-MCNC: 11.5 G/DL (ref 12–15.9)
IMM GRANULOCYTES # BLD AUTO: 0.02 10*3/MM3 (ref 0–0.05)
IMM GRANULOCYTES NFR BLD AUTO: 0.7 % (ref 0–0.5)
LYMPHOCYTES # BLD AUTO: 1.01 10*3/MM3 (ref 0.7–3.1)
LYMPHOCYTES NFR BLD AUTO: 36.9 % (ref 19.6–45.3)
MCH RBC QN AUTO: 28 PG (ref 26.6–33)
MCHC RBC AUTO-ENTMCNC: 32.8 G/DL (ref 31.5–35.7)
MCV RBC AUTO: 85.4 FL (ref 79–97)
MITOCHONDRIA M2 IGG SER-ACNC: <20 UNITS (ref 0–20)
MONOCYTES # BLD AUTO: 0.36 10*3/MM3 (ref 0.1–0.9)
MONOCYTES NFR BLD AUTO: 13.1 % (ref 5–12)
NEUTROPHILS NFR BLD AUTO: 1.26 10*3/MM3 (ref 1.7–7)
NEUTROPHILS NFR BLD AUTO: 46 % (ref 42.7–76)
NRBC BLD AUTO-RTO: 0 /100 WBC (ref 0–0.2)
PLATELET # BLD AUTO: 176 10*3/MM3 (ref 140–450)
PMV BLD AUTO: 8.9 FL (ref 6–12)
POTASSIUM SERPL-SCNC: 4.1 MMOL/L (ref 3.5–5.2)
PROT SERPL-MCNC: 7.4 G/DL (ref 6–8.5)
RBC # BLD AUTO: 4.11 10*6/MM3 (ref 3.77–5.28)
SODIUM SERPL-SCNC: 128 MMOL/L (ref 136–145)
WBC NRBC COR # BLD AUTO: 2.74 10*3/MM3 (ref 3.4–10.8)

## 2024-08-03 PROCEDURE — 80053 COMPREHEN METABOLIC PANEL: CPT | Performed by: INTERNAL MEDICINE

## 2024-08-03 PROCEDURE — 85025 COMPLETE CBC W/AUTO DIFF WBC: CPT | Performed by: INTERNAL MEDICINE

## 2024-08-03 PROCEDURE — 99232 SBSQ HOSP IP/OBS MODERATE 35: CPT | Performed by: INTERNAL MEDICINE

## 2024-08-03 RX ORDER — FUROSEMIDE 20 MG/1
20 TABLET ORAL
Status: DISCONTINUED | OUTPATIENT
Start: 2024-08-03 | End: 2024-08-05 | Stop reason: HOSPADM

## 2024-08-03 RX ORDER — SODIUM CHLORIDE 1 G/1
1 TABLET ORAL 2 TIMES DAILY WITH MEALS
Status: DISCONTINUED | OUTPATIENT
Start: 2024-08-03 | End: 2024-08-05 | Stop reason: HOSPADM

## 2024-08-03 RX ORDER — BISACODYL 5 MG/1
10 TABLET, DELAYED RELEASE ORAL DAILY PRN
Status: DISCONTINUED | OUTPATIENT
Start: 2024-08-03 | End: 2024-08-05 | Stop reason: HOSPADM

## 2024-08-03 RX ADMIN — SODIUM CHLORIDE TAB 1 GM 1 G: 1 TAB at 17:13

## 2024-08-03 RX ADMIN — Medication 10 ML: at 20:02

## 2024-08-03 RX ADMIN — DICYCLOMINE HYDROCHLORIDE 20 MG: 10 CAPSULE ORAL at 20:02

## 2024-08-03 RX ADMIN — FLUTICASONE PROPIONATE 2 SPRAY: 50 SPRAY, METERED NASAL at 10:14

## 2024-08-03 RX ADMIN — DICYCLOMINE HYDROCHLORIDE 20 MG: 10 CAPSULE ORAL at 17:13

## 2024-08-03 RX ADMIN — CETIRIZINE HYDROCHLORIDE 10 MG: 10 TABLET, FILM COATED ORAL at 08:27

## 2024-08-03 RX ADMIN — Medication 15 G: at 10:13

## 2024-08-03 RX ADMIN — Medication 15 G: at 20:02

## 2024-08-03 RX ADMIN — BISACODYL 10 MG: 5 TABLET, COATED ORAL at 21:34

## 2024-08-03 RX ADMIN — DICYCLOMINE HYDROCHLORIDE 20 MG: 10 CAPSULE ORAL at 12:23

## 2024-08-03 RX ADMIN — DICYCLOMINE HYDROCHLORIDE 20 MG: 10 CAPSULE ORAL at 08:27

## 2024-08-03 RX ADMIN — PANTOPRAZOLE SODIUM 40 MG: 40 TABLET, DELAYED RELEASE ORAL at 06:58

## 2024-08-03 RX ADMIN — FUROSEMIDE 20 MG: 20 TABLET ORAL at 17:13

## 2024-08-03 RX ADMIN — ACETYLCYSTEINE 1200 MG: 200 SOLUTION ORAL; RESPIRATORY (INHALATION) at 10:13

## 2024-08-03 RX ADMIN — Medication 10 ML: at 08:35

## 2024-08-03 RX ADMIN — Medication 1000 UNITS: at 08:27

## 2024-08-03 RX ADMIN — Medication 5 MG: at 20:02

## 2024-08-03 RX ADMIN — CYANOCOBALAMIN TAB 500 MCG 500 MCG: 500 TAB at 08:27

## 2024-08-03 NOTE — PLAN OF CARE
Goal Outcome Evaluation:  Plan of Care Reviewed With: patient        Progress: no change  Outcome Evaluation: VSS, c/o of some discomfort on right side from liver biopsy, did not want pain medication. Patient removed dressing and area is clean, dry, and no sign of bleeding. Seems to have rested well during the shift. No acute events to report this shift. Continue plan of care.

## 2024-08-03 NOTE — PLAN OF CARE
Goal Outcome Evaluation:  Plan of Care Reviewed With: patient        Progress: no change  Outcome Evaluation: Patient c/o soreness to right side, pain med given, dressing to right side back C/D/I, no sign of bleeding.

## 2024-08-03 NOTE — PROGRESS NOTES
"Nephrology progress note    Asher Fournier MD     Current history:     Patient is alert, denies any chest pain shortness of breath nausea or vomiting currently    Current medication list:  cetirizine, 10 mg, Oral, Daily  cholecalciferol, 1,000 Units, Oral, Daily  dicyclomine, 20 mg, Oral, 4x Daily  fluticasone, 2 spray, Each Nare, Daily  melatonin, 5 mg, Oral, Nightly  pantoprazole, 40 mg, Oral, Q AM  Psyllium, 1 packet, Oral, BID AC  sodium chloride, 10 mL, Intravenous, Q12H  Urea, 15 g, Oral, BID  vitamin B-12, 500 mcg, Oral, Daily         acetaminophen    aluminum-magnesium hydroxide-simethicone    [DISCONTINUED] senna-docusate sodium **AND** [DISCONTINUED] polyethylene glycol **AND** [DISCONTINUED] bisacodyl **AND** bisacodyl    Diclofenac Sodium    hydrOXYzine    Lidocaine    melatonin    ondansetron    prochlorperazine    sodium chloride    sodium chloride    sodium chloride     Physical Exam:  /61 (BP Location: Right arm, Patient Position: Sitting)   Pulse 70   Temp 97.8 °F (36.6 °C) (Oral)   Resp 16   Ht 152.4 cm (60\")   Wt 64.8 kg (142 lb 13.7 oz)   SpO2 98%   BMI 27.90 kg/m²     Intake/Output Summary (Last 24 hours) at 8/3/2024 1419  Last data filed at 8/3/2024 1323  Gross per 24 hour   Intake 720 ml   Output --   Net 720 ml      General: Patient alert and oriented no acute distress  Cardiac: Regular rate and rhythm without a rub no S3 or S4  Lungs: Clear bilaterally no wheezes rhonchi or rales  Abdomen: Bowel sounds positive nontender soft no mass felt no apparent organomegaly noted  Extremities: No edema clubbing or cyanosis  Skin: No acute rashes noted  : Deferred  Neuro: Appears intact with motor and sensory grossly    Results from last 7 days   Lab Units 08/03/24  0618 08/02/24  0529 08/01/24  0534   SODIUM mmol/L 128* 128* 126*   POTASSIUM mmol/L 4.1 4.3 4.2   CHLORIDE mmol/L 96* 95* 93*   CO2 mmol/L 23.2 24.2 25.7   BUN mg/dL 35* 28* 7*   CREATININE mg/dL 0.75 0.90 0.81   CALCIUM " mg/dL 9.3 8.9 8.9   BILIRUBIN mg/dL 1.2 1.1 1.6*   ALK PHOS U/L 130* 151* 112   ALT (SGPT) U/L 351* 337* 329*   AST (SGOT) U/L 418* 434* 442*   GLUCOSE mg/dL 102* 113* 100*       Estimated Creatinine Clearance: 56.9 mL/min (by C-G formula based on SCr of 0.75 mg/dL).    Results from last 7 days   Lab Units 08/01/24  0534 07/31/24  0449 07/30/24  0459 07/29/24  1503   MAGNESIUM mg/dL 1.8 1.9 1.9  --    PHOSPHORUS mg/dL  --  3.7 3.3 3.3             Results from last 7 days   Lab Units 08/03/24  0618 08/02/24  0529 08/01/24  1438 08/01/24  0534 07/31/24  0449   WBC 10*3/mm3 2.74* 2.88* 2.65* 2.63* 3.19*   HEMOGLOBIN g/dL 11.5* 11.1* 11.6* 10.9* 11.7*   PLATELETS 10*3/mm3 176 196 167 158 192       Results from last 7 days   Lab Units 08/01/24  1438 07/29/24  1503   INR  1.18* 1.11       Imaging Results (Last 24 Hours)       ** No results found for the last 24 hours. **             Assessment/plan:      1.  Hyponatremia-sodium still not coming up as I would like.  On 1L/day fluid restriction and urea.  Urine sodium of 124, urine osm of 564.  Appears SIADH.  Continue fluid restriction.  Patient is on urea twice a day.  Will go ahead and add a loop diuretic as well as some sodium tablets.     2.  Abd Pain- CT with no acut problems.  Elevated LFT's, status post liver biopsy.  Possible autoimmune hepatitis.  On NAC.     3.  HTN-  controlled off meds now.  No HCTZ if antihypertensives are needed          Asher Fournier MD

## 2024-08-04 LAB
ALBUMIN SERPL-MCNC: 3.5 G/DL (ref 3.5–5.2)
ALBUMIN/GLOB SERPL: 0.7 G/DL
ALP SERPL-CCNC: 136 U/L (ref 39–117)
ALT SERPL W P-5'-P-CCNC: 397 U/L (ref 1–33)
ANION GAP SERPL CALCULATED.3IONS-SCNC: 9.6 MMOL/L (ref 5–15)
AST SERPL-CCNC: 459 U/L (ref 1–32)
BASOPHILS # BLD AUTO: 0.05 10*3/MM3 (ref 0–0.2)
BASOPHILS NFR BLD AUTO: 1.2 % (ref 0–1.5)
BILIRUB SERPL-MCNC: 1.3 MG/DL (ref 0–1.2)
BUN SERPL-MCNC: 42 MG/DL (ref 8–23)
BUN/CREAT SERPL: 48.8 (ref 7–25)
CALCIUM SPEC-SCNC: 9.4 MG/DL (ref 8.6–10.5)
CHLORIDE SERPL-SCNC: 96 MMOL/L (ref 98–107)
CO2 SERPL-SCNC: 26.4 MMOL/L (ref 22–29)
CREAT SERPL-MCNC: 0.86 MG/DL (ref 0.57–1)
DEPRECATED RDW RBC AUTO: 48.1 FL (ref 37–54)
EGFRCR SERPLBLD CKD-EPI 2021: 71.9 ML/MIN/1.73
EOSINOPHIL # BLD AUTO: 0.08 10*3/MM3 (ref 0–0.4)
EOSINOPHIL NFR BLD AUTO: 1.9 % (ref 0.3–6.2)
ERYTHROCYTE [DISTWIDTH] IN BLOOD BY AUTOMATED COUNT: 15.3 % (ref 12.3–15.4)
GLOBULIN UR ELPH-MCNC: 4.8 GM/DL
GLUCOSE SERPL-MCNC: 119 MG/DL (ref 65–99)
HCT VFR BLD AUTO: 36.4 % (ref 34–46.6)
HGB BLD-MCNC: 11.6 G/DL (ref 12–15.9)
IMM GRANULOCYTES # BLD AUTO: 0.01 10*3/MM3 (ref 0–0.05)
IMM GRANULOCYTES NFR BLD AUTO: 0.2 % (ref 0–0.5)
LYMPHOCYTES # BLD AUTO: 1.2 10*3/MM3 (ref 0.7–3.1)
LYMPHOCYTES NFR BLD AUTO: 28.4 % (ref 19.6–45.3)
MAGNESIUM SERPL-MCNC: 2.1 MG/DL (ref 1.6–2.4)
MCH RBC QN AUTO: 27.4 PG (ref 26.6–33)
MCHC RBC AUTO-ENTMCNC: 31.9 G/DL (ref 31.5–35.7)
MCV RBC AUTO: 86.1 FL (ref 79–97)
MONOCYTES # BLD AUTO: 0.48 10*3/MM3 (ref 0.1–0.9)
MONOCYTES NFR BLD AUTO: 11.3 % (ref 5–12)
NEUTROPHILS NFR BLD AUTO: 2.41 10*3/MM3 (ref 1.7–7)
NEUTROPHILS NFR BLD AUTO: 57 % (ref 42.7–76)
NRBC BLD AUTO-RTO: 0 /100 WBC (ref 0–0.2)
PHOSPHATE SERPL-MCNC: 4.4 MG/DL (ref 2.5–4.5)
PLATELET # BLD AUTO: 210 10*3/MM3 (ref 140–450)
PMV BLD AUTO: 8.9 FL (ref 6–12)
POTASSIUM SERPL-SCNC: 4.3 MMOL/L (ref 3.5–5.2)
PROT SERPL-MCNC: 8.3 G/DL (ref 6–8.5)
RBC # BLD AUTO: 4.23 10*6/MM3 (ref 3.77–5.28)
SODIUM SERPL-SCNC: 132 MMOL/L (ref 136–145)
WBC NRBC COR # BLD AUTO: 4.23 10*3/MM3 (ref 3.4–10.8)

## 2024-08-04 PROCEDURE — 84100 ASSAY OF PHOSPHORUS: CPT | Performed by: INTERNAL MEDICINE

## 2024-08-04 PROCEDURE — 99232 SBSQ HOSP IP/OBS MODERATE 35: CPT | Performed by: INTERNAL MEDICINE

## 2024-08-04 PROCEDURE — 80053 COMPREHEN METABOLIC PANEL: CPT | Performed by: INTERNAL MEDICINE

## 2024-08-04 PROCEDURE — 83735 ASSAY OF MAGNESIUM: CPT | Performed by: INTERNAL MEDICINE

## 2024-08-04 PROCEDURE — 85025 COMPLETE CBC W/AUTO DIFF WBC: CPT | Performed by: INTERNAL MEDICINE

## 2024-08-04 RX ORDER — AMOXICILLIN 250 MG
1 CAPSULE ORAL 2 TIMES DAILY
Status: DISCONTINUED | OUTPATIENT
Start: 2024-08-04 | End: 2024-08-05 | Stop reason: HOSPADM

## 2024-08-04 RX ADMIN — PANTOPRAZOLE SODIUM 40 MG: 40 TABLET, DELAYED RELEASE ORAL at 06:46

## 2024-08-04 RX ADMIN — DICYCLOMINE HYDROCHLORIDE 20 MG: 10 CAPSULE ORAL at 08:24

## 2024-08-04 RX ADMIN — Medication 5 MG: at 21:44

## 2024-08-04 RX ADMIN — Medication 10 ML: at 21:47

## 2024-08-04 RX ADMIN — SODIUM CHLORIDE TAB 1 GM 1 G: 1 TAB at 08:24

## 2024-08-04 RX ADMIN — DICYCLOMINE HYDROCHLORIDE 20 MG: 10 CAPSULE ORAL at 11:34

## 2024-08-04 RX ADMIN — SENNOSIDES AND DOCUSATE SODIUM 1 TABLET: 50; 8.6 TABLET ORAL at 21:45

## 2024-08-04 RX ADMIN — Medication 15 G: at 21:44

## 2024-08-04 RX ADMIN — Medication 10 ML: at 09:18

## 2024-08-04 RX ADMIN — DICYCLOMINE HYDROCHLORIDE 20 MG: 10 CAPSULE ORAL at 17:11

## 2024-08-04 RX ADMIN — Medication 15 G: at 09:18

## 2024-08-04 RX ADMIN — Medication 1000 UNITS: at 08:23

## 2024-08-04 RX ADMIN — FUROSEMIDE 20 MG: 20 TABLET ORAL at 17:11

## 2024-08-04 RX ADMIN — ACETAMINOPHEN 650 MG: 325 TABLET ORAL at 21:52

## 2024-08-04 RX ADMIN — SODIUM CHLORIDE TAB 1 GM 1 G: 1 TAB at 17:11

## 2024-08-04 RX ADMIN — FUROSEMIDE 20 MG: 20 TABLET ORAL at 08:23

## 2024-08-04 RX ADMIN — FLUTICASONE PROPIONATE 2 SPRAY: 50 SPRAY, METERED NASAL at 11:34

## 2024-08-04 RX ADMIN — DICYCLOMINE HYDROCHLORIDE 20 MG: 10 CAPSULE ORAL at 21:44

## 2024-08-04 RX ADMIN — CETIRIZINE HYDROCHLORIDE 10 MG: 10 TABLET, FILM COATED ORAL at 08:24

## 2024-08-04 RX ADMIN — CYANOCOBALAMIN TAB 500 MCG 500 MCG: 500 TAB at 08:24

## 2024-08-04 NOTE — PLAN OF CARE
Goal Outcome Evaluation:  Plan of Care Reviewed With: patient        Progress: improving  Outcome Evaluation: vss. medicated with po dulcolax x 1 per pt request for no bm x several days. pt up in room ad paresh. reports abdominal tenderness, but no specific pain needs. slept well. cooperative with fluid restrictions. continue poc.

## 2024-08-04 NOTE — PLAN OF CARE
Goal Outcome Evaluation:  Plan of Care Reviewed With: patient        Progress: no change  Outcome Evaluation: VSS, pt up ad paresh. Reports of some abd discomfort from liver biopsy, 8/2/24. No new concerns at this time. Continue plan of care.

## 2024-08-04 NOTE — PROGRESS NOTES
"Nephrology progress note    Asher Fournier MD     Current history:     Patient  denies any chest pain shortness of breath nausea or vomiting currently    Current medication list:  cetirizine, 10 mg, Oral, Daily  cholecalciferol, 1,000 Units, Oral, Daily  dicyclomine, 20 mg, Oral, 4x Daily  fluticasone, 2 spray, Each Nare, Daily  furosemide, 20 mg, Oral, BID  melatonin, 5 mg, Oral, Nightly  pantoprazole, 40 mg, Oral, Q AM  Psyllium, 1 packet, Oral, BID AC  senna-docusate sodium, 1 tablet, Oral, BID  sodium chloride, 10 mL, Intravenous, Q12H  sodium chloride, 1 g, Oral, BID With Meals  Urea, 15 g, Oral, BID  vitamin B-12, 500 mcg, Oral, Daily         acetaminophen    aluminum-magnesium hydroxide-simethicone    bisacodyl    [DISCONTINUED] senna-docusate sodium **AND** [DISCONTINUED] polyethylene glycol **AND** [DISCONTINUED] bisacodyl **AND** bisacodyl    hydrOXYzine    Lidocaine    melatonin    ondansetron    prochlorperazine    sodium chloride    sodium chloride    sodium chloride     Physical Exam:  /58 (BP Location: Right arm, Patient Position: Lying)   Pulse 67   Temp 97.7 °F (36.5 °C) (Oral)   Resp 16   Ht 152.4 cm (60\")   Wt 64.8 kg (142 lb 13.7 oz)   SpO2 96%   BMI 27.90 kg/m²     Intake/Output Summary (Last 24 hours) at 8/4/2024 1341  Last data filed at 8/4/2024 0845  Gross per 24 hour   Intake 620 ml   Output --   Net 620 ml      General: Patient alert and oriented no acute distress  Cardiac: Regular rate and rhythm without a rub no S3 or S4  Lungs: Clear bilaterally no wheezes rhonchi or rales  Abdomen: Bowel sounds positive nontender soft no mass felt no apparent organomegaly noted  Extremities: No edema clubbing or cyanosis  Skin: No acute rashes noted  : Deferred  Neuro: Appears intact with motor and sensory grossly    Results from last 7 days   Lab Units 08/04/24  0627 08/03/24  0618 08/02/24  0529   SODIUM mmol/L 132* 128* 128*   POTASSIUM mmol/L 4.3 4.1 4.3   CHLORIDE mmol/L 96* 96* " 95*   CO2 mmol/L 26.4 23.2 24.2   BUN mg/dL 42* 35* 28*   CREATININE mg/dL 0.86 0.75 0.90   CALCIUM mg/dL 9.4 9.3 8.9   BILIRUBIN mg/dL 1.3* 1.2 1.1   ALK PHOS U/L 136* 130* 151*   ALT (SGPT) U/L 397* 351* 337*   AST (SGOT) U/L 459* 418* 434*   GLUCOSE mg/dL 119* 102* 113*       Estimated Creatinine Clearance: 49.7 mL/min (by C-G formula based on SCr of 0.86 mg/dL).    Results from last 7 days   Lab Units 08/04/24  0627 08/01/24  0534 07/31/24  0449 07/30/24  0459   MAGNESIUM mg/dL 2.1 1.8 1.9 1.9   PHOSPHORUS mg/dL 4.4  --  3.7 3.3             Results from last 7 days   Lab Units 08/04/24  0627 08/03/24  0618 08/02/24  0529 08/01/24  1438 08/01/24  0534   WBC 10*3/mm3 4.23 2.74* 2.88* 2.65* 2.63*   HEMOGLOBIN g/dL 11.6* 11.5* 11.1* 11.6* 10.9*   PLATELETS 10*3/mm3 210 176 196 167 158       Results from last 7 days   Lab Units 08/01/24  1438 07/29/24  1503   INR  1.18* 1.11       Imaging Results (Last 24 Hours)       ** No results found for the last 24 hours. **             Assessment/plan:      1.  Hyponatremia-sodium improving now.  On 1L/day fluid restriction and urea.  Urine sodium of 124, urine osm of 564.  Appears SIADH.  Continue fluid restriction but due to improvement will allow up to 1500 mL/day.  Patient current treatment is urea twice a day,  Sodium tablets twice a day, low-dose loop diuretic twice a day and p.o. fluid restriction     2.  Abd Pain- CT with no acut problems.  Elevated LFT's, status post liver biopsy.  Possible autoimmune hepatitis.  On NAC.     3.  HTN-  controlled off meds now.  No HCTZ if antihypertensives are needed          Asher Fournier MD

## 2024-08-04 NOTE — PROGRESS NOTES
[facility]   Hospitalist Progress Note    Date of admission: 7/29/2024  Patient Name: Qiana Altman  1950  Date: 8/3/2024      Subjective     Chief Complaint   Patient presents with    Abdominal Pain    Nausea    Constipation       Interval Followup: No abdominal pain no fevers.  Eating well tolerating fluid restriction      Objective     Vitals:   Temp:  [97.7 °F (36.5 °C)-98.1 °F (36.7 °C)] 98.1 °F (36.7 °C)  Heart Rate:  [60-74] 74  Resp:  [15-16] 16  BP: (101-115)/(51-61) 110/58    Physical Exam  Awake conversant  CTAB no wheezing  RRR no lower extremity pitting edema  Abdomen nontender no guarding    Result Review:  Vital signs, labs and recent relevant imaging reviewed.      CBC          8/1/2024    05:34 8/1/2024    14:38 8/2/2024    05:29 8/3/2024    06:18   CBC   WBC 2.63  2.65  2.88  2.74    RBC 3.97  4.15  3.95  4.11    Hemoglobin 10.9  11.6  11.1  11.5    Hematocrit 33.1  35.0  33.3  35.1    MCV 83.4  84.3  84.3  85.4    MCH 27.5  28.0  28.1  28.0    MCHC 32.9  33.1  33.3  32.8    RDW 14.9  15.2  15.3  15.5    Platelets 158  167  196  176      CMP          8/1/2024    05:34 8/2/2024    05:29 8/3/2024    06:18   CMP   Glucose 100  113  102    BUN 7  28  35    Creatinine 0.81  0.90  0.75    EGFR 77.2  68.1  84.7    Sodium 126  128  128    Potassium 4.2  4.3  4.1    Chloride 93  95  96    Calcium 8.9  8.9  9.3    Total Protein 7.4  7.3  7.4    Albumin 3.2  3.3  3.3    Globulin 4.2  4.0  4.1    Total Bilirubin 1.6  1.1  1.2    Alkaline Phosphatase 112  151  130    AST (SGOT) 442  434  418    ALT (SGPT) 329  337  351    Albumin/Globulin Ratio 0.8  0.8  0.8    BUN/Creatinine Ratio 8.6  31.1  46.7    Anion Gap 7.3  8.8  8.8          acetaminophen    aluminum-magnesium hydroxide-simethicone    [DISCONTINUED] senna-docusate sodium **AND** [DISCONTINUED] polyethylene glycol **AND** [DISCONTINUED] bisacodyl **AND** bisacodyl    Diclofenac Sodium    hydrOXYzine    Lidocaine    melatonin    ondansetron     prochlorperazine    sodium chloride    sodium chloride    sodium chloride    cetirizine, 10 mg, Oral, Daily  cholecalciferol, 1,000 Units, Oral, Daily  dicyclomine, 20 mg, Oral, 4x Daily  fluticasone, 2 spray, Each Nare, Daily  furosemide, 20 mg, Oral, BID  melatonin, 5 mg, Oral, Nightly  pantoprazole, 40 mg, Oral, Q AM  Psyllium, 1 packet, Oral, BID AC  sodium chloride, 10 mL, Intravenous, Q12H  sodium chloride, 1 g, Oral, BID With Meals  Urea, 15 g, Oral, BID  vitamin B-12, 500 mcg, Oral, Daily        CT Needle Biopsy Liver    Result Date: 8/2/2024  Impression: 1.Successful CT-guided percutaneous liver biopsy without evidence of complication. Electronically Signed: Guillaume Santos MD  8/2/2024 11:13 AM EDT  Workstation ID: [id number]    US Liver    Result Date: 7/30/2024  Impression: No evidence of hepatosplenomegaly. Negative exam. Electronically Signed: Summer Diallo MD  7/30/2024 7:59 AM EDT  Workstation ID: [id number]    CT Abdomen Pelvis With Contrast    Result Date: 7/29/2024  Impression: 1. Hepatic steatosis. 2. Mild hepatosplenomegaly. There may be small gastroesophageal varices. I would recommend clinical correlation with regard to signs and symptoms of portal hypertension. 3. Status post a right hemicolectomy. 4. Calcified uterine fibroid. 5. Suggestion of a small hiatal hernia. 6. Mild dependent atelectasis in the lung bases. Electronically Signed: Kevin Ortega MD  7/29/2024 12:30 PM EDT  Workstation ID: [id number]     Assessment / Plan     Summary: 74 y.o. with history of inflammatory bowel disease, GERD, diverticulosis who presented with abdominal pain nausea and vomiting for approximately 2 days prior to admission, initial imaging showing hepatic steatosis prior colectomy, initial LFTs elevated, GI consulted treated with NAC and additional autoimmune workup sent    Assessment/Plan (clinically significant if listed here)  Concern for autoimmune hepatitis  Elevated liver function test with  hepatosplenomegaly  Abdominal pain/nausea/vomiting in setting of above  Hyponatremia, suspect SIADH  DM2  History of Graves' disease  History of cholecystectomy and right hemicolectomy    AST ALT still elevated, AST slightly decreasing while ALT has been uptrending slightly, still awaiting test results from IR liver biopsy, discussed with GI no steroids needed for now wait for testing before initiating  Noreen positive, smooth muscle elevated at 58,  negative dna/mitochondrial   Persistent leukopenia, no fevers,  Has completed 5-day course of NAC  Sodium seems to be leveling off at 128, continues with urea, salt tabs and low-dose Lasix added today per nephrology appreciate assistance.  Continues with fluid restriction 1 L  Liver US: negative/no hsm/patent flow, prior choly. Acute hep panel negative  Tsh and cortisol wnl, cont ur sodium trend   Holding lisinopril, monitor bp  Cont ppi  Hx of dm, A1c 5.9, no acute medications needed  PT/OT  Check a.m. CBC, BMP, magnesium, phosphorus, lfts    Continue hospitalization at current level of care    Dispo: likely home once medically stable.   Will discuss plan of care/dispo with clinical .     VTE Prophylaxis:  Mechanical VTE prophylaxis orders are present.

## 2024-08-05 ENCOUNTER — READMISSION MANAGEMENT (OUTPATIENT)
Dept: CALL CENTER | Facility: HOSPITAL | Age: 74
End: 2024-08-05
Payer: MEDICARE

## 2024-08-05 VITALS
DIASTOLIC BLOOD PRESSURE: 64 MMHG | RESPIRATION RATE: 18 BRPM | WEIGHT: 142.86 LBS | SYSTOLIC BLOOD PRESSURE: 115 MMHG | HEIGHT: 60 IN | HEART RATE: 65 BPM | OXYGEN SATURATION: 100 % | BODY MASS INDEX: 28.05 KG/M2 | TEMPERATURE: 97.3 F

## 2024-08-05 LAB
ALBUMIN SERPL-MCNC: 3.3 G/DL (ref 3.5–5.2)
ALBUMIN/GLOB SERPL: 0.8 G/DL
ALP SERPL-CCNC: 120 U/L (ref 39–117)
ALT SERPL W P-5'-P-CCNC: 377 U/L (ref 1–33)
ANION GAP SERPL CALCULATED.3IONS-SCNC: 9.8 MMOL/L (ref 5–15)
AST SERPL-CCNC: 388 U/L (ref 1–32)
BILIRUB SERPL-MCNC: 1.1 MG/DL (ref 0–1.2)
BUN SERPL-MCNC: 55 MG/DL (ref 8–23)
BUN/CREAT SERPL: 57.9 (ref 7–25)
C-ANCA TITR SER IF: NORMAL TITER
CALCIUM SPEC-SCNC: 9.5 MG/DL (ref 8.6–10.5)
CHLORIDE SERPL-SCNC: 97 MMOL/L (ref 98–107)
CO2 SERPL-SCNC: 25.2 MMOL/L (ref 22–29)
CREAT SERPL-MCNC: 0.95 MG/DL (ref 0.57–1)
EGFRCR SERPLBLD CKD-EPI 2021: 63.8 ML/MIN/1.73
GLOBULIN UR ELPH-MCNC: 4.4 GM/DL
GLUCOSE SERPL-MCNC: 118 MG/DL (ref 65–99)
MYELOPEROXIDASE AB SER IA-ACNC: <0.2 UNITS (ref 0–0.9)
P-ANCA ATYPICAL TITR SER IF: NORMAL TITER
P-ANCA TITR SER IF: NORMAL TITER
POTASSIUM SERPL-SCNC: 3.7 MMOL/L (ref 3.5–5.2)
PROT SERPL-MCNC: 7.7 G/DL (ref 6–8.5)
PROTEINASE3 AB SER IA-ACNC: <0.2 UNITS (ref 0–0.9)
SODIUM SERPL-SCNC: 132 MMOL/L (ref 136–145)
WHOLE BLOOD HOLD SPECIMEN: NORMAL

## 2024-08-05 PROCEDURE — 87529 HSV DNA AMP PROBE: CPT | Performed by: INTERNAL MEDICINE

## 2024-08-05 PROCEDURE — 99239 HOSP IP/OBS DSCHRG MGMT >30: CPT | Performed by: INTERNAL MEDICINE

## 2024-08-05 PROCEDURE — 80053 COMPREHEN METABOLIC PANEL: CPT | Performed by: INTERNAL MEDICINE

## 2024-08-05 PROCEDURE — 99232 SBSQ HOSP IP/OBS MODERATE 35: CPT | Performed by: INTERNAL MEDICINE

## 2024-08-05 RX ORDER — FUROSEMIDE 20 MG/1
20 TABLET ORAL 2 TIMES DAILY
Qty: 60 TABLET | Refills: 0 | Status: SHIPPED | OUTPATIENT
Start: 2024-08-05 | End: 2024-09-04

## 2024-08-05 RX ORDER — SODIUM CHLORIDE 1 G/1
1 TABLET ORAL 2 TIMES DAILY WITH MEALS
Qty: 60 TABLET | Refills: 0 | Status: SHIPPED | OUTPATIENT
Start: 2024-08-05 | End: 2024-09-04

## 2024-08-05 RX ORDER — AMOXICILLIN 250 MG
1 CAPSULE ORAL 2 TIMES DAILY
Qty: 60 TABLET | Refills: 0 | Status: SHIPPED | OUTPATIENT
Start: 2024-08-05 | End: 2024-09-04

## 2024-08-05 RX ORDER — POLYETHYLENE GLYCOL 3350 17 G/17G
17 POWDER, FOR SOLUTION ORAL 2 TIMES DAILY PRN
Qty: 119 G | Refills: 0 | Status: SHIPPED | OUTPATIENT
Start: 2024-08-05 | End: 2024-08-13

## 2024-08-05 RX ORDER — ACETAMINOPHEN 325 MG/1
650 TABLET ORAL 2 TIMES DAILY PRN
Start: 2024-08-05 | End: 2024-08-13

## 2024-08-05 RX ADMIN — SODIUM CHLORIDE TAB 1 GM 1 G: 1 TAB at 08:55

## 2024-08-05 RX ADMIN — Medication 15 G: at 08:55

## 2024-08-05 RX ADMIN — SENNOSIDES AND DOCUSATE SODIUM 1 TABLET: 50; 8.6 TABLET ORAL at 08:55

## 2024-08-05 RX ADMIN — CETIRIZINE HYDROCHLORIDE 10 MG: 10 TABLET, FILM COATED ORAL at 08:55

## 2024-08-05 RX ADMIN — AVOBENZONE, HOMOSALATE, OCTISALATE, OCTOCRYLENE, AND OXYBENZONE 1 PACKET: 29.4; 147; 49; 25.4; 58.8 LOTION TOPICAL at 08:55

## 2024-08-05 RX ADMIN — Medication 1000 UNITS: at 09:08

## 2024-08-05 RX ADMIN — FLUTICASONE PROPIONATE 2 SPRAY: 50 SPRAY, METERED NASAL at 08:57

## 2024-08-05 RX ADMIN — DICYCLOMINE HYDROCHLORIDE 20 MG: 10 CAPSULE ORAL at 12:27

## 2024-08-05 RX ADMIN — FUROSEMIDE 20 MG: 20 TABLET ORAL at 08:55

## 2024-08-05 RX ADMIN — CYANOCOBALAMIN TAB 500 MCG 500 MCG: 500 TAB at 08:55

## 2024-08-05 RX ADMIN — DICYCLOMINE HYDROCHLORIDE 20 MG: 10 CAPSULE ORAL at 08:55

## 2024-08-05 RX ADMIN — Medication 10 ML: at 09:09

## 2024-08-05 RX ADMIN — PANTOPRAZOLE SODIUM 40 MG: 40 TABLET, DELAYED RELEASE ORAL at 06:10

## 2024-08-05 NOTE — PROGRESS NOTES
[facility]  Inpatient Progress Note    Reason for Follow Up: Abnormally elevated liver function test and liver biopsy    Interval History:       No active issues.  Abdominal pain resolved  Constipation improved to regular BM      Total bilirubin 1.1  Alkaline phosphatase 120  [phone]  Gamma   INR 1.18  SMA 58  RENZO positive  AMA negative  Quantitative immunoglobulin IgG 2736 (hypergammaglobulinemia)  Acute hepatitis panel- Negative  Ultrasound liver - no evidence of hepatosplenomegaly  Liver pathology pending  CMV and HSV PCR pending  Liver kidney microsomal antibody pending  pANCA pending      Current Facility-Administered Medications:     acetaminophen (TYLENOL) tablet 650 mg, 650 mg, Oral, BID PRN, Stuart Baker MD, 650 mg at 08/04/24 2152    aluminum-magnesium hydroxide-simethicone (MAALOX MAX) 400-400-40 MG/5ML suspension 15 mL, 15 mL, Oral, Q6H PRN, Stuart Baker MD    bisacodyl (DULCOLAX) EC tablet 10 mg, 10 mg, Oral, Daily PRN, Blu Gibson MD, 10 mg at 08/03/24 2134    [DISCONTINUED] sennosides-docusate (PERICOLACE) 8.6-50 MG per tablet 2 tablet, 2 tablet, Oral, BID **AND** [DISCONTINUED] polyethylene glycol (MIRALAX) packet 17 g, 17 g, Oral, Daily PRN **AND** [DISCONTINUED] bisacodyl (DULCOLAX) EC tablet 5 mg, 5 mg, Oral, Daily PRN **AND** bisacodyl (DULCOLAX) suppository 10 mg, 10 mg, Rectal, Daily PRN, Sharp, Dimpi, DO    cetirizine (zyrTEC) tablet 10 mg, 10 mg, Oral, Daily, Sharp, Dimpi, DO, 10 mg at 08/05/24 0855    cholecalciferol (VITAMIN D3) tablet 1,000 Units, 1,000 Units, Oral, Daily, Sharp, Dimpi, DO, 1,000 Units at 08/05/24 0908    dicyclomine (BENTYL) capsule 20 mg, 20 mg, Oral, 4x Daily, Peter Osorio MD, 20 mg at 08/05/24 0855    fluticasone (FLONASE) 50 MCG/ACT nasal spray 2 spray, 2 spray, Each Nare, Daily, Whitney Sharp DO, 2 spray at 08/05/24 0857    furosemide (LASIX) tablet 20 mg, 20 mg,  Oral, BID, Asher Fournier MD, 20 mg at 08/05/24 0855    hydrOXYzine (ATARAX) tablet 25 mg, 25 mg, Oral, TID PRN, Stuart Baker MD, 25 mg at 08/02/24 2157    Lidocaine 4 % 1 patch, 1 patch, Transdermal, Daily PRN, Stuart Baker MD    melatonin tablet 5 mg, 5 mg, Oral, Nightly, Sharp, Dimpi, DO, 5 mg at 08/04/24 2144    melatonin tablet 5 mg, 5 mg, Oral, Nightly PRN, Stuart Baker MD    ondansetron (ZOFRAN) injection 4 mg, 4 mg, Intravenous, Q4H PRN, Stuart Baker MD    pantoprazole (PROTONIX) EC tablet 40 mg, 40 mg, Oral, Q AM, Sharp, Dimpi, DO, 40 mg at 08/05/24 0610    prochlorperazine (COMPAZINE) injection 5 mg, 5 mg, Intravenous, Q6H PRN, Stuart Baker MD    Psyllium (METAMUCIL MULTIHEALTH FIBER) 51.7 % packet 1 packet, 1 packet, Oral, BID AC, Peter Osorio MD, 1 packet at 08/05/24 0855    sennosides-docusate (PERICOLACE) 8.6-50 MG per tablet 1 tablet, 1 tablet, Oral, BID, Stuart Baker MD, 1 tablet at 08/05/24 0855    sodium chloride 0.9 % flush 10 mL, 10 mL, Intravenous, PRN, Clyde Clark MD    sodium chloride 0.9 % flush 10 mL, 10 mL, Intravenous, Q12H, Sharp, Dimpi, DO, 10 mL at 08/05/24 0909    sodium chloride 0.9 % flush 10 mL, 10 mL, Intravenous, PRN, Sharp, Dimpi, DO    sodium chloride 0.9 % infusion 40 mL, 40 mL, Intravenous, PRN, Sharp, Dimpi, DO    sodium chloride tablet 1 g, 1 g, Oral, BID With Meals, Asher Fournier MD, 1 g at 08/05/24 0855    Urea (URE-NA) packet 15 g, 15 g, Oral, BID, Lico Correia MD, 15 g at 08/05/24 0855    vitamin B-12 (CYANOCOBALAMIN) tablet 500 mcg, 500 mcg, Oral, Daily, Stuart Baker MD, 500 mcg at 08/05/24 0855    Objective     Vital Signs  Temp:  [97.2 °F (36.2 °C)-97.7 °F (36.5 °C)] 97.5 °F (36.4 °C)  Heart Rate:  [64-67] 65  Resp:  [16-18] 18  BP: (103-115)/(55-62) 115/62  Body mass index is 27.9 kg/m².    Intake/Output Summary (Last 24 hours) at 8/5/2024 1026  Last data filed at 8/5/2024 0910  Gross per 24 hour   Intake 480 ml  "  Output --   Net 480 ml     I/O this shift:  In: 240 [P.O.:240]  Out: -      Physical Exam:  General     Alert and oriented, normal affect   Head:    Normocephalic, without obvious abnormality, atraumatic   Eyes:          conjunctivae and sclerae normal, no icterus, pupils round and reactive to light   Oral cavity: Moist and intact, normal dentation   Neck:   Supple, no adenopathy   Chest Wall/Lungs:    No abnormalities observed, equal on expansion bilaterally, No use of extrarespiratory muscles noted   Abdomen:     Soft, nondistended, nontender; normal bowel sounds, no hepatospleenomegaly, no ascites   Extremities:   no edema, clubbing, or cyanosis   Skin:   No bruising or rash   Psychiatric:  Normal mood and insight       Results Review:      Results from last 7 days   Lab Units 08/04/24  0627 08/03/24  0618 08/02/24  0529   WBC 10*3/mm3 4.23 2.74* 2.88*   HEMOGLOBIN g/dL 11.6* 11.5* 11.1*   HEMATOCRIT % 36.4 35.1 33.3*   PLATELETS 10*3/mm3 210 176 196     Results from last 7 days   Lab Units 08/05/24  0554 08/04/24  0627 08/03/24  0618   SODIUM mmol/L 132* 132* 128*   POTASSIUM mmol/L 3.7 4.3 4.1   CHLORIDE mmol/L 97* 96* 96*   CO2 mmol/L 25.2 26.4 23.2   BUN mg/dL 55* 42* 35*   CREATININE mg/dL 0.95 0.86 0.75   CALCIUM mg/dL 9.5 9.4 9.3   BILIRUBIN mg/dL 1.1 1.3* 1.2   ALK PHOS U/L 120* 136* 130*   ALT (SGPT) U/L 377* 397* 351*   AST (SGOT) U/L 388* 459* 418*   GLUCOSE mg/dL 118* 119* 102*     Invalid input(s): \"3\"   Lab Results   Lab Value Date/Time    LIPASE 54 07/29/2024 1030    LIPASE 51 07/23/2023 0148    LIPASE 40 02/20/2023 2319    LIPASE 17 03/16/2019 1946       Radiology:  No radiology results for the last day     Impression:                     Abnormally elevated LFTs  SMA, RENZO positive  Hypergammaglobulinemia     Plan and Recommendations: Patient admitted with 2 days history of abdominal pain with altered bowel habits, nausea and heartburn.  The patient's symptoms has been improved by adding Bentyl, " Metamucil and Protonix.  She had CT scan that showed hepatosplenomegaly.  Patient has fluctuating elevated LFTs that had been trending upward.  In the meantime, she is found to be positive for SMA, RENZO and negative for AMA with hypergammaglobulinemia.  She does qualify biochemical criteria for autoimmune hepatitis type I.  Patient underwent liver biopsy on last Friday.  I spoke with Dr. Allen and she mentioned to me that pathology results will be back by the end of the week from expert review at .    p-ANCA, liver kidney microsomal antibody, HSV and CMV PCR pending.  Patient is currently asymptomatic and can be discharged with outpatient GI follow-up within 2 weeks and at that time liver pathology results will be available to start the management.  I have discussed the details and plan with the patient and MITCHEL Coppola      Electronically signed by Peter Osorio MD, 08/05/24, 10:26 AM EDT.

## 2024-08-05 NOTE — PROGRESS NOTES
[facility]     Nephrology Progress Note      Patient Name: Qiana Altman  : 1950  MRN: [id number]  Primary Care Physician:  Zohreh Mcduffie APRN  Date of admission: 2024    Subjective   Subjective     Interval History:  Patient Reports feeling okay.  No new complaints.  Status post CT-guided liver biopsy.  Urine output not recorded but patient stated she has been voiding well.    Review of Systems   All systems were reviewed and negative except for: What is mentioned above.    Objective   Objective     Vitals:   Temp:  [97.2 °F (36.2 °C)-97.7 °F (36.5 °C)] 97.3 °F (36.3 °C)  Heart Rate:  [64-65] 65  Resp:  [18] 18  BP: (103-115)/(55-64) 115/64  Physical Exam:   Constitutional: Awake, alert, sitting in chair.  No distress.   Eyes: sclerae anicteric, no conjunctival injection   HENT: mucous membranes moist   Neck: Supple, no thyromegaly, no lymphadenopathy, trachea midline, No JVD   Respiratory: Clear to auscultation bilaterally, nonlabored respirations    Cardiovascular: RRR, no murmurs, rubs, or gallops.   Gastrointestinal: Positive bowel sounds, soft, nontender, nondistended   Musculoskeletal: No edema, no clubbing or cyanosis   Psychiatric: Appropriate affect, cooperative   Neurologic: Oriented x 3, moving all extremities, Cranial Nerves grossly intact, speech clear   Skin: warm and dry, no rashes     Result Review    Result Reviewed:  I have personally reviewed the results from the time of this admission to 2024 17:31 EDT and agree with these findings:  [x]  Laboratory  []  Microbiology  [x]  Radiology  []  EKG/Telemetry   []  Cardiology/Vascular   []  Pathology  [x]  Old records  []  Other:        Lab 24  0554 24  0627 24  0618 24  0529 24  0534 24  0449 24  1654 24  0459   SODIUM 132* 132* 128* 128* 126* 126* 123* 128*   POTASSIUM 3.7 4.3 4.1 4.3 4.2 4.2  --  4.8   CHLORIDE 97* 96* 96* 95* 93* 92*  --  96*   CO2 25.2 26.4 23.2 24.2  25.7 24.3  --  23.8   BUN 55* 42* 35* 28* 7* 7*  --  7*   CREATININE 0.95 0.86 0.75 0.90 0.81 0.78  --  0.77   GLUCOSE 118* 119* 102* 113* 100* 94  --  101*   EGFR 63.8 71.9 84.7 68.1 77.2 80.8  --  82.1   ANION GAP 9.8 9.6 8.8 8.8 7.3 9.7  --  8.2   MAGNESIUM  --  2.1  --   --  1.8 1.9  --  1.9   PHOSPHORUS  --  4.4  --   --   --  3.7  --  3.3     US Liver    Result Date: 7/30/2024  US LIVER Date of Exam: 7/30/2024 6:58 AM EDT Indication: Evaluate for hepatosplenomegaly. Comparison: No comparisons available. Technique: Grayscale and color Doppler ultrasound evaluation of the right upper quadrant was performed. Findings: The pancreas is normal in size and echogenicity. The liver is normal in size and shape. The liver measures 15 cm in length. There are no liver lesions. There is no bile duct dilatation. The gallbladder has been removed. The portal vein and visualized portions of the hepatic veins demonstrate normal direction of flow and are patent. The right kidney is normal in size and shape and demonstrates no hydronephrosis or masses. The spleen measures 12 cm in length. The splenic parenchyma is homogeneous.     Impression: Impression: No evidence of hepatosplenomegaly. Negative exam. Electronically Signed: Summer Diallo MD  7/30/2024 7:59 AM EDT  Workstation ID: [id number]      Most notable findings include: Sodium 132.    Assessment & Plan   Assessment / Plan       Active Hospital Problems:  Active Hospital Problems    Diagnosis     **Liver enzyme elevation     Abnormal liver enzymes     Elevated LFTs     Hyponatremia        Assessment and Plan:    - Euvolemic hyponatremia, most likely secondary to SIADH.  Urine osmolality 564.  Slowly improving with 1500 cc p.o. fluid restriction per day.  Continue the same.  Continue urea and current dose of Lasix.  Recheck in AM.  - Abdominal pain, etiology uncertain, elevated liver enzymes and positive RENZO noted.  CT-guided liver biopsy performed.  Results still pending.   GI following.  - Hypertension, blood pressures acceptable.    No objection for discharge from renal standpoint.  Would recommend rechecking sodium level in 1 week through PCP.  If needed, I will be happy to see her normal.    Will follow.      Electronically signed by Barbra Benedict MD, 8/5/2024, 17:31 EDT.

## 2024-08-05 NOTE — PLAN OF CARE
Goal Outcome Evaluation:  Plan of Care Reviewed With: patient           Outcome Evaluation: Fluid restriction maintained. Pt has not had any c/o this shift.

## 2024-08-05 NOTE — PLAN OF CARE
Goal Outcome Evaluation:      VS WNL. No complaints this shift. Discharge home this shift to follow up with PCP.     Progress: improving

## 2024-08-05 NOTE — DISCHARGE INSTRUCTIONS
Please follow-up with nephrology in 1 to 2 weeks to monitor your sodium levels.  Please keep limiting your total amount of fluids per day to 1500 mL (1.5 L) or less.    Will have you recheck your labs to monitor your liver numbers and sodium/electrolytes in about 1 week and will follow-up with the GI/liver doctors as well to discuss results from your additional pending liver tests.

## 2024-08-05 NOTE — DISCHARGE SUMMARY
[facility]         HOSPITALIST  DISCHARGE SUMMARY    Patient Name: Qiana Altman    : 1950    MRN: [id number]    Date of Admission: 2024  Date of Discharge:  24  Primary Care Physician: Zohreh Mcduffie APRN    Consults       Date and Time Order Name Status Description    2024  3:33 PM Inpatient Gastroenterology Consult Completed     2024  3:32 PM Inpatient Nephrology Consult      2024 12:44 PM Inpatient Hospitalist Consult              Final Diagnosis:  Suspected autoimmune hepatitis, final path pending expert review from [facility]  Elevated liver function test with hepatosplenomegaly in setting of above   Abdominal pain/nausea/vomiting in setting of above, resolved  Hyponatremia, suspect SIADH  DM2  History of Graves' disease  History of cholecystectomy and right hemicolectomy  Constipation, acute on chronic    Hospital Course     Hospital Course:  74 y.o. with history of inflammatory bowel disease, GERD, diverticulosis who presented with abdominal pain nausea and vomiting for approximately 2 days prior to admission, initial imaging showing hepatic steatosis prior colectomy, initial LFTs elevated, GI consulted treated with NAC and additional autoimmune workup sent/liver biopsy obtained.  Preliminary studies suspicious for autoimmune hepatitis but our pathologist is sending the test to [facility] for additional expert review/confirmation.  No steroids are being started at this time, she can have follow-up CMP and lab monitoring outpatient with GI and likely start on steroids at that time.    Patient was monitored for hyponatremia while in the hospital suspected SIADH component, continue with fluid restriction and salt tabs/urea low-dose Lasix and adjustment regimen as below with improvement.  Follow-up with nephrology outpatient for continued monitoring.  Still mildly low sodium but stable and improved overall.    Started on bowel regimen for constipation,  needs continued medications at discharge    Patient discharged in stable condition with close PCP nephro and gi follow up. Return precautions and follow up discussed and patient voiced agreement and understanding of treatment plan.     DISCHARGE Follow Up Recommendations for labs and diagnostics:   Close follow up as above w/ lab monitoring  Prediabetic on A1c 5.9, when initiated on steroids will likely need adjusted diabetic regimen and PCP prophylaxis depending on duration/dosing      CODE STATUS:  Code Status and Medical Interventions: CPR (Attempt to Resuscitate); Full Support   Ordered at: 07/29/24 1444     Level Of Support Discussed With:    Patient     Code Status (Patient has no pulse and is not breathing):    CPR (Attempt to Resuscitate)     Medical Interventions (Patient has pulse or is breathing):    Full Support           Day of Discharge     Vital Signs:  Temp:  [97.2 °F (36.2 °C)-97.7 °F (36.5 °C)] 97.3 °F (36.3 °C)  Heart Rate:  [64-65] 65  Resp:  [18] 18  BP: (103-115)/(55-64) 115/64    Physical Exam    Gen: awake, resting in bed, conversant  Resp: breathing comfortably on room air  CV: RRR, no LE pitting edema  Abd soft nt nd      Discharge Details        Discharge Medications        New Medications        Instructions Start Date   furosemide 20 MG tablet  Commonly known as: LASIX   20 mg, Oral, 2 Times Daily      polyethylene glycol 17 g packet  Commonly known as: MIRALAX   17 g, Oral, 2 Times Daily PRN      Psyllium 51.7 % packet  Commonly known as: METAMUCIL MULTIHEALTH FIBER   1 packet, Oral, 2 Times Daily Before Meals      sennosides-docusate 8.6-50 MG per tablet  Commonly known as: PERICOLACE   1 tablet, Oral, 2 Times Daily      sodium chloride 1 g tablet   1 g, Oral, 2 Times Daily With Meals      Urea 15 g pack packet  Commonly known as: URE-NA   15 g, Oral, 2 Times Daily             Changes to Medications        Instructions Start Date   acetaminophen 325 MG tablet  Commonly known as:  TYLENOL  What changed:   when to take this  reasons to take this   650 mg, Oral, 2 Times Daily PRN             Continue These Medications        Instructions Start Date   cetirizine 10 MG tablet  Commonly known as: zyrTEC   10 mg, Oral, Daily      cholecalciferol 25 MCG (1000 UT) tablet  Commonly known as: VITAMIN D3   1,000 Units, Oral, Daily      esomeprazole 40 MG capsule  Commonly known as: nexIUM   40 mg, Oral, Daily      fluticasone 50 MCG/ACT nasal spray  Commonly known as: FLONASE   2 sprays, Each Nare, Daily      vitamin B-12 1000 MCG tablet  Commonly known as: CYANOCOBALAMIN   1,000 mcg, Oral, Daily             Stop These Medications      celecoxib 100 MG capsule  Commonly known as: CeleBREX     lisinopril 10 MG tablet  Commonly known as: PRINIVIL,ZESTRIL                Discharge Disposition:  Home or Self Care    Diet: continue on diet / dietary restrictions from hospitalization     Discharge Activity: advance as tolerated      Additional Instructions for the Follow-ups that You Need to Schedule       Discharge Follow-up with PCP   As directed       Currently Documented PCP:    Zohreh Mcduffie APRN    PCP Phone Number:    [phone]     Follow Up Details: 3-5 days hospital f/u                Pertinent  and/or Most Recent Results       LAB RESULTS:      Lab 08/04/24  0627 08/03/24  0618 08/02/24  0529 08/01/24  1438 08/01/24  0534 07/30/24  0459 07/29/24  2056   WBC 4.23 2.74* 2.88* 2.65* 2.63*   < >  --    HEMOGLOBIN 11.6* 11.5* 11.1* 11.6* 10.9*   < >  --    HEMATOCRIT 36.4 35.1 33.3* 35.0 33.1*   < >  --    PLATELETS 210 176 196 167 158   < >  --    NEUTROS ABS 2.41 1.26* 1.44* 1.56* 1.15*   < >  --    IMMATURE GRANS (ABS) 0.01 0.02 0.00 0.01 0.00   < >  --    LYMPHS ABS 1.20 1.01 0.98 0.68* 1.05   < >  --    MONOS ABS 0.48 0.36 0.39 0.35 0.35   < >  --    EOS ABS 0.08 0.05 0.04 0.03 0.06   < >  --    MCV 86.1 85.4 84.3 84.3 83.4   < >  --    SED RATE  --   --   --   --   --   --  7   CRP  --    --   --   --   --   --  <0.30   LDH  --   --   --   --   --   --  225*   PROTIME  --   --   --  15.3*  --   --   --    APTT  --   --   --  39.5*  --   --   --     < > = values in this interval not displayed.         Lab 08/05/24 0554 08/04/24 0627 08/03/24 0618 08/02/24 0529 08/01/24 0534 07/31/24 0449 07/30/24  1654 07/30/24  0459   SODIUM 132* 132* 128* 128* 126* 126*   < > 128*   POTASSIUM 3.7 4.3 4.1 4.3 4.2 4.2  --  4.8   CHLORIDE 97* 96* 96* 95* 93* 92*  --  96*   CO2 25.2 26.4 23.2 24.2 25.7 24.3  --  23.8   ANION GAP 9.8 9.6 8.8 8.8 7.3 9.7  --  8.2   BUN 55* 42* 35* 28* 7* 7*  --  7*   CREATININE 0.95 0.86 0.75 0.90 0.81 0.78  --  0.77   EGFR 63.8 71.9 84.7 68.1 77.2 80.8  --  82.1   GLUCOSE 118* 119* 102* 113* 100* 94  --  101*   CALCIUM 9.5 9.4 9.3 8.9 8.9 9.2  --  8.7   MAGNESIUM  --  2.1  --   --  1.8 1.9  --  1.9   PHOSPHORUS  --  4.4  --   --   --  3.7  --  3.3   HEMOGLOBIN A1C  --   --   --   --   --   --   --  5.90*    < > = values in this interval not displayed.         Lab 08/05/24 0554 08/04/24 0627 08/03/24 0618 08/02/24 0529 08/01/24 0534 07/31/24 0449 07/30/24  0459   TOTAL PROTEIN 7.7 8.3 7.4 7.3 7.4   < > 7.2   ALBUMIN 3.3* 3.5 3.3* 3.3* 3.2*   < > 3.2*   GLOBULIN 4.4 4.8 4.1 4.0 4.2   < >  --    ALT (SGPT) 377* 397* 351* 337* 329*   < > 266*   AST (SGOT) 388* 459* 418* 434* 442*   < > 334*   BILIRUBIN 1.1 1.3* 1.2 1.1 1.6*   < > 1.0  1.0   INDIRECT BILIRUBIN  --   --   --   --   --   --  0.4  0.4   BILIRUBIN DIRECT  --   --   --   --   --   --  0.6*  0.6*   ALK PHOS 120* 136* 130* 151* 112   < > 113    < > = values in this interval not displayed.         Lab 08/01/24  1438   PROTIME 15.3*   INR 1.18*                 Brief Urine Lab Results  (Last result in the past 365 days)        Color   Clarity   Blood   Leuk Est   Nitrite   Protein   CREAT   Urine HCG        07/29/24 1144 Yellow   Clear   Negative   Trace   Negative   Negative                 Microbiology Results (last 10  days)       Procedure Component Value - Date/Time    Blood Culture - Blood, Arm, Right [[id number]]  (Normal) Collected: 07/29/24 1714    Lab Status: Final result Specimen: Blood from Arm, Right Updated: 08/03/24 1731     Blood Culture No growth at 5 days    Blood Culture - Blood, Arm, Right [[id number]]  (Normal) Collected: 07/29/24 1714    Lab Status: Final result Specimen: Blood from Arm, Right Updated: 08/03/24 1731     Blood Culture No growth at 5 days            RADIOLOGY:    CT Needle Biopsy Liver    Result Date: 8/2/2024  Impression: Impression: 1.Successful CT-guided percutaneous liver biopsy without evidence of complication. Electronically Signed: Guillaume Santos MD  8/2/2024 11:13 AM EDT  Workstation ID: [id number]    US Liver    Result Date: 7/30/2024  Impression: Impression: No evidence of hepatosplenomegaly. Negative exam. Electronically Signed: Summer Diallo MD  7/30/2024 7:59 AM EDT  Workstation ID: [id number]    CT Abdomen Pelvis With Contrast    Result Date: 7/29/2024  Impression: Impression: 1. Hepatic steatosis. 2. Mild hepatosplenomegaly. There may be small gastroesophageal varices. I would recommend clinical correlation with regard to signs and symptoms of portal hypertension. 3. Status post a right hemicolectomy. 4. Calcified uterine fibroid. 5. Suggestion of a small hiatal hernia. 6. Mild dependent atelectasis in the lung bases. Electronically Signed: Kevin Ortega MD  7/29/2024 12:30 PM EDT  Workstation ID: [id number]             Results for orders placed during the hospital encounter of 07/16/24    Adult Transthoracic Echo Complete W/ Cont if Necessary Per Protocol    Interpretation Summary  Normal left ventricular systolic function with mild left ventricular hypertrophy.  No significant valve abnormalities noted.      Labs Pending at Discharge:  Pending Labs       Order Current Status    ANCA Panel In process    Anti-Soluble Liver Ag Ab In process    CMV DNA, Quantitative, PCR In process    HSV  1/2, PCR (Non-CSF) - Blood, Arm, Right In process    Tissue Pathology Exam Preliminary result              Time spent on Discharge including face to face service:  >35 minutes

## 2024-08-05 NOTE — PROGRESS NOTES
[facility]   Hospitalist Progress Note    Date of admission: 7/29/2024  Patient Name: Qiana Altman  1950  Date: 8/4/2024      Subjective     Chief Complaint   Patient presents with    Abdominal Pain    Nausea    Constipation       Interval Followup: No significant abdominal pain or fevers chills or other acute complaints.  Doing okay with her diet.  Anxious to see the results of testing    Objective     Vitals:   Temp:  [97.7 °F (36.5 °C)-98.2 °F (36.8 °C)] 97.7 °F (36.5 °C)  Heart Rate:  [67-69] 67  Resp:  [16-18] 18  BP: (104-118)/(56-61) 112/58    Physical Exam  Awake conversant  CTAB no wheezing  RRR no lower extremity pitting edema  Abdomen nontender no guarding    Result Review:  Vital signs, labs and recent relevant imaging reviewed.      CBC          8/2/2024    05:29 8/3/2024    06:18 8/4/2024    06:27   CBC   WBC 2.88  2.74  4.23    RBC 3.95  4.11  4.23    Hemoglobin 11.1  11.5  11.6    Hematocrit 33.3  35.1  36.4    MCV 84.3  85.4  86.1    MCH 28.1  28.0  27.4    MCHC 33.3  32.8  31.9    RDW 15.3  15.5  15.3    Platelets 196  176  210      CMP          8/2/2024    05:29 8/3/2024    06:18 8/4/2024    06:27   CMP   Glucose 113  102  119    BUN 28  35  42    Creatinine 0.90  0.75  0.86    EGFR 68.1  84.7  71.9    Sodium 128  128  132    Potassium 4.3  4.1  4.3    Chloride 95  96  96    Calcium 8.9  9.3  9.4    Total Protein 7.3  7.4  8.3    Albumin 3.3  3.3  3.5    Globulin 4.0  4.1  4.8    Total Bilirubin 1.1  1.2  1.3    Alkaline Phosphatase 151  130  136    AST (SGOT) 434  418  459    ALT (SGPT) 337  351  397    Albumin/Globulin Ratio 0.8  0.8  0.7    BUN/Creatinine Ratio 31.1  46.7  48.8    Anion Gap 8.8  8.8  9.6          acetaminophen    aluminum-magnesium hydroxide-simethicone    bisacodyl    [DISCONTINUED] senna-docusate sodium **AND** [DISCONTINUED] polyethylene glycol **AND** [DISCONTINUED] bisacodyl **AND** bisacodyl    hydrOXYzine    Lidocaine    melatonin    ondansetron     prochlorperazine    sodium chloride    sodium chloride    sodium chloride    cetirizine, 10 mg, Oral, Daily  cholecalciferol, 1,000 Units, Oral, Daily  dicyclomine, 20 mg, Oral, 4x Daily  fluticasone, 2 spray, Each Nare, Daily  furosemide, 20 mg, Oral, BID  melatonin, 5 mg, Oral, Nightly  pantoprazole, 40 mg, Oral, Q AM  Psyllium, 1 packet, Oral, BID AC  senna-docusate sodium, 1 tablet, Oral, BID  sodium chloride, 10 mL, Intravenous, Q12H  sodium chloride, 1 g, Oral, BID With Meals  Urea, 15 g, Oral, BID  vitamin B-12, 500 mcg, Oral, Daily        CT Needle Biopsy Liver    Result Date: 8/2/2024  Impression: 1.Successful CT-guided percutaneous liver biopsy without evidence of complication. Electronically Signed: Guillaume Santos MD  8/2/2024 11:13 AM EDT  Workstation ID: [id number]    US Liver    Result Date: 7/30/2024  Impression: No evidence of hepatosplenomegaly. Negative exam. Electronically Signed: Summer Diallo MD  7/30/2024 7:59 AM EDT  Workstation ID: [id number]    CT Abdomen Pelvis With Contrast    Result Date: 7/29/2024  Impression: 1. Hepatic steatosis. 2. Mild hepatosplenomegaly. There may be small gastroesophageal varices. I would recommend clinical correlation with regard to signs and symptoms of portal hypertension. 3. Status post a right hemicolectomy. 4. Calcified uterine fibroid. 5. Suggestion of a small hiatal hernia. 6. Mild dependent atelectasis in the lung bases. Electronically Signed: Kevin Ortega MD  7/29/2024 12:30 PM EDT  Workstation ID: [id number]     Assessment / Plan     Summary: 74 y.o. with history of inflammatory bowel disease, GERD, diverticulosis who presented with abdominal pain nausea and vomiting for approximately 2 days prior to admission, initial imaging showing hepatic steatosis prior colectomy, initial LFTs elevated, GI consulted treated with NAC and additional autoimmune workup sent    Assessment/Plan (clinically significant if listed here)  Concern for autoimmune  hepatitis  Elevated liver function test with hepatosplenomegaly  Abdominal pain/nausea/vomiting in setting of above  Hyponatremia, suspect SIADH  DM2  History of Graves' disease  History of cholecystectomy and right hemicolectomy    AST ALT still elevated, slightly increased today, variable, biopsy results still pending   Have discussed with GI no steroids needed for now wait for testing before initiating  Noreen positive, smooth muscle elevated at 58,  negative dna/mitochondrial   Leukopenia resolved today, no fevers, monitor  Has completed 5-day course of NAC  Sodium trend improving, continues on fluid restriction, salt tabs, low-dose diuretic.  Nephrology assisting  Liver US: negative/no hsm/patent flow, prior choly. Acute hep panel negative  Tsh and cortisol wnl, cont ur sodium trend   Holding lisinopril, monitor bp, stable without this  Cont ppi  Hx of dm, A1c 5.9, no acute medications needed  PT/OT  Check a.m. CBC, BMP, magnesium, phosphorus, lfts    Continue hospitalization at current level of care    Dispo: likely home once medically stable.   Will discuss plan of care/dispo with clinical .     VTE Prophylaxis:  Mechanical VTE prophylaxis orders are present.

## 2024-08-06 ENCOUNTER — TRANSITIONAL CARE MANAGEMENT TELEPHONE ENCOUNTER (OUTPATIENT)
Dept: CALL CENTER | Facility: HOSPITAL | Age: 74
End: 2024-08-06
Payer: MEDICARE

## 2024-08-06 NOTE — OUTREACH NOTE
Call Center TCM Note      Flowsheet Row Responses   [facility] patient discharged from? [facility]   Does the patient have one of the following disease processes/diagnoses(primary or secondary)? Other   TCM attempt successful? No  [no names listed on VR]   Unsuccessful attempts Attempt 1   Call Status Left message            Diana Colin RN    8/6/2024, 12:10 EDT

## 2024-08-06 NOTE — OUTREACH NOTE
Call Center TCM Note      Flowsheet Row Responses   [facility] patient discharged from? [facility]   Does the patient have one of the following disease processes/diagnoses(primary or secondary)? Other   TCM attempt successful? Yes   Call start time 1551   Call end time 1556   Discharge diagnosis Liver enzyme elevation   Meds reviewed with patient/caregiver? Yes   Is the patient having any side effects they believe may be caused by any medication additions or changes? No   Does the patient have all medications ordered at discharge? Yes   Is the patient taking all medications as directed (includes completed medication regime)? Yes   Comments Pt needs an appt on Thursday or Friday morning due to dtrs schedule--will send a note to PCP office   Does the patient have an appointment with their PCP within 7-14 days of discharge? No appointments available   Nursing Interventions Routed TCM call to PCP office   Has home health visited the patient within 72 hours of discharge? N/A   Psychosocial issues? No   Did the patient receive a copy of their discharge instructions? Yes   Nursing interventions Reviewed instructions with patient   What is the patient's perception of their health status since discharge? Improving  [Pt reports she is doing better but tired from week in hospital.  Pt taking meds as ordered.  Aware of need to f/u with neprho and GI--will call for scheduling.]   Is the patient/caregiver able to teach back signs and symptoms related to disease process for when to call PCP? Yes   Is the patient/caregiver able to teach back signs and symptoms related to disease process for when to call 911? Yes   Additional teach back comments pt maintaining fluid restrictions, pt will also start weighing herself daily and monitor for fluid weight gain and edema.   TCM call completed? Yes   Call end time 1556            Diana Colin RN    8/6/2024, 16:00 EDT

## 2024-08-06 NOTE — OUTREACH NOTE
Prep Survey      Flowsheet Row Responses   [facility] patient discharged from? [facility]   Is LACE score < 7 ? No   Eligibility [facility]   Date of Admission 07/29/24   Date of Discharge 08/05/24   Discharge Disposition Home or Self Care   Discharge diagnosis Liver enzyme elevation   Does the patient have one of the following disease processes/diagnoses(primary or secondary)? Other   Does the patient have Home health ordered? No   Is there a DME ordered? No   Prep survey completed? Yes            Marge NGUYEN - Registered Nurse

## 2024-08-07 LAB
CMV DNA SERPL NAA+PROBE-ACNC: NEGATIVE IU/ML
CMV DNA SERPL NAA+PROBE-LOG IU: NORMAL LOG10 IU/ML
CYTO UR: NORMAL
LAB AP CASE REPORT: NORMAL
LAB AP CLINICAL INFORMATION: NORMAL
LAB AP SPECIAL STAINS: NORMAL
PATH REPORT.FINAL DX SPEC: NORMAL
PATH REPORT.GROSS SPEC: NORMAL

## 2024-08-12 LAB
HSV1 DNA SPEC QL NAA+PROBE: NEGATIVE
HSV2 DNA SPEC QL NAA+PROBE: NEGATIVE

## 2024-08-13 ENCOUNTER — OFFICE VISIT (OUTPATIENT)
Dept: FAMILY MEDICINE CLINIC | Facility: CLINIC | Age: 74
End: 2024-08-13
Payer: MEDICARE

## 2024-08-13 VITALS
TEMPERATURE: 97.2 F | DIASTOLIC BLOOD PRESSURE: 65 MMHG | HEART RATE: 85 BPM | SYSTOLIC BLOOD PRESSURE: 133 MMHG | RESPIRATION RATE: 16 BRPM | HEIGHT: 60 IN | WEIGHT: 134.9 LBS | BODY MASS INDEX: 26.48 KG/M2 | OXYGEN SATURATION: 98 %

## 2024-08-13 DIAGNOSIS — E53.8 B12 DEFICIENCY: Primary | ICD-10-CM

## 2024-08-13 DIAGNOSIS — R53.1 WEAKNESS: ICD-10-CM

## 2024-08-13 DIAGNOSIS — K75.4 AUTOIMMUNE HEPATITIS: ICD-10-CM

## 2024-08-13 DIAGNOSIS — R73.03 PREDIABETES: ICD-10-CM

## 2024-08-13 LAB
ALBUMIN SERPL-MCNC: 3.9 G/DL (ref 3.5–5.2)
ALBUMIN/GLOB SERPL: 0.8 G/DL
ALP SERPL-CCNC: 103 U/L (ref 39–117)
ALT SERPL W P-5'-P-CCNC: 283 U/L (ref 1–33)
ANION GAP SERPL CALCULATED.3IONS-SCNC: 10.7 MMOL/L (ref 5–15)
AST SERPL-CCNC: 288 U/L (ref 1–32)
BASOPHILS # BLD AUTO: 0.03 10*3/MM3 (ref 0–0.2)
BASOPHILS NFR BLD AUTO: 0.9 % (ref 0–1.5)
BILIRUB SERPL-MCNC: 1.4 MG/DL (ref 0–1.2)
BUN SERPL-MCNC: 42 MG/DL (ref 8–23)
BUN/CREAT SERPL: 40.8 (ref 7–25)
CALCIUM SPEC-SCNC: 10 MG/DL (ref 8.6–10.5)
CHLORIDE SERPL-SCNC: 102 MMOL/L (ref 98–107)
CO2 SERPL-SCNC: 30.3 MMOL/L (ref 22–29)
CREAT SERPL-MCNC: 1.03 MG/DL (ref 0.57–1)
DEPRECATED RDW RBC AUTO: 43.4 FL (ref 37–54)
EGFRCR SERPLBLD CKD-EPI 2021: 57.9 ML/MIN/1.73
EOSINOPHIL # BLD AUTO: 0.06 10*3/MM3 (ref 0–0.4)
EOSINOPHIL NFR BLD AUTO: 1.8 % (ref 0.3–6.2)
ERYTHROCYTE [DISTWIDTH] IN BLOOD BY AUTOMATED COUNT: 14 % (ref 12.3–15.4)
FOLATE SERPL-MCNC: 14.1 NG/ML (ref 4.78–24.2)
GLOBULIN UR ELPH-MCNC: 4.8 GM/DL
GLUCOSE SERPL-MCNC: 139 MG/DL (ref 65–99)
HCT VFR BLD AUTO: 37 % (ref 34–46.6)
HGB BLD-MCNC: 12 G/DL (ref 12–15.9)
IMM GRANULOCYTES # BLD AUTO: 0 10*3/MM3 (ref 0–0.05)
IMM GRANULOCYTES NFR BLD AUTO: 0 % (ref 0–0.5)
LYMPHOCYTES # BLD AUTO: 1 10*3/MM3 (ref 0.7–3.1)
LYMPHOCYTES NFR BLD AUTO: 30.2 % (ref 19.6–45.3)
MCH RBC QN AUTO: 27.8 PG (ref 26.6–33)
MCHC RBC AUTO-ENTMCNC: 32.4 G/DL (ref 31.5–35.7)
MCV RBC AUTO: 85.6 FL (ref 79–97)
MONOCYTES # BLD AUTO: 0.29 10*3/MM3 (ref 0.1–0.9)
MONOCYTES NFR BLD AUTO: 8.8 % (ref 5–12)
NEUTROPHILS NFR BLD AUTO: 1.93 10*3/MM3 (ref 1.7–7)
NEUTROPHILS NFR BLD AUTO: 58.3 % (ref 42.7–76)
NRBC BLD AUTO-RTO: 0 /100 WBC (ref 0–0.2)
PLATELET # BLD AUTO: 204 10*3/MM3 (ref 140–450)
PMV BLD AUTO: 9.8 FL (ref 6–12)
POTASSIUM SERPL-SCNC: 3.6 MMOL/L (ref 3.5–5.2)
PROT SERPL-MCNC: 8.7 G/DL (ref 6–8.5)
RBC # BLD AUTO: 4.32 10*6/MM3 (ref 3.77–5.28)
SODIUM SERPL-SCNC: 143 MMOL/L (ref 136–145)
VIT B12 BLD-MCNC: 977 PG/ML (ref 211–946)
WBC NRBC COR # BLD AUTO: 3.31 10*3/MM3 (ref 3.4–10.8)

## 2024-08-13 PROCEDURE — 82746 ASSAY OF FOLIC ACID SERUM: CPT | Performed by: NURSE PRACTITIONER

## 2024-08-13 PROCEDURE — 80053 COMPREHEN METABOLIC PANEL: CPT | Performed by: NURSE PRACTITIONER

## 2024-08-13 PROCEDURE — 85025 COMPLETE CBC W/AUTO DIFF WBC: CPT | Performed by: NURSE PRACTITIONER

## 2024-08-13 PROCEDURE — 82607 VITAMIN B-12: CPT | Performed by: NURSE PRACTITIONER

## 2024-08-13 NOTE — PROGRESS NOTES
Transitional Care Follow Up Visit  Subjective     Qianajanes Altman is a 74 y.o. female who presents for a transitional care management visit.    Within 48 business hours after discharge our office contacted her via telephone to coordinate her care and needs.      I reviewed and discussed the details of that call along with the discharge summary, hospital problems, inpatient lab results, inpatient diagnostic studies, and consultation reports with Qiana.     Current outpatient and discharge medications have been reconciled for the patient.  Reviewed by: LOYD Nolen          8/5/2024     8:01 PM   Date of TCM Phone Call   [facility]   Date of Admission 7/29/2024   Date of Discharge 8/5/2024   Discharge Disposition Home or Self Care     Risk for Readmission (LACE) Score: 8 (8/5/2024  6:00 AM)      History of Present Illness   Course During Hospital Stay:    Hospital Course:  74 y.o. with history of inflammatory bowel disease, GERD, diverticulosis who presented with abdominal pain nausea and vomiting for approximately 2 days prior to admission, initial imaging showing hepatic steatosis prior colectomy, initial LFTs elevated, GI consulted treated with NAC and additional autoimmune workup sent/liver biopsy obtained.  Preliminary studies suspicious for autoimmune hepatitis but our pathologist is sending the test to [place] for additional expert review/confirmation.  No steroids are being started at this time, she can have follow-up CMP and lab monitoring outpatient with GI and likely start on steroids at that time.     Patient was monitored for hyponatremia while in the hospital suspected SIADH component, continue with fluid restriction and salt tabs/urea low-dose Lasix and adjustment regimen as below with improvement.  Follow-up with nephrology outpatient for continued monitoring.  Still mildly low sodium but stable and improved overall.     Started on bowel regimen for constipation,  "needs continued medications at discharge     Patient discharged in stable condition with close PCP nephro and gi follow up. Return precautions and follow up discussed and patient voiced agreement and understanding of treatment plan.      DISCHARGE Follow Up Recommendations for labs and diagnostics:   Close follow up as above w/ lab monitoring  Prediabetic on A1c 5.9, when initiated on steroids will likely need adjusted diabetic regimen and PCP prophylaxis depending on duration/dosing      She is very worried today.  Her daughters told her that she had a parasite and she is very very worried from the biopsy.  She does not follow-up with GI until August 30.  She is due labs today for recheck.  We will continue to monitor her sugars.  She does have her grandson with her today as he is got to go to an appointment later this afternoon.  The following portions of the patient's history were reviewed and updated as appropriate: allergies, current medications, past family history, past medical history, past social history, past surgical history, and problem list.    Review of Systems   Constitutional:  Positive for fatigue.   Respiratory:  Negative for cough and shortness of breath.    Cardiovascular:  Negative for chest pain.   Gastrointestinal:  Negative for constipation, nausea and vomiting.   Psychiatric/Behavioral:  The patient is nervous/anxious.        Objective   /65   Pulse 85   Temp 97.2 °F (36.2 °C)   Resp 16   Ht 152.4 cm (60\")   Wt 61.2 kg (134 lb 14.4 oz)   SpO2 98%   BMI 26.35 kg/m²   Physical Exam  Vitals reviewed.   Constitutional:       Appearance: Normal appearance. She is well-developed.   Cardiovascular:      Rate and Rhythm: Normal rate and regular rhythm.      Heart sounds: Normal heart sounds. No murmur heard.  Pulmonary:      Effort: Pulmonary effort is normal.      Breath sounds: Normal breath sounds.   Neurological:      Mental Status: She is alert and oriented to person, place, and " time.      Cranial Nerves: No cranial nerve deficit.      Motor: No weakness.   Psychiatric:         Mood and Affect: Mood and affect normal.         Assessment & Plan   Problems Addressed this Visit          Endocrine and Metabolic    B12 deficiency - Primary    Relevant Orders    Vitamin B12 & Folate     Other Visit Diagnoses       Autoimmune hepatitis        Relevant Orders    CBC & Differential    Comprehensive Metabolic Panel    Weakness        Relevant Orders    CBC & Differential    Comprehensive Metabolic Panel    Vitamin B12 & Folate    Prediabetes              Diagnoses         Codes Comments    B12 deficiency    -  Primary ICD-10-CM: E53.8  ICD-9-CM: 266.2     Autoimmune hepatitis     ICD-10-CM: K75.4  ICD-9-CM: 571.42     Weakness     ICD-10-CM: R53.1  ICD-9-CM: 780.79     Prediabetes     ICD-10-CM: R73.03  ICD-9-CM: 790.29         Discussed that we will do labs today.  We will need to check her A1c in roughly 8 to 12 weeks.  I discussed that I read the pathology report and I am not seeing an actual parasite.  The report says that it is auto immune hepatitis.  She is going to get her daughters to look at it again and send me a picture of where they see it is a form of a parasite which she said like is snail based parasite.  LOYD Nolen

## 2024-08-14 ENCOUNTER — READMISSION MANAGEMENT (OUTPATIENT)
Dept: CALL CENTER | Facility: HOSPITAL | Age: 74
End: 2024-08-14
Payer: MEDICARE

## 2024-08-14 NOTE — OUTREACH NOTE
Medical Week 2 Survey      Flowsheet Row Responses   [facility] patient discharged from? [facility]   Does the patient have one of the following disease processes/diagnoses(primary or secondary)? Other   Week 2 attempt successful? Yes   Call start time 1612   Discharge diagnosis Liver enzyme elevation   Call end time 1614   Person spoke with today (if not patient) and relationship patient   Does the patient have a primary care provider?  Yes   Comments regarding PCP Seen pcp yesterday   Has home health visited the patient within 72 hours of discharge? N/A   Psychosocial issues? No   Did the patient receive a copy of their discharge instructions? Yes   Nursing interventions Reviewed instructions with patient   What is the patient's perception of their health status since discharge? Improving   Is the patient/caregiver able to teach back signs and symptoms related to disease process for when to call PCP? Yes   Is the patient/caregiver able to teach back signs and symptoms related to disease process for when to call 911? Yes   Is the patient/caregiver able to teach back the hierarchy of who to call/visit for symptoms/problems? PCP, Specialist, Home health nurse, Urgent Care, ED, 911 Yes   Week 2 Call Completed? Yes   Graduated Yes   Wrap up additional comments patient reports doing well. Seen pcp yesterday. Awaiting Biopsy results.   Call end time 1614            Maryjane DARBY - Registered Nurse

## 2024-08-15 ENCOUNTER — TELEPHONE (OUTPATIENT)
Dept: GASTROENTEROLOGY | Facility: CLINIC | Age: 74
End: 2024-08-15
Payer: MEDICARE

## 2024-08-15 DIAGNOSIS — K74.00 FIBROSIS OF LIVER: ICD-10-CM

## 2024-08-15 DIAGNOSIS — K75.4 AUTOIMMUNE HEPATITIS: Primary | ICD-10-CM

## 2024-08-15 LAB — SOLUBLE LIVER AB SER-ACNC: <20 UNITS

## 2024-08-15 RX ORDER — PREDNISONE 50 MG/1
50 TABLET ORAL DAILY
Qty: 30 TABLET | Refills: 0 | Status: SHIPPED | OUTPATIENT
Start: 2024-08-15 | End: 2024-09-14

## 2024-08-15 NOTE — TELEPHONE ENCOUNTER
Hub staff attempted to follow warm transfer process and was unsuccessful     Caller: HAZEL MIKAEL    Relationship to patient: JEFFREY    Best call back number: [phone]     Patient is needing: MS LOGSDON CALLED REGARDING THE STEROIDS FOR THE AUTOIMMUNE HEP. THAT SHE WAS DIAGNOSED WITH WHILE IN THE HOSPITAL. MS. YOUSSEF SAID THAT NO ONE HAS CALLED REGARDING THIS ISSUE. PLEASE REVIEW AND ADVISE. CAN ALSO CALL MS. AVILA [phone]    OK TO CALL BACK ANYTIME. OK TO LEAVE .

## 2024-08-21 NOTE — TELEPHONE ENCOUNTER
Spoke with pt - has started prednisone and feeling better. Patient states would also like to follow Dr Osorio. Pt will come in for follow up 8.30.24. ( Pt understands this is a transfer of care)

## 2024-08-26 ENCOUNTER — HOSPITAL ENCOUNTER (EMERGENCY)
Facility: HOSPITAL | Age: 74
Discharge: HOME | End: 2024-08-27
Attending: EMERGENCY MEDICINE
Payer: MEDICARE

## 2024-08-26 VITALS
SYSTOLIC BLOOD PRESSURE: 125 MMHG | RESPIRATION RATE: 16 BRPM | BODY MASS INDEX: 26.45 KG/M2 | HEART RATE: 76 BPM | TEMPERATURE: 98.3 F | DIASTOLIC BLOOD PRESSURE: 75 MMHG | OXYGEN SATURATION: 99 % | HEIGHT: 60 IN | WEIGHT: 134.7 LBS

## 2024-08-26 DIAGNOSIS — R73.9 HYPERGLYCEMIA: Primary | ICD-10-CM

## 2024-08-26 LAB
ACETONE BLD QL: NEGATIVE
ALBUMIN SERPL-MCNC: 3.4 G/DL (ref 3.5–5.2)
ALBUMIN/GLOB SERPL: 0.8 G/DL
ALP SERPL-CCNC: 100 U/L (ref 39–117)
ALT SERPL W P-5'-P-CCNC: 95 U/L (ref 1–33)
ANION GAP SERPL CALCULATED.3IONS-SCNC: 9.8 MMOL/L (ref 5–15)
AST SERPL-CCNC: 50 U/L (ref 1–32)
BASOPHILS # BLD AUTO: 0.01 10*3/MM3 (ref 0–0.2)
BASOPHILS NFR BLD AUTO: 0.1 % (ref 0–1.5)
BILIRUB SERPL-MCNC: 1.5 MG/DL (ref 0–1.2)
BILIRUB UR QL STRIP: NEGATIVE
BUN SERPL-MCNC: 21 MG/DL (ref 8–23)
BUN/CREAT SERPL: 18.6 (ref 7–25)
CALCIUM SPEC-SCNC: 8.8 MG/DL (ref 8.6–10.5)
CHLORIDE SERPL-SCNC: 96 MMOL/L (ref 98–107)
CLARITY UR: CLEAR
CO2 SERPL-SCNC: 28.2 MMOL/L (ref 22–29)
COLOR UR: ABNORMAL
CREAT SERPL-MCNC: 1.13 MG/DL (ref 0.57–1)
D-LACTATE SERPL-SCNC: 2 MMOL/L (ref 0.5–2)
DEPRECATED RDW RBC AUTO: 48 FL (ref 37–54)
EGFRCR SERPLBLD CKD-EPI 2021: 51.8 ML/MIN/1.73
EOSINOPHIL # BLD AUTO: 0 10*3/MM3 (ref 0–0.4)
EOSINOPHIL NFR BLD AUTO: 0 % (ref 0.3–6.2)
ERYTHROCYTE [DISTWIDTH] IN BLOOD BY AUTOMATED COUNT: 15 % (ref 12.3–15.4)
GLOBULIN UR ELPH-MCNC: 4.4 GM/DL
GLUCOSE BLDC GLUCOMTR-MCNC: 303 MG/DL (ref 70–99)
GLUCOSE SERPL-MCNC: 360 MG/DL (ref 65–99)
GLUCOSE UR STRIP-MCNC: ABNORMAL MG/DL
HCT VFR BLD AUTO: 39.2 % (ref 34–46.6)
HGB BLD-MCNC: 12.5 G/DL (ref 12–15.9)
HGB UR QL STRIP.AUTO: NEGATIVE
HOLD SPECIMEN: NORMAL
HOLD SPECIMEN: NORMAL
IMM GRANULOCYTES # BLD AUTO: 0.04 10*3/MM3 (ref 0–0.05)
IMM GRANULOCYTES NFR BLD AUTO: 0.5 % (ref 0–0.5)
KETONES UR QL STRIP: NEGATIVE
LEUKOCYTE ESTERASE UR QL STRIP.AUTO: NEGATIVE
LIPASE SERPL-CCNC: 39 U/L (ref 13–60)
LYMPHOCYTES # BLD AUTO: 0.76 10*3/MM3 (ref 0.7–3.1)
LYMPHOCYTES NFR BLD AUTO: 10.1 % (ref 19.6–45.3)
MCH RBC QN AUTO: 27.8 PG (ref 26.6–33)
MCHC RBC AUTO-ENTMCNC: 31.9 G/DL (ref 31.5–35.7)
MCV RBC AUTO: 87.1 FL (ref 79–97)
MONOCYTES # BLD AUTO: 0.2 10*3/MM3 (ref 0.1–0.9)
MONOCYTES NFR BLD AUTO: 2.6 % (ref 5–12)
NEUTROPHILS NFR BLD AUTO: 6.54 10*3/MM3 (ref 1.7–7)
NEUTROPHILS NFR BLD AUTO: 86.7 % (ref 42.7–76)
NITRITE UR QL STRIP: NEGATIVE
NRBC BLD AUTO-RTO: 0 /100 WBC (ref 0–0.2)
OSMOLALITY SERPL: 304 MOSM/KG (ref 280–301)
PH UR STRIP.AUTO: 5.5 [PH] (ref 5–8)
PLATELET # BLD AUTO: 193 10*3/MM3 (ref 140–450)
PMV BLD AUTO: 8.6 FL (ref 6–12)
POTASSIUM SERPL-SCNC: 4.4 MMOL/L (ref 3.5–5.2)
PROT SERPL-MCNC: 7.8 G/DL (ref 6–8.5)
PROT UR QL STRIP: ABNORMAL
RBC # BLD AUTO: 4.5 10*6/MM3 (ref 3.77–5.28)
SODIUM SERPL-SCNC: 134 MMOL/L (ref 136–145)
SP GR UR STRIP: 1.03 (ref 1–1.03)
UROBILINOGEN UR QL STRIP: ABNORMAL
WBC NRBC COR # BLD AUTO: 7.55 10*3/MM3 (ref 3.4–10.8)
WHOLE BLOOD HOLD COAG: NORMAL
WHOLE BLOOD HOLD SPECIMEN: NORMAL

## 2024-08-26 PROCEDURE — 85025 COMPLETE CBC W/AUTO DIFF WBC: CPT

## 2024-08-26 PROCEDURE — 83036 HEMOGLOBIN GLYCOSYLATED A1C: CPT | Performed by: NURSE PRACTITIONER

## 2024-08-26 PROCEDURE — 82948 REAGENT STRIP/BLOOD GLUCOSE: CPT

## 2024-08-26 PROCEDURE — 83690 ASSAY OF LIPASE: CPT

## 2024-08-26 PROCEDURE — 99283 EMERGENCY DEPT VISIT LOW MDM: CPT

## 2024-08-26 PROCEDURE — 80053 COMPREHEN METABOLIC PANEL: CPT

## 2024-08-26 PROCEDURE — 81003 URINALYSIS AUTO W/O SCOPE: CPT

## 2024-08-26 PROCEDURE — 82009 KETONE BODYS QUAL: CPT

## 2024-08-26 PROCEDURE — 36415 COLL VENOUS BLD VENIPUNCTURE: CPT

## 2024-08-26 PROCEDURE — 83930 ASSAY OF BLOOD OSMOLALITY: CPT

## 2024-08-26 PROCEDURE — 83605 ASSAY OF LACTIC ACID: CPT

## 2024-08-26 RX ORDER — SODIUM CHLORIDE 0.9 % (FLUSH) 0.9 %
10 SYRINGE (ML) INJECTION AS NEEDED
Status: DISCONTINUED | OUTPATIENT
Start: 2024-08-26 | End: 2024-08-27 | Stop reason: HOSPADM

## 2024-08-26 RX ORDER — METFORMIN HCL 500 MG
500 TABLET, EXTENDED RELEASE 24 HR ORAL 3 TIMES DAILY
Qty: 90 TABLET | Refills: 1 | Status: SHIPPED | OUTPATIENT
Start: 2024-08-26

## 2024-08-27 ENCOUNTER — OFFICE VISIT (OUTPATIENT)
Dept: FAMILY MEDICINE CLINIC | Facility: CLINIC | Age: 74
End: 2024-08-27
Payer: MEDICARE

## 2024-08-27 DIAGNOSIS — R73.02 IMPAIRED GLUCOSE TOLERANCE (ORAL): Primary | ICD-10-CM

## 2024-08-27 DIAGNOSIS — R73.03 PREDIABETES: ICD-10-CM

## 2024-08-27 DIAGNOSIS — K75.4 AUTOIMMUNE HEPATITIS: ICD-10-CM

## 2024-08-27 LAB
GLUCOSE BLDC GLUCOMTR-MCNC: 213 MG/DL (ref 70–99)
GLUCOSE BLDC GLUCOMTR-MCNC: 270 MG/DL (ref 70–99)
HBA1C MFR BLD: 7.1 % (ref 4.8–5.6)

## 2024-08-27 PROCEDURE — 3044F HG A1C LEVEL LT 7.0%: CPT | Performed by: NURSE PRACTITIONER

## 2024-08-27 PROCEDURE — 82948 REAGENT STRIP/BLOOD GLUCOSE: CPT

## 2024-08-27 PROCEDURE — 1159F MED LIST DOCD IN RCRD: CPT | Performed by: NURSE PRACTITIONER

## 2024-08-27 PROCEDURE — 1125F AMNT PAIN NOTED PAIN PRSNT: CPT | Performed by: NURSE PRACTITIONER

## 2024-08-27 PROCEDURE — 1160F RVW MEDS BY RX/DR IN RCRD: CPT | Performed by: NURSE PRACTITIONER

## 2024-08-27 PROCEDURE — 63710000001 INSULIN REGULAR HUMAN PER 5 UNITS

## 2024-08-27 PROCEDURE — 99213 OFFICE O/P EST LOW 20 MIN: CPT | Performed by: NURSE PRACTITIONER

## 2024-08-27 RX ADMIN — INSULIN HUMAN 6 UNITS: 100 INJECTION, SOLUTION PARENTERAL at 00:25

## 2024-08-27 NOTE — ED PROVIDER NOTES
Time: 1:16 AM EDT  Date of encounter:  2024  Independent Historian/Clinical History and Information was obtained by:   Patient    History is limited by: N/A    Chief Complaint: Hyperglycemia      History of Present Illness:  Patient is a 74 y.o. year old female who presents to the emergency department for evaluation of hyperglycemia.  Patient states that she has had some abdominal discomfort of the hyperglycemia today.  Denies any dysuria.  She does take metformin.  She has been compliant with this medication. Aox4.  Denies any nausea, vomiting, constipation.  She has had some loose bowel movement.      Patient Care Team  Primary Care Provider: Zohreh Mcduffie APRN    Past Medical History:     Allergies   Allergen Reactions    Shellfish Allergy Anaphylaxis    Moxifloxacin Hcl Unknown - Low Severity    Nitrous Oxide Other (See Comments)    Scopolamine Swelling    Sulfa Antibiotics Unknown - Low Severity    Morphine Rash    Penicillins Rash     Past Medical History:   Diagnosis Date    Allergic rhinitis due to allergen 09/10/2019    Anemia 09/10/2019    Anxiety disorder 2020    B12 deficiency 2020    Callus     Cataract     COVID-19 2022    Depression     Diverticulosis     Essential hypertension 2019    Family history of myocardial infarction 2023    GERD (gastroesophageal reflux disease) 2019    Graves' disease     HLD (hyperlipidemia) 2019    Hyperthyroidism     Hypothyroidism     Inflammatory bowel disease     Major depressive disorder 2019    Osteoarthritis 2020    Osteopenia     Peptic ulceration     T2DM (type 2 diabetes mellitus) 2019    Tuberculosis     Took meds will never need to be tested again    Vitamin D deficiency 2019     Past Surgical History:   Procedure Laterality Date    APPENDECTOMY       SECTION      CHOLECYSTECTOMY  2016    DR SCALES    COLON SURGERY      COLONOSCOPY      HIP FRACTURE SURGERY Left      REPAIR    HIP SURGERY      OOPHORECTOMY      SMALL INTESTINE SURGERY      TUBAL ABDOMINAL LIGATION      UPPER GASTROINTESTINAL ENDOSCOPY       Family History   Problem Relation Age of Onset    Heart disease Mother     Heart failure Mother     Arthritis Mother     Hyperlipidemia Mother     Hypertension Mother     Stroke Mother     Thyroid disease Mother     Heart disease Brother     Heart disease Brother     Rectal cancer Maternal Grandmother     Kidney disease Daughter     Miscarriages / Stillbirths Daughter     Birth defects Daughter         Cleft palate    Lung cancer Other     Skin cancer Other        Home Medications:  Prior to Admission medications    Medication Sig Start Date End Date Taking? Authorizing Provider   cetirizine (zyrTEC) 10 MG tablet Take 1 tablet by mouth Daily. 1/31/24   Zohreh Mcduffie APRN   Cholecalciferol 25 MCG (1000 UT) tablet Take 1 tablet by mouth Daily.    Provider, MD Laverne   esomeprazole (nexIUM) 40 MG capsule TAKE ONE CAPSULE BY MOUTH ONCE DAILY 6/11/24   Zohreh Mcduffie APRN   fluticasone (FLONASE) 50 MCG/ACT nasal spray 2 sprays by Each Nare route Daily. 3/27/24   Zohreh Mcduffie APRN   furosemide (LASIX) 20 MG tablet Take 1 tablet by mouth 2 (Two) Times a Day for 30 days. 8/5/24 9/4/24  Stuart Baker MD   metFORMIN ER (GLUCOPHAGE-XR) 500 MG 24 hr tablet Take 1 tablet by mouth 3 times a day. 8/26/24   Zohreh Mcduffie APRN   predniSONE (DELTASONE) 50 MG tablet Take 1 tablet by mouth Daily for 30 days. Take 1 tablet 13 hours, 7 hours and 1 hour before CT with contrast 8/15/24 9/14/24  Peter Osorio MD   sennosides-docusate (PERICOLACE) 8.6-50 MG per tablet Take 1 tablet by mouth 2 (Two) Times a Day for 30 days. 8/5/24 9/4/24  Stuart Baker MD   sodium chloride 1 g tablet Take 1 tablet by mouth 2 (Two) Times a Day With Meals for 30 days. 8/5/24 9/4/24  Stuart Baker MD   Urea (URE-NA) 15 g pack packet Take 15 g by mouth 2 (Two) Times a Day  "for 30 days. 24  Stuart Baker MD        Social History:   Social History     Tobacco Use    Smoking status: Former     Current packs/day: 0.00     Average packs/day: 1 pack/day for 5.0 years (5.0 ttl pk-yrs)     Types: Cigarettes     Start date: 1980     Quit date: 1985     Years since quittin.6     Passive exposure: Past    Smokeless tobacco: Never    Tobacco comments:     QUIT 28 YEARS AGO   Vaping Use    Vaping status: Never Used   Substance Use Topics    Alcohol use: Never    Drug use: Never         Review of Systems:  Review of Systems   Constitutional:  Negative for chills and fever.   HENT:  Negative for ear pain.    Eyes:  Negative for pain.   Respiratory:  Negative for cough and shortness of breath.    Cardiovascular:  Negative for chest pain.   Gastrointestinal:  Positive for abdominal pain and diarrhea. Negative for nausea and vomiting.   Genitourinary:  Negative for dysuria.   Musculoskeletal:  Negative for arthralgias.   Skin:  Negative for rash.   Neurological:  Negative for headaches.        Physical Exam:  /75 (BP Location: Left arm, Patient Position: Sitting)   Pulse 76   Temp 98.3 °F (36.8 °C) (Oral)   Resp 16   Ht 152.4 cm (60\")   Wt 61.1 kg (134 lb 11.2 oz)   SpO2 99%   BMI 26.31 kg/m²     Physical Exam  Vitals and nursing note reviewed.   Constitutional:       Appearance: Normal appearance.   HENT:      Head: Normocephalic and atraumatic.      Nose: Nose normal.      Mouth/Throat:      Mouth: Mucous membranes are moist.   Eyes:      Extraocular Movements: Extraocular movements intact.      Conjunctiva/sclera: Conjunctivae normal.      Pupils: Pupils are equal, round, and reactive to light.   Cardiovascular:      Rate and Rhythm: Normal rate and regular rhythm.      Pulses: Normal pulses.      Heart sounds: Normal heart sounds.   Pulmonary:      Effort: Pulmonary effort is normal.      Breath sounds: Normal breath sounds.   Abdominal:      General: Abdomen " is flat. There is no distension.      Palpations: Abdomen is soft.      Tenderness: There is no abdominal tenderness.   Musculoskeletal:         General: Normal range of motion.      Cervical back: Normal range of motion and neck supple.   Skin:     General: Skin is warm and dry.   Neurological:      General: No focal deficit present.      Mental Status: She is alert and oriented to person, place, and time.   Psychiatric:         Mood and Affect: Mood normal.         Behavior: Behavior normal.                  Procedures:  Procedures      Medical Decision Making:      Comorbidities that affect care:    Type 2 diabetes, diverticulosis, inflammatory bowel disease, hyperlipidemia    External Notes reviewed:    None      The following orders were placed and all results were independently analyzed by me:  Orders Placed This Encounter   Procedures    [place] Draw    Comprehensive Metabolic Panel    Lipase    Urinalysis With Microscopic If Indicated (No Culture) - Urine, Clean Catch    Lactic Acid, Plasma    CBC Auto Differential    Acetone    Osmolality, Serum    NPO Diet NPO Type: Strict NPO    Undress & Gown    POC Glucose Once    POC Glucose Once    POC Glucose Once    Insert Peripheral IV    CBC & Differential    Green Top (Gel)    Lavender Top    Gold Top - SST    Light Blue Top       Medications Given in the Emergency Department:  Medications   sodium chloride 0.9 % flush 10 mL (has no administration in time range)   sodium chloride 0.9 % bolus 1,000 mL (has no administration in time range)   insulin regular (humuLIN R,novoLIN R) injection 6 Units (6 Units Subcutaneous Given 8/27/24 0025)        ED Course:    ED Course as of 08/27/24 0146   Tue Aug 27, 2024   0143  [MV]      ED Course User Index  [MV] Hilario Gandhi PA       Labs:    Lab Results (last 24 hours)       Procedure Component Value Units Date/Time    CBC & Differential [[id number]]  (Abnormal) Collected: 08/26/24 2140    Specimen: Blood Updated:  08/26/24 2145    Narrative:      The following orders were created for panel order CBC & Differential.  Procedure                               Abnormality         Status                     ---------                               -----------         ------                     CBC Auto Differential[[id number]]        Abnormal            Final result                 Please view results for these tests on the individual orders.    Comprehensive Metabolic Panel [[id number]]  (Abnormal) Collected: 08/26/24 2140    Specimen: Blood Updated: 08/26/24 2205     Glucose 360 mg/dL      BUN 21 mg/dL      Creatinine 1.13 mg/dL      Sodium 134 mmol/L      Potassium 4.4 mmol/L      Chloride 96 mmol/L      CO2 28.2 mmol/L      Calcium 8.8 mg/dL      Total Protein 7.8 g/dL      Albumin 3.4 g/dL      ALT (SGPT) 95 U/L      AST (SGOT) 50 U/L      Alkaline Phosphatase 100 U/L      Total Bilirubin 1.5 mg/dL      Globulin 4.4 gm/dL      A/G Ratio 0.8 g/dL      BUN/Creatinine Ratio 18.6     Anion Gap 9.8 mmol/L      eGFR 51.8 mL/min/1.73     Narrative:      GFR Normal >60  Chronic Kidney Disease <60  Kidney Failure <15    The GFR formula is only valid for adults with stable renal function between ages 18 and 70.    Lipase [[id number]]  (Normal) Collected: 08/26/24 2140    Specimen: Blood Updated: 08/26/24 2205     Lipase 39 U/L     Lactic Acid, Plasma [[id number]]  (Normal) Collected: 08/26/24 2140    Specimen: Blood Updated: 08/26/24 2209     Lactate 2.0 mmol/L     CBC Auto Differential [[id number]]  (Abnormal) Collected: 08/26/24 2140    Specimen: Blood Updated: 08/26/24 2145     WBC 7.55 10*3/mm3      RBC 4.50 10*6/mm3      Hemoglobin 12.5 g/dL      Hematocrit 39.2 %      MCV 87.1 fL      MCH 27.8 pg      MCHC 31.9 g/dL      RDW 15.0 %      RDW-SD 48.0 fl      MPV 8.6 fL      Platelets 193 10*3/mm3      Neutrophil % 86.7 %      Lymphocyte % 10.1 %      Monocyte % 2.6 %      Eosinophil % 0.0 %      Basophil % 0.1 %      Immature Grans % 0.5  %      Neutrophils, Absolute 6.54 10*3/mm3      Lymphocytes, Absolute 0.76 10*3/mm3      Monocytes, Absolute 0.20 10*3/mm3      Eosinophils, Absolute 0.00 10*3/mm3      Basophils, Absolute 0.01 10*3/mm3      Immature Grans, Absolute 0.04 10*3/mm3      nRBC 0.0 /100 WBC     Acetone [[id number]]  (Normal) Collected: 08/26/24 2140    Specimen: Blood Updated: 08/26/24 2333     Acetone Negative    Osmolality, Serum [[id number]]  (Abnormal) Collected: 08/26/24 2140    Specimen: Blood Updated: 08/26/24 2336     Osmolality 304 mOsm/kg     POC Glucose Once [[id number]]  (Abnormal) Collected: 08/26/24 2147    Specimen: Blood Updated: 08/26/24 2149     Glucose 303 mg/dL      Comment: Serial Number: [id number]:  [id number]       Urinalysis With Microscopic If Indicated (No Culture) - Urine, Clean Catch [[id number]]  (Abnormal) Collected: 08/26/24 2148    Specimen: Urine, Clean Catch Updated: 08/26/24 2157     Color, UA Dark Yellow     Appearance, UA Clear     pH, UA 5.5     Specific Gravity, UA 1.026     Glucose,  mg/dL (Trace)     Ketones, UA Negative     Bilirubin, UA Negative     Blood, UA Negative     Protein, UA Trace     Leuk Esterase, UA Negative     Nitrite, UA Negative     Urobilinogen, UA 1.0 E.U./dL    Narrative:      Urine microscopic not indicated.    POC Glucose Once [[id number]]  (Abnormal) Collected: 08/27/24 0025    Specimen: Blood Updated: 08/27/24 0026     Glucose 270 mg/dL      Comment: Serial Number: [id number]:  [id number]       POC Glucose Once [[id number]]  (Abnormal) Collected: 08/27/24 0141    Specimen: Blood Updated: 08/27/24 0143     Glucose 213 mg/dL      Comment: Serial Number: [id number]:  [id number]                Imaging:    No Radiology Exams Resulted Within Past 24 Hours      Differential Diagnosis and Discussion:    Abdominal Pain: Based on the patient's signs and symptoms, I considered abdominal aortic aneurysm, small bowel obstruction, pancreatitis, acute cholecystitis,  acute appendecitis, peptic ulcer disease, gastritis, colitis, endocrine disorders, irritable bowel syndrome and other differential diagnosis an etiology of the patient's abdominal pain.    All labs were reviewed and interpreted by me.    MDM     Amount and/or Complexity of Data Reviewed  Decide to obtain previous medical records or to obtain history from someone other than the patient: yes    Risk of Complications, Morbidity, and/or Mortality  Presenting problems: moderate  Diagnostic procedures: low  Management options: low    Patient Progress  Patient progress: stable    Patient presents to the emergency department for evaluation of hyperglycemia.  Patient states that she has had some abdominal discomfort of the hyperglycemia today.  Denies any dysuria.  She does take metformin.  She has been compliant with this medication. Aox4.  Denies any nausea, vomiting, constipation.  She has had some loose bowel movement.     On exam, abdomen is soft and nontender in all quadrants.  She does have mild discomfort to the left lower quadrant.    The patient´s CBC that was reviewed and interpreted by me shows no abnormalities of critical concern. Of note, there is no anemia requiring a blood transfusion and the platelet count is acceptable.      The patient´s CMP that was reviewed and interpretted by me shows no abnormalities of critical concern. Of note, the patient´s sodium and potassium are acceptable.  The patient´s renal function (creatinine) is preserved. The patient has a normal anion gap.    Patient sodium is a little low at 134.  This most likely due to the high glucose of 360.  Liver enzymes are slightly elevated.  Patient does see a GI doctor.    Acetone negative.  Osmolality unremarkable.  Negative for HHS and DKA.    Patient's blood sugar dropped from the 300s to 217 after an insulin shot.  Patient will follow-up with her PCP in 3 days for further evaluation.  Her liver enzymes are slightly elevated today.  Patient  will keep her appointment with her liver doctor on August 30.            Patient Care Considerations:    ANTIBIOTICS: I considered prescribing antibiotics as an outpatient however no bacterial focus of infection was found.      Consultants/Shared Management Plan:    None    Social Determinants of Health:    Patient is independent, reliable, and has access to care.       Disposition and Care Coordination:    Discharged: The patient is suitable and stable for discharge with no need for consideration of admission.    I have explained the patient´s condition, diagnoses and treatment plan based on the information available to me at this time. I have answered questions and addressed any concerns. The patient has a good  understanding of the patient´s diagnosis, condition, and treatment plan as can be expected at this point. The vital signs have been stable. The patient´s condition is stable and appropriate for discharge from the emergency department.      The patient will pursue further outpatient evaluation with the primary care physician or other designated or consulting physician as outlined in the discharge instructions. They are agreeable to this plan of care and follow-up instructions have been explained in detail. The patient has received these instructions in written format and has expressed an understanding of the discharge instructions. The patient is aware that any significant change in condition or worsening of symptoms should prompt an immediate return to this or the closest emergency department or call to 911.  I have explained discharge medications and the need for follow up with the patient/caretakers. This was also printed in the discharge instructions. Patient was discharged with the following medications and follow up:      Medication List        New Prescriptions      metFORMIN  MG 24 hr tablet  Commonly known as: GLUCOPHAGE-XR  Take 1 tablet by mouth 3 times a day.               Where to Get  Your Medications        These medications were sent to [facility] - [place], [place] - [address] - [phone]  - [phone] FX  [address] [place] [address]      Phone: [phone]   metFORMIN  MG 24 hr tablet      Zohreh Mcduffie, APRN  [address] [place] [address] [phone]    In 3 days         Final diagnoses:   Hyperglycemia        ED Disposition       ED Disposition   Discharge    Condition   Stable    Comment   --               This medical record created using voice recognition software.             Hilario Gandhi PA  08/27/24 0146

## 2024-08-27 NOTE — PROGRESS NOTES
Chief Complaint  Blood Sugar Problem    Subjective          Qiana Altman presents to [facility]  History of Present Illness  Patient went to the emergency room yesterday evening with a blood sugar over 300.  She is currently on prednisone.  Her daughter reached out to me through sougou and I discussed that the sugars were elevated due to the steroid use for her liver.  The emergency room did give her a injection of insulin.  She is here today just to know kind of what she can eat and what she cannot eat for diet and when and how often does she need to check her blood sugar.  She said she is very nervous this morning.  Her grandson is with her today.    She is worried that she is going to do something not correct    She does drive herself here in town but she does not going to [place] town.    Her daughters are having to take off and take her places and she is worried about their jobs and time off.  Allergies  Shellfish allergy, Moxifloxacin hcl, Nitrous oxide, Scopolamine, Sulfa antibiotics, Morphine, and Penicillins    Social History     Tobacco Use    Smoking status: Former     Current packs/day: 0.00     Average packs/day: 1 pack/day for 5.0 years (5.0 ttl pk-yrs)     Types: Cigarettes     Start date: 1980     Quit date: 1985     Years since quittin.6     Passive exposure: Past    Smokeless tobacco: Never    Tobacco comments:     QUIT 28 YEARS AGO   Vaping Use    Vaping status: Never Used   Substance Use Topics    Alcohol use: Never    Drug use: Never       Family History   Problem Relation Age of Onset    Heart disease Mother     Heart failure Mother     Arthritis Mother     Hyperlipidemia Mother     Hypertension Mother     Stroke Mother     Thyroid disease Mother     Heart disease Brother     Heart disease Brother     Rectal cancer Maternal Grandmother     Kidney disease Daughter     Miscarriages / Stillbirths Daughter     Birth defects Daughter         Cleft palate     Lung cancer Other     Skin cancer Other         Health Maintenance Due   Topic Date Due    DIABETIC EYE EXAM  Never done    ZOSTER VACCINE (1 of 2) Never done    DIABETIC FOOT EXAM  Never done    URINE MICROALBUMIN  09/23/2021    BMI FOLLOWUP  07/12/2024    DXA SCAN  08/05/2024    INFLUENZA VACCINE  08/01/2024        Immunization History   Administered Date(s) Administered    COVID-19 (MODERNA) 1st,2nd,3rd Dose Monovalent 03/26/2021, 04/23/2021    Flu Vaccine Quad PF >36MO 09/09/2014, 12/29/2015    Fluzone (or Fluarix & Flulaval for VFC) >6mos 09/09/2014, 12/29/2015    Fluzone High-Dose 65+yrs 10/04/2021, 09/29/2022, 10/31/2023    Influenza, Unspecified 09/23/2020    Pneumococcal Conjugate 13-Valent (PCV13) 11/14/2017    Pneumococcal Polysaccharide (PPSV23) 09/23/2020    Tdap 06/22/2016       Review of Systems   Psychiatric/Behavioral:  Positive for stress.         Objective       There were no vitals filed for this visit.    There is no height or weight on file to calculate BMI.         Physical Exam  Constitutional:       Appearance: Normal appearance.   HENT:      Head: Normocephalic.   Pulmonary:      Effort: Pulmonary effort is normal.   Skin:     Findings: No bruising.   Neurological:      General: No focal deficit present.      Mental Status: She is alert and oriented to person, place, and time.   Psychiatric:         Mood and Affect: Mood normal.         Thought Content: Thought content normal.         Judgment: Judgment normal.      Comments: She is tremoring slightly today and she is nervous.               Result Review :     The following data was reviewed by: LOYD Nolen on 08/27/2024:    Common Labs   Common labs          8/5/2024    05:54 8/13/2024    08:56 8/26/2024    21:40   Common Labs   Glucose 118  139  360    BUN 55  42  21    Creatinine 0.95  1.03  1.13    Sodium 132  143  134    Potassium 3.7  3.6  4.4    Chloride 97  102  96    Calcium 9.5  10.0  8.8    Albumin 3.3  3.9  3.4     Total Bilirubin 1.1  1.4  1.5    Alkaline Phosphatase 120  103  100    AST (SGOT) 388  288  50    ALT (SGPT) 377  283  95    WBC  3.31  7.55    Hemoglobin  12.0  12.5    Hematocrit  37.0  39.2    Platelets  204  193      A1C   Most Recent A1C          7/30/2024    04:59   HGBA1C Most Recent   Hemoglobin A1C 5.90            CT Needle Biopsy Liver    Result Date: 8/2/2024  Impression: 1.Successful CT-guided percutaneous liver biopsy without evidence of complication. Electronically Signed: Guillaume Santos MD  8/2/2024 11:13 AM EDT  Workstation ID: [id number]    US Liver    Result Date: 7/30/2024  Impression: No evidence of hepatosplenomegaly. Negative exam. Electronically Signed: Summer Diallo MD  7/30/2024 7:59 AM EDT  Workstation ID: [id number]    CT Abdomen Pelvis With Contrast    Result Date: 7/29/2024  Impression: 1. Hepatic steatosis. 2. Mild hepatosplenomegaly. There may be small gastroesophageal varices. I would recommend clinical correlation with regard to signs and symptoms of portal hypertension. 3. Status post a right hemicolectomy. 4. Calcified uterine fibroid. 5. Suggestion of a small hiatal hernia. 6. Mild dependent atelectasis in the lung bases. Electronically Signed: Kevin Ortega MD  7/29/2024 12:30 PM EDT  Workstation ID: [id number]                Assessment and Plan      Assessment & Plan  Impaired glucose tolerance (oral)  Discussed that we will go ahead and start the metformin 3 times a day.  We may end up actually needing to do Some short-term insulin if the sugars are not good.  Discussed that she is on steroids and steroids will increase the sugars and give a impairment of diabetes.  We did discuss about diet that she needs to eat high-protein foods whether its protein shakes, eggs, cheese, different type of lunch meats.  I did discuss with her that she could have an apple or a banana.  She really needs to cut back the does not have to cut out all carbs but she cannot be eating pancakes with  jelly or jam.  If she eats a biscuit she needs to have protein like sausage varela eggs piece of cheese with it.  She also needs to check her blood sugar fasting then 2 hours after she eats lunch and then again right before she goes to bed.  Log those for me for 2 weeks while taking the metformin.  We will follow-up here in the office at that time with going over her sugars.  If the sugars are going over 400 then she needs to let me know before then so I can add fast acting insulin prior to meals.  I also did place a referral into the diabetic education as urgent so that we can help her and assist with food choices.  Prediabetes    Autoimmune hepatitis  Continue following up with GI    Orders Placed This Encounter   Procedures    Hemoglobin A1c    Ambulatory Referral to Diabetes [facility]             Diagnosis Plan   1. Impaired glucose tolerance (oral)  Ambulatory Referral to Diabetes [facility] [place]    Hemoglobin A1c      2. Prediabetes  Ambulatory Referral to Diabetes [facility] [place]    Hemoglobin A1c      3. Autoimmune hepatitis  Ambulatory Referral to Diabetes [facility]    Hemoglobin A1c            I spent 21 minutes caring for Qiana on this date of service. This time includes time spent by me in the following activities:preparing for the visit, reviewing tests, obtaining and/or reviewing a separately obtained history, performing a medically appropriate examination and/or evaluation , counseling and educating the patient/family/caregiver, ordering medications, tests, or procedures, referring and communicating with other health care professionals , and documenting information in the medical record    Follow Up     Return in about 2 weeks (around 9/10/2024).    Patient was given instructions and counseling regarding her condition or for health maintenance advice. Please see specific information pulled into the AVS if appropriate.     Parts of this note are electronic  transcriptions/translations of spoken language to printed text using the Dragon Dictation system.          Zohreh Mcduffie, LOYD  08/27/2024

## 2024-08-27 NOTE — DISCHARGE INSTRUCTIONS
Your lab work today is mostly unremarkable.  Your sugar is elevated in the 300s when you first arrived.  It did drop to 217 after an insulin shot.  You will need to follow-up with your PCP in 3 days for further evaluation.    Keep your appointment with your liver doctor.  Your liver enzymes are slightly elevated today.    Strict return to the emergency department if you develop any worsening abdominal pain, fever, nausea, vomiting.

## 2024-08-30 ENCOUNTER — OFFICE VISIT (OUTPATIENT)
Dept: GASTROENTEROLOGY | Facility: CLINIC | Age: 74
End: 2024-08-30
Payer: MEDICARE

## 2024-08-30 ENCOUNTER — NUTRITION (OUTPATIENT)
Dept: DIABETES SERVICES | Facility: HOSPITAL | Age: 74
End: 2024-08-30
Payer: MEDICARE

## 2024-08-30 VITALS
SYSTOLIC BLOOD PRESSURE: 119 MMHG | DIASTOLIC BLOOD PRESSURE: 57 MMHG | HEIGHT: 60 IN | HEART RATE: 69 BPM | BODY MASS INDEX: 26.27 KG/M2 | WEIGHT: 133.8 LBS

## 2024-08-30 DIAGNOSIS — K75.4 AUTOIMMUNE HEPATITIS: Primary | ICD-10-CM

## 2024-08-30 DIAGNOSIS — R79.89 ELEVATED LFTS: Primary | ICD-10-CM

## 2024-08-30 DIAGNOSIS — D89.2 HYPERGAMMAGLOBULINEMIA: ICD-10-CM

## 2024-08-30 DIAGNOSIS — E11.65 TYPE 2 DIABETES MELLITUS WITH HYPERGLYCEMIA, WITHOUT LONG-TERM CURRENT USE OF INSULIN: Primary | ICD-10-CM

## 2024-08-30 DIAGNOSIS — R79.89 ABNORMAL LFTS: ICD-10-CM

## 2024-08-30 DIAGNOSIS — K74.00 FIBROSIS OF LIVER: ICD-10-CM

## 2024-08-30 PROCEDURE — 97802 MEDICAL NUTRITION INDIV IN: CPT | Performed by: DIETITIAN, REGISTERED

## 2024-08-30 PROCEDURE — 1160F RVW MEDS BY RX/DR IN RCRD: CPT | Performed by: INTERNAL MEDICINE

## 2024-08-30 PROCEDURE — 3078F DIAST BP <80 MM HG: CPT | Performed by: INTERNAL MEDICINE

## 2024-08-30 PROCEDURE — 99214 OFFICE O/P EST MOD 30 MIN: CPT | Performed by: INTERNAL MEDICINE

## 2024-08-30 PROCEDURE — 3074F SYST BP LT 130 MM HG: CPT | Performed by: INTERNAL MEDICINE

## 2024-08-30 PROCEDURE — 1159F MED LIST DOCD IN RCRD: CPT | Performed by: INTERNAL MEDICINE

## 2024-08-30 RX ORDER — PREDNISONE 10 MG/1
40 TABLET ORAL DAILY
Qty: 120 TABLET | Refills: 0 | Status: SHIPPED | OUTPATIENT
Start: 2024-08-30 | End: 2024-09-29

## 2024-09-03 ENCOUNTER — TELEPHONE (OUTPATIENT)
Dept: CASE MANAGEMENT | Facility: OTHER | Age: 74
End: 2024-09-03
Payer: MEDICARE

## 2024-09-03 NOTE — TELEPHONE ENCOUNTER
Called to discuss CCM services, left message for call back to my [place] office line if interested.

## 2024-09-04 ENCOUNTER — TELEPHONE (OUTPATIENT)
Dept: CASE MANAGEMENT | Facility: OTHER | Age: 74
End: 2024-09-04
Payer: MEDICARE

## 2024-09-04 RX ORDER — FUROSEMIDE 20 MG
20 TABLET ORAL 2 TIMES DAILY
Qty: 60 TABLET | Refills: 0 | Status: SHIPPED | OUTPATIENT
Start: 2024-09-04 | End: 2024-10-04

## 2024-09-04 NOTE — TELEPHONE ENCOUNTER
Caller: Qiana Altman    Relationship: Self    Best call back number:     [phone]     Requested Prescriptions:   Requested Prescriptions     Pending Prescriptions Disp Refills    furosemide (LASIX) 20 MG tablet 60 tablet 0     Sig: Take 1 tablet by mouth 2 (Two) Times a Day for 30 days.        Pharmacy where request should be sent: [place], [place] - [address] [facility] [address] - [phone] PH - [phone] FX     Last office visit with prescribing clinician: 8/27/2024   Last telemedicine visit with prescribing clinician: Visit date not found   Next office visit with prescribing clinician: 9/10/2024     Additional details provided by patient: WAS PRESCRIBED THIS MEDICATION AT THE HOSPITAL    Does the patient have less than a 3 day supply:  [x] Yes  [] No    Would you like a call back once the refill request has been completed: [x] Yes [] No    If the office needs to give you a call back, can they leave a voicemail: [x] Yes [] No    Brendan Mora Rep   09/04/24 10:50 EDT

## 2024-09-04 NOTE — TELEPHONE ENCOUNTER
Outreached to her daughter Holli as noted she has sent messages to PCP office in patient My Chart about labs. I left a message about CCM Case Management and requested call back to the [place] line as I work here today. Second outreach for CCM discussion.

## 2024-09-07 ENCOUNTER — LAB (OUTPATIENT)
Dept: LAB | Facility: HOSPITAL | Age: 74
End: 2024-09-07
Payer: MEDICARE

## 2024-09-07 DIAGNOSIS — E87.1 LOW SODIUM LEVELS: ICD-10-CM

## 2024-09-07 DIAGNOSIS — R79.89 ELEVATED LFTS: ICD-10-CM

## 2024-09-07 DIAGNOSIS — K75.4 AUTOIMMUNE HEPATITIS: ICD-10-CM

## 2024-09-07 LAB
ALBUMIN SERPL-MCNC: 3.7 G/DL (ref 3.5–5.2)
ALP SERPL-CCNC: 73 U/L (ref 39–117)
ALT SERPL W P-5'-P-CCNC: 53 U/L (ref 1–33)
AST SERPL-CCNC: 32 U/L (ref 1–32)
BILIRUB CONJ SERPL-MCNC: 0.2 MG/DL (ref 0–0.3)
BILIRUB INDIRECT SERPL-MCNC: 0.6 MG/DL
BILIRUB SERPL-MCNC: 0.8 MG/DL (ref 0–1.2)
PROT SERPL-MCNC: 6.5 G/DL (ref 6–8.5)

## 2024-09-07 PROCEDURE — 80076 HEPATIC FUNCTION PANEL: CPT

## 2024-09-07 PROCEDURE — 36415 COLL VENOUS BLD VENIPUNCTURE: CPT

## 2024-09-07 PROCEDURE — 80048 BASIC METABOLIC PNL TOTAL CA: CPT

## 2024-09-09 ENCOUNTER — TELEPHONE (OUTPATIENT)
Dept: CASE MANAGEMENT | Facility: OTHER | Age: 74
End: 2024-09-09
Payer: MEDICARE

## 2024-09-10 ENCOUNTER — TELEPHONE (OUTPATIENT)
Dept: GASTROENTEROLOGY | Facility: CLINIC | Age: 74
End: 2024-09-10
Payer: MEDICARE

## 2024-09-10 ENCOUNTER — TELEPHONE (OUTPATIENT)
Dept: GASTROENTEROLOGY | Facility: CLINIC | Age: 74
End: 2024-09-10

## 2024-09-10 ENCOUNTER — OFFICE VISIT (OUTPATIENT)
Dept: FAMILY MEDICINE CLINIC | Facility: CLINIC | Age: 74
End: 2024-09-10
Payer: MEDICARE

## 2024-09-10 VITALS
SYSTOLIC BLOOD PRESSURE: 120 MMHG | HEART RATE: 70 BPM | OXYGEN SATURATION: 99 % | RESPIRATION RATE: 16 BRPM | BODY MASS INDEX: 26.72 KG/M2 | HEIGHT: 60 IN | DIASTOLIC BLOOD PRESSURE: 74 MMHG | WEIGHT: 136.1 LBS | TEMPERATURE: 96.7 F

## 2024-09-10 DIAGNOSIS — R53.1 WEAKNESS: ICD-10-CM

## 2024-09-10 DIAGNOSIS — K75.4 AUTOIMMUNE HEPATITIS: Primary | ICD-10-CM

## 2024-09-10 DIAGNOSIS — R73.03 PREDIABETES: ICD-10-CM

## 2024-09-10 DIAGNOSIS — E87.1 LOW SODIUM LEVELS: ICD-10-CM

## 2024-09-10 DIAGNOSIS — Z12.31 SCREENING MAMMOGRAM FOR BREAST CANCER: ICD-10-CM

## 2024-09-10 DIAGNOSIS — Z78.0 POSTMENOPAUSAL: ICD-10-CM

## 2024-09-10 DIAGNOSIS — R73.02 IMPAIRED GLUCOSE TOLERANCE (ORAL): ICD-10-CM

## 2024-09-10 LAB
ALBUMIN UR-MCNC: 1.3 MG/DL
ANION GAP SERPL CALCULATED.3IONS-SCNC: 13 MMOL/L (ref 5–15)
BUN SERPL-MCNC: 15 MG/DL (ref 8–23)
BUN/CREAT SERPL: 16.9 (ref 7–25)
CALCIUM SPEC-SCNC: 9.6 MG/DL (ref 8.6–10.5)
CHLORIDE SERPL-SCNC: 101 MMOL/L (ref 98–107)
CO2 SERPL-SCNC: 28 MMOL/L (ref 22–29)
CREAT SERPL-MCNC: 0.89 MG/DL (ref 0.57–1)
EGFRCR SERPLBLD CKD-EPI 2021: 69 ML/MIN/1.73
GLUCOSE SERPL-MCNC: 99 MG/DL (ref 65–99)
POTASSIUM SERPL-SCNC: 3.6 MMOL/L (ref 3.5–5.2)
SODIUM SERPL-SCNC: 142 MMOL/L (ref 136–145)

## 2024-09-10 PROCEDURE — 1160F RVW MEDS BY RX/DR IN RCRD: CPT | Performed by: NURSE PRACTITIONER

## 2024-09-10 PROCEDURE — 1170F FXNL STATUS ASSESSED: CPT | Performed by: NURSE PRACTITIONER

## 2024-09-10 PROCEDURE — 99214 OFFICE O/P EST MOD 30 MIN: CPT | Performed by: NURSE PRACTITIONER

## 2024-09-10 PROCEDURE — 3078F DIAST BP <80 MM HG: CPT | Performed by: NURSE PRACTITIONER

## 2024-09-10 PROCEDURE — 3051F HG A1C>EQUAL 7.0%<8.0%: CPT | Performed by: NURSE PRACTITIONER

## 2024-09-10 PROCEDURE — 3074F SYST BP LT 130 MM HG: CPT | Performed by: NURSE PRACTITIONER

## 2024-09-10 PROCEDURE — 82043 UR ALBUMIN QUANTITATIVE: CPT | Performed by: NURSE PRACTITIONER

## 2024-09-10 PROCEDURE — 1159F MED LIST DOCD IN RCRD: CPT | Performed by: NURSE PRACTITIONER

## 2024-09-10 PROCEDURE — 1125F AMNT PAIN NOTED PAIN PRSNT: CPT | Performed by: NURSE PRACTITIONER

## 2024-09-10 PROCEDURE — G2211 COMPLEX E/M VISIT ADD ON: HCPCS | Performed by: NURSE PRACTITIONER

## 2024-09-10 RX ORDER — METFORMIN HCL 500 MG
500 TABLET, EXTENDED RELEASE 24 HR ORAL 3 TIMES DAILY
Qty: 90 TABLET | Refills: 2 | Status: SHIPPED | OUTPATIENT
Start: 2024-09-10

## 2024-09-10 NOTE — TELEPHONE ENCOUNTER
Caller: JOE AVILA    Relationship: SELF    Best call back number: [phone]     What is the best time to reach you: ANYTIME    Who are you requesting to speak with (clinical staff, provider,  specific staff member): CLINICAL STAFF    Do you know the name of the person who called: LIZZIE TENORIO    What was the call regarding: LABS    Is it okay if the provider responds through JasonDBhart: YES    OK TO CALL BACK NOW AND UNTIL 1 PM TOMORROW. OK TO LEAVE .

## 2024-09-10 NOTE — TELEPHONE ENCOUNTER
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

## 2024-09-10 NOTE — TELEPHONE ENCOUNTER
Called pt to review results - no answer -no vm   - pt viewed results on MC -additional message sent on my chart

## 2024-09-10 NOTE — PROGRESS NOTES
Chief Complaint  Prediabetes and Medicare Wellness-subsequent    Subjective          Qianajanes Altman presents to [facility]  History of Present Illness  Pt is here for follow-up.  She is watching her sugars and taking her metformin.    She is doing good overall.  She still has Appointment nephrology.        Depression: Not at risk (9/10/2024)    PHQ-2     PHQ-2 Score: 0    and 9/10/2024    BMI is >= 25 and <30. (Overweight) The following options were offered after discussion;: exercise counseling/recommendations and nutrition counseling/recommendations         Allergies  Shellfish allergy, Moxifloxacin hcl, Nitrous oxide, Scopolamine, Sulfa antibiotics, Morphine, and Penicillins    Social History     Tobacco Use    Smoking status: Former     Current packs/day: 0.00     Average packs/day: 1 pack/day for 5.0 years (5.0 ttl pk-yrs)     Types: Cigarettes     Start date: 1980     Quit date: 1985     Years since quittin.7     Passive exposure: Past    Smokeless tobacco: Never    Tobacco comments:     QUIT 28 YEARS AGO   Vaping Use    Vaping status: Never Used   Substance Use Topics    Alcohol use: Never    Drug use: Never       Family History   Problem Relation Age of Onset    Heart disease Mother     Heart failure Mother     Arthritis Mother     Hyperlipidemia Mother     Hypertension Mother     Stroke Mother     Thyroid disease Mother     Colon polyps Mother     Heart disease Brother     Heart disease Brother     Rectal cancer Maternal Grandmother     Kidney disease Daughter     Miscarriages / Stillbirths Daughter     Birth defects Daughter         Cleft palate    Lung cancer Other     Skin cancer Other         Health Maintenance Due   Topic Date Due    DIABETIC EYE EXAM  Never done    ZOSTER VACCINE (1 of 2) Never done    DIABETIC FOOT EXAM  Never done    URINE MICROALBUMIN  2021    BMI FOLLOWUP  2024    DXA SCAN  2024    COVID-19 Vaccine (3 - 2023-24  "season) 09/01/2024    INFLUENZA VACCINE  08/01/2024        Immunization History   Administered Date(s) Administered    COVID-19 (MODERNA) 1st,2nd,3rd Dose Monovalent 03/26/2021, 04/23/2021    Flu Vaccine Quad PF >36MO 09/09/2014, 12/29/2015    Fluzone (or Fluarix & Flulaval for VFC) >6mos 09/09/2014, 12/29/2015    Fluzone High-Dose 65+yrs 10/04/2021, 09/29/2022, 10/31/2023    Influenza, Unspecified 09/23/2020    Pneumococcal Conjugate 13-Valent (PCV13) 11/14/2017    Pneumococcal Polysaccharide (PPSV23) 09/23/2020    Tdap 06/22/2016       Review of Systems   Constitutional:  Negative for fatigue.   Respiratory:  Negative for cough and shortness of breath.    Cardiovascular:  Negative for chest pain.   Gastrointestinal:  Negative for diarrhea, nausea and vomiting.        Objective       Vitals:    09/10/24 0757 09/10/24 0808   BP: 140/69 120/74   Pulse: 70    Resp: 16    Temp: 96.7 °F (35.9 °C)    SpO2: 99%    Weight: 61.7 kg (136 lb 1.6 oz)    Height: 152.4 cm (60\")        Body mass index is 26.58 kg/m².         Physical Exam  Vitals reviewed.   Constitutional:       Appearance: Normal appearance. She is well-developed.   Cardiovascular:      Rate and Rhythm: Normal rate and regular rhythm.      Heart sounds: Normal heart sounds. No murmur heard.  Pulmonary:      Effort: Pulmonary effort is normal.      Breath sounds: Normal breath sounds.   Neurological:      Mental Status: She is alert and oriented to person, place, and time.      Cranial Nerves: No cranial nerve deficit.      Motor: No weakness.   Psychiatric:         Mood and Affect: Mood and affect normal.               Result Review :     The following data was reviewed by: LOYD Nolen on 09/10/2024:            CT Needle Biopsy Liver    Result Date: 8/2/2024  Impression: 1.Successful CT-guided percutaneous liver biopsy without evidence of complication. Electronically Signed: Guillaume Santos MD  8/2/2024 11:13 AM EDT  Workstation ID: " [id number]    US Liver    Result Date: 7/30/2024  Impression: No evidence of hepatosplenomegaly. Negative exam. Electronically Signed: Summer Diallo MD  7/30/2024 7:59 AM EDT  Workstation ID: [id number]    CT Abdomen Pelvis With Contrast    Result Date: 7/29/2024  Impression: 1. Hepatic steatosis. 2. Mild hepatosplenomegaly. There may be small gastroesophageal varices. I would recommend clinical correlation with regard to signs and symptoms of portal hypertension. 3. Status post a right hemicolectomy. 4. Calcified uterine fibroid. 5. Suggestion of a small hiatal hernia. 6. Mild dependent atelectasis in the lung bases. Electronically Signed: Kevin Ortega MD  7/29/2024 12:30 PM EDT  Workstation ID: [id number]                Assessment and Plan      Assessment & Plan  Autoimmune hepatitis  Cont to follow up with GI  Screening mammogram for breast cancer    Postmenopausal    Impaired glucose tolerance (oral)  Check sugar 2 times a day.  Follow up in 2 mths  Prediabetes    Weakness    Low sodium levels  Cont with neph and we will go from there.    Orders Placed This Encounter   Procedures    DEXA Bone Density Axial    Mammo Screening Digital Tomosynthesis Bilateral With CAD    Basic metabolic panel    MicroAlbumin, Urine, Random - Urine, Clean Catch     New Medications Ordered This Visit   Medications    metFORMIN ER (GLUCOPHAGE-XR) 500 MG 24 hr tablet     Sig: Take 1 tablet by mouth 3 times a day.     Dispense:  90 tablet     Refill:  2          Diagnosis Plan   1. Autoimmune hepatitis        2. Screening mammogram for breast cancer  Mammo Screening Digital Tomosynthesis Bilateral With CAD      3. Postmenopausal  DEXA Bone Density Axial      4. Impaired glucose tolerance (oral)  metFORMIN ER (GLUCOPHAGE-XR) 500 MG 24 hr tablet    MicroAlbumin, Urine, Random - Urine, Clean Catch      5. Prediabetes  metFORMIN ER (GLUCOPHAGE-XR) 500 MG 24 hr tablet    MicroAlbumin, Urine, Random - Urine, Clean Catch      6. Weakness        7.  Low sodium levels  Basic metabolic panel                Follow Up     Return in about 2 months (around 11/10/2024).    Patient was given instructions and counseling regarding her condition or for health maintenance advice. Please see specific information pulled into the AVS if appropriate.     Parts of this note are electronic transcriptions/translations of spoken language to printed text using the Dragon Dictation system.          Zohreh Mcduffie, [email or web address]  09/10/2024  Answers submitted by the patient for this visit:  Other (Submitted on 9/8/2024)  Please describe your symptoms.: Follow up labs  Have you had these symptoms before?: Yes  How long have you been having these symptoms?: Greater than 2 weeks  Please list any medications you are currently taking for this condition.: Prednisone  Primary Reason for Visit (Submitted on 9/8/2024)  What is the primary reason for your visit?: Other

## 2024-09-10 NOTE — TELEPHONE ENCOUNTER
----- Message from Peter Osorio sent at 9/8/2024  1:35 PM EDT -----  Patient has improving LFTs.  AST, alkaline phosphatase and total bilirubin are within normal range whereas ALT improved from 95-53    Please let the patient know that reduce prednisone to 30 mg orally daily for 15 days. Repeat LFTs in 2 weeks

## 2024-09-13 DIAGNOSIS — M19.90 OSTEOARTHRITIS, UNSPECIFIED OSTEOARTHRITIS TYPE, UNSPECIFIED SITE: ICD-10-CM

## 2024-09-13 RX ORDER — CELECOXIB 100 MG/1
100 CAPSULE ORAL 2 TIMES DAILY
Qty: 180 CAPSULE | Refills: 1 | OUTPATIENT
Start: 2024-09-13

## 2024-09-20 VITALS — WEIGHT: 133 LBS | BODY MASS INDEX: 26.11 KG/M2 | HEIGHT: 60 IN

## 2024-09-23 ENCOUNTER — TELEPHONE (OUTPATIENT)
Dept: FAMILY MEDICINE CLINIC | Facility: CLINIC | Age: 74
End: 2024-09-23

## 2024-09-24 ENCOUNTER — LAB (OUTPATIENT)
Dept: LAB | Facility: HOSPITAL | Age: 74
End: 2024-09-24
Payer: MEDICARE

## 2024-09-24 DIAGNOSIS — D89.2 HYPERGAMMAGLOBULINEMIA: ICD-10-CM

## 2024-09-24 DIAGNOSIS — K75.4 AUTOIMMUNE HEPATITIS: ICD-10-CM

## 2024-09-24 LAB
ALBUMIN SERPL-MCNC: 3.3 G/DL (ref 3.5–5.2)
ALP SERPL-CCNC: 71 U/L (ref 39–117)
ALT SERPL W P-5'-P-CCNC: 48 U/L (ref 1–33)
AST SERPL-CCNC: 37 U/L (ref 1–32)
BILIRUB CONJ SERPL-MCNC: 0.2 MG/DL (ref 0–0.3)
BILIRUB INDIRECT SERPL-MCNC: 0.5 MG/DL
BILIRUB SERPL-MCNC: 0.7 MG/DL (ref 0–1.2)
PROT SERPL-MCNC: 6.5 G/DL (ref 6–8.5)

## 2024-09-24 PROCEDURE — 80076 HEPATIC FUNCTION PANEL: CPT | Performed by: INTERNAL MEDICINE

## 2024-09-24 PROCEDURE — 36415 COLL VENOUS BLD VENIPUNCTURE: CPT

## 2024-09-24 PROCEDURE — 82784 ASSAY IGA/IGD/IGG/IGM EACH: CPT

## 2024-09-24 PROCEDURE — 82785 ASSAY OF IGE: CPT

## 2024-09-25 ENCOUNTER — TELEPHONE (OUTPATIENT)
Dept: GASTROENTEROLOGY | Facility: CLINIC | Age: 74
End: 2024-09-25
Payer: MEDICARE

## 2024-09-28 LAB
IGA SERPL-MCNC: 212 MG/DL (ref 64–422)
IGE SERPL-ACNC: 132 IU/ML (ref 6–495)
IGG SERPL-MCNC: 1337 MG/DL (ref 586–1602)
IGM SERPL-MCNC: 117 MG/DL (ref 26–217)

## 2024-09-29 ENCOUNTER — HOSPITAL ENCOUNTER (EMERGENCY)
Facility: HOSPITAL | Age: 74
Discharge: HOME | End: 2024-09-29
Attending: EMERGENCY MEDICINE | Admitting: EMERGENCY MEDICINE
Payer: MEDICARE

## 2024-09-29 VITALS
OXYGEN SATURATION: 96 % | HEART RATE: 77 BPM | WEIGHT: 136.02 LBS | HEIGHT: 60 IN | DIASTOLIC BLOOD PRESSURE: 67 MMHG | BODY MASS INDEX: 26.71 KG/M2 | RESPIRATION RATE: 16 BRPM | SYSTOLIC BLOOD PRESSURE: 140 MMHG | TEMPERATURE: 98 F

## 2024-09-29 DIAGNOSIS — E11.65 HYPERGLYCEMIA DUE TO DIABETES MELLITUS: Primary | ICD-10-CM

## 2024-09-29 LAB
ALBUMIN SERPL-MCNC: 3.6 G/DL (ref 3.5–5.2)
ALBUMIN/GLOB SERPL: 1.1 G/DL
ALP SERPL-CCNC: 75 U/L (ref 39–117)
ALT SERPL W P-5'-P-CCNC: 37 U/L (ref 1–33)
ANION GAP SERPL CALCULATED.3IONS-SCNC: 15.1 MMOL/L (ref 5–15)
AST SERPL-CCNC: 30 U/L (ref 1–32)
BASOPHILS # BLD AUTO: 0.02 10*3/MM3 (ref 0–0.2)
BASOPHILS NFR BLD AUTO: 0.3 % (ref 0–1.5)
BILIRUB SERPL-MCNC: 1.1 MG/DL (ref 0–1.2)
BUN SERPL-MCNC: 17 MG/DL (ref 8–23)
BUN/CREAT SERPL: 17.3 (ref 7–25)
CALCIUM SPEC-SCNC: 9.4 MG/DL (ref 8.6–10.5)
CHLORIDE SERPL-SCNC: 96 MMOL/L (ref 98–107)
CO2 SERPL-SCNC: 23.9 MMOL/L (ref 22–29)
CREAT SERPL-MCNC: 0.98 MG/DL (ref 0.57–1)
DEPRECATED RDW RBC AUTO: 47.8 FL (ref 37–54)
EGFRCR SERPLBLD CKD-EPI 2021: 61.5 ML/MIN/1.73
EOSINOPHIL # BLD AUTO: 0.01 10*3/MM3 (ref 0–0.4)
EOSINOPHIL NFR BLD AUTO: 0.2 % (ref 0.3–6.2)
ERYTHROCYTE [DISTWIDTH] IN BLOOD BY AUTOMATED COUNT: 15.3 % (ref 12.3–15.4)
GLOBULIN UR ELPH-MCNC: 3.4 GM/DL
GLUCOSE BLDC GLUCOMTR-MCNC: 175 MG/DL (ref 70–99)
GLUCOSE BLDC GLUCOMTR-MCNC: 302 MG/DL (ref 70–99)
GLUCOSE SERPL-MCNC: 335 MG/DL (ref 65–99)
HCT VFR BLD AUTO: 36.8 % (ref 34–46.6)
HGB BLD-MCNC: 12.2 G/DL (ref 12–15.9)
HOLD SPECIMEN: NORMAL
HOLD SPECIMEN: NORMAL
IMM GRANULOCYTES # BLD AUTO: 0.05 10*3/MM3 (ref 0–0.05)
IMM GRANULOCYTES NFR BLD AUTO: 0.8 % (ref 0–0.5)
LYMPHOCYTES # BLD AUTO: 0.64 10*3/MM3 (ref 0.7–3.1)
LYMPHOCYTES NFR BLD AUTO: 9.7 % (ref 19.6–45.3)
MCH RBC QN AUTO: 28.4 PG (ref 26.6–33)
MCHC RBC AUTO-ENTMCNC: 33.2 G/DL (ref 31.5–35.7)
MCV RBC AUTO: 85.6 FL (ref 79–97)
MONOCYTES # BLD AUTO: 0.14 10*3/MM3 (ref 0.1–0.9)
MONOCYTES NFR BLD AUTO: 2.1 % (ref 5–12)
NEUTROPHILS NFR BLD AUTO: 5.76 10*3/MM3 (ref 1.7–7)
NEUTROPHILS NFR BLD AUTO: 86.9 % (ref 42.7–76)
NRBC BLD AUTO-RTO: 0 /100 WBC (ref 0–0.2)
PLATELET # BLD AUTO: 217 10*3/MM3 (ref 140–450)
PMV BLD AUTO: 8.2 FL (ref 6–12)
POTASSIUM SERPL-SCNC: 3.4 MMOL/L (ref 3.5–5.2)
PROT SERPL-MCNC: 7 G/DL (ref 6–8.5)
RBC # BLD AUTO: 4.3 10*6/MM3 (ref 3.77–5.28)
SODIUM SERPL-SCNC: 135 MMOL/L (ref 136–145)
WBC NRBC COR # BLD AUTO: 6.62 10*3/MM3 (ref 3.4–10.8)
WHOLE BLOOD HOLD COAG: NORMAL
WHOLE BLOOD HOLD SPECIMEN: NORMAL

## 2024-09-29 PROCEDURE — 82948 REAGENT STRIP/BLOOD GLUCOSE: CPT

## 2024-09-29 PROCEDURE — 99283 EMERGENCY DEPT VISIT LOW MDM: CPT

## 2024-09-29 PROCEDURE — 85025 COMPLETE CBC W/AUTO DIFF WBC: CPT | Performed by: EMERGENCY MEDICINE

## 2024-09-29 PROCEDURE — 25810000003 SODIUM CHLORIDE 0.9 % SOLUTION

## 2024-09-29 PROCEDURE — 63710000001 INSULIN REGULAR HUMAN PER 5 UNITS

## 2024-09-29 PROCEDURE — 80053 COMPREHEN METABOLIC PANEL: CPT | Performed by: EMERGENCY MEDICINE

## 2024-09-29 RX ADMIN — INSULIN HUMAN 5 UNITS: 100 INJECTION, SOLUTION PARENTERAL at 15:21

## 2024-09-29 RX ADMIN — SODIUM CHLORIDE 500 ML: 9 INJECTION, SOLUTION INTRAVENOUS at 15:21

## 2024-09-29 NOTE — ED PROVIDER NOTES
SHARED VISIT NOTE:    Patient is 74 y.o. year old female that presents to the ED for evaluation of hyperglycemia.  The patient has been on prednisone due to autoimmune hepatitis.  Patient has not had a change in her oral hypoglycemic medications and thus her blood sugar is becoming extremely elevated.    Physical Exam  Vital signs were reviewed under triage note.  General appearance - Patient appears well-developed and well-nourished.  Patient is in no acute distress.  Head - Normocephalic, atraumatic.  Pupils - Equal, round, reactive to light.  Extraocular muscles are intact.  Conjunctiva is clear.  Nasal - Normal inspection.  No evidence of trauma or epistaxis.  Tympanic membranes - Gray, intact without erythema or retractions.  Oral mucosa - Pink and moist without lesions or erythema.  Uvula is midline.  Chest wall - Atraumatic.  Chest wall is nontender.  There are no vesicular rashes noted.  Neck - Supple.  Trachea was midline.  There is no palpable lymphadenopathy or thyromegaly.  There are no meningeal signs  Lungs - Clear to auscultation and percussion bilaterally.  Heart - Regular rate and rhythm without any murmurs, clicks, or gallops.  Abdomen - Soft.  Bowel sounds are present.  There is no palpable tenderness.  There is no rebound, guarding, or rigidity.  There are no palpable masses.  There are no pulsatile masses.  Back - Spine is straight and midline.  There is no CVA tenderness.  Extremities - Intact x4 with full range of motion.  There is no palpable edema.  Pulses are intact x4 and equal.  Neurologic - Patient is awake, alert, and oriented x3.  Cranial nerves II through XII are grossly intact.  Motor and sensory functions grossly intact.  Cerebellar function was normal.  Integument - There are no rashes.  There are no petechia or purpura lesions noted.  There are no vesicular lesions noted.      ED Course:    /67 (BP Location: Right arm, Patient Position: Sitting)   Pulse 89   Temp 98 °F  "(36.7 °C) (Oral)   Resp 16   Ht 152.4 cm (60\")   Wt 61.7 kg (136 lb 0.4 oz)   SpO2 94%   BMI 26.57 kg/m²   Results for orders placed or performed during the hospital encounter of 09/29/24   Comprehensive Metabolic Panel    Specimen: Blood   Result Value Ref Range    Glucose 335 (H) 65 - 99 mg/dL    BUN 17 8 - 23 mg/dL    Creatinine 0.98 0.57 - 1.00 mg/dL    Sodium 135 (L) 136 - 145 mmol/L    Potassium 3.4 (L) 3.5 - 5.2 mmol/L    Chloride 96 (L) 98 - 107 mmol/L    CO2 23.9 22.0 - 29.0 mmol/L    Calcium 9.4 8.6 - 10.5 mg/dL    Total Protein 7.0 6.0 - 8.5 g/dL    Albumin 3.6 3.5 - 5.2 g/dL    ALT (SGPT) 37 (H) 1 - 33 U/L    AST (SGOT) 30 1 - 32 U/L    Alkaline Phosphatase 75 39 - 117 U/L    Total Bilirubin 1.1 0.0 - 1.2 mg/dL    Globulin 3.4 gm/dL    A/G Ratio 1.1 g/dL    BUN/Creatinine Ratio 17.3 7.0 - 25.0    Anion Gap 15.1 (H) 5.0 - 15.0 mmol/L    eGFR 61.5 >60.0 mL/min/1.73   CBC Auto Differential    Specimen: Blood   Result Value Ref Range    WBC 6.62 3.40 - 10.80 10*3/mm3    RBC 4.30 3.77 - 5.28 10*6/mm3    Hemoglobin 12.2 12.0 - 15.9 g/dL    Hematocrit 36.8 34.0 - 46.6 %    MCV 85.6 79.0 - 97.0 fL    MCH 28.4 26.6 - 33.0 pg    MCHC 33.2 31.5 - 35.7 g/dL    RDW 15.3 12.3 - 15.4 %    RDW-SD 47.8 37.0 - 54.0 fl    MPV 8.2 6.0 - 12.0 fL    Platelets 217 140 - 450 10*3/mm3    Neutrophil % 86.9 (H) 42.7 - 76.0 %    Lymphocyte % 9.7 (L) 19.6 - 45.3 %    Monocyte % 2.1 (L) 5.0 - 12.0 %    Eosinophil % 0.2 (L) 0.3 - 6.2 %    Basophil % 0.3 0.0 - 1.5 %    Immature Grans % 0.8 (H) 0.0 - 0.5 %    Neutrophils, Absolute 5.76 1.70 - 7.00 10*3/mm3    Lymphocytes, Absolute 0.64 (L) 0.70 - 3.10 10*3/mm3    Monocytes, Absolute 0.14 0.10 - 0.90 10*3/mm3    Eosinophils, Absolute 0.01 0.00 - 0.40 10*3/mm3    Basophils, Absolute 0.02 0.00 - 0.20 10*3/mm3    Immature Grans, Absolute 0.05 0.00 - 0.05 10*3/mm3    nRBC 0.0 0.0 - 0.2 /100 WBC   POC Glucose Once    Specimen: Blood   Result Value Ref Range    Glucose 302 (H) 70 - 99 " mg/dL   Green Top (Gel)   Result Value Ref Range    Extra Tube Hold for add-ons.    Lavender Top   Result Value Ref Range    Extra Tube hold for add-on    Gold Top - SST   Result Value Ref Range    Extra Tube Hold for add-ons.    Light Blue Top   Result Value Ref Range    Extra Tube Hold for add-ons.      Medications   sodium chloride 0.9 % bolus 500 mL (has no administration in time range)   insulin regular (humuLIN R,novoLIN R) injection 5 Units (has no administration in time range)     No results found.    MDM:    Procedures    All labs were reviewed and interpreted by me.  There is no evidence of DKA or HHNK.          SHARED VISIT ATTESTATION:    This visit was performed by both myself and an APC.  I performed the substantive portion of the medical decision making. The management plan was made or approved by me, and I take responsibility for patient management.           Niko Trejo DO  15:11 EDT  09/29/24         Niko Trejo DO  10/01/24 0908

## 2024-09-29 NOTE — ED PROVIDER NOTES
Time: 2:06 PM EDT  Date of encounter:  9/29/2024  Independent Historian/Clinical History and Information was obtained by:   Patient    History is limited by: N/A    Chief Complaint   Patient presents with    Hyperglycemia     Blood sugar raising around 1600 yesterday, is on steroids for her liver last month.           History of Present Illness:  Patient is a 74 y.o. year old female who presents to the emergency department for evaluation of high blood sugar.  Patient states she has been on prednisone since early September due to her autoimmune hepatitis.  Patient states she is having the dose tapered but is awaiting for her doctor to let her know that she can decrease her dose.  She had labs performed on 9/24/2024 which will determine her prednisone taper.  In the meantime patient is diabetic and is experiencing hyperglycemia.  She is on metformin 500 mg twice daily but is unable to bring her sugar down.  Yesterday it got up to 430.  Upon presentation today it is 302.  Patient reports medication compliance.    Patient also states that she is on a 1700 mL fluid restriction per her kidney doctor due to abnormal kidney function.  However, patient is now seeing this doctor and is unsure if she supposed to remain on her fluid restriction.  Denies complaints currently.  Denies chest pain, nausea, vomiting.  Denies cough or fever.    Patient Care Team  Primary Care Provider: Zohreh Mcduffie APRN    Past Medical History:     Allergies   Allergen Reactions    Shellfish Allergy Anaphylaxis    Moxifloxacin Hcl Unknown - Low Severity    Nitrous Oxide Other (See Comments)    Scopolamine Swelling    Sulfa Antibiotics Unknown - Low Severity    Morphine Rash    Penicillins Rash     Past Medical History:   Diagnosis Date    Allergic rhinitis due to allergen 09/10/2019    Anemia 09/10/2019    Anxiety disorder 06/09/2020    B12 deficiency 09/23/2020    Callus     Cataract     Cholelithiasis     Chronic kidney disease      COVID-19 2022    Depression     Diverticulitis of colon     Diverticulosis     Essential hypertension 2019    Family history of myocardial infarction 2023    Fatty liver     GERD (gastroesophageal reflux disease) 2019    Graves' disease     Hernia     HLD (hyperlipidemia) 2019    Hyperthyroidism     Hypothyroidism     Inflammatory bowel disease     Major depressive disorder 2019    Osteoarthritis 2020    Osteopenia     Peptic ulceration     T2DM (type 2 diabetes mellitus) 2019    Tuberculosis     Took meds will never need to be tested again    Vitamin D deficiency 2019     Past Surgical History:   Procedure Laterality Date    ABDOMINAL SURGERY      APPENDECTOMY       SECTION      CHOLECYSTECTOMY  2016    DR SCALES    COLON SURGERY      COLONOSCOPY      HIP FRACTURE SURGERY Left     REPAIR    HIP SURGERY      LIVER BIOPSY      OOPHORECTOMY      SMALL INTESTINE SURGERY      TUBAL ABDOMINAL LIGATION      UPPER GASTROINTESTINAL ENDOSCOPY       Family History   Problem Relation Age of Onset    Heart disease Mother     Heart failure Mother     Arthritis Mother     Hyperlipidemia Mother     Hypertension Mother     Stroke Mother     Thyroid disease Mother     Colon polyps Mother     Heart disease Brother     Heart disease Brother     Rectal cancer Maternal Grandmother     Kidney disease Daughter     Miscarriages / Stillbirths Daughter     Birth defects Daughter         Cleft palate    Lung cancer Other     Skin cancer Other        Home Medications:  Prior to Admission medications    Medication Sig Start Date End Date Taking? Authorizing Provider   cetirizine (zyrTEC) 10 MG tablet Take 1 tablet by mouth Daily. 24   Zohreh Mcduffie APRN   Cholecalciferol 25 MCG (1000 UT) tablet Take 1 tablet by mouth Daily.    Provider, MD Laverne   esomeprazole (nexIUM) 40 MG capsule TAKE ONE CAPSULE BY MOUTH ONCE DAILY 24   Zohreh Mcduffie APRN  "  fluticasone (FLONASE) 50 MCG/ACT nasal spray 2 sprays by Each Nare route Daily. 3/27/24   Zohreh Mcduffie APRN   furosemide (LASIX) 20 MG tablet Take 1 tablet by mouth 2 (Two) Times a Day for 30 days. 9/4/24 10/4/24  Zohreh Mcduffie APRN   metFORMIN ER (GLUCOPHAGE-XR) 500 MG 24 hr tablet Take 1 tablet by mouth 3 times a day. 9/10/24   Zohreh Mcduffie APRN   predniSONE (DELTASONE) 10 MG tablet Take 4 tablets by mouth Daily for 30 days. 24  Peter Osorio MD        Social History:   Social History     Tobacco Use    Smoking status: Former     Current packs/day: 0.00     Average packs/day: 1 pack/day for 5.0 years (5.0 ttl pk-yrs)     Types: Cigarettes     Start date: 1980     Quit date: 1985     Years since quittin.7     Passive exposure: Past    Smokeless tobacco: Never    Tobacco comments:     QUIT 28 YEARS AGO   Vaping Use    Vaping status: Never Used   Substance Use Topics    Alcohol use: Never    Drug use: Never         Review of Systems:  Review of Systems   Constitutional:         Elevated blood sugar   HENT: Negative.     Eyes: Negative.    Respiratory: Negative.     Cardiovascular: Negative.    Gastrointestinal: Negative.    Endocrine: Negative.    Genitourinary: Negative.    Musculoskeletal: Negative.    Skin: Negative.    Allergic/Immunologic: Negative.    Neurological: Negative.    Hematological: Negative.    Psychiatric/Behavioral: Negative.          Physical Exam:  /67 (BP Location: Right arm, Patient Position: Sitting)   Pulse 77   Temp 98 °F (36.7 °C) (Oral)   Resp 16   Ht 152.4 cm (60\")   Wt 61.7 kg (136 lb 0.4 oz)   SpO2 96%   BMI 26.57 kg/m²         Physical Exam  Vitals and nursing note reviewed.   Constitutional:       General: She is not in acute distress.     Appearance: Normal appearance. She is not toxic-appearing.   HENT:      Head: Normocephalic and atraumatic.      Jaw: There is normal jaw occlusion.      Right Ear: Tympanic " membrane and external ear normal.      Left Ear: Tympanic membrane and external ear normal.      Nose: Nose normal.      Mouth/Throat:      Mouth: Mucous membranes are moist.      Pharynx: Oropharynx is clear.   Eyes:      General: Lids are normal.      Extraocular Movements: Extraocular movements intact.      Conjunctiva/sclera: Conjunctivae normal.      Pupils: Pupils are equal, round, and reactive to light.   Cardiovascular:      Rate and Rhythm: Normal rate and regular rhythm.      Pulses: Normal pulses.      Heart sounds: Normal heart sounds.   Pulmonary:      Effort: Pulmonary effort is normal. No respiratory distress.      Breath sounds: Normal breath sounds. No stridor. No wheezing, rhonchi or rales.   Abdominal:      General: Abdomen is flat. Bowel sounds are normal.      Palpations: Abdomen is soft.      Tenderness: There is no abdominal tenderness. There is no right CVA tenderness, left CVA tenderness, guarding or rebound.   Musculoskeletal:         General: Normal range of motion.      Cervical back: Normal range of motion and neck supple.      Right lower leg: No edema.      Left lower leg: No edema.   Skin:     General: Skin is warm and dry.   Neurological:      General: No focal deficit present.      Mental Status: She is alert and oriented to person, place, and time. Mental status is at baseline.   Psychiatric:         Mood and Affect: Mood normal.                  Procedures:  Procedures      Medical Decision Making:      Comorbidities that affect care:    Anxiety, depression, diverticulitis, GERD, thyroid disease, diabetes, diverticulitis    External Notes reviewed:    Previous Clinic Note: I reviewed the patient's last clinic note where she was seen by a Zohreh HARP and Telephone Encounter: I reviewed multiple telephone encounters from 9/10/2024 through today.  This included patient messaging through [facility] as well.      The following orders were placed and all results were  independently analyzed by me:  Orders Placed This Encounter   Procedures    [place] Draw    Comprehensive Metabolic Panel    CBC Auto Differential    POC Glucose Once    POC Glucose Once    CBC & Differential    Green Top (Gel)    Lavender Top    Gold Top - SST    Light Blue Top       Medications Given in the Emergency Department:  Medications   sodium chloride 0.9 % bolus 500 mL (0 mL Intravenous Stopped 9/29/24 1624)   insulin regular (humuLIN R,novoLIN R) injection 5 Units (5 Units Intravenous Given 9/29/24 1521)        ED Course:    The patient was initially evaluated in the triage area where orders were placed. The patient was later dispositioned by LOYD Quinones.      The patient was advised to stay for completion of workup which includes but is not limited to communication of labs and radiological results, reassessment and plan. The patient was advised that leaving prior to disposition by a provider could result in critical findings that are not communicated to the patient.          Labs:    Lab Results (last 24 hours)       Procedure Component Value Units Date/Time    Comprehensive Metabolic Panel [[id number]]  (Abnormal) Collected: 09/29/24 1407    Specimen: Blood Updated: 09/29/24 1443     Glucose 335 mg/dL      BUN 17 mg/dL      Creatinine 0.98 mg/dL      Sodium 135 mmol/L      Potassium 3.4 mmol/L      Chloride 96 mmol/L      CO2 23.9 mmol/L      Calcium 9.4 mg/dL      Total Protein 7.0 g/dL      Albumin 3.6 g/dL      ALT (SGPT) 37 U/L      AST (SGOT) 30 U/L      Alkaline Phosphatase 75 U/L      Total Bilirubin 1.1 mg/dL      Globulin 3.4 gm/dL      A/G Ratio 1.1 g/dL      BUN/Creatinine Ratio 17.3     Anion Gap 15.1 mmol/L      eGFR 61.5 mL/min/1.73     Narrative:      GFR Normal >60  Chronic Kidney Disease <60  Kidney Failure <15    The GFR formula is only valid for adults with stable renal function between ages 18 and 70.    CBC & Differential [[id number]]  (Abnormal) Collected: 09/29/24 1407     Specimen: Blood Updated: 09/29/24 1419    Narrative:      The following orders were created for panel order CBC & Differential.  Procedure                               Abnormality         Status                     ---------                               -----------         ------                     CBC Auto Differential[[id number]]        Abnormal            Final result                 Please view results for these tests on the individual orders.    CBC Auto Differential [[id number]]  (Abnormal) Collected: 09/29/24 1407    Specimen: Blood Updated: 09/29/24 1419     WBC 6.62 10*3/mm3      RBC 4.30 10*6/mm3      Hemoglobin 12.2 g/dL      Hematocrit 36.8 %      MCV 85.6 fL      MCH 28.4 pg      MCHC 33.2 g/dL      RDW 15.3 %      RDW-SD 47.8 fl      MPV 8.2 fL      Platelets 217 10*3/mm3      Neutrophil % 86.9 %      Lymphocyte % 9.7 %      Monocyte % 2.1 %      Eosinophil % 0.2 %      Basophil % 0.3 %      Immature Grans % 0.8 %      Neutrophils, Absolute 5.76 10*3/mm3      Lymphocytes, Absolute 0.64 10*3/mm3      Monocytes, Absolute 0.14 10*3/mm3      Eosinophils, Absolute 0.01 10*3/mm3      Basophils, Absolute 0.02 10*3/mm3      Immature Grans, Absolute 0.05 10*3/mm3      nRBC 0.0 /100 WBC     POC Glucose Once [[id number]]  (Abnormal) Collected: 09/29/24 1415    Specimen: Blood Updated: 09/29/24 1420     Glucose 302 mg/dL      Comment: Serial Number: [id number]:  [id number]       POC Glucose Once [[id number]]  (Abnormal) Collected: 09/29/24 1627    Specimen: Blood Updated: 09/29/24 1629     Glucose 175 mg/dL      Comment: Serial Number: [id number]:  [id number]                Imaging:    No Radiology Exams Resulted Within Past 24 Hours      Differential Diagnosis and Discussion:      Metabolic: Differential diagnosis includes but is not limited to hypertension, hyperglycemia, hyperkalemia, hypocalcemia, metabolic acidosis, hypokalemia, hypoglycemia, malnutrition, hypothyroidism, hyperthyroidism, and  adrenal insufficiency.     All labs were reviewed and interpreted by me.    MDM  Number of Diagnoses or Management Options  Hyperglycemia due to diabetes mellitus  Diagnosis management comments: 74-year-old female presents the emergency department with complaints of high blood sugar.  Patient is currently taking prednisone long-term for treatment of autoimmune hepatitis.  Patient vital signs are stable while in the ED.  She is afebrile.  Patient has no complaints of dizziness, weakness, headache, blurred vision.  Patient was treated with IV insulin and fluids.  Her blood sugar was reduced to 175.  Patient was instructed to take 2 metformin tonight before bed if she continues to experience hyperglycemia and follow-up with her doctor  in the morning for further management and treatment of her elevated blood sugars.  Patient was also given diabetes diet education.  She admittedly eats whatever she wants because she does not like to reduce her sugar intake.  Patient is agreeable to plan for discharge and verbalizes instructions of strict return instructions.       Amount and/or Complexity of Data Reviewed  Clinical lab tests: reviewed  Decide to obtain previous medical records or to obtain history from someone other than the patient: yes        Patient Care Considerations:    SEPSIS was considered but is NOT present in the emergency department as SIRS criteria is not present. ANTIBIOTICS: I considered prescribing antibiotics as an outpatient however no bacterial focus of infection was found.      Consultants/Shared Management Plan:    None    Social Determinants of Health:    Patient is independent, reliable, and has access to care.       Disposition and Care Coordination:    Discharged: The patient is suitable and stable for discharge with no need for consideration of admission.    I have explained the patient´s condition, diagnoses and treatment plan based on the information available to me at this time. I have  answered questions and addressed any concerns. The patient has a good  understanding of the patient´s diagnosis, condition, and treatment plan as can be expected at this point. The vital signs have been stable. The patient´s condition is stable and appropriate for discharge from the emergency department.      The patient will pursue further outpatient evaluation with the primary care physician or other designated or consulting physician as outlined in the discharge instructions. They are agreeable to this plan of care and follow-up instructions have been explained in detail. The patient has received these instructions in written format and has expressed an understanding of the discharge instructions. The patient is aware that any significant change in condition or worsening of symptoms should prompt an immediate return to this or the closest emergency department or call to 911.  I have explained discharge medications and the need for follow up with the patient/caretakers. This was also printed in the discharge instructions. Patient was discharged with the following medications and follow up:      Medication List      No changes were made to your prescriptions during this visit.      No follow-up provider specified.     Final diagnoses:   Hyperglycemia due to diabetes mellitus        ED Disposition       ED Disposition   Discharge    Condition   Stable    Comment   --               This medical record created using voice recognition software.             Monica Trejo, APRN  09/29/24 [id number]

## 2024-09-29 NOTE — DISCHARGE INSTRUCTIONS
Home.  Increase your fluid intake.  As we discussed during your discharge teaching when you check your blood sugar tonight, if it is still elevated take 2 of your metformin pills.  Recheck your blood sugar in the morning and do the same.  If it is still elevated you can take 2 of your metformin otherwise you may take open 1.  Avoid excessive sugar and carbohydrates when making choices for your meals.  Be sure you are checking your sugar regularly.    Call your primary care provider first thing in the morning to discuss further management of your hyperglycemia while you are taking the prednisone.    If symptoms worsen, change in presentation, or you develop new symptoms please seek medical attention or return to the ED for further evaluation.

## 2024-09-30 ENCOUNTER — TELEPHONE (OUTPATIENT)
Dept: FAMILY MEDICINE CLINIC | Facility: CLINIC | Age: 74
End: 2024-09-30
Payer: MEDICARE

## 2024-09-30 NOTE — TELEPHONE ENCOUNTER
Patient states her sugar was 172 last night and she took 2 metformin last night. States her sugar is 117 this morning and she is eating breakfast.     She is on prednisone per Sadia.   She was at the ER yesterday elevated BS.    Her question, should she take 1-2 tabs of metformin TID ?      Patient states what do you suggest?

## 2024-09-30 NOTE — TELEPHONE ENCOUNTER
Patient aware. States she will take Metformin 2 at 9 AM , 1 AT 3 PM AND 1 AT 9 PM. She is aware to call us and follow up as needed.

## 2024-10-01 ENCOUNTER — TELEPHONE (OUTPATIENT)
Dept: GASTROENTEROLOGY | Facility: CLINIC | Age: 74
End: 2024-10-01
Payer: MEDICARE

## 2024-10-01 DIAGNOSIS — K75.4 AUTOIMMUNE HEPATITIS: Primary | ICD-10-CM

## 2024-10-01 RX ORDER — PREDNISONE 20 MG/1
TABLET ORAL
Qty: 15 TABLET | Refills: 0 | Status: SHIPPED | OUTPATIENT
Start: 2024-10-01

## 2024-10-01 NOTE — TELEPHONE ENCOUNTER
----- Message from Britni ZEPEDA sent at 9/30/2024  9:50 AM EDT -----  Waitng on Quantitative Immunoglobulin for further instructions  ----- Message -----  From: Peter Osorio MD  Sent: 9/26/2024   9:44 AM EDT  To: Britni Kasper MA    AST is almost normal. Although fluctuating but significantly improved from 459 to 37.  Similarly, ALT improved from 397 to 48.  Quantitative Immunoglobulin pending, awaiting results to be reported to decide for tapering Prednisone dose

## 2024-10-17 DIAGNOSIS — K75.4 AUTOIMMUNE HEPATITIS: Primary | ICD-10-CM

## 2024-10-17 DIAGNOSIS — K75.4 AUTOIMMUNE HEPATITIS: ICD-10-CM

## 2024-10-18 DIAGNOSIS — K75.4 AUTOIMMUNE HEPATITIS: Primary | ICD-10-CM

## 2024-10-18 RX ORDER — PREDNISONE 10 MG/1
20 TABLET ORAL DAILY
Qty: 60 TABLET | Refills: 0 | Status: SHIPPED | OUTPATIENT
Start: 2024-10-18 | End: 2024-11-17

## 2024-10-21 RX ORDER — PREDNISONE 20 MG/1
TABLET ORAL
Qty: 15 TABLET | Refills: 0 | OUTPATIENT
Start: 2024-10-21

## 2024-10-31 ENCOUNTER — TELEPHONE (OUTPATIENT)
Age: 74
End: 2024-10-31
Payer: MEDICARE

## 2024-10-31 DIAGNOSIS — K75.4 AUTOIMMUNE HEPATITIS: Primary | ICD-10-CM

## 2024-10-31 DIAGNOSIS — R79.89 ELEVATED LFTS: ICD-10-CM

## 2024-10-31 RX ORDER — FUROSEMIDE 20 MG/1
20 TABLET ORAL DAILY
Qty: 90 TABLET | Refills: 0 | Status: SHIPPED | OUTPATIENT
Start: 2024-10-31

## 2024-10-31 NOTE — TELEPHONE ENCOUNTER
Pt states she would like a refill. She is taking once daily and it helping keep fluid down and she believes is also helping her BP

## 2024-11-07 ENCOUNTER — LAB (OUTPATIENT)
Facility: HOSPITAL | Age: 74
End: 2024-11-07
Payer: MEDICARE

## 2024-11-07 DIAGNOSIS — R79.89 ELEVATED LFTS: ICD-10-CM

## 2024-11-07 LAB
ALBUMIN SERPL-MCNC: 3.9 G/DL (ref 3.5–5.2)
ALP SERPL-CCNC: 49 U/L (ref 39–117)
ALT SERPL W P-5'-P-CCNC: 22 U/L (ref 1–33)
AST SERPL-CCNC: 23 U/L (ref 1–32)
BILIRUB CONJ SERPL-MCNC: <0.2 MG/DL (ref 0–0.3)
BILIRUB INDIRECT SERPL-MCNC: NORMAL MG/DL
BILIRUB SERPL-MCNC: 0.7 MG/DL (ref 0–1.2)
PROT SERPL-MCNC: 6.9 G/DL (ref 6–8.5)

## 2024-11-07 PROCEDURE — 80076 HEPATIC FUNCTION PANEL: CPT

## 2024-11-11 ENCOUNTER — OFFICE VISIT (OUTPATIENT)
Dept: FAMILY MEDICINE CLINIC | Facility: CLINIC | Age: 74
End: 2024-11-11
Payer: MEDICARE

## 2024-11-11 VITALS
HEIGHT: 60 IN | BODY MASS INDEX: 25.97 KG/M2 | TEMPERATURE: 97.3 F | HEART RATE: 76 BPM | SYSTOLIC BLOOD PRESSURE: 148 MMHG | WEIGHT: 132.3 LBS | RESPIRATION RATE: 16 BRPM | OXYGEN SATURATION: 99 % | DIASTOLIC BLOOD PRESSURE: 60 MMHG

## 2024-11-11 DIAGNOSIS — I10 ESSENTIAL HYPERTENSION: ICD-10-CM

## 2024-11-11 DIAGNOSIS — E55.9 VITAMIN D DEFICIENCY: ICD-10-CM

## 2024-11-11 DIAGNOSIS — E87.1 HYPONATREMIA: ICD-10-CM

## 2024-11-11 DIAGNOSIS — K75.4 AUTOIMMUNE HEPATITIS: ICD-10-CM

## 2024-11-11 DIAGNOSIS — K21.9 GASTROESOPHAGEAL REFLUX DISEASE WITHOUT ESOPHAGITIS: ICD-10-CM

## 2024-11-11 DIAGNOSIS — Z23 NEEDS FLU SHOT: Primary | ICD-10-CM

## 2024-11-11 DIAGNOSIS — J30.9 ALLERGIC RHINITIS, UNSPECIFIED SEASONALITY, UNSPECIFIED TRIGGER: ICD-10-CM

## 2024-11-11 DIAGNOSIS — E53.8 B12 DEFICIENCY: ICD-10-CM

## 2024-11-11 DIAGNOSIS — E11.65 TYPE 2 DIABETES MELLITUS WITH HYPERGLYCEMIA, WITHOUT LONG-TERM CURRENT USE OF INSULIN: ICD-10-CM

## 2024-11-11 LAB
25(OH)D3 SERPL-MCNC: 28.5 NG/ML (ref 30–100)
ALBUMIN SERPL-MCNC: 4 G/DL (ref 3.5–5.2)
ALBUMIN/GLOB SERPL: 1.3 G/DL
ALP SERPL-CCNC: 52 U/L (ref 39–117)
ALT SERPL W P-5'-P-CCNC: 23 U/L (ref 1–33)
ANION GAP SERPL CALCULATED.3IONS-SCNC: 11 MMOL/L (ref 5–15)
AST SERPL-CCNC: 22 U/L (ref 1–32)
BASOPHILS # BLD AUTO: 0.03 10*3/MM3 (ref 0–0.2)
BASOPHILS NFR BLD AUTO: 0.5 % (ref 0–1.5)
BILIRUB SERPL-MCNC: 0.5 MG/DL (ref 0–1.2)
BUN SERPL-MCNC: 12 MG/DL (ref 8–23)
BUN/CREAT SERPL: 13.5 (ref 7–25)
CALCIUM SPEC-SCNC: 9.5 MG/DL (ref 8.6–10.5)
CHLORIDE SERPL-SCNC: 103 MMOL/L (ref 98–107)
CHOLEST SERPL-MCNC: 178 MG/DL (ref 0–200)
CO2 SERPL-SCNC: 26 MMOL/L (ref 22–29)
CREAT SERPL-MCNC: 0.89 MG/DL (ref 0.57–1)
DEPRECATED RDW RBC AUTO: 40.4 FL (ref 37–54)
EGFRCR SERPLBLD CKD-EPI 2021: 69 ML/MIN/1.73
EOSINOPHIL # BLD AUTO: 0.04 10*3/MM3 (ref 0–0.4)
EOSINOPHIL NFR BLD AUTO: 0.7 % (ref 0.3–6.2)
ERYTHROCYTE [DISTWIDTH] IN BLOOD BY AUTOMATED COUNT: 13.1 % (ref 12.3–15.4)
FOLATE SERPL-MCNC: 8.06 NG/ML (ref 4.78–24.2)
GLOBULIN UR ELPH-MCNC: 3.1 GM/DL
GLUCOSE SERPL-MCNC: 137 MG/DL (ref 65–99)
HBA1C MFR BLD: 7 % (ref 4.8–5.6)
HCT VFR BLD AUTO: 35.7 % (ref 34–46.6)
HDLC SERPL QL: 3.12
HDLC SERPL-MCNC: 57 MG/DL (ref 40–60)
HGB BLD-MCNC: 11.9 G/DL (ref 12–15.9)
IMM GRANULOCYTES # BLD AUTO: 0.03 10*3/MM3 (ref 0–0.05)
IMM GRANULOCYTES NFR BLD AUTO: 0.5 % (ref 0–0.5)
LDLC SERPL CALC-MCNC: 107 MG/DL (ref 0–100)
LYMPHOCYTES # BLD AUTO: 1.49 10*3/MM3 (ref 0.7–3.1)
LYMPHOCYTES NFR BLD AUTO: 27.2 % (ref 19.6–45.3)
MCH RBC QN AUTO: 28.8 PG (ref 26.6–33)
MCHC RBC AUTO-ENTMCNC: 33.3 G/DL (ref 31.5–35.7)
MCV RBC AUTO: 86.4 FL (ref 79–97)
MONOCYTES # BLD AUTO: 0.39 10*3/MM3 (ref 0.1–0.9)
MONOCYTES NFR BLD AUTO: 7.1 % (ref 5–12)
NEUTROPHILS NFR BLD AUTO: 3.49 10*3/MM3 (ref 1.7–7)
NEUTROPHILS NFR BLD AUTO: 64 % (ref 42.7–76)
NRBC BLD AUTO-RTO: 0 /100 WBC (ref 0–0.2)
PLATELET # BLD AUTO: 203 10*3/MM3 (ref 140–450)
PMV BLD AUTO: 8.9 FL (ref 6–12)
POTASSIUM SERPL-SCNC: 3.5 MMOL/L (ref 3.5–5.2)
PROT SERPL-MCNC: 7.1 G/DL (ref 6–8.5)
RBC # BLD AUTO: 4.13 10*6/MM3 (ref 3.77–5.28)
SODIUM SERPL-SCNC: 140 MMOL/L (ref 136–145)
TRIGL SERPL-MCNC: 77 MG/DL (ref 0–150)
TSH SERPL DL<=0.05 MIU/L-ACNC: 2.63 UIU/ML (ref 0.27–4.2)
VIT B12 BLD-MCNC: 159 PG/ML (ref 211–946)
VLDLC SERPL-MCNC: 14 MG/DL (ref 5–40)
WBC NRBC COR # BLD AUTO: 5.47 10*3/MM3 (ref 3.4–10.8)

## 2024-11-11 PROCEDURE — 83036 HEMOGLOBIN GLYCOSYLATED A1C: CPT | Performed by: NURSE PRACTITIONER

## 2024-11-11 PROCEDURE — 82746 ASSAY OF FOLIC ACID SERUM: CPT | Performed by: NURSE PRACTITIONER

## 2024-11-11 PROCEDURE — 82306 VITAMIN D 25 HYDROXY: CPT | Performed by: NURSE PRACTITIONER

## 2024-11-11 PROCEDURE — 82607 VITAMIN B-12: CPT | Performed by: NURSE PRACTITIONER

## 2024-11-11 PROCEDURE — 3078F DIAST BP <80 MM HG: CPT | Performed by: NURSE PRACTITIONER

## 2024-11-11 PROCEDURE — 80061 LIPID PANEL: CPT | Performed by: NURSE PRACTITIONER

## 2024-11-11 PROCEDURE — 84443 ASSAY THYROID STIM HORMONE: CPT | Performed by: NURSE PRACTITIONER

## 2024-11-11 PROCEDURE — 85025 COMPLETE CBC W/AUTO DIFF WBC: CPT | Performed by: NURSE PRACTITIONER

## 2024-11-11 PROCEDURE — G0008 ADMIN INFLUENZA VIRUS VAC: HCPCS | Performed by: NURSE PRACTITIONER

## 2024-11-11 PROCEDURE — 3077F SYST BP >= 140 MM HG: CPT | Performed by: NURSE PRACTITIONER

## 2024-11-11 PROCEDURE — 1159F MED LIST DOCD IN RCRD: CPT | Performed by: NURSE PRACTITIONER

## 2024-11-11 PROCEDURE — 1160F RVW MEDS BY RX/DR IN RCRD: CPT | Performed by: NURSE PRACTITIONER

## 2024-11-11 PROCEDURE — 99214 OFFICE O/P EST MOD 30 MIN: CPT | Performed by: NURSE PRACTITIONER

## 2024-11-11 PROCEDURE — 1125F AMNT PAIN NOTED PAIN PRSNT: CPT | Performed by: NURSE PRACTITIONER

## 2024-11-11 PROCEDURE — 90662 IIV NO PRSV INCREASED AG IM: CPT | Performed by: NURSE PRACTITIONER

## 2024-11-11 PROCEDURE — 80053 COMPREHEN METABOLIC PANEL: CPT | Performed by: NURSE PRACTITIONER

## 2024-11-11 PROCEDURE — 3051F HG A1C>EQUAL 7.0%<8.0%: CPT | Performed by: NURSE PRACTITIONER

## 2024-11-11 RX ORDER — MONTELUKAST SODIUM 10 MG/1
10 TABLET ORAL NIGHTLY
Qty: 30 TABLET | Refills: 2 | Status: SHIPPED | OUTPATIENT
Start: 2024-11-11

## 2024-11-11 RX ORDER — FLUTICASONE PROPIONATE 50 MCG
2 SPRAY, SUSPENSION (ML) NASAL DAILY
Qty: 16 G | Refills: 2 | Status: SHIPPED | OUTPATIENT
Start: 2024-11-11

## 2024-11-11 RX ORDER — METFORMIN HYDROCHLORIDE 500 MG/1
500 TABLET, EXTENDED RELEASE ORAL 4 TIMES DAILY
Qty: 120 TABLET | Refills: 2 | Status: SHIPPED | OUTPATIENT
Start: 2024-11-11

## 2024-11-11 RX ORDER — CETIRIZINE HYDROCHLORIDE 10 MG/1
10 TABLET ORAL DAILY
Qty: 90 TABLET | Refills: 1 | Status: SHIPPED | OUTPATIENT
Start: 2024-11-11

## 2024-11-11 RX ORDER — ESOMEPRAZOLE MAGNESIUM 40 MG/1
40 CAPSULE, DELAYED RELEASE ORAL DAILY
Qty: 90 CAPSULE | Refills: 1 | Status: SHIPPED | OUTPATIENT
Start: 2024-11-11

## 2024-11-11 NOTE — ASSESSMENT & PLAN NOTE
I will add Singulair with her Zyrtec and Flonase and keep me posted  Orders:    cetirizine (zyrTEC) 10 MG tablet; Take 1 tablet by mouth Daily.    fluticasone (FLONASE) 50 MCG/ACT nasal spray; 2 sprays by Each Nare route Daily.    montelukast (Singulair) 10 MG tablet; Take 1 tablet by mouth Every Night.

## 2024-11-11 NOTE — ASSESSMENT & PLAN NOTE
for the blood pressure she is taking her Lasix which helps with the fluid from the liver.  She has not had to have blood pressure medicine for a while.  She used to be on lisinopril but was taken off of it when she was in the hospital.  We may need to restart.  She will keep me posted on her blood pressure after she takes her Lasix this afternoon.

## 2024-11-11 NOTE — ASSESSMENT & PLAN NOTE
She did just have her liver functions which are actually back to normal.  Continue per GI on the steroid.

## 2024-11-11 NOTE — PROGRESS NOTES
Chief Complaint  Diabetes    Subjective          Qiana Altman presents to [facility]  History of Present Illness  Autoimmune hepatitis: She is on long-term steroids but she is weaning down.  She said that she has been on the 10 mg prednisone since .  She cannot wait to get the steroid to be stopped.  Medication induced diabetes: Since being on the steroids her sugars that were prediabetic are now actually diabetic.  She is taking her metformin 2 in the morning 1 in the afternoon and 1 in the evening.  Her sugars are staying around the 130s but sometimes they do go up to the 150s.  She is taking her Zyrtec and her Flonase but she has had some sinus congestion little cough tickle cough and she does cough up some white mucus at times.  She has had sinus pressure.  She feels that it is more weather related.  She has not had a fever.  She is due her flu shot today.  Her blood pressure is elevated today.  She has not taken her Lasix.  She also does not feel that the kidney specialist is doing much.  She goes to him at the end of November/beginning of December.  Depression: Not at risk (9/10/2024)    PHQ-2     PHQ-2 Score: 0                  Allergies  Shellfish allergy, Moxifloxacin hcl, Nitrous oxide, Scopolamine, Sulfa antibiotics, Morphine, and Penicillins    Social History     Tobacco Use    Smoking status: Former     Current packs/day: 0.00     Average packs/day: 1 pack/day for 5.0 years (5.0 ttl pk-yrs)     Types: Cigarettes     Start date: 1980     Quit date: 1985     Years since quittin.8     Passive exposure: Past    Smokeless tobacco: Never    Tobacco comments:     QUIT 28 YEARS AGO   Vaping Use    Vaping status: Never Used   Substance Use Topics    Alcohol use: Never    Drug use: Never       Family History   Problem Relation Age of Onset    Heart disease Mother     Heart failure Mother     Arthritis Mother     Hyperlipidemia Mother     Hypertension  "Mother     Stroke Mother     Thyroid disease Mother     Colon polyps Mother     Heart disease Brother     Heart disease Brother     Rectal cancer Maternal Grandmother     Kidney disease Daughter     Miscarriages / Stillbirths Daughter     Birth defects Daughter         Cleft palate    Lung cancer Other     Skin cancer Other         Health Maintenance Due   Topic Date Due    DIABETIC EYE EXAM  Never done    DIABETIC FOOT EXAM  Never done    INFLUENZA VACCINE  08/01/2024    DXA SCAN  08/05/2024    ANNUAL WELLNESS VISIT  01/31/2025    COLORECTAL CANCER SCREENING  03/28/2025        Immunization History   Administered Date(s) Administered    COVID-19 (MODERNA) 1st,2nd,3rd Dose Monovalent 03/26/2021, 04/23/2021    Flu Vaccine Quad PF >36MO 09/09/2014, 12/29/2015    Fluzone (or Fluarix & Flulaval for VFC) >6mos 09/09/2014, 12/29/2015    Fluzone High-Dose 65+yrs 10/04/2021, 09/29/2022, 10/31/2023    Influenza, Unspecified 09/23/2020    Pneumococcal Conjugate 13-Valent (PCV13) 11/14/2017    Pneumococcal Polysaccharide (PPSV23) 09/23/2020    Tdap 06/22/2016       Review of Systems   Constitutional:  Negative for fatigue.   HENT:  Positive for congestion.    Respiratory:  Positive for cough. Negative for shortness of breath.    Cardiovascular:  Negative for chest pain.   Gastrointestinal:  Negative for diarrhea, nausea and vomiting.        Objective       Vitals:    11/11/24 0851 11/11/24 0900   BP: 152/54 148/60   Pulse: 76    Resp: 16    Temp: 97.3 °F (36.3 °C)    SpO2: 99%    Weight: 60 kg (132 lb 4.8 oz)    Height: 152.4 cm (60\")        Body mass index is 25.84 kg/m².         Physical Exam  Vitals reviewed.   Constitutional:       Appearance: Normal appearance. She is well-developed.   Cardiovascular:      Rate and Rhythm: Normal rate and regular rhythm.      Heart sounds: Normal heart sounds. No murmur heard.  Pulmonary:      Effort: Pulmonary effort is normal.      Breath sounds: Normal breath sounds.   Neurological:     "  Mental Status: She is alert and oriented to person, place, and time.      Cranial Nerves: No cranial nerve deficit.      Motor: No weakness.   Psychiatric:         Mood and Affect: Mood and affect normal.               Result Review :     The following data was reviewed by: LOYD Nolen on 11/11/2024:    Common Labs   Common labs          9/24/2024    10:19 9/29/2024    14:07 11/7/2024    09:09   Common Labs   Glucose  335     BUN  17     Creatinine  0.98     Sodium  135     Potassium  3.4     Chloride  96     Calcium  9.4     Albumin 3.3  3.6  3.9    Total Bilirubin 0.7  1.1  0.7    Alkaline Phosphatase 71  75  49    AST (SGOT) 37  30  23    ALT (SGPT) 48  37  22    WBC  6.62     Hemoglobin  12.2     Hematocrit  36.8     Platelets  217             CT Needle Biopsy Liver    Result Date: 8/2/2024  Impression: 1.Successful CT-guided percutaneous liver biopsy without evidence of complication. Electronically Signed: Guillaume Santos MD  8/2/2024 11:13 AM EDT  Workstation ID: [id number]    US Liver    Result Date: 7/30/2024  Impression: No evidence of hepatosplenomegaly. Negative exam. Electronically Signed: Summer Diallo MD  7/30/2024 7:59 AM EDT  Workstation ID: [id number]    CT Abdomen Pelvis With Contrast    Result Date: 7/29/2024  Impression: 1. Hepatic steatosis. 2. Mild hepatosplenomegaly. There may be small gastroesophageal varices. I would recommend clinical correlation with regard to signs and symptoms of portal hypertension. 3. Status post a right hemicolectomy. 4. Calcified uterine fibroid. 5. Suggestion of a small hiatal hernia. 6. Mild dependent atelectasis in the lung bases. Electronically Signed: Kevin Ortega MD  7/29/2024 12:30 PM EDT  Workstation ID: [id number]                Assessment and Plan      Assessment & Plan  Allergic rhinitis, unspecified seasonality, unspecified trigger  I will add Singulair with her Zyrtec and Flonase and keep me posted  Orders:    cetirizine (zyrTEC) 10 MG  tablet; Take 1 tablet by mouth Daily.    fluticasone (FLONASE) 50 MCG/ACT nasal spray; 2 sprays by Each Nare route Daily.    montelukast (Singulair) 10 MG tablet; Take 1 tablet by mouth Every Night.    Gastroesophageal reflux disease without esophagitis  Continue on the Nexium daily  Orders:    esomeprazole (nexIUM) 40 MG capsule; Take 1 capsule by mouth Daily.    Type 2 diabetes mellitus with hyperglycemia, without long-term current use of insulin    She is doing the metformin for a day hopefully once she is off the steroid we can wean off.  Orders:    metFORMIN ER (GLUCOPHAGE-XR) 500 MG 24 hr tablet; Take 1 tablet by mouth 4 (Four) Times a Day.    CBC Auto Differential    Comprehensive Metabolic Panel    Hemoglobin A1c    Lipid Panel With / Chol / HDL Ratio    TSH    Needs flu shot  We will go ahead and do her flu shot with her autoimmune history  Orders:    Fluzone High-Dose 65+yrs ([id number])    Vitamin D deficiency  We are going to check her vitamin D today  Orders:    Vitamin D,25-Hydroxy    Hyponatremia  We will check a comp  Orders:    Comprehensive Metabolic Panel    B12 deficiency  We will check her B12 today  Orders:    Vitamin B12 & Folate    Autoimmune hepatitis  She did just have her liver functions which are actually back to normal.  Continue per GI on the steroid.       Essential hypertension   for the blood pressure she is taking her Lasix which helps with the fluid from the liver.  She has not had to have blood pressure medicine for a while.  She used to be on lisinopril but was taken off of it when she was in the hospital.  We may need to restart.  She will keep me posted on her blood pressure after she takes her Lasix this afternoon.            Diagnosis Plan   1. Needs flu shot  Fluzone High-Dose 65+yrs ([id number])      2. Allergic rhinitis, unspecified seasonality, unspecified trigger  cetirizine (zyrTEC) 10 MG tablet    fluticasone (FLONASE) 50 MCG/ACT nasal spray    montelukast (Singulair) 10  MG tablet      3. Gastroesophageal reflux disease without esophagitis  esomeprazole (nexIUM) 40 MG capsule      4. Type 2 diabetes mellitus with hyperglycemia, without long-term current use of insulin  metFORMIN ER (GLUCOPHAGE-XR) 500 MG 24 hr tablet    CBC Auto Differential    Comprehensive Metabolic Panel    Hemoglobin A1c    Lipid Panel With / Chol / HDL Ratio    TSH      5. Vitamin D deficiency  Vitamin D,25-Hydroxy      6. Hyponatremia  Comprehensive Metabolic Panel      7. B12 deficiency  Vitamin B12 & Folate      8. Autoimmune hepatitis        9. Essential hypertension                  Follow Up     Return in about 3 months (around 2/11/2025).    Patient was given instructions and counseling regarding her condition or for health maintenance advice. Please see specific information pulled into the AVS if appropriate.     Parts of this note are electronic transcriptions/translations of spoken language to printed text using the Dragon Dictation system.          Zohreh Mcduffie, APRN  11/11/2024

## 2024-11-11 NOTE — ASSESSMENT & PLAN NOTE
Continue on the Nexium daily  Orders:    esomeprazole (nexIUM) 40 MG capsule; Take 1 capsule by mouth Daily.

## 2024-11-13 ENCOUNTER — TELEPHONE (OUTPATIENT)
Dept: FAMILY MEDICINE CLINIC | Facility: CLINIC | Age: 74
End: 2024-11-13
Payer: MEDICARE

## 2024-11-13 RX ORDER — CYANOCOBALAMIN 1000 UG/ML
INJECTION, SOLUTION INTRAMUSCULAR; SUBCUTANEOUS
Qty: 7 ML | Refills: 0 | Status: SHIPPED | OUTPATIENT
Start: 2024-11-13 | End: 2024-11-15 | Stop reason: SDUPTHER

## 2024-11-13 NOTE — TELEPHONE ENCOUNTER
Caller: Qiana Altman    Relationship: Self    Best call back number: [phone]    What is the best time to reach you: ANYTIME    Who are you requesting to speak with (clinical staff, provider,  specific staff member): AGUSTINA PERSAUD, OR NURSE     What was the call regarding: PATIENT STATES THAT HER B12 SHOTS HAVE NOT BEEN CALLED IN TO [facility].

## 2024-11-14 ENCOUNTER — TELEPHONE (OUTPATIENT)
Dept: FAMILY MEDICINE CLINIC | Facility: CLINIC | Age: 74
End: 2024-11-14

## 2024-11-14 NOTE — TELEPHONE ENCOUNTER
Caller: Qiana Altman    Relationship: Self    Best call back number:      What is the best time to reach you:  [phone]        What was the call regarding:       THE PATIENT SAID TO SEND THE B -12 SHOT  TO THE [facility] AND HER DAUGHTER [place] PICK THEM UP [place] AND HER DAUGHTER WILL PICK THEM UP       SHE SAID TO CALL HER IF THERE ARE ANY QUESTIONS

## 2024-11-14 NOTE — TELEPHONE ENCOUNTER
Caller: Qiana Altman    Relationship to patient: Self    Best call back number: [phone]     Patient is needing:     PATIENT STATES INSURANCE IS NOT COVERING B-12 INJECTIONS AND SHE HAS TO PAY CLOSE TO 34 DOLLARS FOR EVERYTHING.    IS THERE SOMETHING THAT CAN BE DONE TO HAVE INSURANCE COVER IT OR SEND IN SOMETHING ELSE AS AN ALTERNATIVE OR CAN PATIENT GET SOMETHING OVER THE COUNTER.

## 2024-11-15 RX ORDER — CYANOCOBALAMIN 1000 UG/ML
INJECTION, SOLUTION INTRAMUSCULAR; SUBCUTANEOUS
Qty: 7 ML | Refills: 0 | Status: SHIPPED | OUTPATIENT
Start: 2024-11-15

## 2024-11-21 ENCOUNTER — PATIENT ROUNDING (BHMG ONLY) (OUTPATIENT)
Dept: FAMILY MEDICINE CLINIC | Facility: CLINIC | Age: 74
End: 2024-11-21
Payer: MEDICARE

## 2024-12-06 ENCOUNTER — OFFICE VISIT (OUTPATIENT)
Dept: GASTROENTEROLOGY | Facility: CLINIC | Age: 74
End: 2024-12-06
Payer: MEDICARE

## 2024-12-06 VITALS
BODY MASS INDEX: 26.11 KG/M2 | HEART RATE: 68 BPM | WEIGHT: 133 LBS | HEIGHT: 60 IN | SYSTOLIC BLOOD PRESSURE: 145 MMHG | DIASTOLIC BLOOD PRESSURE: 68 MMHG

## 2024-12-06 DIAGNOSIS — K75.4 AUTOIMMUNE HEPATITIS: Primary | ICD-10-CM

## 2024-12-06 DIAGNOSIS — E55.9 VITAMIN D DEFICIENCY: ICD-10-CM

## 2024-12-06 DIAGNOSIS — E53.8 B12 DEFICIENCY: ICD-10-CM

## 2024-12-06 DIAGNOSIS — D89.2 HYPERGAMMAGLOBULINEMIA: ICD-10-CM

## 2024-12-06 DIAGNOSIS — K74.00 FIBROSIS OF LIVER: ICD-10-CM

## 2024-12-06 RX ORDER — PREDNISONE 5 MG/1
5 TABLET ORAL DAILY
Qty: 17 TABLET | Refills: 0 | Status: SHIPPED | OUTPATIENT
Start: 2024-12-06 | End: 2024-12-23

## 2024-12-06 RX ORDER — PREDNISONE 10 MG/1
10 TABLET ORAL DAILY
COMMUNITY
Start: 2024-08-30 | End: 2024-12-06 | Stop reason: DRUGHIGH

## 2024-12-06 RX ORDER — AZATHIOPRINE 50 MG/1
50 TABLET ORAL DAILY
Qty: 30 TABLET | Refills: 2 | Status: SHIPPED | OUTPATIENT
Start: 2024-12-06 | End: 2025-03-06

## 2024-12-06 NOTE — PATIENT INSTRUCTIONS
Take prednisone 5 mg tablet daily for 10 days and then 2.5 mg tablet daily for 7 days and then stopped taking it.  Will start treatment with azathioprine 50 mg orally daily.  The potential side effect of the medication causing drop in blood cell count as well as nausea and vomiting discussed with the patient.  For first 3 months she needs CBC every month.  Patient notified that azathioprine is a immunomodulator medication that can suppress her immunity plan lead to serious infection.  She has to get vaccination for pneumonia, flu, shingles and RSV.  She notified that she is scheduled to get all of them next week.  We will start her on calcium with vitamin D to improve bone growth and health  Will check RENZO, SMA, quantitative immunoglobulin, LFTs, CBC, PT/INR.  Will see her back in follow-up in 3 months.

## 2024-12-06 NOTE — PROGRESS NOTES
Patient Name: Qiana Altman   Visit Date: 12/06/2024   Patient ID: [id number]  Provider: Peter Osorio MD    Sex: female  Location:  Location Address:  Location Phone: [phone]  [place] [address] [phone]    YOB: 1950  Age: 74 y.o.   Primary Care Provider Zohreh Mcduffie APRN      Referring Provider: No ref. provider found          History: 08/30/2024    Qiana Altman is a 74 y.o. female had been seen in the hospital on 07/29/2024 in consultation for elevated LFTs and hepatosplenomegaly.  Patient workup was reported AST of 394, , total bilirubin 1.8 and alkaline phosphatase 127.  CT abdomen and pelvis with hepatic steatosis, mild hepatosplenomegaly and is small gastroesophageal varices.  Patient apparently had similar episode in the past and she was found to be positive for SMA and CMV IgG.  Patient does not have a history of illicit or IV drug abuse, and said she did not take, history of hepatitis or blood transfusion.  She has history of GERD and takes Nexium.  I ordered extensive and comprehensive liver workup to find out the etiology of elevated LFTs and work-up showed     Total bilirubin 1.6  Alkaline phosphatase 112  [phone]  [phone]  Gamma   INR 1.18  SMA 58  RENZO positive  AMA pending  Quantitative immunoglobulin IgG 2736 (hypergammaglobulinemia)  Acute hepatitis panel- Negative  Ultrasound liver - no evidence of hepatosplenomegaly  pANCA negative  AMA negative  CMV and HSV PCR negative  Liver Kidney microsomal antibody negative     Patient underwent CT-guided liver biopsy that confirmed the diagnosis of Autoimmune Hepatitis.     Final Diagnosis   Liver, biopsy:               - Interface hepatitis with prominent plasma cells; most consistent with autoimmune hepatitis. 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 See separate consultation report for additional informatio      Patient started on prednisone 50 mg orally daily and discharged home.  She presented today for follow-up office visit. Most recent LFT's showed improved LFTs.  AST improved to 50 from 459, ALT 95 from 397, total bili 1.5, alkaline phosphatase 100 from 151.  Patient describes that overall she is doing fine except uncontrolled blood sugar and her PCP started her on metformin 500 mg 3 times daily that helps to bring the blood sugar within acceptable range. Patient does not have abdominal pain, poor appetite, weight loss, nausea, vomiting.  She denies any concerns or having any questions.       I have explained to her that every 2 weeks I will check her LFTs and every month IgG level and will reduce prednisone 10 mg every two weeks and will maintain the dose to 10 mg/day until azathioprine added and then will discontinue by tapering the prednisone.    12/06/2024    Patient presents today for follow-up office visit.  She is currently on prednisone 10 mg orally daily.  Most recent lab workup on 11/11/2024 showed normal LFTs.  Patient also noted to have B12 deficiency anemia with B12 level of 159.  Folate level was normal.  Vitamin D level low normal.  She is currently on vitamin D replacement.  Hemoglobin A1c 7.  LDL cholesterol 107.  Hemoglobin noted to be 11.9 g/dL.  Overall, she is doing a lot better and happy with the normalization of liver function test.  I did talk with the patient and his daughter who was with her during this office visit and answered their questions appropriately.  She she will be getting vaccination for flu, pneumonia and RSV next week.  I did talk to them the importance of preventative vaccination especially while I am going to start her on Imuran that increases the risk of infection due to its immunomodulator effect    Allergies   Allergen Reactions    Shellfish Allergy Anaphylaxis    Moxifloxacin Hcl  "Unknown - Low Severity    Nitrous Oxide Other (See Comments)    Scopolamine Swelling    Sulfa Antibiotics Unknown - Low Severity    Morphine Rash    Penicillins Rash       CURRENT & DISCONTINUED MEDICATIONS  Current Outpatient Medications   Medication Instructions    azaTHIOprine (IMURAN) 50 mg, Oral, Daily    Calcium Citrate-Vitamin D 250-2.5 MG-MCG per tablet 1 tablet, Oral, 2 Times Daily    cetirizine (ZYRTEC) 10 mg, Oral, Daily    cholecalciferol (VITAMIN D3) 1,000 Units, Daily    cyanocobalamin 1000 MCG/ML injection 1 CC IM weekly for 4 weeks then 1 cc every 14 days for 2 injections then 1cc monthly there after then will need lab draw in office    esomeprazole (NEXIUM) 40 mg, Oral, Daily    fluticasone (FLONASE) 50 MCG/ACT nasal spray 2 sprays, Each Nare, Daily    furosemide (LASIX) 20 mg, Oral, Daily    metFORMIN ER (GLUCOPHAGE-XR) 500 mg, Oral, 4 Times Daily    montelukast (SINGULAIR) 10 mg, Oral, Nightly    predniSONE (DELTASONE) 5 MG tablet Take 1 tablet (5 mg) daily for 10 days and then 1/2 tablet (2.5 mg)daily for 7 days and then stop taking it    SENNOSIDES-DOCUSATE SODIUM PO Take  by mouth.    Syringe/Needle, Disp, (Luer Lock Safety Syringes) 25G X 1\" 3 ML misc 1 each, Not Applicable, As Needed       Medications Discontinued During This Encounter   Medication Reason    predniSONE (DELTASONE) 10 MG tablet Dose adjustment        OBJECTIVE/PHYSICAL EXAM    VITAL SIGNS                                                                                                                                         /68 (BP Location: Right arm, Patient Position: Sitting, Cuff Size: Adult)   Pulse 68   Ht 152.4 cm (60\")   Wt 60.3 kg (133 lb)   BMI 25.97 kg/m²       Physical Exam  Constitutional:       Appearance: Normal appearance. She is normal weight.   HENT:      Mouth/Throat:      Mouth: Mucous membranes are moist.      Pharynx: Oropharynx is clear.   Cardiovascular:      Rate and Rhythm: Normal rate and " regular rhythm.      Pulses: Normal pulses.      Heart sounds: Normal heart sounds.   Pulmonary:      Effort: Pulmonary effort is normal.      Breath sounds: Normal breath sounds.   Abdominal:      General: Abdomen is flat. Bowel sounds are normal. There is no distension.      Palpations: Abdomen is soft.      Tenderness: There is no abdominal tenderness. There is no guarding.   Skin:     General: Skin is warm and dry.   Neurological:      General: No focal deficit present.      Mental Status: She is alert and oriented to person, place, and time.   Psychiatric:         Mood and Affect: Mood normal.         Behavior: Behavior normal.         Thought Content: Thought content normal.         Judgment: Judgment normal.        Results Reviewed:  The following data was reviewed by: Peter Osorio MD on 12/06/2024:    Lab Results - Last 18 Months   Lab Units 11/11/24 0923 09/29/24 1407 08/26/24  2140   WBC 10*3/mm3 5.47 6.62 7.55   HEMOGLOBIN g/dL 11.9* 12.2 12.5   MCV fL 86.4 85.6 87.1   PLATELETS 10*3/mm3 203 217 193             Lab Results - Last 18 Months   Lab Units 11/11/24  0923 09/29/24  1407 09/07/24  1135   BUN mg/dL 12 17 15   CREATININE mg/dL 0.89 0.98 0.89   SODIUM mmol/L 140 135* 142   POTASSIUM mmol/L 3.5 3.4* 3.6   CHLORIDE mmol/L 103 96* 101   CO2 mmol/L 26.0 23.9 28.0   GLUCOSE mg/dL 137* 335* 99        Lab Results - Last 18 Months   Lab Units 08/01/24  1438 07/29/24  1503   PROTIME Seconds 15.3* 14.5   INR  1.18* 1.11   APTT seconds 39.5*  --        Lab Results - Last 18 Months   Lab Units 11/11/24  0923 11/07/24  0909 09/29/24  1407 09/24/24  1019 09/07/24  1135   AST (SGOT) U/L 22 23 30 37* 32   ALT (SGPT) U/L 23 22 37* 48* 53*   ALK PHOS U/L 52 49 75 71 73   BILIRUBIN mg/dL 0.5 0.7 1.1 0.7 0.8   BILIRUBIN DIRECT mg/dL  --  <0.2  --  0.2 0.2   TOTAL PROTEIN g/dL 7.1 6.9 7.0 6.5 6.5   ALBUMIN g/dL 4.0 3.9 3.6 3.3* 3.7        Lab Results - Last 18 Months   Lab Units 11/11/24  0923 08/13/24  0856  07/29/24  1503 01/31/24  1028 07/12/23  0724   IRON mcg/dL  --   --   --   --  63   TRANSFERRIN mg/dL  --   --   --   --  373*   TIBC mcg/dL  --   --   --   --  556*   VITAMIN B 12 pg/mL 159* 977* 1,131*   < > 244   FOLATE ng/mL 8.06 14.10 15.20   < > 10.30    < > = values in this interval not displayed.       Lab Results - Last 18 Months   Lab Units 07/29/24 2056   HEP A IGM  Non-Reactive       No Images in the past 120 days found..    Endoscopy Procedures:       Assessment and Plan   Diagnoses and all orders for this visit:    1. Autoimmune hepatitis (Primary)  -     RENZO; Future  -     Anti-Smooth Muscle Antibody Titer  -     IgG; Future  -     IgM; Future  -     IgA; Future  -     Hepatic Function Panel  -     CBC & Differential; Future  -     Protime-INR; Future    2. Hypergammaglobulinemia  -     IgG; Future  -     IgM; Future  -     IgA; Future    3. Fibrosis of liver  -     IgG; Future  -     IgM; Future  -     IgA; Future    4. B12 deficiency    5. Vitamin D deficiency    Other orders  -     predniSONE (DELTASONE) 5 MG tablet; Take 1 tablet (5 mg) daily for 10 days and then 1/2 tablet (2.5 mg)daily for 7 days and then stop taking it  Dispense: 17 tablet; Refill: 0  -     azaTHIOprine (Imuran) 50 MG tablet; Take 1 tablet by mouth Daily for 90 days.  Dispense: 30 tablet; Refill: 2  -     Calcium Citrate-Vitamin D 250-2.5 MG-MCG per tablet; Take 1 tablet by mouth 2 (Two) Times a Day.  Dispense: 60 tablet; Refill: 11      PLAN & RECOMMENDATIONS: Patient with biopsy-proven diagnosis of autoimmune hepatitis.  Biochemical profile including abnormally elevated LFTs, positive RENZO and SMA and hypergammaglobulinemia were positive. also present.  She is currently on prednisone 10 mg orally daily and will reduce it to 5 mg tablet daily for 10 days and then 2.5 mg tablet daily for a week And then discontinue it.  Simultaneously, will start her on azathioprine 50 mg tablet daily and will titrate up after checking her CBC  and LFTs in a month.  Will also check quantitative immunoglobulin, AMA, SMA and INR..     Patient is immunocompromise and should avoid live vaccination and take preventative majors including wearing masks, handwashing to protect herself from exposing to opportunistic infection and should get vaccination for pneumonia, flu, shingles.    The potential side effect of the medication causing drop in blood cell count as well as nausea and vomiting discussed with the patient.  For first 3 months she needs CBC every month.    Continue B12 replacement therapy    We will start her on calcium with vitamin D to improve bone growth and health    FOLLOW UP  Return in about 3 months (around 3/6/2025).  Patient was given instructions and counseling regarding her condition or for health maintenance advice. Please see specific information pulled into the AVS if appropriate.       Part of this note may be an electronic transcription/translation of spoken language to printed text using the Dragon Dictation System

## 2024-12-09 DIAGNOSIS — I10 ESSENTIAL HYPERTENSION: ICD-10-CM

## 2024-12-09 RX ORDER — LISINOPRIL 10 MG/1
10 TABLET ORAL
Qty: 90 TABLET | Refills: 1 | OUTPATIENT
Start: 2024-12-09

## 2024-12-10 ENCOUNTER — TELEPHONE (OUTPATIENT)
Dept: GASTROENTEROLOGY | Facility: CLINIC | Age: 74
End: 2024-12-10
Payer: MEDICARE

## 2024-12-10 ENCOUNTER — OFFICE VISIT (OUTPATIENT)
Dept: FAMILY MEDICINE CLINIC | Facility: CLINIC | Age: 74
End: 2024-12-10
Payer: MEDICARE

## 2024-12-10 ENCOUNTER — TELEPHONE (OUTPATIENT)
Dept: FAMILY MEDICINE CLINIC | Facility: CLINIC | Age: 74
End: 2024-12-10

## 2024-12-10 VITALS
BODY MASS INDEX: 25.4 KG/M2 | SYSTOLIC BLOOD PRESSURE: 130 MMHG | DIASTOLIC BLOOD PRESSURE: 78 MMHG | OXYGEN SATURATION: 98 % | RESPIRATION RATE: 16 BRPM | WEIGHT: 129.4 LBS | HEIGHT: 60 IN | TEMPERATURE: 97.3 F | HEART RATE: 76 BPM

## 2024-12-10 DIAGNOSIS — R30.0 DYSURIA: Primary | ICD-10-CM

## 2024-12-10 DIAGNOSIS — L72.3 SEBACEOUS CYST: ICD-10-CM

## 2024-12-10 DIAGNOSIS — M19.90 OSTEOARTHRITIS, UNSPECIFIED OSTEOARTHRITIS TYPE, UNSPECIFIED SITE: ICD-10-CM

## 2024-12-10 DIAGNOSIS — K75.4 AUTOIMMUNE HEPATITIS: ICD-10-CM

## 2024-12-10 LAB
BILIRUB BLD-MCNC: NEGATIVE MG/DL
CLARITY, POC: ABNORMAL
COLOR UR: YELLOW
EXPIRATION DATE: ABNORMAL
GLUCOSE UR STRIP-MCNC: NEGATIVE MG/DL
KETONES UR QL: NEGATIVE
LEUKOCYTE EST, POC: ABNORMAL
Lab: ABNORMAL
NITRITE UR-MCNC: NEGATIVE MG/ML
PH UR: 5 [PH] (ref 5–8)
PROT UR STRIP-MCNC: NEGATIVE MG/DL
RBC # UR STRIP: ABNORMAL /UL
SP GR UR: 1.01 (ref 1–1.03)
UROBILINOGEN UR QL: ABNORMAL

## 2024-12-10 PROCEDURE — 1159F MED LIST DOCD IN RCRD: CPT | Performed by: NURSE PRACTITIONER

## 2024-12-10 PROCEDURE — 87086 URINE CULTURE/COLONY COUNT: CPT | Performed by: NURSE PRACTITIONER

## 2024-12-10 PROCEDURE — 3075F SYST BP GE 130 - 139MM HG: CPT | Performed by: NURSE PRACTITIONER

## 2024-12-10 PROCEDURE — G2211 COMPLEX E/M VISIT ADD ON: HCPCS | Performed by: NURSE PRACTITIONER

## 2024-12-10 PROCEDURE — 1160F RVW MEDS BY RX/DR IN RCRD: CPT | Performed by: NURSE PRACTITIONER

## 2024-12-10 PROCEDURE — 81003 URINALYSIS AUTO W/O SCOPE: CPT | Performed by: NURSE PRACTITIONER

## 2024-12-10 PROCEDURE — 87088 URINE BACTERIA CULTURE: CPT | Performed by: NURSE PRACTITIONER

## 2024-12-10 PROCEDURE — 3078F DIAST BP <80 MM HG: CPT | Performed by: NURSE PRACTITIONER

## 2024-12-10 PROCEDURE — 87186 SC STD MICRODIL/AGAR DIL: CPT | Performed by: NURSE PRACTITIONER

## 2024-12-10 PROCEDURE — 99213 OFFICE O/P EST LOW 20 MIN: CPT | Performed by: NURSE PRACTITIONER

## 2024-12-10 PROCEDURE — 3051F HG A1C>EQUAL 7.0%<8.0%: CPT | Performed by: NURSE PRACTITIONER

## 2024-12-10 PROCEDURE — 1125F AMNT PAIN NOTED PAIN PRSNT: CPT | Performed by: NURSE PRACTITIONER

## 2024-12-10 RX ORDER — MUPIROCIN CALCIUM 20 MG/G
1 CREAM TOPICAL 3 TIMES DAILY
Qty: 30 G | Refills: 0 | Status: SHIPPED | OUTPATIENT
Start: 2024-12-10

## 2024-12-10 NOTE — TELEPHONE ENCOUNTER
Pt called to let us know that she was unable to get Flu or Pneumonia Vaccines due to active UTI. Does patient need to wait on starting Imuran until she is able to get these vaccinations?

## 2024-12-10 NOTE — PROGRESS NOTES
Chief Complaint  Cyst (Right leg panty line) and Difficulty Urinating    Subjective          Qiana AMA Blackstone presents to [facility]  History of Present Illness  CYST: She has a cyst like area noted on the right groin.  She said that she feels like it is getting a little bit bigger.  It is not draining.  She said that she feels also that she may have a urinary tract infection.  She is having irritability and discomfort with urination.  It started just couple days ago.  She is also wanting to see pain management for her joints.    She said that insurance told her that the medication that GI is wanting her to be on is more for rheumatoid arthritis and she knows that she has osteoarthritis but she did not know why he had her on the medicine--it is not approved yet and I did reach out to gastro and they are going to work on getting it approved.               Allergies  Shellfish allergy, Moxifloxacin hcl, Nitrous oxide, Scopolamine, Sulfa antibiotics, Morphine, and Penicillins    Social History     Tobacco Use    Smoking status: Former     Current packs/day: 0.00     Average packs/day: 1 pack/day for 5.0 years (5.0 ttl pk-yrs)     Types: Cigarettes     Start date: 1980     Quit date: 1985     Years since quittin.9     Passive exposure: Past    Smokeless tobacco: Never    Tobacco comments:     QUIT 28 YEARS AGO   Vaping Use    Vaping status: Never Used   Substance Use Topics    Alcohol use: Never    Drug use: Never       Family History   Problem Relation Age of Onset    Heart disease Mother     Heart failure Mother     Arthritis Mother     Hyperlipidemia Mother     Hypertension Mother     Stroke Mother     Thyroid disease Mother     Colon polyps Mother     Heart disease Brother     Heart disease Brother     Rectal cancer Maternal Grandmother     Kidney disease Daughter     Miscarriages / Stillbirths Daughter     Birth defects Daughter         Cleft palate    Lung cancer Other  "    Skin cancer Other     Colon cancer Neg Hx         Health Maintenance Due   Topic Date Due    DIABETIC FOOT EXAM  Never done    DIABETIC EYE EXAM  Never done    DXA SCAN  08/05/2024    ANNUAL WELLNESS VISIT  01/31/2025    COLORECTAL CANCER SCREENING  03/28/2025        Immunization History   Administered Date(s) Administered    COVID-19 (MODERNA) 1st,2nd,3rd Dose Monovalent 03/26/2021, 04/23/2021    Flu Vaccine Quad PF >36MO 09/09/2014, 12/29/2015    Fluzone (or Fluarix & Flulaval for VFC) >6mos 09/09/2014, 12/29/2015    Fluzone High-Dose 65+YRS 11/11/2024    Fluzone High-Dose 65+yrs 10/04/2021, 09/29/2022, 10/31/2023    Influenza, Unspecified 09/23/2020    Pneumococcal Conjugate 13-Valent (PCV13) 11/14/2017    Pneumococcal Polysaccharide (PPSV23) 09/23/2020    Tdap 06/22/2016       Review of Systems   Genitourinary:  Positive for dysuria.   Musculoskeletal:  Positive for arthralgias and gait problem.   Skin:  Positive for skin lesions.        Objective       Vitals:    12/10/24 0840 12/10/24 0854   BP: 152/67 130/78   Pulse: 76    Resp: 16    Temp: 97.3 °F (36.3 °C)    SpO2: 98%    Weight: 58.7 kg (129 lb 6.4 oz)    Height: 152.4 cm (60\")        Body mass index is 25.27 kg/m².         Physical Exam  Vitals reviewed.   Constitutional:       Appearance: Normal appearance. She is well-developed.   Cardiovascular:      Rate and Rhythm: Normal rate and regular rhythm.      Heart sounds: Normal heart sounds. No murmur heard.  Pulmonary:      Effort: Pulmonary effort is normal.      Breath sounds: Normal breath sounds.   Genitourinary:      Skin:     Comments: There is a small sebaceous/Bartholin cyst noted right in the fold of the right groin.  It is pea-sized.  No redness or drainage noted.   Neurological:      Mental Status: She is alert and oriented to person, place, and time.      Cranial Nerves: No cranial nerve deficit.      Motor: No weakness.   Psychiatric:         Mood and Affect: Mood and affect normal. "               Result Review :     The following data was reviewed by: LOYD Nolen on 12/10/2024:            No Images in the past 120 days found..              Assessment and Plan      Assessment & Plan  Dysuria  Discussed that we would do a urine culture and go from there.  Push fluids.  Orders:    POCT urinalysis dipstick, automated    Urine Culture - Urine, Urine, Clean Catch    Sebaceous cyst  She will do Epsom salt at least 10 minutes twice a day.  If the area becomes bigger then we will do a referral to general surgery to get the area removed.  Do not pick at the area squeeze the area.  Orders:    mupirocin (BACTROBAN) 2 % cream; Apply 1 Application topically to the appropriate area as directed 3 (Three) Times a Day.    Osteoarthritis, unspecified osteoarthritis type, unspecified site    Orders:    Ambulatory Referral to Pain Management    Autoimmune hepatitis            Diagnosis Plan   1. Dysuria  POCT urinalysis dipstick, automated    Urine Culture - Urine, Urine, Clean Catch      2. Sebaceous cyst  mupirocin (BACTROBAN) 2 % cream      3. Osteoarthritis, unspecified osteoarthritis type, unspecified site  Ambulatory Referral to Pain Management      4. Autoimmune hepatitis                  Follow Up     Return if symptoms worsen or fail to improve.  I did reach out to gastro and they are going to check on the Imuran prescription  Patient was given instructions and counseling regarding her condition or for health maintenance advice. Please see specific information pulled into the AVS if appropriate.     Parts of this note are electronic transcriptions/translations of spoken language to printed text using the Dragon Dictation system.          LOYD Nolen  12/10/2024

## 2024-12-10 NOTE — TELEPHONE ENCOUNTER
[facility] called they have the ointment not the cream in [facility] ,they have the ointment available. I gave verbal fro ointment.

## 2024-12-10 NOTE — TELEPHONE ENCOUNTER
Caller: Qiana Altman    Relationship: Self    Best call back number: [phone]     What is the best time to reach you: AROUND 4      Who are you requesting to speak with (clinical staff, provider,  specific staff member): CLINICAL     Do you know the name of the person who called: LIZZIE     What was the call regarding: PATIENT WAS LEFT VOICEMAIL, SHE HAS QUESTIONS ABOUT THE MEDICATION.

## 2024-12-12 ENCOUNTER — TELEPHONE (OUTPATIENT)
Dept: FAMILY MEDICINE CLINIC | Facility: CLINIC | Age: 74
End: 2024-12-12
Payer: MEDICARE

## 2024-12-12 DIAGNOSIS — I10 ESSENTIAL HYPERTENSION: ICD-10-CM

## 2024-12-12 LAB — BACTERIA SPEC AEROBE CULT: ABNORMAL

## 2024-12-12 RX ORDER — NITROFURANTOIN 25; 75 MG/1; MG/1
100 CAPSULE ORAL 2 TIMES DAILY
Qty: 14 CAPSULE | Refills: 0 | Status: SHIPPED | OUTPATIENT
Start: 2024-12-12

## 2024-12-12 RX ORDER — LISINOPRIL 10 MG/1
10 TABLET ORAL
Qty: 90 TABLET | Refills: 1 | OUTPATIENT
Start: 2024-12-12

## 2024-12-12 NOTE — TELEPHONE ENCOUNTER
Caller: Qiana Altman    Relationship to patient: Self    Best call back number: [phone]     Patient is needing: CONTACT PATIENT WITH RESULTS FROM URINALYSIS CULTURE.    IF ANY MEDICATION NEEDS TO BE SENT PLEASE SEND TO [facility] - [place] - [id number] [facility] - [phone]  - [phone] FX

## 2024-12-17 ENCOUNTER — TELEPHONE (OUTPATIENT)
Dept: FAMILY MEDICINE CLINIC | Facility: CLINIC | Age: 74
End: 2024-12-17
Payer: MEDICARE

## 2024-12-17 NOTE — TELEPHONE ENCOUNTER
Pt will be done with abx Thursday she wants to know if she needs to come in for labs or more blood work

## 2024-12-17 NOTE — TELEPHONE ENCOUNTER
Pt states her liver dr states she has to have pneumonia shot, RVS, and shingles shot before she can start a new Rx he would like her to start because it can weaken her immune system. Pharmacy is refusing to give pt the shots until she is infection free. She is requesting labs to be drawn to show she no longer has infection.

## 2024-12-28 ENCOUNTER — HOSPITAL ENCOUNTER (OUTPATIENT)
Dept: MAMMOGRAPHY | Facility: HOSPITAL | Age: 74
Discharge: HOME | End: 2024-12-28
Admitting: NURSE PRACTITIONER
Payer: MEDICARE

## 2024-12-28 DIAGNOSIS — Z12.31 SCREENING MAMMOGRAM FOR BREAST CANCER: ICD-10-CM

## 2024-12-28 PROCEDURE — 77063 BREAST TOMOSYNTHESIS BI: CPT

## 2024-12-28 PROCEDURE — 77067 SCR MAMMO BI INCL CAD: CPT

## 2024-12-30 ENCOUNTER — LAB (OUTPATIENT)
Facility: HOSPITAL | Age: 74
End: 2024-12-30
Payer: MEDICARE

## 2024-12-30 DIAGNOSIS — D89.2 HYPERGAMMAGLOBULINEMIA: ICD-10-CM

## 2024-12-30 DIAGNOSIS — K74.00 FIBROSIS OF LIVER: ICD-10-CM

## 2024-12-30 DIAGNOSIS — K75.4 AUTOIMMUNE HEPATITIS: ICD-10-CM

## 2024-12-30 LAB
ALBUMIN SERPL-MCNC: 3.6 G/DL (ref 3.5–5.2)
ALP SERPL-CCNC: 60 U/L (ref 39–117)
ALT SERPL W P-5'-P-CCNC: 27 U/L (ref 1–33)
AST SERPL-CCNC: 36 U/L (ref 1–32)
BASOPHILS # BLD AUTO: 0.02 10*3/MM3 (ref 0–0.2)
BASOPHILS NFR BLD AUTO: 0.7 % (ref 0–1.5)
BILIRUB CONJ SERPL-MCNC: <0.2 MG/DL (ref 0–0.3)
BILIRUB INDIRECT SERPL-MCNC: ABNORMAL MG/DL
BILIRUB SERPL-MCNC: 0.4 MG/DL (ref 0–1.2)
DEPRECATED RDW RBC AUTO: 39.2 FL (ref 37–54)
EOSINOPHIL # BLD AUTO: 0.09 10*3/MM3 (ref 0–0.4)
EOSINOPHIL NFR BLD AUTO: 3.1 % (ref 0.3–6.2)
ERYTHROCYTE [DISTWIDTH] IN BLOOD BY AUTOMATED COUNT: 13 % (ref 12.3–15.4)
HCT VFR BLD AUTO: 35.8 % (ref 34–46.6)
HGB BLD-MCNC: 11.4 G/DL (ref 12–15.9)
IGA1 MFR SER: 211 MG/DL (ref 70–400)
IGG1 SER-MCNC: 1112 MG/DL (ref 700–1600)
IGM SERPL-MCNC: 123 MG/DL (ref 40–230)
IMM GRANULOCYTES # BLD AUTO: 0 10*3/MM3 (ref 0–0.05)
IMM GRANULOCYTES NFR BLD AUTO: 0 % (ref 0–0.5)
INR PPP: 0.95 (ref 0.86–1.15)
LYMPHOCYTES # BLD AUTO: 1.24 10*3/MM3 (ref 0.7–3.1)
LYMPHOCYTES NFR BLD AUTO: 42 % (ref 19.6–45.3)
MCH RBC QN AUTO: 26.7 PG (ref 26.6–33)
MCHC RBC AUTO-ENTMCNC: 31.8 G/DL (ref 31.5–35.7)
MCV RBC AUTO: 83.8 FL (ref 79–97)
MONOCYTES # BLD AUTO: 0.21 10*3/MM3 (ref 0.1–0.9)
MONOCYTES NFR BLD AUTO: 7.1 % (ref 5–12)
NEUTROPHILS NFR BLD AUTO: 1.39 10*3/MM3 (ref 1.7–7)
NEUTROPHILS NFR BLD AUTO: 47.1 % (ref 42.7–76)
NRBC BLD AUTO-RTO: 0 /100 WBC (ref 0–0.2)
PLATELET # BLD AUTO: 205 10*3/MM3 (ref 140–450)
PMV BLD AUTO: 9.4 FL (ref 6–12)
PROT SERPL-MCNC: 7.3 G/DL (ref 6–8.5)
PROTHROMBIN TIME: 12.9 SECONDS (ref 11.8–14.9)
RBC # BLD AUTO: 4.27 10*6/MM3 (ref 3.77–5.28)
WBC NRBC COR # BLD AUTO: 2.95 10*3/MM3 (ref 3.4–10.8)

## 2024-12-30 PROCEDURE — 80076 HEPATIC FUNCTION PANEL: CPT | Performed by: INTERNAL MEDICINE

## 2024-12-30 PROCEDURE — 85610 PROTHROMBIN TIME: CPT

## 2024-12-30 PROCEDURE — 36415 COLL VENOUS BLD VENIPUNCTURE: CPT

## 2024-12-30 PROCEDURE — 85025 COMPLETE CBC W/AUTO DIFF WBC: CPT

## 2024-12-30 PROCEDURE — 82784 ASSAY IGA/IGD/IGG/IGM EACH: CPT

## 2024-12-30 PROCEDURE — 86015 ACTIN ANTIBODY EACH: CPT | Performed by: INTERNAL MEDICINE

## 2024-12-30 PROCEDURE — 86038 ANTINUCLEAR ANTIBODIES: CPT

## 2024-12-31 ENCOUNTER — HOSPITAL ENCOUNTER (OUTPATIENT)
Dept: BONE DENSITY | Facility: HOSPITAL | Age: 74
Discharge: HOME | End: 2024-12-31
Admitting: NURSE PRACTITIONER
Payer: MEDICARE

## 2024-12-31 DIAGNOSIS — Z78.0 POSTMENOPAUSAL: ICD-10-CM

## 2024-12-31 LAB
ANA SER QL: NEGATIVE
SMA IGG SER-ACNC: 21 UNITS (ref 0–19)

## 2024-12-31 PROCEDURE — 77080 DXA BONE DENSITY AXIAL: CPT

## 2025-01-08 ENCOUNTER — TELEPHONE (OUTPATIENT)
Dept: GASTROENTEROLOGY | Facility: CLINIC | Age: 75
End: 2025-01-08
Payer: MEDICARE

## 2025-01-08 NOTE — TELEPHONE ENCOUNTER
----- Message from Peter Osorio sent at 1/6/2025  2:40 PM EST -----  LFTs are within normal range except AST level very mildly elevated to 36.  Smooth muscle antibody level improved from 58-21  RENZO negative  Quantitative immunoglobulin including IgG, IgM and IgA are found to be within normal range    CBC reported low WBC count of 2.95 likely due to azathioprine.  Hemoglobin also came down from 12.2 to 11.4 g/dL  Patient will continue on azathioprine 50 mg tablet daily.  Recheck CBC and LFTs every 2 weeks.

## 2025-01-22 ENCOUNTER — LAB (OUTPATIENT)
Facility: HOSPITAL | Age: 75
End: 2025-01-22
Payer: MEDICARE

## 2025-01-22 ENCOUNTER — TELEPHONE (OUTPATIENT)
Dept: GASTROENTEROLOGY | Facility: CLINIC | Age: 75
End: 2025-01-22
Payer: MEDICARE

## 2025-01-22 DIAGNOSIS — K75.4 AUTOIMMUNE HEPATITIS: Primary | ICD-10-CM

## 2025-01-22 DIAGNOSIS — K75.4 AUTOIMMUNE HEPATITIS: ICD-10-CM

## 2025-01-22 LAB
ALBUMIN SERPL-MCNC: 3.9 G/DL (ref 3.5–5.2)
ALP SERPL-CCNC: 59 U/L (ref 39–117)
ALT SERPL W P-5'-P-CCNC: 19 U/L (ref 1–33)
AST SERPL-CCNC: 26 U/L (ref 1–32)
BASOPHILS # BLD AUTO: 0.05 10*3/MM3 (ref 0–0.2)
BASOPHILS NFR BLD AUTO: 1 % (ref 0–1.5)
BILIRUB CONJ SERPL-MCNC: <0.2 MG/DL (ref 0–0.3)
BILIRUB INDIRECT SERPL-MCNC: NORMAL MG/DL
BILIRUB SERPL-MCNC: 0.6 MG/DL (ref 0–1.2)
DEPRECATED RDW RBC AUTO: 39 FL (ref 37–54)
EOSINOPHIL # BLD AUTO: 0.1 10*3/MM3 (ref 0–0.4)
EOSINOPHIL NFR BLD AUTO: 1.9 % (ref 0.3–6.2)
ERYTHROCYTE [DISTWIDTH] IN BLOOD BY AUTOMATED COUNT: 13.5 % (ref 12.3–15.4)
HCT VFR BLD AUTO: 35.7 % (ref 34–46.6)
HGB BLD-MCNC: 11.9 G/DL (ref 12–15.9)
IMM GRANULOCYTES # BLD AUTO: 0.02 10*3/MM3 (ref 0–0.05)
IMM GRANULOCYTES NFR BLD AUTO: 0.4 % (ref 0–0.5)
LYMPHOCYTES # BLD AUTO: 1.79 10*3/MM3 (ref 0.7–3.1)
LYMPHOCYTES NFR BLD AUTO: 34.2 % (ref 19.6–45.3)
MCH RBC QN AUTO: 26.7 PG (ref 26.6–33)
MCHC RBC AUTO-ENTMCNC: 33.3 G/DL (ref 31.5–35.7)
MCV RBC AUTO: 80 FL (ref 79–97)
MONOCYTES # BLD AUTO: 0.42 10*3/MM3 (ref 0.1–0.9)
MONOCYTES NFR BLD AUTO: 8 % (ref 5–12)
NEUTROPHILS NFR BLD AUTO: 2.86 10*3/MM3 (ref 1.7–7)
NEUTROPHILS NFR BLD AUTO: 54.5 % (ref 42.7–76)
NRBC BLD AUTO-RTO: 0 /100 WBC (ref 0–0.2)
PLATELET # BLD AUTO: 220 10*3/MM3 (ref 140–450)
PMV BLD AUTO: 9.2 FL (ref 6–12)
PROT SERPL-MCNC: 7.8 G/DL (ref 6–8.5)
RBC # BLD AUTO: 4.46 10*6/MM3 (ref 3.77–5.28)
WBC NRBC COR # BLD AUTO: 5.24 10*3/MM3 (ref 3.4–10.8)

## 2025-01-22 PROCEDURE — 36415 COLL VENOUS BLD VENIPUNCTURE: CPT

## 2025-01-22 PROCEDURE — 80076 HEPATIC FUNCTION PANEL: CPT

## 2025-01-22 PROCEDURE — 85025 COMPLETE CBC W/AUTO DIFF WBC: CPT

## 2025-01-23 ENCOUNTER — TELEPHONE (OUTPATIENT)
Dept: GASTROENTEROLOGY | Facility: CLINIC | Age: 75
End: 2025-01-23
Payer: MEDICARE

## 2025-01-23 RX ORDER — FUROSEMIDE 20 MG/1
20 TABLET ORAL DAILY
Qty: 90 TABLET | Refills: 0 | OUTPATIENT
Start: 2025-01-23

## 2025-01-23 NOTE — TELEPHONE ENCOUNTER
----- Message from Peter Osorio sent at 1/22/2025  5:37 PM EST -----  LFTs within normal range  CBC with WBC count of 5.24, hemoglobin 11.9 g/dL, platelet count 220K  Patient can wean off prednisone and increase azathioprine to 100 mg orally daily.  Recheck CBC and LFTs every 2 weeks for 3 months and then once monthly for 3 months and then every 3 months

## 2025-01-27 ENCOUNTER — TELEPHONE (OUTPATIENT)
Dept: GASTROENTEROLOGY | Facility: CLINIC | Age: 75
End: 2025-01-27
Payer: MEDICARE

## 2025-01-27 NOTE — TELEPHONE ENCOUNTER
----- Message from Peter Osorio sent at 1/23/2025  5:01 PM EST -----  Yes.  She has to be on azathioprine 50 mg daily for a month and then will advance depending on CBC and LFTs.  Patient should stop prednisone while on azathioprine.    Dr. Osorio  ----- Message -----  From: Britni Ksaper MA  Sent: 1/23/2025   1:13 PM EST  To: Peter Osorio MD    Spoke with patient, she has not started azathioprine yet, she states she was unable to get vaccinations due to weather and illness. She plans to get them tomorrow or Monday. I asked patient to call when she has these done. Should she stay with 50mg dose since she has not started therapy yet?

## 2025-02-05 ENCOUNTER — LAB (OUTPATIENT)
Facility: HOSPITAL | Age: 75
End: 2025-02-05
Payer: MEDICARE

## 2025-02-05 DIAGNOSIS — K75.4 AUTOIMMUNE HEPATITIS: ICD-10-CM

## 2025-02-05 LAB
ALBUMIN SERPL-MCNC: 3.7 G/DL (ref 3.5–5.2)
ALP SERPL-CCNC: 60 U/L (ref 39–117)
ALT SERPL W P-5'-P-CCNC: 41 U/L (ref 1–33)
AST SERPL-CCNC: 57 U/L (ref 1–32)
BASOPHILS # BLD AUTO: 0.03 10*3/MM3 (ref 0–0.2)
BASOPHILS NFR BLD AUTO: 0.8 % (ref 0–1.5)
BILIRUB CONJ SERPL-MCNC: 0.3 MG/DL (ref 0–0.3)
BILIRUB INDIRECT SERPL-MCNC: 0.5 MG/DL
BILIRUB SERPL-MCNC: 0.8 MG/DL (ref 0–1.2)
DEPRECATED RDW RBC AUTO: 43.1 FL (ref 37–54)
EOSINOPHIL # BLD AUTO: 0.07 10*3/MM3 (ref 0–0.4)
EOSINOPHIL NFR BLD AUTO: 1.9 % (ref 0.3–6.2)
ERYTHROCYTE [DISTWIDTH] IN BLOOD BY AUTOMATED COUNT: 14.1 % (ref 12.3–15.4)
HCT VFR BLD AUTO: 35.9 % (ref 34–46.6)
HGB BLD-MCNC: 11 G/DL (ref 12–15.9)
IMM GRANULOCYTES # BLD AUTO: 0.01 10*3/MM3 (ref 0–0.05)
IMM GRANULOCYTES NFR BLD AUTO: 0.3 % (ref 0–0.5)
LYMPHOCYTES # BLD AUTO: 1.11 10*3/MM3 (ref 0.7–3.1)
LYMPHOCYTES NFR BLD AUTO: 30.9 % (ref 19.6–45.3)
MCH RBC QN AUTO: 25.5 PG (ref 26.6–33)
MCHC RBC AUTO-ENTMCNC: 30.6 G/DL (ref 31.5–35.7)
MCV RBC AUTO: 83.3 FL (ref 79–97)
MONOCYTES # BLD AUTO: 0.32 10*3/MM3 (ref 0.1–0.9)
MONOCYTES NFR BLD AUTO: 8.9 % (ref 5–12)
NEUTROPHILS NFR BLD AUTO: 2.05 10*3/MM3 (ref 1.7–7)
NEUTROPHILS NFR BLD AUTO: 57.2 % (ref 42.7–76)
NRBC BLD AUTO-RTO: 0 /100 WBC (ref 0–0.2)
PLATELET # BLD AUTO: 190 10*3/MM3 (ref 140–450)
PMV BLD AUTO: 9.2 FL (ref 6–12)
PROT SERPL-MCNC: 7 G/DL (ref 6–8.5)
RBC # BLD AUTO: 4.31 10*6/MM3 (ref 3.77–5.28)
WBC NRBC COR # BLD AUTO: 3.59 10*3/MM3 (ref 3.4–10.8)

## 2025-02-05 PROCEDURE — 80076 HEPATIC FUNCTION PANEL: CPT

## 2025-02-05 PROCEDURE — 36415 COLL VENOUS BLD VENIPUNCTURE: CPT

## 2025-02-05 PROCEDURE — 85025 COMPLETE CBC W/AUTO DIFF WBC: CPT

## 2025-02-25 ENCOUNTER — HOSPITAL ENCOUNTER (OUTPATIENT)
Dept: GENERAL RADIOLOGY | Facility: HOSPITAL | Age: 75
Discharge: HOME | End: 2025-02-25
Payer: MEDICARE

## 2025-02-25 ENCOUNTER — TRANSCRIBE ORDERS (OUTPATIENT)
Dept: ADMINISTRATIVE | Facility: HOSPITAL | Age: 75
End: 2025-02-25
Payer: MEDICARE

## 2025-02-25 ENCOUNTER — LAB (OUTPATIENT)
Dept: LAB | Facility: HOSPITAL | Age: 75
End: 2025-02-25
Payer: MEDICARE

## 2025-02-25 DIAGNOSIS — M47.816 SPONDYLOSIS OF LUMBAR REGION WITHOUT MYELOPATHY OR RADICULOPATHY: Primary | ICD-10-CM

## 2025-02-25 DIAGNOSIS — M47.816 SPONDYLOSIS OF LUMBAR REGION WITHOUT MYELOPATHY OR RADICULOPATHY: ICD-10-CM

## 2025-02-25 DIAGNOSIS — M16.12 UNILATERAL PRIMARY OSTEOARTHRITIS, LEFT HIP: ICD-10-CM

## 2025-02-25 DIAGNOSIS — K75.4 AUTOIMMUNE HEPATITIS: ICD-10-CM

## 2025-02-25 LAB
ALBUMIN SERPL-MCNC: 3.6 G/DL (ref 3.5–5.2)
ALP SERPL-CCNC: 124 U/L (ref 39–117)
ALT SERPL W P-5'-P-CCNC: 270 U/L (ref 1–33)
AST SERPL-CCNC: 352 U/L (ref 1–32)
BASOPHILS # BLD AUTO: 0.02 10*3/MM3 (ref 0–0.2)
BASOPHILS NFR BLD AUTO: 0.8 % (ref 0–1.5)
BILIRUB CONJ SERPL-MCNC: 0.6 MG/DL (ref 0–0.3)
BILIRUB INDIRECT SERPL-MCNC: 0.9 MG/DL
BILIRUB SERPL-MCNC: 1.5 MG/DL (ref 0–1.2)
DEPRECATED RDW RBC AUTO: 45.8 FL (ref 37–54)
EOSINOPHIL # BLD AUTO: 0.06 10*3/MM3 (ref 0–0.4)
EOSINOPHIL NFR BLD AUTO: 2.3 % (ref 0.3–6.2)
ERYTHROCYTE [DISTWIDTH] IN BLOOD BY AUTOMATED COUNT: 15.3 % (ref 12.3–15.4)
HCT VFR BLD AUTO: 33.9 % (ref 34–46.6)
HGB BLD-MCNC: 10.9 G/DL (ref 12–15.9)
IMM GRANULOCYTES # BLD AUTO: 0 10*3/MM3 (ref 0–0.05)
IMM GRANULOCYTES NFR BLD AUTO: 0 % (ref 0–0.5)
LYMPHOCYTES # BLD AUTO: 0.71 10*3/MM3 (ref 0.7–3.1)
LYMPHOCYTES NFR BLD AUTO: 27.4 % (ref 19.6–45.3)
MCH RBC QN AUTO: 26.3 PG (ref 26.6–33)
MCHC RBC AUTO-ENTMCNC: 32.2 G/DL (ref 31.5–35.7)
MCV RBC AUTO: 81.9 FL (ref 79–97)
MONOCYTES # BLD AUTO: 0.26 10*3/MM3 (ref 0.1–0.9)
MONOCYTES NFR BLD AUTO: 10 % (ref 5–12)
NEUTROPHILS NFR BLD AUTO: 1.54 10*3/MM3 (ref 1.7–7)
NEUTROPHILS NFR BLD AUTO: 59.5 % (ref 42.7–76)
NRBC BLD AUTO-RTO: 0 /100 WBC (ref 0–0.2)
PLATELET # BLD AUTO: 189 10*3/MM3 (ref 140–450)
PMV BLD AUTO: 9.4 FL (ref 6–12)
PROT SERPL-MCNC: 7.6 G/DL (ref 6–8.5)
RBC # BLD AUTO: 4.14 10*6/MM3 (ref 3.77–5.28)
WBC NRBC COR # BLD AUTO: 2.59 10*3/MM3 (ref 3.4–10.8)

## 2025-02-25 PROCEDURE — 73522 X-RAY EXAM HIPS BI 3-4 VIEWS: CPT

## 2025-02-25 PROCEDURE — 85025 COMPLETE CBC W/AUTO DIFF WBC: CPT

## 2025-02-25 PROCEDURE — 80076 HEPATIC FUNCTION PANEL: CPT

## 2025-02-25 PROCEDURE — 72100 X-RAY EXAM L-S SPINE 2/3 VWS: CPT

## 2025-02-25 PROCEDURE — 36415 COLL VENOUS BLD VENIPUNCTURE: CPT

## 2025-02-27 ENCOUNTER — OFFICE VISIT (OUTPATIENT)
Dept: FAMILY MEDICINE CLINIC | Facility: CLINIC | Age: 75
End: 2025-02-27
Payer: MEDICARE

## 2025-02-27 VITALS
RESPIRATION RATE: 16 BRPM | TEMPERATURE: 97.1 F | SYSTOLIC BLOOD PRESSURE: 140 MMHG | HEART RATE: 82 BPM | DIASTOLIC BLOOD PRESSURE: 60 MMHG | BODY MASS INDEX: 24.26 KG/M2 | OXYGEN SATURATION: 99 % | WEIGHT: 123.6 LBS | HEIGHT: 60 IN

## 2025-02-27 DIAGNOSIS — E53.8 B12 DEFICIENCY: ICD-10-CM

## 2025-02-27 DIAGNOSIS — Z00.00 MEDICARE ANNUAL WELLNESS VISIT, SUBSEQUENT: Primary | ICD-10-CM

## 2025-02-27 DIAGNOSIS — I10 ESSENTIAL HYPERTENSION: ICD-10-CM

## 2025-02-27 DIAGNOSIS — F41.9 ANXIETY: ICD-10-CM

## 2025-02-27 DIAGNOSIS — E11.65 TYPE 2 DIABETES MELLITUS WITH HYPERGLYCEMIA, WITHOUT LONG-TERM CURRENT USE OF INSULIN: ICD-10-CM

## 2025-02-27 DIAGNOSIS — J30.9 ALLERGIC RHINITIS, UNSPECIFIED SEASONALITY, UNSPECIFIED TRIGGER: ICD-10-CM

## 2025-02-27 DIAGNOSIS — E55.9 VITAMIN D DEFICIENCY: ICD-10-CM

## 2025-02-27 DIAGNOSIS — R63.4 WEIGHT LOSS: ICD-10-CM

## 2025-02-27 DIAGNOSIS — L72.3 SEBACEOUS CYST: ICD-10-CM

## 2025-02-27 LAB
25(OH)D3 SERPL-MCNC: 41.6 NG/ML (ref 30–100)
ALBUMIN SERPL-MCNC: 3.7 G/DL (ref 3.5–5.2)
ALBUMIN UR-MCNC: <1.2 MG/DL
ALBUMIN/GLOB SERPL: 0.9 G/DL
ALP SERPL-CCNC: 130 U/L (ref 39–117)
ALT SERPL W P-5'-P-CCNC: 293 U/L (ref 1–33)
ANION GAP SERPL CALCULATED.3IONS-SCNC: 11 MMOL/L (ref 5–15)
AST SERPL-CCNC: 399 U/L (ref 1–32)
BILIRUB SERPL-MCNC: 1.7 MG/DL (ref 0–1.2)
BUN SERPL-MCNC: 12 MG/DL (ref 8–23)
BUN/CREAT SERPL: 13.6 (ref 7–25)
CALCIUM SPEC-SCNC: 9.3 MG/DL (ref 8.6–10.5)
CHLORIDE SERPL-SCNC: 106 MMOL/L (ref 98–107)
CHOLEST SERPL-MCNC: 111 MG/DL (ref 0–200)
CO2 SERPL-SCNC: 24 MMOL/L (ref 22–29)
CREAT SERPL-MCNC: 0.88 MG/DL (ref 0.57–1)
CREAT UR-MCNC: 170.4 MG/DL
EGFRCR SERPLBLD CKD-EPI 2021: 69.9 ML/MIN/1.73
FOLATE SERPL-MCNC: 13.1 NG/ML (ref 4.78–24.2)
GLOBULIN UR ELPH-MCNC: 4 GM/DL
GLUCOSE SERPL-MCNC: 106 MG/DL (ref 65–99)
HBA1C MFR BLD: 5.9 % (ref 4.8–5.6)
HDLC SERPL-MCNC: 34 MG/DL (ref 40–60)
LDLC SERPL CALC-MCNC: 61 MG/DL (ref 0–100)
LDLC/HDLC SERPL: 1.79 {RATIO}
MICROALBUMIN/CREAT UR: NORMAL MG/G{CREAT}
POTASSIUM SERPL-SCNC: 3.9 MMOL/L (ref 3.5–5.2)
PROT SERPL-MCNC: 7.7 G/DL (ref 6–8.5)
SODIUM SERPL-SCNC: 141 MMOL/L (ref 136–145)
TRIGL SERPL-MCNC: 80 MG/DL (ref 0–150)
TSH SERPL DL<=0.05 MIU/L-ACNC: 1.54 UIU/ML (ref 0.27–4.2)
VIT B12 BLD-MCNC: 1405 PG/ML (ref 211–946)
VLDLC SERPL-MCNC: 16 MG/DL (ref 5–40)

## 2025-02-27 PROCEDURE — 82306 VITAMIN D 25 HYDROXY: CPT | Performed by: NURSE PRACTITIONER

## 2025-02-27 PROCEDURE — 82746 ASSAY OF FOLIC ACID SERUM: CPT | Performed by: NURSE PRACTITIONER

## 2025-02-27 PROCEDURE — 80061 LIPID PANEL: CPT | Performed by: NURSE PRACTITIONER

## 2025-02-27 PROCEDURE — 82570 ASSAY OF URINE CREATININE: CPT | Performed by: NURSE PRACTITIONER

## 2025-02-27 PROCEDURE — 84443 ASSAY THYROID STIM HORMONE: CPT | Performed by: NURSE PRACTITIONER

## 2025-02-27 PROCEDURE — 82043 UR ALBUMIN QUANTITATIVE: CPT | Performed by: NURSE PRACTITIONER

## 2025-02-27 PROCEDURE — 80053 COMPREHEN METABOLIC PANEL: CPT | Performed by: NURSE PRACTITIONER

## 2025-02-27 PROCEDURE — 82607 VITAMIN B-12: CPT | Performed by: NURSE PRACTITIONER

## 2025-02-27 PROCEDURE — 83036 HEMOGLOBIN GLYCOSYLATED A1C: CPT | Performed by: NURSE PRACTITIONER

## 2025-02-27 RX ORDER — MONTELUKAST SODIUM 10 MG/1
10 TABLET ORAL NIGHTLY
Qty: 30 TABLET | Refills: 5 | Status: SHIPPED | OUTPATIENT
Start: 2025-02-27

## 2025-02-27 RX ORDER — MUPIROCIN CALCIUM 20 MG/G
1 CREAM TOPICAL 3 TIMES DAILY
Qty: 30 G | Refills: 0 | Status: SHIPPED | OUTPATIENT
Start: 2025-02-27

## 2025-02-27 RX ORDER — FUROSEMIDE 20 MG/1
20 TABLET ORAL DAILY
Qty: 90 TABLET | Refills: 0 | Status: SHIPPED | OUTPATIENT
Start: 2025-02-27

## 2025-02-27 RX ORDER — BUSPIRONE HYDROCHLORIDE 5 MG/1
5 TABLET ORAL 2 TIMES DAILY PRN
Qty: 60 TABLET | Refills: 2 | Status: SHIPPED | OUTPATIENT
Start: 2025-02-27

## 2025-02-27 RX ORDER — METFORMIN HYDROCHLORIDE 500 MG/1
500 TABLET, EXTENDED RELEASE ORAL 2 TIMES DAILY
Qty: 60 TABLET | Refills: 5 | Status: SHIPPED | OUTPATIENT
Start: 2025-02-27

## 2025-02-27 RX ORDER — FLUTICASONE PROPIONATE 50 MCG
2 SPRAY, SUSPENSION (ML) NASAL DAILY
Qty: 16 G | Refills: 5 | Status: SHIPPED | OUTPATIENT
Start: 2025-02-27

## 2025-02-27 NOTE — PROGRESS NOTES
Subjective   The ABCs of the Annual Wellness Visit  Medicare Wellness Visit      Qiana Altman is a 74 y.o. patient who presents for a Medicare Wellness Visit.    The following portions of the patient's history were reviewed and   updated as appropriate: allergies, current medications, past family history, past medical history, past social history, past surgical history, and problem list.    Compared to one year ago, the patient's physical   health is worse.  Compared to one year ago, the patient's mental   health is worse.    Recent Hospitalizations:  This patient has had a [facility] admission record on file within the last 365 days.  Current Medical Providers:  Patient Care Team:  Zohreh Mcduffie APRN as PCP - General (Nurse Practitioner)  Anna Oquendo APRN as Nurse Practitioner (Nurse Practitioner)    Outpatient Medications Prior to Visit   Medication Sig Dispense Refill    Calcium Citrate-Vitamin D 250-2.5 MG-MCG per tablet Take 1 tablet by mouth 2 (Two) Times a Day. 60 tablet 11    cetirizine (zyrTEC) 10 MG tablet Take 1 tablet by mouth Daily. 90 tablet 1    Cholecalciferol 25 MCG (1000 UT) tablet Take 1 tablet by mouth Daily.      esomeprazole (nexIUM) 40 MG capsule Take 1 capsule by mouth Daily. 90 capsule 1    SENNOSIDES-DOCUSATE SODIUM PO Take  by mouth.      cyanocobalamin 1000 MCG/ML injection 1 CC IM weekly for 4 weeks then 1 cc every 14 days for 2 injections then 1cc monthly there after then will need lab draw in office 7 mL 0    fluticasone (FLONASE) 50 MCG/ACT nasal spray 2 sprays by Each Nare route Daily. 16 g 2    metFORMIN ER (GLUCOPHAGE-XR) 500 MG 24 hr tablet Take 1 tablet by mouth 4 (Four) Times a Day. 120 tablet 2    montelukast (Singulair) 10 MG tablet Take 1 tablet by mouth Every Night. 30 tablet 2    mupirocin (BACTROBAN) 2 % cream Apply 1 Application topically to the appropriate area as directed 3 (Three) Times a Day. 30 g 0    azaTHIOprine (Imuran) 50 MG tablet  "Take 1 tablet by mouth Daily (Patient not taking: Reported on 2/27/2025) 30 tablet 2    furosemide (LASIX) 20 MG tablet Take 1 tablet by mouth Daily. (Patient not taking: Reported on 2/27/2025) 90 tablet 0    nitrofurantoin, macrocrystal-monohydrate, (Macrobid) 100 MG capsule Take 1 capsule by mouth 2 (Two) Times a Day. (Patient not taking: Reported on 2/27/2025) 14 capsule 0    Syringe/Needle, Disp, (Luer Lock Safety Syringes) 25G X 1\" 3 ML misc Use 1 each As Needed (with b12). (Patient not taking: Reported on 2/27/2025) 7 each 0     No facility-administered medications prior to visit.     No opioid medication identified on active medication list. I have reviewed chart for other potential  high risk medication/s and harmful drug interactions in the elderly.      Aspirin is not on active medication list.  Aspirin use is not indicated based on review of current medical condition/s. Risk of harm outweighs potential benefits.  .    Patient Active Problem List   Diagnosis    Allergic rhinitis due to allergen    Essential hypertension    GERD (gastroesophageal reflux disease)    Hyperlipidemia    Major depressive disorder    Vitamin D deficiency    Osteoarthritis    B12 deficiency    Anxiety disorder    Anemia    Overweight (BMI 25.0-29.9)    Acute medial meniscus tear of left knee    Closed fracture of medial plateau of left tibia    Liver enzyme elevation    Elevated LFTs    Hyponatremia    Abnormal liver enzymes    Autoimmune hepatitis     Advance Care Planning Advance Directive is on file.  ACP discussion was declined by the patient. Patient has an advance directive in EMR which is still valid.             Objective   Vitals:    02/27/25 1050 02/27/25 1059   BP: 165/72 140/60   Pulse: 82    Resp: 16    Temp: 97.1 °F (36.2 °C)    SpO2: 99%    Weight: 56.1 kg (123 lb 9.6 oz)    Height: 152.4 cm (60\")    PainSc: 8     PainLoc: Knee        Estimated body mass index is 24.14 kg/m² as calculated from the following:    " "Height as of this encounter: 152.4 cm (60\").    Weight as of this encounter: 56.1 kg (123 lb 9.6 oz).    BMI is within normal parameters. No other follow-up for BMI required.           Does the patient have evidence of cognitive impairment? No  Lab Results   Component Value Date    TRIG 80 2025    HDL 34 (L) 2025    LDL 61 2025    VLDL 16 2025    HGBA1C 5.90 (H) 2025                                                                                                Health  Risk Assessment    Smoking Status:  Social History     Tobacco Use   Smoking Status Former    Current packs/day: 0.00    Average packs/day: 1 pack/day for 5.0 years (5.0 ttl pk-yrs)    Types: Cigarettes    Start date: 1980    Quit date: 1985    Years since quittin.1    Passive exposure: Past   Smokeless Tobacco Never   Tobacco Comments    QUIT 28 YEARS AGO     Alcohol Consumption:  Social History     Substance and Sexual Activity   Alcohol Use Never       Fall Risk Screen  STEADI Fall Risk Assessment was completed, and patient is at MODERATE risk for falls. Assessment completed on:2025    Depression Screening   Little interest or pleasure in doing things? Not at all   Feeling down, depressed, or hopeless? Several days (states she is depressed because she can get anything with her pain)   PHQ-2 Total Score 1      Health Habits and Functional and Cognitive Screenin/27/2025    10:53 AM   Functional & Cognitive Status   Do you have difficulty preparing food and eating? No   Do you have difficulty bathing yourself, getting dressed or grooming yourself? No   Do you have difficulty using the toilet? No   Do you have difficulty moving around from place to place? No   Do you have trouble with steps or getting out of a bed or a chair? No   Current Diet Well Balanced Diet   Exercise (times per week) 0 times per week   Current Exercises Include No Regular Exercise   Do you need help using the phone?  No "   Are you deaf or do you have serious difficulty hearing?  No   Do you need help to go to places out of walking distance? No   Do you need help shopping? No   Do you need help preparing meals?  No   Do you need help with housework?  No   Do you need help with laundry? No   Do you need help taking your medications? No   Do you need help managing money? No   Do you ever drive or ride in a car without wearing a seat belt? No   Have you felt unusual stress, anger or loneliness in the last month? Yes   Who do you live with? Child   If you need help, do you have trouble finding someone available to you? No   Have you been bothered in the last four weeks by sexual problems? No   Do you have difficulty concentrating, remembering or making decisions? No           Age-appropriate Screening Schedule:  Refer to the list below for future screening recommendations based on patient's age, sex and/or medical conditions. Orders for these recommended tests are listed in the plan section. The patient has been provided with a written plan.    Health Maintenance List  Health Maintenance   Topic Date Due    DIABETIC FOOT EXAM  Never done    ZOSTER VACCINE (1 of 2) Never done    URINE MICROALBUMIN-CREATININE RATIO (uACR)  09/23/2021    ANNUAL WELLNESS VISIT  01/31/2025    COLORECTAL CANCER SCREENING  03/28/2025    COVID-19 Vaccine (3 - 2024-25 season) 05/19/2025 (Originally 9/1/2024)    HEMOGLOBIN A1C  08/27/2025    DIABETIC EYE EXAM  12/02/2025    LIPID PANEL  02/27/2026    TDAP/TD VACCINES (2 - Td or Tdap) 06/22/2026    MAMMOGRAM  12/28/2026    DXA SCAN  12/31/2026    HEPATITIS C SCREENING  Completed    INFLUENZA VACCINE  Completed    Pneumococcal Vaccine 50+  Completed                                                                                                                                                CMS Preventative Services Quick Reference  Risk Factors Identified During Encounter  Dental Screening Recommended  Vision  "Screening Recommended    The above risks/problems have been discussed with the patient.  Pertinent information has been shared with the patient in the After Visit Summary.  An After Visit Summary and PPPS were made available to the patient.    Follow Up:   Next Medicare Wellness visit to be scheduled in 1 year.         Additional E&M Note during same encounter follows:  Patient has additional, significant, and separately identifiable condition(s)/problem(s) that require work above and beyond the Medicare Wellness Visit     Chief Complaint  Hypertension, Diabetes, and Medicare Wellness-subsequent    Subjective    Hypertension    Diabetes      Qiana is also being seen today for additional medical problem/s.                 Objective   Vital Signs:  /60   Pulse 82   Temp 97.1 °F (36.2 °C)   Resp 16   Ht 152.4 cm (60\")   Wt 56.1 kg (123 lb 9.6 oz)   SpO2 99%   BMI 24.14 kg/m²   Physical Exam  Vitals reviewed.   Constitutional:       Appearance: Normal appearance. She is well-developed.   Cardiovascular:      Rate and Rhythm: Normal rate and regular rhythm.      Heart sounds: Normal heart sounds. No murmur heard.  Pulmonary:      Effort: Pulmonary effort is normal.      Breath sounds: Normal breath sounds.   Neurological:      Mental Status: She is alert and oriented to person, place, and time.      Cranial Nerves: No cranial nerve deficit.      Motor: No weakness.   Psychiatric:         Mood and Affect: Mood and affect normal.                       Results                Assessment and Plan                    Follow Up   Return in about 6 months (around 8/27/2025).  Patient was given instructions and counseling regarding her condition or for health maintenance advice. Please see specific information pulled into the AVS if appropriate.  Patient or patient representative verbalized consent for the use of Ambient Listening during the visit with  LOYD Nolen for chart documentation. 3/2/2025  " 21:04 EST

## 2025-02-27 NOTE — ASSESSMENT & PLAN NOTE
Orders:    fluticasone (FLONASE) 50 MCG/ACT nasal spray; 2 sprays by Each Nare route Daily.    montelukast (Singulair) 10 MG tablet; Take 1 tablet by mouth Every Night.

## 2025-02-27 NOTE — PROGRESS NOTES
Chief Complaint  Hypertension, Diabetes, and Medicare Wellness-subsequent    Subjective          Qiana Altman presents to [facility]  History of Present Illness    History of Present Illness  The patient presents for evaluation of vitamin B12 deficiency, diabetes mellitus, weight loss, pain management, anxiety, and health maintenance.    She has been experiencing a delay in her scheduled B12 injection, which was due approximately a month ago, due to weather-related cancellations. She expresses a preference for oral B12 supplementation over injections, citing cost concerns.    Her blood glucose levels have been well-controlled, typically ranging between 110 and 130. She is currently on a regimen of metformin, taking two doses in the morning.    She reports a current weight of 123 pounds and has been advised to incorporate protein shakes into her diet.    She experiences persistent leg pain, which she rates as a 10 on a scale of 0 to 10, and reports difficulty sleeping due to the pain. The pain is not localized to one area but affects her knee, hip, back, arms, head, neck, and spine. She has been prescribed a topical cream for pain management, but due to insurance coverage issues, she has been unable to obtain it. Instead, she has been self-medicating with ibuprofen, taking 200 to 400 mg nightly before bed.    She has been prescribed buspirone for anxiety, to be taken twice daily as needed.    She has received the pneumonia and RSV vaccines and is considering getting the shingles vaccine today. She has an upcoming appointment with her hepatologist, Dr. Osorio, on 03/07/2025, and a follow-up with her cardiologist in 06/2025.    Supplemental Information  She has been diagnosed with atherosclerosis and has undergone x-ray imaging.    MEDICATIONS  Current: Metformin, ibuprofen, buspirone, duloxetine, sennoside.  Past: Azathioprine.    IMMUNIZATIONS  She has received the pneumonia and RSV  vaccines.      Patient or patient representative verbalized consent for the use of Ambient Listening during the visit with  LOYD Nolen for chart documentation. 3/2/2025  21:05 EST      Depression: Not at risk (2025)    PHQ-2     PHQ-2 Score: 1    and     BMI is within normal parameters. No other follow-up for BMI required.         Allergies  Shellfish allergy, Moxifloxacin hcl, Nitrous oxide, Scopolamine, Sulfa antibiotics, Morphine, and Penicillins    Social History     Tobacco Use    Smoking status: Former     Current packs/day: 0.00     Average packs/day: 1 pack/day for 5.0 years (5.0 ttl pk-yrs)     Types: Cigarettes     Start date: 1980     Quit date: 1985     Years since quittin.1     Passive exposure: Past    Smokeless tobacco: Never    Tobacco comments:     QUIT 28 YEARS AGO   Vaping Use    Vaping status: Never Used   Substance Use Topics    Alcohol use: Never    Drug use: Never       Family History   Problem Relation Age of Onset    Heart disease Mother     Heart failure Mother     Arthritis Mother     Hyperlipidemia Mother     Hypertension Mother     Stroke Mother     Thyroid disease Mother     Colon polyps Mother     Heart disease Brother     Heart disease Brother     Rectal cancer Maternal Grandmother     Kidney disease Daughter     Miscarriages / Stillbirths Daughter     Birth defects Daughter         Cleft palate    Lung cancer Other     Skin cancer Other     Colon cancer Neg Hx         Health Maintenance Due   Topic Date Due    DIABETIC FOOT EXAM  Never done    ZOSTER VACCINE (1 of 2) Never done    URINE MICROALBUMIN-CREATININE RATIO (uACR)  2021    COLORECTAL CANCER SCREENING  2025        Immunization History   Administered Date(s) Administered    COVID-19 (MODERNA) 1st,2nd,3rd Dose Monovalent 2021, 2021    Flu Vaccine Quad PF >36MO 2014, 2015    Fluzone (or Fluarix & Flulaval for VFC) >6mos 2014, 2015    Fluzone  "High-Dose 65+YRS 11/11/2024    Fluzone High-Dose 65+yrs 10/04/2021, 09/29/2022, 10/31/2023    Influenza, Unspecified 09/23/2020    Pneumococcal Conjugate 13-Valent (PCV13) 11/14/2017    Pneumococcal Polysaccharide (PPSV23) 09/23/2020    Tdap 06/22/2016       Review of Systems     Objective       Vitals:    02/27/25 1050 02/27/25 1059   BP: 165/72 140/60   Pulse: 82    Resp: 16    Temp: 97.1 °F (36.2 °C)    SpO2: 99%    Weight: 56.1 kg (123 lb 9.6 oz)    Height: 152.4 cm (60\")        Body mass index is 24.14 kg/m².         Physical Exam  Vitals reviewed.   Constitutional:       Appearance: Normal appearance. She is well-developed.   Cardiovascular:      Rate and Rhythm: Normal rate and regular rhythm.      Heart sounds: Normal heart sounds. No murmur heard.  Pulmonary:      Effort: Pulmonary effort is normal.      Breath sounds: Normal breath sounds.   Neurological:      Mental Status: She is alert and oriented to person, place, and time.      Cranial Nerves: No cranial nerve deficit.      Motor: No weakness.   Psychiatric:         Mood and Affect: Mood and affect normal.           Physical Exam  Lungs are clear.  Heart sounds are normal.    Vital Signs  Weight is 123 pounds.              Result Review :     The following data was reviewed by: LOYD Nolen on 02/27/2025:    Common Labs   Common labs          2/5/2025    10:27 2/25/2025    11:11 2/27/2025    11:24   Common Labs   Glucose   106    BUN   12    Creatinine   0.88    Sodium   141    Potassium   3.9    Chloride   106    Calcium   9.3    Albumin 3.7  3.6  3.7    Total Bilirubin 0.8  1.5  1.7    Alkaline Phosphatase 60  124  130    AST (SGOT) 57  352  399    ALT (SGPT) 41  270  293    WBC 3.59  2.59     Hemoglobin 11.0  10.9     Hematocrit 35.9  33.9     Platelets 190  189     Total Cholesterol   111    Triglycerides   80    HDL Cholesterol   34    LDL Cholesterol    61    Hemoglobin A1C   5.90    Microalbumin, Urine   <1.2            XR Spine " Lumbar 2 or 3 View    Result Date: 2/26/2025  Impression: There is some mild lumbar dextrocurvature without significant lateral subluxation. Alignment in the sagittal plane is anatomic. There is no evidence of fracture. Advanced multilevel spondylosis is present with areas of disc space height loss, osteophyte formation and facet arthropathy, appearing most severe at L2-3 and L3-4. Electronically Signed: Vince Richardson MD  2/26/2025 5:10 PM EST  Workstation ID: [id number]    DEXA Bone Density Axial    Result Date: 12/31/2024  Impression: Osteopenia - Based upon the FRAX score, this patient does meet criteria for initiation of pharmacological treatment recommendations for prevention of osteoporosis per the National Osteoporosis Foundation (NOF) guidelines. However, individualized treatment decisions should be made accounting for additional clinical factors. Report dictated by: Zoie Kingsley  I have personally reviewed this case and agree with the findings above: Electronically Signed: Kvng Ma MD  12/31/2024 3:07 PM EST  Workstation ID: [id number]    Mammo Screening Digital Tomosynthesis Bilateral With CAD    Result Date: 12/29/2024  No mammographic evidence of malignancy.  Recommend annual screening mammography.  BI-RADS ASSESSMENT: BI-RADS 1. Negative.  Note:  It has been reported that there is approximately a 15% false negative rate in mammography.  Therefore, management of a palpable abnormality should not be deferred because of a negative mammogram.  Electronically Signed By-ENIO MOREJON MD On:12/29/2024 9:06 AM             Results  Laboratory Studies  Liver enzymes were over 200 on Tuesday night.                    Assessment and Plan      Assessment & Plan  Medicare annual wellness visit, subsequent         Allergic rhinitis, unspecified seasonality, unspecified trigger    Orders:    fluticasone (FLONASE) 50 MCG/ACT nasal spray; 2 sprays by Each Nare route Daily.    montelukast (Singulair) 10 MG tablet;  Take 1 tablet by mouth Every Night.    Type 2 diabetes mellitus with hyperglycemia, without long-term current use of insulin      Orders:    Comprehensive Metabolic Panel    TSH    Lipid Panel    Hemoglobin A1c    Microalbumin / Creatinine Urine Ratio - Urine, Clean Catch    metFORMIN ER (GLUCOPHAGE-XR) 500 MG 24 hr tablet; Take 1 tablet by mouth 2 (Two) Times a Day.    Sebaceous cyst  She is only a refill of the cream she does not have an active sebaceous cyst  Orders:    mupirocin (BACTROBAN) 2 % cream; Apply 1 Application topically to the appropriate area as directed 3 (Three) Times a Day.    Vitamin D deficiency    Orders:    Vitamin D,25-Hydroxy    B12 deficiency    Orders:    Vitamin B12 & Folate    Essential hypertension           Anxiety  .-Start buspirone  Orders:    busPIRone (BUSPAR) 5 MG tablet; Take 1 tablet by mouth 2 (Two) Times a Day As Needed (anxiety).    Weight loss            Diagnosis Plan   1. Medicare annual wellness visit, subsequent        2. Allergic rhinitis, unspecified seasonality, unspecified trigger  fluticasone (FLONASE) 50 MCG/ACT nasal spray    montelukast (Singulair) 10 MG tablet      3. Type 2 diabetes mellitus with hyperglycemia, without long-term current use of insulin  Comprehensive Metabolic Panel    TSH    Lipid Panel    Hemoglobin A1c    Microalbumin / Creatinine Urine Ratio - Urine, Clean Catch    metFORMIN ER (GLUCOPHAGE-XR) 500 MG 24 hr tablet      4. Sebaceous cyst  mupirocin (BACTROBAN) 2 % cream      5. Vitamin D deficiency  Vitamin D,25-Hydroxy      6. B12 deficiency  Vitamin B12 & Folate      7. Essential hypertension        8. Anxiety  busPIRone (BUSPAR) 5 MG tablet      9. Weight loss              Assessment & Plan  1. Vitamin B12 deficiency.  A blood test will be conducted to assess her B12 levels. The frequency of her B12 injections will be determined based on the results of this test. If the B12 level is less than 200, she will need to continue with the  injections. If the level is adequate, the injections will be discontinued, and the dosage of her oral medication may be adjusted.    2. Diabetes mellitus.  Her blood sugar levels are well-controlled, ranging between 110 and 130. She is currently taking metformin 2 tablets in the morning. She should continue with her current medication regimen.    3. Weight loss.  She weighs 123 pounds. She has been advised to start drinking protein shakes to help with weight gain.    4. Pain management.  She experiences pain in multiple areas, including her knee, hip, back, arms, head, neck, and spine. She takes ibuprofen 200-400 mg at night to ease the pain. She should continue with her current pain management regimen and follow up with her pain specialist as needed.    5. Anxiety.  She has been prescribed buspirone to be taken twice daily as needed for anxiety. She should take it only when necessary.    6. Health maintenance.  She has received the pneumonia and RSV vaccines. She is considering getting the shingles vaccine today. She has an upcoming appointment with her liver doctor, Dr. Osorio, on 03/07/2025, and a follow-up with her cardiologist in 06/2025.          Follow Up     Return in about 6 months (around 8/27/2025).    Patient was given instructions and counseling regarding her condition or for health maintenance advice. Please see specific information pulled into the AVS if appropriate.     Parts of this note are electronic transcriptions/translations of spoken language to printed text using the Dragon Dictation system.          Zohreh Mcduffie, LOYD  02/27/2025

## 2025-02-28 ENCOUNTER — TELEPHONE (OUTPATIENT)
Dept: OTHER | Facility: HOSPITAL | Age: 75
End: 2025-02-28
Payer: MEDICARE

## 2025-02-28 ENCOUNTER — TELEPHONE (OUTPATIENT)
Dept: GASTROENTEROLOGY | Facility: CLINIC | Age: 75
End: 2025-02-28
Payer: MEDICARE

## 2025-02-28 DIAGNOSIS — R79.89 ELEVATED LFTS: Primary | ICD-10-CM

## 2025-02-28 RX ORDER — PREDNISONE 20 MG/1
20 TABLET ORAL DAILY
Qty: 30 TABLET | Refills: 0 | Status: SHIPPED | OUTPATIENT
Start: 2025-02-28 | End: 2025-03-30

## 2025-02-28 NOTE — TELEPHONE ENCOUNTER
Dr Osorio,   Please advise on Ms [place] labs. She has contacted the office asking about her results.

## 2025-02-28 NOTE — TELEPHONE ENCOUNTER
Per phone conversation with Dr Osorio - he advised to send Ms [place] Prednisone 20mg daily for 30 days   Order placed

## 2025-02-28 NOTE — TELEPHONE ENCOUNTER
Patient has transfer her care to Dr. Osorio, per [place], the appointment with Anna needed to cancelled

## 2025-02-28 NOTE — TELEPHONE ENCOUNTER
Called [facility] to advise patient that RX was sent in and to ask if she had any questions. No answer, left message with office number and advised I was working offsite but they would be able to get me a message.

## 2025-03-07 ENCOUNTER — LAB (OUTPATIENT)
Facility: HOSPITAL | Age: 75
End: 2025-03-07
Payer: MEDICARE

## 2025-03-07 ENCOUNTER — OFFICE VISIT (OUTPATIENT)
Dept: GASTROENTEROLOGY | Facility: CLINIC | Age: 75
End: 2025-03-07
Payer: MEDICARE

## 2025-03-07 ENCOUNTER — PREP FOR SURGERY (OUTPATIENT)
Dept: OTHER | Facility: HOSPITAL | Age: 75
End: 2025-03-07
Payer: MEDICARE

## 2025-03-07 VITALS
BODY MASS INDEX: 24.8 KG/M2 | HEART RATE: 65 BPM | DIASTOLIC BLOOD PRESSURE: 64 MMHG | HEIGHT: 60 IN | WEIGHT: 126.3 LBS | SYSTOLIC BLOOD PRESSURE: 165 MMHG

## 2025-03-07 DIAGNOSIS — D64.9 ANEMIA, UNSPECIFIED TYPE: ICD-10-CM

## 2025-03-07 DIAGNOSIS — R63.0 POOR APPETITE: ICD-10-CM

## 2025-03-07 DIAGNOSIS — K75.4 AUTOIMMUNE HEPATITIS: ICD-10-CM

## 2025-03-07 DIAGNOSIS — R63.4 WEIGHT LOSS, ABNORMAL: ICD-10-CM

## 2025-03-07 DIAGNOSIS — Z87.19 H/O GASTROESOPHAGEAL REFLUX (GERD): ICD-10-CM

## 2025-03-07 DIAGNOSIS — Z12.11 ENCOUNTER FOR SCREENING FOR MALIGNANT NEOPLASM OF COLON: ICD-10-CM

## 2025-03-07 DIAGNOSIS — R79.89 ELEVATED LFTS: Primary | ICD-10-CM

## 2025-03-07 DIAGNOSIS — Z12.11 ENCOUNTER FOR SCREENING FOR MALIGNANT NEOPLASM OF COLON: Primary | ICD-10-CM

## 2025-03-07 DIAGNOSIS — R10.10 UPPER ABDOMINAL PAIN OF UNKNOWN ETIOLOGY: ICD-10-CM

## 2025-03-07 LAB
ALBUMIN SERPL-MCNC: 3.5 G/DL (ref 3.5–5.2)
ALP SERPL-CCNC: 123 U/L (ref 39–117)
ALT SERPL W P-5'-P-CCNC: 216 U/L (ref 1–33)
AST SERPL-CCNC: 174 U/L (ref 1–32)
BASOPHILS # BLD AUTO: 0.01 10*3/MM3 (ref 0–0.2)
BASOPHILS NFR BLD AUTO: 0.3 % (ref 0–1.5)
BILIRUB CONJ SERPL-MCNC: 0.6 MG/DL (ref 0–0.3)
BILIRUB INDIRECT SERPL-MCNC: 0.6 MG/DL
BILIRUB SERPL-MCNC: 1.2 MG/DL (ref 0–1.2)
DEPRECATED RDW RBC AUTO: 47.1 FL (ref 37–54)
EOSINOPHIL # BLD AUTO: 0.01 10*3/MM3 (ref 0–0.4)
EOSINOPHIL NFR BLD AUTO: 0.3 % (ref 0.3–6.2)
ERYTHROCYTE [DISTWIDTH] IN BLOOD BY AUTOMATED COUNT: 15.5 % (ref 12.3–15.4)
FERRITIN SERPL-MCNC: 10.2 NG/ML (ref 13–150)
HCT VFR BLD AUTO: 34.4 % (ref 34–46.6)
HGB BLD-MCNC: 11 G/DL (ref 12–15.9)
IMM GRANULOCYTES # BLD AUTO: 0.01 10*3/MM3 (ref 0–0.05)
IMM GRANULOCYTES NFR BLD AUTO: 0.3 % (ref 0–0.5)
IRON 24H UR-MRATE: 51 MCG/DL (ref 37–145)
IRON SATN MFR SERPL: 9 % (ref 20–50)
LYMPHOCYTES # BLD AUTO: 0.74 10*3/MM3 (ref 0.7–3.1)
LYMPHOCYTES NFR BLD AUTO: 20.7 % (ref 19.6–45.3)
MCH RBC QN AUTO: 26.3 PG (ref 26.6–33)
MCHC RBC AUTO-ENTMCNC: 32 G/DL (ref 31.5–35.7)
MCV RBC AUTO: 82.1 FL (ref 79–97)
MONOCYTES # BLD AUTO: 0.15 10*3/MM3 (ref 0.1–0.9)
MONOCYTES NFR BLD AUTO: 4.2 % (ref 5–12)
NEUTROPHILS NFR BLD AUTO: 2.65 10*3/MM3 (ref 1.7–7)
NEUTROPHILS NFR BLD AUTO: 74.2 % (ref 42.7–76)
NRBC BLD AUTO-RTO: 0 /100 WBC (ref 0–0.2)
PLATELET # BLD AUTO: 204 10*3/MM3 (ref 140–450)
PMV BLD AUTO: 9.2 FL (ref 6–12)
PROT SERPL-MCNC: 8.7 G/DL (ref 6–8.5)
RBC # BLD AUTO: 4.19 10*6/MM3 (ref 3.77–5.28)
TIBC SERPL-MCNC: 541 MCG/DL (ref 298–536)
TRANSFERRIN SERPL-MCNC: 363 MG/DL (ref 200–360)
WBC NRBC COR # BLD AUTO: 3.57 10*3/MM3 (ref 3.4–10.8)

## 2025-03-07 PROCEDURE — 82728 ASSAY OF FERRITIN: CPT

## 2025-03-07 PROCEDURE — 80076 HEPATIC FUNCTION PANEL: CPT | Performed by: INTERNAL MEDICINE

## 2025-03-07 PROCEDURE — 83540 ASSAY OF IRON: CPT

## 2025-03-07 PROCEDURE — 36415 COLL VENOUS BLD VENIPUNCTURE: CPT

## 2025-03-07 PROCEDURE — 84466 ASSAY OF TRANSFERRIN: CPT

## 2025-03-07 PROCEDURE — 85025 COMPLETE CBC W/AUTO DIFF WBC: CPT | Performed by: INTERNAL MEDICINE

## 2025-03-07 RX ORDER — MUPIROCIN 20 MG/G
OINTMENT TOPICAL
COMMUNITY
Start: 2025-02-27

## 2025-03-07 NOTE — PROGRESS NOTES
Patient Name: Qiana Altman   Visit Date: 03/07/2025   Patient ID: [id number]  Provider: Peter Osorio MD    Sex: female  Location:  Location Address:  Location Phone: [phone]  [place] [address] [phone]    YOB: 1950  Age: 75 y.o.   Primary Care Provider Zohreh Mcduffie APRN      Referring Provider: No ref. provider found        History: 08/30/2024     Qiana Altman is a 74 y.o. female with biopsy-proven diagnosis of autoimmune hepatitis. Patient started on prednisone 50 mg orally daily and her LFT's showed remarkable improvement.  AST improved to 50 from 459, ALT 95 from 397, total bili 1.5, alkaline phosphatase 100 from 151.  She continued to have tapering doses of prednisone and her LFTs were almost came back to normal.  She does not have any symptoms except elevated blood sugar for which her PCP started her on metformin 500 mg 3 times daily.       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     Final Diagnosis   Liver, biopsy:               - Interface hepatitis with prominent plasma cells; most consistent with autoimmune hepatitis. 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 See separate consultation report for additional informatio      12/06/2024     Patient presents today for follow-up office visit.  She is currently on prednisone 10 mg orally daily.  Most recent lab workup on  11/11/2024 showed normal LFTs.  Patient also noted to have B12 deficiency anemia with B12 level of 159.  Folate level was normal.  Vitamin D level low normal.  She is currently on vitamin D replacement.  Hemoglobin A1c 7.  LDL cholesterol 107.  Hemoglobin noted to be 11.9 g/dL.  Overall, she is doing a lot better and happy with the normalization of liver function test.  I did talk with the patient and his daughter who was with her during this office visit and answered their questions appropriately.  She she will be getting vaccination for flu, pneumonia and RSV next week.  I did talk to them the importance of preventative vaccination especially while I am going to start her on Imuran that increases the risk of infection due to its immunomodulator effect     03/07/2025    Patient presents today for follow-up office follow visit.  She discontinued prednisone as per part of the plan about couple of months ago.  Unfortunately, due to vaccination issues she was unable to start azathioprine and blood workup on 02/25/2025 showed again elevation in LFTs with total bili of 1.5, ,  and alkaline phosphate is 124.  About a week ago, I spoke to her over the phone and prednisone 20 mg tablet daily was started with the hope that her liver function test will be coming back to normal in a month and as soon as her vaccination will be completed, we will be able to start her on azathioprine 50 mg orally daily.  Patient describes that she feels lethargic with loss of energy and losing weight from 160 pounds to 126 pounds. She has poor appetite and force herself to eat.  Patient denies melena, hematochezia, bright red blood per rectum, coffee-ground emesis or hematemesis.  No nausea or vomiting.  She feels soreness in the upper abdomen, mostly after meals and at evening time with a big meal.  Patient had EGD and colonoscopy about 13 years ago.  As per the patient, they were unremarkable.    Allergies   Allergen Reactions     "Shellfish Allergy Anaphylaxis    Moxifloxacin Hcl Unknown - Low Severity    Nitrous Oxide Other (See Comments)    Scopolamine Swelling    Sulfa Antibiotics Unknown - Low Severity    Morphine Rash    Penicillins Rash       CURRENT & DISCONTINUED MEDICATIONS  Current Outpatient Medications   Medication Instructions    busPIRone (BUSPAR) 5 mg, Oral, 2 Times Daily PRN    Calcium Citrate-Vitamin D 250-2.5 MG-MCG per tablet 1 tablet, Oral, 2 Times Daily    cetirizine (ZYRTEC) 10 mg, Oral, Daily    cholecalciferol (VITAMIN D3) 1,000 Units, Daily    Diclofenac Sodium (Voltaren) 1 % gel gel APPLY 2 GRAMS TO THE AFFECTED AREA(S) BY TOPICAL ROUTE 4 TIMES PER DAY    esomeprazole (NEXIUM) 40 mg, Oral, Daily    fluticasone (FLONASE) 50 MCG/ACT nasal spray 2 sprays, Each Nare, Daily    furosemide (LASIX) 20 mg, Oral, Daily    metFORMIN ER (GLUCOPHAGE-XR) 500 mg, Oral, 2 Times Daily    montelukast (SINGULAIR) 10 mg, Oral, Nightly    mupirocin (BACTROBAN) 2 % ointment apply to the affected area topically THREE TIMES DAILY    predniSONE (DELTASONE) 20 mg, Oral, Daily    SENNOSIDES-DOCUSATE SODIUM PO Take  by mouth.       Medications Discontinued During This Encounter   Medication Reason    mupirocin (BACTROBAN) 2 % cream Duplicate order        OBJECTIVE/PHYSICAL EXAM  You have  VITAL SIGNS  /64 (BP Location: Left arm, Patient Position: Sitting, Cuff Size: Adult)   Pulse 65   Ht 152.4 cm (60\")   Wt 57.3 kg (126 lb 4.8 oz)   BMI 24.67 kg/m²       Physical Exam  Constitutional:       Appearance: Normal appearance. She is normal weight.   HENT:      Head: Normocephalic and atraumatic.      Mouth/Throat:      Mouth: Mucous membranes are moist.      Pharynx: Oropharynx is clear.   Eyes:      Extraocular Movements: Extraocular movements intact.      Conjunctiva/sclera: Conjunctivae normal.      Pupils: Pupils are equal, round, and reactive to light.   Cardiovascular:      Rate and Rhythm: Normal rate and regular rhythm.      " Pulses: Normal pulses.      Heart sounds: Normal heart sounds.   Pulmonary:      Effort: Pulmonary effort is normal.      Breath sounds: Normal breath sounds.   Abdominal:      General: Abdomen is flat. Bowel sounds are normal.      Palpations: Abdomen is soft.      Tenderness: There is abdominal tenderness.      Comments: Tenderness in upper abdomen   Skin:     General: Skin is warm and dry.      Coloration: Skin is pale. Skin is not jaundiced.   Neurological:      General: No focal deficit present.      Mental Status: She is alert and oriented to person, place, and time. Mental status is at baseline.   Psychiatric:         Mood and Affect: Mood normal.         Behavior: Behavior normal.         Thought Content: Thought content normal.         Judgment: Judgment normal.        Results Reviewed:  The following data was reviewed by: Peter Osorio MD on 03/07/2025:    Lab Results - Last 18 Months   Lab Units 03/07/25  1132 02/25/25  1111 02/05/25  1027   WBC 10*3/mm3 3.57 2.59* 3.59   HEMOGLOBIN g/dL 11.0* 10.9* 11.0*   MCV fL 82.1 81.9 83.3   PLATELETS 10*3/mm3 204 189 190             Lab Results - Last 18 Months   Lab Units 02/27/25  1124 11/11/24  0923 09/29/24  1407   BUN mg/dL 12 12 17   CREATININE mg/dL 0.88 0.89 0.98   SODIUM mmol/L 141 140 135*   POTASSIUM mmol/L 3.9 3.5 3.4*   CHLORIDE mmol/L 106 103 96*   CO2 mmol/L 24.0 26.0 23.9   GLUCOSE mg/dL 106* 137* 335*        Lab Results - Last 18 Months   Lab Units 12/30/24  0951 08/01/24  1438 07/29/24  1503   PROTIME Seconds 12.9 15.3* 14.5   INR  0.95 1.18* 1.11   APTT seconds  --  39.5*  --        Lab Results - Last 18 Months   Lab Units 03/26/25  1028 03/07/25  1132 02/27/25  1124 02/25/25  1111   AST (SGOT) U/L 287* 174* 399* 352*   ALT (SGPT) U/L 296* 216* 293* 270*   ALK PHOS U/L 118* 123* 130* 124*   BILIRUBIN mg/dL 1.5* 1.2 1.7* 1.5*   BILIRUBIN DIRECT mg/dL 0.8* 0.6*  --  0.6*   TOTAL PROTEIN g/dL 7.8 8.7* 7.7 7.6   ALBUMIN g/dL 3.2* 3.5 3.7 3.6         Lab Results - Last 18 Months   Lab Units 03/07/25  1132 02/27/25  1124 11/11/24  0923 08/13/24  0856   IRON mcg/dL 51  --   --   --    TRANSFERRIN mg/dL 363*  --   --   --    TIBC mcg/dL 541*  --   --   --    FERRITIN ng/mL 10.20*  --   --   --    VITAMIN B 12 pg/mL  --  1,405* 159* 977*   FOLATE ng/mL  --  13.10 8.06 14.10       Lab Results - Last 18 Months   Lab Units 07/29/24 2056   HEP A IGM  Non-Reactive       XR Hips Bilateral With or Without Pelvis 3-4 View    Result Date: 2/27/2025  Impression: 1. Normal appearance of both hips 2. Stable left femoral medullary salma 3. Degenerative disease in the lower lumbar spine Electronically Signed: Jere Asher  2/27/2025 11:45 AM EST  Workstation ID: [id number]    XR Spine Lumbar 2 or 3 View    Result Date: 2/26/2025  Impression: There is some mild lumbar dextrocurvature without significant lateral subluxation. Alignment in the sagittal plane is anatomic. There is no evidence of fracture. Advanced multilevel spondylosis is present with areas of disc space height loss, osteophyte formation and facet arthropathy, appearing most severe at L2-3 and L3-4. Electronically Signed: Vince Richardson MD  2/26/2025 5:10 PM EST  Workstation ID: [id number]    DEXA Bone Density Axial    Result Date: 12/31/2024  Impression: Osteopenia - Based upon the FRAX score, this patient does meet criteria for initiation of pharmacological treatment recommendations for prevention of osteoporosis per the National Osteoporosis Foundation (NOF) guidelines. However, individualized treatment decisions should be made accounting for additional clinical factors. Report dictated by: Zoie Kingsley  I have personally reviewed this case and agree with the findings above: Electronically Signed: Kvng Ma MD  12/31/2024 3:07 PM EST  Workstation ID: [id number]    Mammo Screening Digital Tomosynthesis Bilateral With CAD    Result Date: 12/29/2024  No mammographic evidence of malignancy.  Recommend annual  screening mammography.  BI-RADS ASSESSMENT: BI-RADS 1. Negative.  Note:  It has been reported that there is approximately a 15% false negative rate in mammography.  Therefore, management of a palpable abnormality should not be deferred because of a negative mammogram.  Electronically Signed By-ENIO MOREJON MD On:12/29/2024 9:06 AM         Assessment and Plan   Diagnoses and all orders for this visit:    1. Elevated LFTs (Primary)  -     Hepatic Function Panel    2. Autoimmune hepatitis  -     Hepatic Function Panel    3. Weight loss, abnormal  -     CBC & Differential    4. Poor appetite  -     CBC & Differential    5. Anemia, unspecified type  -     CBC & Differential  -     Iron Profile; Future  -     Ferritin; Future    6. Encounter for screening for malignant neoplasm of colon    7. Upper abdominal pain of unknown etiology      PLAN & RECOMMENDATIONS    Patient with biopsy-proven diagnosis of autoimmune hepatitis.  Initially, she has good response to oral prednisone with almost normal LFTs and immunoglobulin back to normal with RENZO negative.  For last 2 months she is off of prednisone and have not been able to start azathioprine due to vaccination.  Patient restarted on prednisone at 20 mg orally daily until there is downward trend in LFT's..  I hope soon she will be able to get immunization and we will be able to start azathioprine at 1 mg/kg.    Patient advised and recommended to avoid all hepatotoxic medications including NSAIDs.    Patient has significant unintentional weight loss of uncleared etiology.  Lab workup revealed anemia.  Will check CBC and iron profile.  Will schedule for diagnostic EGD and screening colonoscopy as her last C-scope was 13 years ago.    Patient advised to add Glucerna 1 can 3 times daily    FOLLOW UP    Return in about 2 months (around 5/7/2025).    Patient was given instructions and counseling regarding her condition or for health maintenance advice. Please see specific  information pulled into the AVS if appropriate.       Part of this note may be an electronic transcription/translation of spoken language to printed text using the Dragon Dictation System

## 2025-03-11 ENCOUNTER — TELEPHONE (OUTPATIENT)
Dept: GASTROENTEROLOGY | Facility: CLINIC | Age: 75
End: 2025-03-11
Payer: MEDICARE

## 2025-03-11 ENCOUNTER — RESULTS FOLLOW-UP (OUTPATIENT)
Dept: GASTROENTEROLOGY | Facility: CLINIC | Age: 75
End: 2025-03-11
Payer: MEDICARE

## 2025-03-11 DIAGNOSIS — R79.89 ABNORMAL LFTS: Primary | ICD-10-CM

## 2025-03-11 NOTE — TELEPHONE ENCOUNTER
Pt would like Dr Osorio's Advice on taking Gabapentin for pain. This is a new prescription she recv'd from pain management at her appointment yesterday. She advised she was told at pain management that tylenol was not good for her liver. I advised I would speak with Dr Osorio and return her call.

## 2025-03-12 DIAGNOSIS — D64.9 ANEMIA, UNSPECIFIED TYPE: Primary | ICD-10-CM

## 2025-03-12 PROBLEM — K90.9 INTESTINAL MALABSORPTION: Status: ACTIVE | Noted: 2025-03-12

## 2025-03-12 RX ORDER — SODIUM CHLORIDE 9 MG/ML
20 INJECTION, SOLUTION INTRAVENOUS ONCE
Status: CANCELLED | OUTPATIENT
Start: 2025-03-12

## 2025-03-12 RX ORDER — HYDROCORTISONE SODIUM SUCCINATE 100 MG/2ML
100 INJECTION INTRAMUSCULAR; INTRAVENOUS AS NEEDED
Status: CANCELLED | OUTPATIENT
Start: 2025-03-12

## 2025-03-12 RX ORDER — DIPHENHYDRAMINE HCL 25 MG
25 CAPSULE ORAL ONCE
Status: CANCELLED | OUTPATIENT
Start: 2025-03-12

## 2025-03-12 RX ORDER — ACETAMINOPHEN 325 MG/1
650 TABLET ORAL ONCE
Status: CANCELLED | OUTPATIENT
Start: 2025-03-12

## 2025-03-12 RX ORDER — DIPHENHYDRAMINE HYDROCHLORIDE 50 MG/ML
50 INJECTION INTRAMUSCULAR; INTRAVENOUS AS NEEDED
Status: CANCELLED | OUTPATIENT
Start: 2025-03-12

## 2025-03-12 RX ORDER — FAMOTIDINE 10 MG/ML
20 INJECTION, SOLUTION INTRAVENOUS AS NEEDED
Status: CANCELLED | OUTPATIENT
Start: 2025-03-12

## 2025-03-12 NOTE — TELEPHONE ENCOUNTER
Attempted to call patient to discuss what  recommended for her Gabapentin.      -Pt should take half of her prescribed dose and in one week check LFTs to see effects on liver function test.     No answer, left VM asking for a call back with my direct line.

## 2025-03-12 NOTE — TELEPHONE ENCOUNTER
Contacted [facility] and spoke with Milla Smith PA needed for Iron infusions for outpatient services. Reference # # [id number].

## 2025-03-12 NOTE — TELEPHONE ENCOUNTER
S/w patient and was able to give  recommendations. Pt is agreeable to take half of her dose (50mg) at a time and will complete lab by next Tuesday to check liver function.     Postponing to 3.17.25 to remind pt about lab.

## 2025-03-13 RX ORDER — DIPHENHYDRAMINE HYDROCHLORIDE 50 MG/ML
50 INJECTION INTRAMUSCULAR; INTRAVENOUS AS NEEDED
OUTPATIENT
Start: 2025-03-13

## 2025-03-13 RX ORDER — FAMOTIDINE 10 MG/ML
20 INJECTION, SOLUTION INTRAVENOUS AS NEEDED
OUTPATIENT
Start: 2025-03-13

## 2025-03-13 RX ORDER — SODIUM CHLORIDE 9 MG/ML
20 INJECTION, SOLUTION INTRAVENOUS ONCE
OUTPATIENT
Start: 2025-03-13

## 2025-03-13 RX ORDER — HYDROCORTISONE SODIUM SUCCINATE 100 MG/2ML
100 INJECTION INTRAMUSCULAR; INTRAVENOUS AS NEEDED
OUTPATIENT
Start: 2025-03-13

## 2025-03-13 NOTE — TELEPHONE ENCOUNTER
Spoke to patient. Phone number given to patient to call and schedule infusions. Patient voiced understanding. Encouraged patient to contact office with any questions or concerns.

## 2025-03-17 NOTE — TELEPHONE ENCOUNTER
Called patient to remind her to get labs done today or tomorrow to check liver function, patient and her daughter gave verbal understanding.

## 2025-03-25 ENCOUNTER — HOSPITAL ENCOUNTER (OUTPATIENT)
Dept: INFUSION THERAPY | Facility: HOSPITAL | Age: 75
Discharge: HOME | End: 2025-03-25
Payer: MEDICARE

## 2025-03-25 VITALS
HEART RATE: 77 BPM | TEMPERATURE: 97.3 F | BODY MASS INDEX: 23.33 KG/M2 | RESPIRATION RATE: 16 BRPM | SYSTOLIC BLOOD PRESSURE: 123 MMHG | DIASTOLIC BLOOD PRESSURE: 65 MMHG | WEIGHT: 118.83 LBS | HEIGHT: 60 IN | OXYGEN SATURATION: 100 %

## 2025-03-25 DIAGNOSIS — D50.8 IRON DEFICIENCY ANEMIA SECONDARY TO INADEQUATE DIETARY IRON INTAKE: Primary | ICD-10-CM

## 2025-03-25 DIAGNOSIS — K90.49 MALABSORPTION DUE TO INTOLERANCE, NOT ELSEWHERE CLASSIFIED: ICD-10-CM

## 2025-03-25 PROCEDURE — 96366 THER/PROPH/DIAG IV INF ADDON: CPT

## 2025-03-25 PROCEDURE — 25010000002 NA FERRIC GLUC CPLX PER 12.5 MG

## 2025-03-25 PROCEDURE — 96365 THER/PROPH/DIAG IV INF INIT: CPT

## 2025-03-25 PROCEDURE — 25810000003 SODIUM CHLORIDE 0.9 % SOLUTION

## 2025-03-25 RX ORDER — SODIUM CHLORIDE 9 MG/ML
20 INJECTION, SOLUTION INTRAVENOUS ONCE
Status: DISCONTINUED | OUTPATIENT
Start: 2025-03-25 | End: 2025-03-27 | Stop reason: HOSPADM

## 2025-03-25 RX ORDER — HYDROCORTISONE SODIUM SUCCINATE 100 MG/2ML
100 INJECTION INTRAMUSCULAR; INTRAVENOUS AS NEEDED
OUTPATIENT
Start: 2025-04-01

## 2025-03-25 RX ORDER — FAMOTIDINE 10 MG/ML
20 INJECTION, SOLUTION INTRAVENOUS AS NEEDED
OUTPATIENT
Start: 2025-04-01

## 2025-03-25 RX ORDER — DIPHENHYDRAMINE HYDROCHLORIDE 50 MG/ML
50 INJECTION, SOLUTION INTRAMUSCULAR; INTRAVENOUS AS NEEDED
OUTPATIENT
Start: 2025-04-01

## 2025-03-25 RX ORDER — SODIUM CHLORIDE 9 MG/ML
20 INJECTION, SOLUTION INTRAVENOUS ONCE
OUTPATIENT
Start: 2025-04-01

## 2025-03-25 RX ADMIN — SODIUM CHLORIDE 250 MG: 9 INJECTION, SOLUTION INTRAVENOUS at 08:52

## 2025-03-26 ENCOUNTER — LAB (OUTPATIENT)
Facility: HOSPITAL | Age: 75
End: 2025-03-26
Payer: MEDICARE

## 2025-03-26 DIAGNOSIS — R79.89 ABNORMAL LFTS: ICD-10-CM

## 2025-03-26 LAB
ALBUMIN SERPL-MCNC: 3.2 G/DL (ref 3.5–5.2)
ALP SERPL-CCNC: 118 U/L (ref 39–117)
ALT SERPL W P-5'-P-CCNC: 296 U/L (ref 1–33)
AST SERPL-CCNC: 287 U/L (ref 1–32)
BILIRUB CONJ SERPL-MCNC: 0.8 MG/DL (ref 0–0.3)
BILIRUB INDIRECT SERPL-MCNC: 0.7 MG/DL
BILIRUB SERPL-MCNC: 1.5 MG/DL (ref 0–1.2)
PROT SERPL-MCNC: 7.8 G/DL (ref 6–8.5)

## 2025-03-26 PROCEDURE — 36415 COLL VENOUS BLD VENIPUNCTURE: CPT

## 2025-03-26 PROCEDURE — 80076 HEPATIC FUNCTION PANEL: CPT

## 2025-03-31 ENCOUNTER — TELEPHONE (OUTPATIENT)
Dept: GASTROENTEROLOGY | Facility: CLINIC | Age: 75
End: 2025-03-31
Payer: MEDICARE

## 2025-03-31 ENCOUNTER — RESULTS FOLLOW-UP (OUTPATIENT)
Dept: GASTROENTEROLOGY | Facility: CLINIC | Age: 75
End: 2025-03-31
Payer: MEDICARE

## 2025-03-31 DIAGNOSIS — R74.8 ELEVATED LIVER ENZYMES: Primary | ICD-10-CM

## 2025-03-31 RX ORDER — PREDNISONE 20 MG/1
20 TABLET ORAL DAILY
Qty: 30 TABLET | Refills: 0 | Status: SHIPPED | OUTPATIENT
Start: 2025-03-31 | End: 2025-04-30

## 2025-03-31 RX ORDER — PREDNISONE 20 MG/1
20 TABLET ORAL DAILY
Qty: 30 TABLET | Refills: 0 | Status: SHIPPED | OUTPATIENT
Start: 2025-03-31 | End: 2025-03-31 | Stop reason: SDUPTHER

## 2025-04-14 ENCOUNTER — HOSPITAL ENCOUNTER (OUTPATIENT)
Dept: INFUSION THERAPY | Facility: HOSPITAL | Age: 75
Discharge: HOME | End: 2025-04-14
Payer: MEDICARE

## 2025-04-14 VITALS
BODY MASS INDEX: 23.33 KG/M2 | DIASTOLIC BLOOD PRESSURE: 64 MMHG | HEIGHT: 60 IN | WEIGHT: 118.83 LBS | TEMPERATURE: 97.4 F | SYSTOLIC BLOOD PRESSURE: 113 MMHG | RESPIRATION RATE: 18 BRPM | OXYGEN SATURATION: 100 % | HEART RATE: 74 BPM

## 2025-04-14 DIAGNOSIS — D50.8 IRON DEFICIENCY ANEMIA SECONDARY TO INADEQUATE DIETARY IRON INTAKE: Primary | ICD-10-CM

## 2025-04-14 DIAGNOSIS — K90.49 MALABSORPTION DUE TO INTOLERANCE, NOT ELSEWHERE CLASSIFIED: ICD-10-CM

## 2025-04-14 PROCEDURE — 25810000003 SODIUM CHLORIDE 0.9 % SOLUTION

## 2025-04-14 PROCEDURE — 25010000002 NA FERRIC GLUC CPLX PER 12.5 MG

## 2025-04-14 PROCEDURE — 96365 THER/PROPH/DIAG IV INF INIT: CPT

## 2025-04-14 PROCEDURE — 96366 THER/PROPH/DIAG IV INF ADDON: CPT

## 2025-04-14 RX ORDER — HYDROCORTISONE SODIUM SUCCINATE 100 MG/2ML
100 INJECTION INTRAMUSCULAR; INTRAVENOUS AS NEEDED
OUTPATIENT
Start: 2025-04-21

## 2025-04-14 RX ORDER — FAMOTIDINE 10 MG/ML
20 INJECTION, SOLUTION INTRAVENOUS AS NEEDED
OUTPATIENT
Start: 2025-04-21

## 2025-04-14 RX ORDER — SODIUM CHLORIDE 9 MG/ML
20 INJECTION, SOLUTION INTRAVENOUS ONCE
Status: CANCELLED | OUTPATIENT
Start: 2025-04-21

## 2025-04-14 RX ORDER — DIPHENHYDRAMINE HYDROCHLORIDE 50 MG/ML
50 INJECTION, SOLUTION INTRAMUSCULAR; INTRAVENOUS AS NEEDED
OUTPATIENT
Start: 2025-04-21

## 2025-04-14 RX ADMIN — SODIUM CHLORIDE 250 MG: 9 INJECTION, SOLUTION INTRAVENOUS at 09:05

## 2025-04-15 ENCOUNTER — LAB (OUTPATIENT)
Facility: HOSPITAL | Age: 75
End: 2025-04-15
Payer: MEDICARE

## 2025-04-15 DIAGNOSIS — R79.89 ABNORMAL LFTS: ICD-10-CM

## 2025-04-15 LAB
ALBUMIN SERPL-MCNC: 3.1 G/DL (ref 3.5–5.2)
ALP SERPL-CCNC: 132 U/L (ref 39–117)
ALT SERPL W P-5'-P-CCNC: 331 U/L (ref 1–33)
AST SERPL-CCNC: 265 U/L (ref 1–32)
BILIRUB CONJ SERPL-MCNC: 1 MG/DL (ref 0–0.3)
BILIRUB INDIRECT SERPL-MCNC: 0.6 MG/DL
BILIRUB SERPL-MCNC: 1.6 MG/DL (ref 0–1.2)
PROT SERPL-MCNC: 7.6 G/DL (ref 6–8.5)

## 2025-04-15 PROCEDURE — 80076 HEPATIC FUNCTION PANEL: CPT

## 2025-04-15 PROCEDURE — 36415 COLL VENOUS BLD VENIPUNCTURE: CPT

## 2025-04-21 ENCOUNTER — RESULTS FOLLOW-UP (OUTPATIENT)
Dept: GASTROENTEROLOGY | Facility: CLINIC | Age: 75
End: 2025-04-21
Payer: MEDICARE

## 2025-04-21 ENCOUNTER — HOSPITAL ENCOUNTER (OUTPATIENT)
Dept: INFUSION THERAPY | Facility: HOSPITAL | Age: 75
Discharge: HOME | End: 2025-04-21
Payer: MEDICARE

## 2025-04-21 VITALS
HEART RATE: 79 BPM | TEMPERATURE: 98 F | DIASTOLIC BLOOD PRESSURE: 62 MMHG | RESPIRATION RATE: 20 BRPM | OXYGEN SATURATION: 100 % | SYSTOLIC BLOOD PRESSURE: 126 MMHG

## 2025-04-21 DIAGNOSIS — K90.49 MALABSORPTION DUE TO INTOLERANCE, NOT ELSEWHERE CLASSIFIED: ICD-10-CM

## 2025-04-21 DIAGNOSIS — D50.8 IRON DEFICIENCY ANEMIA SECONDARY TO INADEQUATE DIETARY IRON INTAKE: Primary | ICD-10-CM

## 2025-04-21 PROCEDURE — 25810000003 SODIUM CHLORIDE 0.9 % SOLUTION

## 2025-04-21 PROCEDURE — 96365 THER/PROPH/DIAG IV INF INIT: CPT

## 2025-04-21 PROCEDURE — 96366 THER/PROPH/DIAG IV INF ADDON: CPT

## 2025-04-21 PROCEDURE — 25010000002 NA FERRIC GLUC CPLX PER 12.5 MG

## 2025-04-21 RX ORDER — DIPHENHYDRAMINE HYDROCHLORIDE 50 MG/ML
50 INJECTION, SOLUTION INTRAMUSCULAR; INTRAVENOUS AS NEEDED
Status: CANCELLED | OUTPATIENT
Start: 2025-04-28

## 2025-04-21 RX ORDER — HYDROCORTISONE SODIUM SUCCINATE 100 MG/2ML
100 INJECTION INTRAMUSCULAR; INTRAVENOUS AS NEEDED
Status: CANCELLED | OUTPATIENT
Start: 2025-04-28

## 2025-04-21 RX ORDER — SODIUM CHLORIDE 9 MG/ML
20 INJECTION, SOLUTION INTRAVENOUS ONCE
Status: DISCONTINUED | OUTPATIENT
Start: 2025-04-21 | End: 2025-04-23 | Stop reason: HOSPADM

## 2025-04-21 RX ORDER — FAMOTIDINE 10 MG/ML
20 INJECTION, SOLUTION INTRAVENOUS AS NEEDED
Status: CANCELLED | OUTPATIENT
Start: 2025-04-28

## 2025-04-21 RX ORDER — SODIUM CHLORIDE 9 MG/ML
20 INJECTION, SOLUTION INTRAVENOUS ONCE
Status: CANCELLED | OUTPATIENT
Start: 2025-04-28

## 2025-04-21 RX ADMIN — SODIUM CHLORIDE 250 MG: 9 INJECTION, SOLUTION INTRAVENOUS at 09:18

## 2025-04-22 ENCOUNTER — ANESTHESIA EVENT (OUTPATIENT)
Dept: GASTROENTEROLOGY | Facility: HOSPITAL | Age: 75
End: 2025-04-22
Payer: MEDICARE

## 2025-04-22 NOTE — ANESTHESIA PREPROCEDURE EVALUATION
Anesthesia Evaluation     Patient summary reviewed and Nursing notes reviewed   NPO Solid Status: > 8 hours  NPO Liquid Status: > 2 hours           Airway   Mallampati: I  TM distance: >3 FB  Neck ROM: full  No difficulty expected  Dental    (+) poor dentition        Pulmonary     breath sounds clear to auscultation  Cardiovascular - normal exam  Exercise tolerance: good (4-7 METS)    ECG reviewed  Rhythm: regular  Rate: normal    (+) hypertension well controlled, hyperlipidemia      Neuro/Psych  (+) psychiatric history Depression  GI/Hepatic/Renal/Endo    (+) GERD well controlled, PUD, hepatitis, liver disease fatty liver disease, renal disease- CRI, diabetes mellitus type 2 well controlled, thyroid problem hypothyroidism and hyperthyroidism    Musculoskeletal     Abdominal    Substance History      OB/GYN          Other   arthritis,     ROS/Med Hx Other: Screening, wt. Loss, anemia     ECHO 07/16/24: EF 58%,   Normal left ventricular systolic function with mild left ventricular hypertrophy.  No significant valve abnormalities noted.    EKG 03/16/19: HR 71, SR              Anesthesia Plan    ASA 3     general   total IV anesthesia  (Total IV Anesthesia    Patient understands anesthesia not responsible for dental damage.      Discussed risks with pt including aspiration, allergic reactions, apnea, advanced airway placement. Pt verbalized understanding. All questions answered.     Has DNR order in place but wants it rescinded during perioperative phase.     )  intravenous induction     Anesthetic plan, risks, benefits, and alternatives have been provided, discussed and informed consent has been obtained with: patient.  Pre-procedure education provided  Plan discussed with CRNA.      CODE STATUS:

## 2025-04-23 ENCOUNTER — ANESTHESIA (OUTPATIENT)
Dept: GASTROENTEROLOGY | Facility: HOSPITAL | Age: 75
End: 2025-04-23
Payer: MEDICARE

## 2025-04-23 ENCOUNTER — HOSPITAL ENCOUNTER (OUTPATIENT)
Facility: HOSPITAL | Age: 75
Setting detail: HOSPITAL OUTPATIENT SURGERY
Discharge: HOME | End: 2025-04-23
Attending: INTERNAL MEDICINE | Admitting: INTERNAL MEDICINE
Payer: MEDICARE

## 2025-04-23 VITALS
HEIGHT: 60 IN | BODY MASS INDEX: 23.2 KG/M2 | DIASTOLIC BLOOD PRESSURE: 65 MMHG | TEMPERATURE: 98.7 F | OXYGEN SATURATION: 100 % | WEIGHT: 118.17 LBS | HEART RATE: 68 BPM | SYSTOLIC BLOOD PRESSURE: 110 MMHG | RESPIRATION RATE: 15 BRPM

## 2025-04-23 DIAGNOSIS — Z12.11 ENCOUNTER FOR SCREENING FOR MALIGNANT NEOPLASM OF COLON: ICD-10-CM

## 2025-04-23 DIAGNOSIS — Z87.19 H/O GASTROESOPHAGEAL REFLUX (GERD): ICD-10-CM

## 2025-04-23 DIAGNOSIS — R63.4 WEIGHT LOSS, ABNORMAL: ICD-10-CM

## 2025-04-23 DIAGNOSIS — D64.9 ANEMIA, UNSPECIFIED TYPE: ICD-10-CM

## 2025-04-23 LAB — GLUCOSE BLDC GLUCOMTR-MCNC: 213 MG/DL (ref 70–99)

## 2025-04-23 PROCEDURE — 25010000002 LIDOCAINE PF 2% 2 % SOLUTION: Performed by: NURSE ANESTHETIST, CERTIFIED REGISTERED

## 2025-04-23 PROCEDURE — 43239 EGD BIOPSY SINGLE/MULTIPLE: CPT | Performed by: INTERNAL MEDICINE

## 2025-04-23 PROCEDURE — 45385 COLONOSCOPY W/LESION REMOVAL: CPT | Performed by: INTERNAL MEDICINE

## 2025-04-23 PROCEDURE — 25010000002 PROPOFOL 10 MG/ML EMULSION: Performed by: NURSE ANESTHETIST, CERTIFIED REGISTERED

## 2025-04-23 PROCEDURE — 25810000003 LACTATED RINGERS PER 1000 ML

## 2025-04-23 PROCEDURE — 82948 REAGENT STRIP/BLOOD GLUCOSE: CPT

## 2025-04-23 PROCEDURE — 88305 TISSUE EXAM BY PATHOLOGIST: CPT | Performed by: INTERNAL MEDICINE

## 2025-04-23 RX ORDER — PROPOFOL 10 MG/ML
VIAL (ML) INTRAVENOUS AS NEEDED
Status: DISCONTINUED | OUTPATIENT
Start: 2025-04-23 | End: 2025-04-23 | Stop reason: SURG

## 2025-04-23 RX ORDER — HYDROCORTISONE ACETATE 25 MG/1
25 SUPPOSITORY RECTAL 2 TIMES DAILY
Qty: 60 EACH | Refills: 2 | Status: SHIPPED | OUTPATIENT
Start: 2025-04-23 | End: 2025-04-28 | Stop reason: SDUPTHER

## 2025-04-23 RX ORDER — SODIUM CHLORIDE, SODIUM LACTATE, POTASSIUM CHLORIDE, CALCIUM CHLORIDE 600; 310; 30; 20 MG/100ML; MG/100ML; MG/100ML; MG/100ML
30 INJECTION, SOLUTION INTRAVENOUS CONTINUOUS
Status: DISCONTINUED | OUTPATIENT
Start: 2025-04-23 | End: 2025-04-23 | Stop reason: HOSPADM

## 2025-04-23 RX ORDER — LIDOCAINE HYDROCHLORIDE 20 MG/ML
INJECTION, SOLUTION EPIDURAL; INFILTRATION; INTRACAUDAL; PERINEURAL AS NEEDED
Status: DISCONTINUED | OUTPATIENT
Start: 2025-04-23 | End: 2025-04-23 | Stop reason: SURG

## 2025-04-23 RX ORDER — WHEAT DEXTRIN/ASPARTAME 3 G/6 G
1 POWDER IN PACKET (EA) ORAL 2 TIMES DAILY
Qty: 60 EACH | Refills: 5 | Status: SHIPPED | OUTPATIENT
Start: 2025-04-23 | End: 2025-04-28 | Stop reason: SDUPTHER

## 2025-04-23 RX ADMIN — PROPOFOL 150 MCG/KG/MIN: 10 INJECTION, EMULSION INTRAVENOUS at 11:36

## 2025-04-23 RX ADMIN — SODIUM CHLORIDE, POTASSIUM CHLORIDE, SODIUM LACTATE AND CALCIUM CHLORIDE 30 ML/HR: 600; 310; 30; 20 INJECTION, SOLUTION INTRAVENOUS at 11:09

## 2025-04-23 RX ADMIN — LIDOCAINE HYDROCHLORIDE 60 MG: 20 INJECTION, SOLUTION EPIDURAL; INFILTRATION; INTRACAUDAL; PERINEURAL at 11:35

## 2025-04-23 RX ADMIN — PROPOFOL 70 MG: 10 INJECTION, EMULSION INTRAVENOUS at 11:35

## 2025-04-23 NOTE — ANESTHESIA POSTPROCEDURE EVALUATION
Patient: Qiana Altman    Procedure Summary       Date: 04/23/25 Room / Location: [facility] ENDOSCOPY 5 / [facility] ENDOSCOPY    Anesthesia Start: 1132 Anesthesia Stop: 1235    Procedures:       ESOPHAGOGASTRODUODENOSCOPY WITH BIOPSIES      COLONOSCOPY WITH HOT SNARE POLYPECTOMY Diagnosis:       Encounter for screening for malignant neoplasm of colon      Weight loss, abnormal      Anemia, unspecified type      H/O gastroesophageal reflux (GERD)      (Encounter for screening for malignant neoplasm of colon [Z12.11])      (Weight loss, abnormal [R63.4])      (Anemia, unspecified type [D64.9])      (H/O gastroesophageal reflux (GERD) [Z87.19])    Surgeons: Peter Osorio MD Provider: Kiersten Thomas CRNA    Anesthesia Type: general ASA Status: 3            Anesthesia Type: general    Vitals  Vitals Value Taken Time   /65 04/23/25 12:50   Temp 37.1 °C (98.7 °F) 04/23/25 12:50   Pulse 77 04/23/25 12:53   Resp 15 04/23/25 12:50   SpO2 99 % 04/23/25 12:53   Vitals shown include unfiled device data.        Post Anesthesia Care and Evaluation    Post-procedure mental status: acceptable.  Pain management: satisfactory to patient    Airway patency: patent  Anesthetic complications: No anesthetic complications    Cardiovascular status: acceptable  Respiratory status: acceptable    Comments: Per chart review

## 2025-04-23 NOTE — NURSING NOTE
ABDOMINAL PRESSURE APPLIED PER MD INSTRUCTION TO ASSIST WITH ADVANCEMENT OF SCOPE.  Jennifer Jeffrey RN

## 2025-04-23 NOTE — H&P
Pre Procedure History & Physical    Chief Complaint: Abnormal weight loss, poor appetite and iron deficiency anemia    HPI: Qiana Altman is a 74 y.o. female with biopsy-proven diagnosis of autoimmune hepatitis. Patient started on prednisone 50 mg orally daily and her LFT's showed marked improvement.  She was seen in GI office for follow-up visit.  Patient found to have iron deficiency anemia, poor appetite and weight loss of 35.0 over 6 months.  She had EGD and colonoscopy about 13 years ago.  She is not on any blood thinner, anticoagulant or NSAID medications.    Past Medical History:   Past Medical History:   Diagnosis Date    Allergic rhinitis due to allergen 09/10/2019    Anemia 09/10/2019    Anxiety disorder 2020    B12 deficiency 2020    Callus     Cataract     Cholelithiasis     Chronic kidney disease     COVID-19 2022    Depression     Diverticulitis of colon     Diverticulosis     Essential hypertension 2019    Family history of myocardial infarction 2023    Fatty liver     GERD (gastroesophageal reflux disease) 2019    Graves' disease     Hernia     HLD (hyperlipidemia) 2019    Hyperthyroidism     Hypothyroidism     Inflammatory bowel disease     Major depressive disorder 2019    Osteoarthritis 2020    Osteopenia     Peptic ulceration     T2DM (type 2 diabetes mellitus) 2019    Tuberculosis     Took meds will never need to be tested again    Vitamin D deficiency 2019       Past Surgical History:  Past Surgical History:   Procedure Laterality Date    ABDOMINAL SURGERY      APPENDECTOMY       SECTION      CHOLECYSTECTOMY  2016    DR SCALES    COLON SURGERY      COLONOSCOPY      HIP FRACTURE SURGERY Left     REPAIR    HIP SURGERY      LIVER BIOPSY      OOPHORECTOMY      SMALL INTESTINE SURGERY      TUBAL ABDOMINAL LIGATION      UPPER GASTROINTESTINAL ENDOSCOPY         Family History:  Family History   Problem Relation Age of  Onset    Heart disease Mother     Heart failure Mother     Arthritis Mother     Hyperlipidemia Mother     Hypertension Mother     Stroke Mother     Thyroid disease Mother     Colon polyps Mother     Heart disease Brother     Heart disease Brother     Rectal cancer Maternal Grandmother     Kidney disease Daughter     Miscarriages / Stillbirths Daughter     Birth defects Daughter         Cleft palate    Lung cancer Other     Skin cancer Other     Colon cancer Neg Hx        Social History:   reports that she quit smoking about 40 years ago. Her smoking use included cigarettes. She started smoking about 45 years ago. She has a 5 pack-year smoking history. She has been exposed to tobacco smoke. She has never used smokeless tobacco. She reports that she does not drink alcohol and does not use drugs.    Medications:   Medications Prior to Admission   Medication Sig Dispense Refill Last Dose/Taking    Calcium Citrate-Vitamin D 250-2.5 MG-MCG per tablet Take 1 tablet by mouth 2 (Two) Times a Day. 60 tablet 11 4/22/2025    cetirizine (zyrTEC) 10 MG tablet Take 1 tablet by mouth Daily. 90 tablet 1 4/22/2025    Cholecalciferol 25 MCG (1000 UT) tablet Take 1 tablet by mouth Daily.   4/22/2025    Diclofenac Sodium (Voltaren) 1 % gel gel APPLY 2 GRAMS TO THE AFFECTED AREA(S) BY TOPICAL ROUTE 4 TIMES PER DAY   Past Week    esomeprazole (nexIUM) 40 MG capsule Take 1 capsule by mouth Daily. 90 capsule 1 4/23/2025    furosemide (LASIX) 20 MG tablet Take 1 tablet by mouth Daily. 90 tablet 0 Past Week    metFORMIN ER (GLUCOPHAGE-XR) 500 MG 24 hr tablet Take 1 tablet by mouth 2 (Two) Times a Day. 60 tablet 5 4/22/2025    mupirocin (BACTROBAN) 2 % ointment apply to the affected area topically THREE TIMES DAILY   Past Week    predniSONE (DELTASONE) 20 MG tablet Take 1 tablet by mouth Daily for 30 days. 30 tablet 0 4/23/2025    SENNOSIDES-DOCUSATE SODIUM PO Take  by mouth.   Past Week    fluticasone (FLONASE) 50 MCG/ACT nasal spray 2 sprays  "by Each Nare route Daily. 16 g 5 More than a month    montelukast (Singulair) 10 MG tablet Take 1 tablet by mouth Every Night. 30 tablet 5 More than a month       Allergies:  Shellfish allergy, Moxifloxacin hcl, Nitrous oxide, Scopolamine, Sulfa antibiotics, Morphine, and Penicillins      Objective     Blood pressure 127/65, pulse 77, temperature 98.2 °F (36.8 °C), temperature source Temporal, resp. rate 12, height 152.4 cm (60\"), weight 53.6 kg (118 lb 2.7 oz), SpO2 100%.    Physical Exam   Constitutional: Pt is oriented to person, place, and time and well-developed, well-nourished, and in no distress.   Mouth/Throat: Oropharynx is clear and moist.   Neck: Normal range of motion.   Cardiovascular: Normal rate, regular rhythm and normal heart sounds.    Pulmonary/Chest: Effort normal and breath sounds normal.   Abdominal: Soft. Nontender  Skin: Skin is warm and dry.   Psychiatric: Mood, memory, affect and judgment normal.     Assessment & Plan     Diagnosis:    Iron deficiency anemia  Abnormal weight loss  Poor appetite    Anticipated Surgical Procedure:    EGD and colonoscopy    The risks, benefits, and alternatives of this procedure have been discussed with the patient or the responsible party- the patient understands and agrees to proceed.        Electronically signed by Peter Osorio MD, 04/23/25, 11:11 AM EDT.            "

## 2025-04-24 ENCOUNTER — PREP FOR SURGERY (OUTPATIENT)
Dept: OTHER | Facility: HOSPITAL | Age: 75
End: 2025-04-24
Payer: MEDICARE

## 2025-04-24 LAB
CYTO UR: NORMAL
LAB AP CASE REPORT: NORMAL
LAB AP CLINICAL INFORMATION: NORMAL
PATH REPORT.FINAL DX SPEC: NORMAL
PATH REPORT.GROSS SPEC: NORMAL

## 2025-04-28 ENCOUNTER — HOSPITAL ENCOUNTER (OUTPATIENT)
Dept: INFUSION THERAPY | Facility: HOSPITAL | Age: 75
Discharge: HOME | End: 2025-04-28
Payer: MEDICARE

## 2025-04-28 VITALS
BODY MASS INDEX: 23.68 KG/M2 | WEIGHT: 120.59 LBS | RESPIRATION RATE: 16 BRPM | TEMPERATURE: 97.5 F | SYSTOLIC BLOOD PRESSURE: 130 MMHG | OXYGEN SATURATION: 100 % | HEIGHT: 60 IN | HEART RATE: 70 BPM | DIASTOLIC BLOOD PRESSURE: 64 MMHG

## 2025-04-28 DIAGNOSIS — D50.8 IRON DEFICIENCY ANEMIA SECONDARY TO INADEQUATE DIETARY IRON INTAKE: Primary | ICD-10-CM

## 2025-04-28 DIAGNOSIS — K90.49 MALABSORPTION DUE TO INTOLERANCE, NOT ELSEWHERE CLASSIFIED: ICD-10-CM

## 2025-04-28 PROCEDURE — 25010000002 NA FERRIC GLUC CPLX PER 12.5 MG

## 2025-04-28 PROCEDURE — 96366 THER/PROPH/DIAG IV INF ADDON: CPT

## 2025-04-28 PROCEDURE — 25810000003 SODIUM CHLORIDE 0.9 % SOLUTION

## 2025-04-28 PROCEDURE — 96365 THER/PROPH/DIAG IV INF INIT: CPT

## 2025-04-28 RX ORDER — FAMOTIDINE 10 MG/ML
20 INJECTION, SOLUTION INTRAVENOUS AS NEEDED
OUTPATIENT
Start: 2025-05-05

## 2025-04-28 RX ORDER — HYDROCORTISONE SODIUM SUCCINATE 100 MG/2ML
100 INJECTION INTRAMUSCULAR; INTRAVENOUS AS NEEDED
OUTPATIENT
Start: 2025-05-05

## 2025-04-28 RX ORDER — SODIUM CHLORIDE 9 MG/ML
20 INJECTION, SOLUTION INTRAVENOUS ONCE
OUTPATIENT
Start: 2025-05-05

## 2025-04-28 RX ORDER — DIPHENHYDRAMINE HYDROCHLORIDE 50 MG/ML
50 INJECTION, SOLUTION INTRAMUSCULAR; INTRAVENOUS AS NEEDED
OUTPATIENT
Start: 2025-05-05

## 2025-04-28 RX ADMIN — SODIUM CHLORIDE 250 MG: 9 INJECTION, SOLUTION INTRAVENOUS at 08:43

## 2025-05-07 ENCOUNTER — LAB (OUTPATIENT)
Facility: HOSPITAL | Age: 75
End: 2025-05-07
Payer: MEDICARE

## 2025-05-07 DIAGNOSIS — R79.89 ABNORMAL LFTS: ICD-10-CM

## 2025-05-07 LAB
ALBUMIN SERPL-MCNC: 3.1 G/DL (ref 3.5–5.2)
ALP SERPL-CCNC: 89 U/L (ref 39–117)
ALT SERPL W P-5'-P-CCNC: 368 U/L (ref 1–33)
AST SERPL-CCNC: 403 U/L (ref 1–32)
BILIRUB CONJ SERPL-MCNC: 1.7 MG/DL (ref 0–0.3)
BILIRUB INDIRECT SERPL-MCNC: 1.4 MG/DL
BILIRUB SERPL-MCNC: 3.1 MG/DL (ref 0–1.2)
PROT SERPL-MCNC: 7.5 G/DL (ref 6–8.5)

## 2025-05-07 PROCEDURE — 36415 COLL VENOUS BLD VENIPUNCTURE: CPT

## 2025-05-07 PROCEDURE — 80076 HEPATIC FUNCTION PANEL: CPT

## 2025-05-08 ENCOUNTER — OFFICE VISIT (OUTPATIENT)
Dept: FAMILY MEDICINE CLINIC | Facility: CLINIC | Age: 75
End: 2025-05-08
Payer: MEDICARE

## 2025-05-08 ENCOUNTER — TELEPHONE (OUTPATIENT)
Dept: FAMILY MEDICINE CLINIC | Facility: CLINIC | Age: 75
End: 2025-05-08

## 2025-05-08 VITALS
HEIGHT: 60 IN | DIASTOLIC BLOOD PRESSURE: 68 MMHG | OXYGEN SATURATION: 98 % | TEMPERATURE: 97.3 F | WEIGHT: 115 LBS | HEART RATE: 101 BPM | BODY MASS INDEX: 22.58 KG/M2 | SYSTOLIC BLOOD PRESSURE: 124 MMHG | RESPIRATION RATE: 16 BRPM

## 2025-05-08 DIAGNOSIS — R53.1 WEAKNESS: ICD-10-CM

## 2025-05-08 DIAGNOSIS — E55.9 VITAMIN D DEFICIENCY: ICD-10-CM

## 2025-05-08 DIAGNOSIS — R17 JAUNDICE: ICD-10-CM

## 2025-05-08 DIAGNOSIS — E08.65 DIABETES MELLITUS DUE TO UNDERLYING CONDITION WITH HYPERGLYCEMIA, WITHOUT LONG-TERM CURRENT USE OF INSULIN: ICD-10-CM

## 2025-05-08 DIAGNOSIS — L98.9 SKIN LESION: Primary | ICD-10-CM

## 2025-05-08 DIAGNOSIS — E53.8 B12 DEFICIENCY: ICD-10-CM

## 2025-05-08 LAB
25(OH)D3 SERPL-MCNC: 55.5 NG/ML (ref 30–100)
FERRITIN SERPL-MCNC: 511 NG/ML (ref 13–150)
FOLATE SERPL-MCNC: 9.48 NG/ML (ref 4.78–24.2)
IRON 24H UR-MRATE: 175 MCG/DL (ref 37–145)
IRON SATN MFR SERPL: 56 % (ref 20–50)
TIBC SERPL-MCNC: 314 MCG/DL (ref 298–536)
TRANSFERRIN SERPL-MCNC: 211 MG/DL (ref 200–360)
VIT B12 BLD-MCNC: 1263 PG/ML (ref 211–946)

## 2025-05-08 PROCEDURE — 83540 ASSAY OF IRON: CPT | Performed by: NURSE PRACTITIONER

## 2025-05-08 PROCEDURE — 84466 ASSAY OF TRANSFERRIN: CPT | Performed by: NURSE PRACTITIONER

## 2025-05-08 PROCEDURE — 82607 VITAMIN B-12: CPT | Performed by: NURSE PRACTITIONER

## 2025-05-08 PROCEDURE — 82746 ASSAY OF FOLIC ACID SERUM: CPT | Performed by: NURSE PRACTITIONER

## 2025-05-08 PROCEDURE — 82306 VITAMIN D 25 HYDROXY: CPT | Performed by: NURSE PRACTITIONER

## 2025-05-08 PROCEDURE — 82728 ASSAY OF FERRITIN: CPT | Performed by: NURSE PRACTITIONER

## 2025-05-08 RX ORDER — LANOLIN ALCOHOL/MO/W.PET/CERES
1000 CREAM (GRAM) TOPICAL DAILY
COMMUNITY

## 2025-05-08 NOTE — PROGRESS NOTES
Chief Complaint  Skin Lesion (Left temporal area), Ear Fullness (States they are ringing), and Weakness - Generalized (States she just feels bad)    Subjective          Qiana AMA Altman presents to [facility]  History of Present Illness    History of Present Illness  The patient presents for evaluation of a crusty lesion on her temple, elevated liver enzymes, and mild chronic duodenitis.    She reports the presence of a crusty lesion on her temple, which she initially mistook for a mole. Upon closer inspection, she realized it did not resemble a mole and was informed by another individual that it might be a wart. The lesion is described as a hard knot, with no associated bleeding or flaking. She has expressed concern about the potential impact of treatment on her blood sugar levels.    She underwent a colonoscopy, during which she was prescribed Benefiber and a suppository. However, due to insurance coverage issues, she was unable to obtain the suppository. She has been experiencing severe abdominal pain and is concerned about the potential impact of the prescribed treatments on her blood sugar levels. She has been taking omeprazole 40 mg daily for acid reflux prevention and has been advised to avoid aspirin, ibuprofen, and naproxen.     She reports constant drowsiness and generally feeling unwell. She has been taking Tylenol at night for knee pain but reports that it does not alleviate her ear or head symptoms. She has been on prednisone since 05/01/2025 and has completed her course. She has also been receiving iron infusions and is scheduled for lab work in 08/2025.      Patient or patient representative verbalized consent for the use of Ambient Listening during the visit with  LOYD Nolen for chart documentation. 5/8/2025  10:50 EDT          BMI is within normal parameters. No other follow-up for BMI required.         Allergies  Shellfish allergy, Moxifloxacin hcl,  Nitrous oxide, Scopolamine, Sulfa antibiotics, Morphine, and Penicillins    Social History     Tobacco Use    Smoking status: Former     Current packs/day: 0.00     Average packs/day: 1 pack/day for 5.0 years (5.0 ttl pk-yrs)     Types: Cigarettes     Start date: 1980     Quit date: 1985     Years since quittin.3     Passive exposure: Past    Smokeless tobacco: Never   Vaping Use    Vaping status: Never Used   Substance Use Topics    Alcohol use: Never    Drug use: Never       Family History   Problem Relation Age of Onset    Heart disease Mother     Heart failure Mother     Arthritis Mother     Hyperlipidemia Mother     Hypertension Mother     Stroke Mother     Thyroid disease Mother     Colon polyps Mother     Heart disease Brother     Heart disease Brother     Rectal cancer Maternal Grandmother     Kidney disease Daughter     Miscarriages / Stillbirths Daughter     Birth defects Daughter         Cleft palate    Lung cancer Other     Skin cancer Other     Colon cancer Neg Hx         Health Maintenance Due   Topic Date Due    DIABETIC FOOT EXAM  Never done        Immunization History   Administered Date(s) Administered    COVID-19 (MODERNA) 1st,2nd,3rd Dose Monovalent 2021, 2021    Flu Vaccine Quad PF >36MO 2014, 2015    Fluzone (or Fluarix & Flulaval for VFC) >6mos 2014, 2015    Fluzone High-Dose 65+YRS 2024    Fluzone High-Dose 65+yrs 10/04/2021, 2022, 10/31/2023    Influenza, Unspecified 2020    Pneumococcal Conjugate 13-Valent (PCV13) 2017    Pneumococcal Conjugate 20-Valent (PCV20) 2025    Pneumococcal Polysaccharide (PPSV23) 2020    Tdap 2016    mRESVIA (RSV 60+) 2025       Review of Systems   Constitutional:  Positive for fatigue. Negative for chills, diaphoresis and fever.   HENT:  Negative for congestion, sore throat and swollen glands.    Respiratory:  Negative for cough.    Cardiovascular:  Negative for chest  "pain.   Gastrointestinal:  Positive for abdominal pain. Negative for nausea and vomiting.   Genitourinary:  Negative for dysuria.   Musculoskeletal:  Positive for myalgias. Negative for neck pain.   Skin:  Negative for rash.   Neurological:  Positive for weakness. Negative for numbness.        Objective       Vitals:    05/08/25 1019   BP: 124/68   Pulse: 101   Resp: 16   Temp: 97.3 °F (36.3 °C)   SpO2: 98%   Weight: 52.2 kg (115 lb)   Height: 152.4 cm (60\")       Body mass index is 22.46 kg/m².         Physical Exam  Vitals reviewed.   Constitutional:       Appearance: Normal appearance. She is well-developed. She is ill-appearing.   Cardiovascular:      Rate and Rhythm: Normal rate and regular rhythm.      Heart sounds: Normal heart sounds. No murmur heard.  Pulmonary:      Effort: Pulmonary effort is normal.      Breath sounds: Normal breath sounds.   Skin:     Coloration: Skin is jaundiced.   Neurological:      Mental Status: She is alert and oriented to person, place, and time.      Cranial Nerves: No cranial nerve deficit.      Motor: No weakness.   Psychiatric:         Mood and Affect: Mood and affect normal.           Physical Exam                Result Review :     The following data was reviewed by: LOYD Nolen on 05/08/2025:            XR Hips Bilateral With or Without Pelvis 3-4 View  Result Date: 2/27/2025  Impression: 1. Normal appearance of both hips 2. Stable left femoral medullary salma 3. Degenerative disease in the lower lumbar spine Electronically Signed: Jere Asher  2/27/2025 11:45 AM EST  Workstation ID: [id number]    XR Spine Lumbar 2 or 3 View  Result Date: 2/26/2025  Impression: There is some mild lumbar dextrocurvature without significant lateral subluxation. Alignment in the sagittal plane is anatomic. There is no evidence of fracture. Advanced multilevel spondylosis is present with areas of disc space height loss, osteophyte formation and facet arthropathy, appearing most " severe at L2-3 and L3-4. Electronically Signed: Vince Richardson MD  2025 5:10 PM EST  Workstation ID: [id number]           Results  Labs   - Liver Enzymes: Increasin months ago 216, then 296, then 331-368   - Bilirubin: Up    Diagnostic Testing   - Colonoscopy: Tubular adenoma polyp   - Stomach Biopsy: Reactive gastro and fundic polyp but negative for malignancy and H. pylori           Cryotherapy, Skin Lesion    Date/Time: 2025 10:30 AM    Performed by: Zohreh Mcduffie APRN  Authorized by: Zohreh Mcduffie APRN  Local anesthesia used: no    Anesthesia:  Local anesthesia used: no    Sedation:  Patient sedated: no    Patient tolerance: patient tolerated the procedure well with no immediate complications  Comments: Treated 2 times pt tolerated well.              Assessment and Plan      Assessment & Plan  Skin lesion         Jaundice         Weakness    Orders:    Vitamin D,25-Hydroxy    Vitamin B12 & Folate    Iron Profile    Ferritin    Vitamin D deficiency    Orders:    Vitamin D,25-Hydroxy    Vitamin B12 & Folate    Iron Profile    Ferritin    B12 deficiency    Orders:    Vitamin D,25-Hydroxy    Vitamin B12 & Folate    Iron Profile    Ferritin    Diabetes mellitus due to underlying condition with hyperglycemia, without long-term current use of insulin      Orders:    Iron Profile    Ferritin       Diagnosis Plan   1. Skin lesion        2. Jaundice        3. Weakness  Vitamin D,25-Hydroxy    Vitamin B12 & Folate    Iron Profile    Ferritin      4. Vitamin D deficiency  Vitamin D,25-Hydroxy    Vitamin B12 & Folate    Iron Profile    Ferritin      5. B12 deficiency  Vitamin D,25-Hydroxy    Vitamin B12 & Folate    Iron Profile    Ferritin      6. Diabetes mellitus due to underlying condition with hyperglycemia, without long-term current use of insulin  Iron Profile    Ferritin            Assessment & Plan  1. Crusty lesion on the temple.  - The lesion appears to be a wart or an age spot.  -  Liquid nitrogen will be applied to freeze the lesion, which should fall off within 2 weeks.  - Advised to avoid scratching the area during hair washing to prevent blistering.  - If the lesion does not fall off within 2 weeks, a referral to a dermatologist will be made.    2. Elevated liver enzymes.  - Liver enzymes have been progressively increasing, with bilirubin levels also elevated.  - Reports feeling unwell and has a pumpkin-like skin color.  - A liver biopsy was previously conducted and showed no malignancy.  - Will continue to monitor liver function and follow up with her doctor in June.  I did reach out to Dr Villagomez office and they did advise me to have her go to the ER and get a MRCP.  Pt aware and will need to wait until her daughter comes home.      3. Mild chronic duodenitis.  - Diagnosed with mild chronic duodenitis, causing daily stomach pain.  - Advised to continue taking omeprazole 40 mg daily and avoid aspirin, ibuprofen, and naproxen.  - An upper endoscopy is not recommended at this time.  - Stomach biopsy showed reactive gastro and fundic polyp, negative for malignancy and H. pylori.    4. Tubular adenoma polyp.  - A tubular adenoma polyp was found during her colonoscopy.  - Advised to take Benefiber twice a day and use a hydrocortisone suppository twice a day.  - Laxatives should be avoided.    5. Fluid behind the ears.  - There is fluid behind her ears, but no infection is present.  - Will continue using Flonase nasal spray and taking Zyrtec daily.  - Advised to use nasal spray regularly to manage symptoms.  - No additional treatment required at this time.          Follow Up     No follow-ups on file.    Patient was given instructions and counseling regarding her condition or for health maintenance advice. Please see specific information pulled into the AVS if appropriate.     Parts of this note are electronic transcriptions/translations of spoken language to printed text using the Dragon  Dictation system.          Zohreh Mcduffie, APRN  05/08/2025

## 2025-05-08 NOTE — TELEPHONE ENCOUNTER
Talk to Qiana and advise her Dr Osorio advise her to go to ER to be evaluated . Patient states she will go - but it will have to be this afternoon when her daughter Holli gets home from work.

## 2025-05-09 ENCOUNTER — APPOINTMENT (OUTPATIENT)
Dept: CT IMAGING | Facility: HOSPITAL | Age: 75
End: 2025-05-09
Payer: MEDICARE

## 2025-05-09 ENCOUNTER — HOSPITAL ENCOUNTER (EMERGENCY)
Facility: HOSPITAL | Age: 75
Discharge: HOME | End: 2025-05-09
Attending: EMERGENCY MEDICINE
Payer: MEDICARE

## 2025-05-09 ENCOUNTER — TELEPHONE (OUTPATIENT)
Dept: GASTROENTEROLOGY | Facility: CLINIC | Age: 75
End: 2025-05-09
Payer: MEDICARE

## 2025-05-09 ENCOUNTER — RESULTS FOLLOW-UP (OUTPATIENT)
Dept: GASTROENTEROLOGY | Facility: CLINIC | Age: 75
End: 2025-05-09
Payer: MEDICARE

## 2025-05-09 VITALS
OXYGEN SATURATION: 96 % | BODY MASS INDEX: 22.64 KG/M2 | HEART RATE: 80 BPM | SYSTOLIC BLOOD PRESSURE: 117 MMHG | RESPIRATION RATE: 16 BRPM | DIASTOLIC BLOOD PRESSURE: 77 MMHG | WEIGHT: 115.3 LBS | HEIGHT: 60 IN | TEMPERATURE: 98 F

## 2025-05-09 DIAGNOSIS — R74.8 ELEVATED LIVER ENZYMES: ICD-10-CM

## 2025-05-09 DIAGNOSIS — R74.8 ELEVATED LIVER ENZYMES: Primary | ICD-10-CM

## 2025-05-09 DIAGNOSIS — D73.89 SPLENIC LESION: ICD-10-CM

## 2025-05-09 DIAGNOSIS — K52.9 COLITIS: ICD-10-CM

## 2025-05-09 DIAGNOSIS — R93.5 ABNORMAL CT OF THE ABDOMEN: Primary | ICD-10-CM

## 2025-05-09 DIAGNOSIS — K76.9 LIVER LESION: ICD-10-CM

## 2025-05-09 LAB
ALBUMIN SERPL-MCNC: 3 G/DL (ref 3.5–5.2)
ALBUMIN/GLOB SERPL: 0.7 G/DL
ALP SERPL-CCNC: 98 U/L (ref 39–117)
ALT SERPL W P-5'-P-CCNC: 350 U/L (ref 1–33)
ANION GAP SERPL CALCULATED.3IONS-SCNC: 10.4 MMOL/L (ref 5–15)
AST SERPL-CCNC: 363 U/L (ref 1–32)
BACTERIA UR QL AUTO: NORMAL /HPF
BASOPHILS # BLD AUTO: 0.04 10*3/MM3 (ref 0–0.2)
BASOPHILS NFR BLD AUTO: 1.2 % (ref 0–1.5)
BILIRUB SERPL-MCNC: 2.6 MG/DL (ref 0–1.2)
BILIRUB UR QL STRIP: NEGATIVE
BUN SERPL-MCNC: 12 MG/DL (ref 8–23)
BUN/CREAT SERPL: 15 (ref 7–25)
CALCIUM SPEC-SCNC: 8.9 MG/DL (ref 8.6–10.5)
CHLORIDE SERPL-SCNC: 100 MMOL/L (ref 98–107)
CLARITY UR: CLEAR
CO2 SERPL-SCNC: 20.6 MMOL/L (ref 22–29)
COLOR UR: ABNORMAL
CREAT SERPL-MCNC: 0.8 MG/DL (ref 0.57–1)
D-LACTATE SERPL-SCNC: 2.2 MMOL/L (ref 0.5–2)
D-LACTATE SERPL-SCNC: 3.6 MMOL/L (ref 0.5–2)
DEPRECATED RDW RBC AUTO: 60.3 FL (ref 37–54)
EGFRCR SERPLBLD CKD-EPI 2021: 77.9 ML/MIN/1.73
EOSINOPHIL # BLD AUTO: 0.03 10*3/MM3 (ref 0–0.4)
EOSINOPHIL NFR BLD AUTO: 0.9 % (ref 0.3–6.2)
ERYTHROCYTE [DISTWIDTH] IN BLOOD BY AUTOMATED COUNT: 18.2 % (ref 12.3–15.4)
GLOBULIN UR ELPH-MCNC: 4.1 GM/DL
GLUCOSE SERPL-MCNC: 274 MG/DL (ref 65–99)
GLUCOSE UR STRIP-MCNC: ABNORMAL MG/DL
HCT VFR BLD AUTO: 33.3 % (ref 34–46.6)
HGB BLD-MCNC: 11 G/DL (ref 12–15.9)
HGB UR QL STRIP.AUTO: NEGATIVE
HOLD SPECIMEN: NORMAL
HOLD SPECIMEN: NORMAL
HYALINE CASTS UR QL AUTO: NORMAL /LPF
IMM GRANULOCYTES # BLD AUTO: 0.03 10*3/MM3 (ref 0–0.05)
IMM GRANULOCYTES NFR BLD AUTO: 0.9 % (ref 0–0.5)
KETONES UR QL STRIP: NEGATIVE
LEUKOCYTE ESTERASE UR QL STRIP.AUTO: ABNORMAL
LIPASE SERPL-CCNC: 52 U/L (ref 13–60)
LYMPHOCYTES # BLD AUTO: 0.69 10*3/MM3 (ref 0.7–3.1)
LYMPHOCYTES NFR BLD AUTO: 21 % (ref 19.6–45.3)
MCH RBC QN AUTO: 29.9 PG (ref 26.6–33)
MCHC RBC AUTO-ENTMCNC: 33 G/DL (ref 31.5–35.7)
MCV RBC AUTO: 90.5 FL (ref 79–97)
MONOCYTES # BLD AUTO: 0.29 10*3/MM3 (ref 0.1–0.9)
MONOCYTES NFR BLD AUTO: 8.8 % (ref 5–12)
NEUTROPHILS NFR BLD AUTO: 2.2 10*3/MM3 (ref 1.7–7)
NEUTROPHILS NFR BLD AUTO: 67.2 % (ref 42.7–76)
NITRITE UR QL STRIP: NEGATIVE
NRBC BLD AUTO-RTO: 0 /100 WBC (ref 0–0.2)
PH UR STRIP.AUTO: 5.5 [PH] (ref 5–8)
PLATELET # BLD AUTO: 173 10*3/MM3 (ref 140–450)
PMV BLD AUTO: 8.7 FL (ref 6–12)
POTASSIUM SERPL-SCNC: 4 MMOL/L (ref 3.5–5.2)
PROT SERPL-MCNC: 7.1 G/DL (ref 6–8.5)
PROT UR QL STRIP: NEGATIVE
RBC # BLD AUTO: 3.68 10*6/MM3 (ref 3.77–5.28)
RBC # UR STRIP: NORMAL /HPF
REF LAB TEST METHOD: NORMAL
SODIUM SERPL-SCNC: 131 MMOL/L (ref 136–145)
SP GR UR STRIP: 1.03 (ref 1–1.03)
SQUAMOUS #/AREA URNS HPF: NORMAL /HPF
UROBILINOGEN UR QL STRIP: ABNORMAL
WBC # UR STRIP: NORMAL /HPF
WBC NRBC COR # BLD AUTO: 3.28 10*3/MM3 (ref 3.4–10.8)
WHOLE BLOOD HOLD COAG: NORMAL
WHOLE BLOOD HOLD SPECIMEN: NORMAL

## 2025-05-09 PROCEDURE — 80053 COMPREHEN METABOLIC PANEL: CPT | Performed by: EMERGENCY MEDICINE

## 2025-05-09 PROCEDURE — 83690 ASSAY OF LIPASE: CPT | Performed by: EMERGENCY MEDICINE

## 2025-05-09 PROCEDURE — 81001 URINALYSIS AUTO W/SCOPE: CPT | Performed by: EMERGENCY MEDICINE

## 2025-05-09 PROCEDURE — 74177 CT ABD & PELVIS W/CONTRAST: CPT

## 2025-05-09 PROCEDURE — 25810000003 SODIUM CHLORIDE 0.9 % SOLUTION: Performed by: EMERGENCY MEDICINE

## 2025-05-09 PROCEDURE — 99285 EMERGENCY DEPT VISIT HI MDM: CPT

## 2025-05-09 PROCEDURE — 83605 ASSAY OF LACTIC ACID: CPT | Performed by: EMERGENCY MEDICINE

## 2025-05-09 PROCEDURE — 85025 COMPLETE CBC W/AUTO DIFF WBC: CPT | Performed by: EMERGENCY MEDICINE

## 2025-05-09 PROCEDURE — 36415 COLL VENOUS BLD VENIPUNCTURE: CPT

## 2025-05-09 PROCEDURE — 25510000001 IOPAMIDOL PER 1 ML: Performed by: EMERGENCY MEDICINE

## 2025-05-09 RX ORDER — IOPAMIDOL 755 MG/ML
100 INJECTION, SOLUTION INTRAVASCULAR
Status: COMPLETED | OUTPATIENT
Start: 2025-05-09 | End: 2025-05-09

## 2025-05-09 RX ORDER — PREDNISONE 20 MG/1
20 TABLET ORAL DAILY
COMMUNITY

## 2025-05-09 RX ORDER — BUSPIRONE HYDROCHLORIDE 5 MG/1
5 TABLET ORAL 2 TIMES DAILY PRN
COMMUNITY

## 2025-05-09 RX ORDER — ONDANSETRON 4 MG/1
4 TABLET, ORALLY DISINTEGRATING ORAL EVERY 6 HOURS PRN
Qty: 15 TABLET | Refills: 0 | Status: SHIPPED | OUTPATIENT
Start: 2025-05-09

## 2025-05-09 RX ORDER — SODIUM CHLORIDE 0.9 % (FLUSH) 0.9 %
10 SYRINGE (ML) INJECTION AS NEEDED
Status: DISCONTINUED | OUTPATIENT
Start: 2025-05-09 | End: 2025-05-09 | Stop reason: HOSPADM

## 2025-05-09 RX ORDER — GABAPENTIN 100 MG/1
100 CAPSULE ORAL 2 TIMES DAILY
COMMUNITY
Start: 2025-03-11

## 2025-05-09 RX ADMIN — SODIUM CHLORIDE 1000 ML: 9 INJECTION, SOLUTION INTRAVENOUS at 10:36

## 2025-05-09 RX ADMIN — IOPAMIDOL 75 ML: 755 INJECTION, SOLUTION INTRAVENOUS at 10:20

## 2025-05-09 NOTE — ED PROVIDER NOTES
Time: 9:06 AM EDT  Date of encounter:  5/9/2025  Independent Historian/Clinical History and Information was obtained by:   Patient    History is limited by: N/A    Chief Complaint: Abnormal labs      History of Present Illness:  Patient is a 75 y.o. year old female who presents to the emergency department for evaluation of abnormal labs.  Patient states she was sent here for elevated liver enzymes.  She is somewhat frustrated and does not understand why she was told to come here as her liver enzymes have been elevated per her report intermittently for over 1 year.  She states she has been admitted for this in the past and had liver biopsy and extensive outpatient testing.  Just finished a course of steroids for her liver enzymes.  She does have chronic abdominal mild discomfort but voices no worsening abdominal pain in the last 48 hours.  No fever.  No vomiting or diarrhea.      Patient Care Team  Primary Care Provider: Zohreh Mcduffie APRN    Past Medical History:     Allergies   Allergen Reactions    Shellfish Allergy Anaphylaxis    Moxifloxacin Hcl Unknown - Low Severity    Nitrous Oxide Other (See Comments)    Scopolamine Swelling    Sulfa Antibiotics Unknown - Low Severity    Morphine Rash    Penicillins Rash     Past Medical History:   Diagnosis Date    Allergic rhinitis due to allergen 09/10/2019    Anemia 09/10/2019    Anxiety disorder 06/09/2020    B12 deficiency 09/23/2020    Callus     Cataract     Cholelithiasis     Chronic kidney disease     COVID-19 06/23/2022    Depression     Diverticulitis of colon     Diverticulosis     Essential hypertension 01/03/2019    Family history of myocardial infarction 01/19/2023    Fatty liver     GERD (gastroesophageal reflux disease) 01/03/2019    Graves' disease     Hernia     HLD (hyperlipidemia) 01/03/2019    Hyperthyroidism     Hypothyroidism     Inflammatory bowel disease     Major depressive disorder 01/03/2019    Osteoarthritis 09/23/2020    Osteopenia      Peptic ulceration     T2DM (type 2 diabetes mellitus) 2019    Tuberculosis     Took meds will never need to be tested again    Vitamin D deficiency 2019     Past Surgical History:   Procedure Laterality Date    ABDOMINAL SURGERY      APPENDECTOMY       SECTION      CHOLECYSTECTOMY  2016    DR SCALES    COLON SURGERY      COLONOSCOPY      COLONOSCOPY N/A 2025    Procedure: COLONOSCOPY WITH HOT SNARE POLYPECTOMY;  Surgeon: Peter Osorio MD;  Location: [facility] ENDOSCOPY;  Service: Gastroenterology;  Laterality: N/A;  COLON POLYP, HEMORRHOIDS, MELANOSIS COLI    ENDOSCOPY N/A 2025    Procedure: ESOPHAGOGASTRODUODENOSCOPY WITH BIOPSIES;  Surgeon: Peter Osorio MD;  Location: [facility] ENDOSCOPY;  Service: Gastroenterology;  Laterality: N/A;  ESOPHAGITIS, DUODENITIS, SMALL HIATAL HERNIA    HIP FRACTURE SURGERY Left     REPAIR    HIP SURGERY      LIVER BIOPSY      OOPHORECTOMY      SMALL INTESTINE SURGERY      TUBAL ABDOMINAL LIGATION      UPPER GASTROINTESTINAL ENDOSCOPY       Family History   Problem Relation Age of Onset    Heart disease Mother     Heart failure Mother     Arthritis Mother     Hyperlipidemia Mother     Hypertension Mother     Stroke Mother     Thyroid disease Mother     Colon polyps Mother     Heart disease Brother     Heart disease Brother     Rectal cancer Maternal Grandmother     Kidney disease Daughter     Miscarriages / Stillbirths Daughter     Birth defects Daughter         Cleft palate    Lung cancer Other     Skin cancer Other     Colon cancer Neg Hx        Home Medications:  Prior to Admission medications    Medication Sig Start Date End Date Taking? Authorizing Provider   gabapentin (NEURONTIN) 100 MG capsule Take 1 capsule by mouth 2 (Two) Times a Day. 3/11/25  Yes Provider, MD Laverne   busPIRone (BUSPAR) 5 MG tablet Take 1 tablet by mouth 2 (Two) Times a Day As Needed. for anxiety    ProviderLaverne MD   Calcium Citrate-Vitamin D  250-2.5 MG-MCG per tablet Take 1 tablet by mouth 2 (Two) Times a Day. 24  Peter Osorio MD   cetirizine (zyrTEC) 10 MG tablet Take 1 tablet by mouth Daily. 24   Zohreh Mcduffie APRN   Cholecalciferol 25 MCG (1000 UT) tablet Take 1 tablet by mouth Daily.    Laverne Seals MD   esomeprazole (nexIUM) 40 MG capsule Take 1 capsule by mouth Daily. 24   Zohreh Mcduffie APRN   fluticasone (FLONASE) 50 MCG/ACT nasal spray 2 sprays by Each Nare route Daily. 25   Zohreh Mcduffie APRN   furosemide (LASIX) 20 MG tablet Take 1 tablet by mouth Daily. 25   Zohreh Mcduffie APRN   hydrocortisone (ANUSOL-HC) 25 MG suppository Insert 1 suppository into the rectum 2 (Two) Times a Day for 90 days. 25  Jewels Brooks APRN   metFORMIN ER (GLUCOPHAGE-XR) 500 MG 24 hr tablet Take 1 tablet by mouth 2 (Two) Times a Day. 25   Zohreh Mcduffie APRN   montelukast (Singulair) 10 MG tablet Take 1 tablet by mouth Every Night. 25   Zohreh Mcduffie APRN   mupirocin (BACTROBAN) 2 % ointment apply to the affected area topically THREE TIMES DAILY 25   Laverne Seals MD   predniSONE (DELTASONE) 20 MG tablet Take 1 tablet by mouth Daily.    Laverne Seals MD   vitamin B-12 (CYANOCOBALAMIN) 1000 MCG tablet Take 1 tablet by mouth Daily.    Laverne Seals MD   Wheat Dextrin (Benefiber Drink Mix) pack Take 1 packet by mouth 2 (Two) Times a Day for 30 days. 25  Jewels Brooks APRN        Social History:   Social History     Tobacco Use    Smoking status: Former     Current packs/day: 0.00     Average packs/day: 1 pack/day for 5.0 years (5.0 ttl pk-yrs)     Types: Cigarettes     Start date: 1980     Quit date: 1985     Years since quittin.3     Passive exposure: Past    Smokeless tobacco: Never   Vaping Use    Vaping status: Never Used   Substance Use Topics    Alcohol use: Never  "   Drug use: Never         Review of Systems:  Review of Systems   Constitutional:  Negative for chills and fever.   HENT:  Negative for congestion, rhinorrhea and sore throat.    Eyes:  Negative for photophobia.   Respiratory:  Negative for apnea, cough, chest tightness and shortness of breath.    Cardiovascular:  Negative for chest pain and palpitations.   Gastrointestinal:  Positive for abdominal pain. Negative for diarrhea, nausea and vomiting.   Endocrine: Negative.    Genitourinary:  Negative for difficulty urinating and dysuria.   Musculoskeletal:  Negative for back pain, joint swelling and myalgias.   Skin:  Negative for color change and wound.   Allergic/Immunologic: Negative.    Neurological:  Negative for seizures and headaches.   Psychiatric/Behavioral: Negative.     All other systems reviewed and are negative.       Physical Exam:  /77   Pulse 80   Temp 97.9 °F (36.6 °C) (Oral)   Resp 16   Ht 152.4 cm (60\")   Wt 52.3 kg (115 lb 4.8 oz)   SpO2 96%   BMI 22.52 kg/m²     Physical Exam  Vitals and nursing note reviewed.   Constitutional:       General: She is awake. She is not in acute distress.     Appearance: Normal appearance. She is not ill-appearing or toxic-appearing.   HENT:      Head: Normocephalic and atraumatic.      Nose: Nose normal.      Mouth/Throat:      Mouth: Mucous membranes are moist.   Eyes:      Extraocular Movements: Extraocular movements intact.      Pupils: Pupils are equal, round, and reactive to light.   Cardiovascular:      Rate and Rhythm: Normal rate and regular rhythm.      Heart sounds: Normal heart sounds.   Pulmonary:      Effort: Pulmonary effort is normal. No respiratory distress.      Breath sounds: Normal breath sounds. No wheezing, rhonchi or rales.   Abdominal:      General: Bowel sounds are normal.      Palpations: Abdomen is soft.      Tenderness: There is no abdominal tenderness. There is no guarding or rebound.      Comments: No rigidity "   Musculoskeletal:         General: No tenderness. Normal range of motion.      Cervical back: Normal range of motion and neck supple.   Skin:     General: Skin is warm and dry.      Coloration: Skin is not jaundiced.   Neurological:      General: No focal deficit present.      Mental Status: She is alert. Mental status is at baseline.   Psychiatric:         Mood and Affect: Mood normal.        Patient is in no visible pain and very well-appearing with normal abdominal exam.            Medical Decision Making:      Comorbidities that affect care:    GERD, depression, hyperlipidemia, B12 deficiency, CKD, anxiety/depression    External Notes reviewed:    Previous Operation Note: Surgery with gastroenterology Dr. Osorio on 4/23/2025.  Description: EGD with biopsies      The following orders were placed and all results were independently analyzed by me:  Orders Placed This Encounter   Procedures    CT Abdomen Pelvis With Contrast    [place] Draw    Comprehensive Metabolic Panel    Lipase    Urinalysis With Microscopic If Indicated (No Culture) - Urine, Clean Catch    Lactic Acid, Plasma    CBC Auto Differential    STAT Lactic Acid, Reflex    Urinalysis, Microscopic Only - Urine, Clean Catch    STAT Lactic Acid, Reflex    NPO Diet NPO Type: Strict NPO    Undress & Gown    Gastroenterology (on-call MD unless specified)    Insert Peripheral IV    CBC & Differential    Green Top (Gel)    Lavender Top    Gold Top - SST    Light Blue Top       Medications Given in the Emergency Department:  Medications   sodium chloride 0.9 % flush 10 mL (has no administration in time range)   sodium chloride 0.9 % bolus 1,000 mL (0 mL Intravenous Stopped 5/9/25 1133)   iopamidol (ISOVUE-370) 76 % injection 100 mL (75 mL Intravenous Given 5/9/25 1020)        ED Course:    ED Course as of 05/09/25 1446   Fri May 09, 2025   1339 STAT Lactic Acid, Reflex [RP]      ED Course User Index  [RP] Manuel Hodgson MD       Labs:    Lab Results (last 24  hours)       Procedure Component Value Units Date/Time    CBC & Differential [[id number]]  (Abnormal) Collected: 05/09/25 0850    Specimen: Blood Updated: 05/09/25 0900    Narrative:      The following orders were created for panel order CBC & Differential.  Procedure                               Abnormality         Status                     ---------                               -----------         ------                     CBC Auto Differential[[id number]]        Abnormal            Final result                 Please view results for these tests on the individual orders.    Comprehensive Metabolic Panel [[id number]]  (Abnormal) Collected: 05/09/25 0850    Specimen: Blood Updated: 05/09/25 0922     Glucose 274 mg/dL      BUN 12 mg/dL      Creatinine 0.80 mg/dL      Sodium 131 mmol/L      Potassium 4.0 mmol/L      Chloride 100 mmol/L      CO2 20.6 mmol/L      Calcium 8.9 mg/dL      Total Protein 7.1 g/dL      Albumin 3.0 g/dL      ALT (SGPT) 350 U/L      AST (SGOT) 363 U/L      Alkaline Phosphatase 98 U/L      Total Bilirubin 2.6 mg/dL      Globulin 4.1 gm/dL      A/G Ratio 0.7 g/dL      BUN/Creatinine Ratio 15.0     Anion Gap 10.4 mmol/L      eGFR 77.9 mL/min/1.73     Narrative:      GFR Categories in Chronic Kidney Disease (CKD)              GFR Category          GFR (mL/min/1.73)    Interpretation  G1                    90 or greater        Normal or high (1)  G2                    60-89                Mild decrease (1)  G3a                   45-59                Mild to moderate decrease  G3b                   30-44                Moderate to severe decrease  G4                    15-29                Severe decrease  G5                    14 or less           Kidney failure    (1)In the absence of evidence of kidney disease, neither GFR category G1 or G2 fulfill the criteria for CKD.    eGFR calculation 2021 CKD-EPI creatinine equation, which does not include race as a factor    Lipase [[id number]]  (Normal)  Collected: 05/09/25 0850    Specimen: Blood Updated: 05/09/25 0922     Lipase 52 U/L     Lactic Acid, Plasma [[id number]]  (Abnormal) Collected: 05/09/25 0850    Specimen: Blood Updated: 05/09/25 0935     Lactate 3.6 mmol/L     CBC Auto Differential [[id number]]  (Abnormal) Collected: 05/09/25 0850    Specimen: Blood Updated: 05/09/25 0900     WBC 3.28 10*3/mm3      RBC 3.68 10*6/mm3      Hemoglobin 11.0 g/dL      Hematocrit 33.3 %      MCV 90.5 fL      MCH 29.9 pg      MCHC 33.0 g/dL      RDW 18.2 %      RDW-SD 60.3 fl      MPV 8.7 fL      Platelets 173 10*3/mm3      Neutrophil % 67.2 %      Lymphocyte % 21.0 %      Monocyte % 8.8 %      Eosinophil % 0.9 %      Basophil % 1.2 %      Immature Grans % 0.9 %      Neutrophils, Absolute 2.20 10*3/mm3      Lymphocytes, Absolute 0.69 10*3/mm3      Monocytes, Absolute 0.29 10*3/mm3      Eosinophils, Absolute 0.03 10*3/mm3      Basophils, Absolute 0.04 10*3/mm3      Immature Grans, Absolute 0.03 10*3/mm3      nRBC 0.0 /100 WBC     Urinalysis With Microscopic If Indicated (No Culture) - Urine, Clean Catch [[id number]]  (Abnormal) Collected: 05/09/25 1034    Specimen: Urine, Clean Catch Updated: 05/09/25 1052     Color, UA Dark Yellow     Appearance, UA Clear     pH, UA 5.5     Specific Gravity, UA 1.027     Glucose,  mg/dL (Trace)     Ketones, UA Negative     Bilirubin, UA Negative     Blood, UA Negative     Protein, UA Negative     Leuk Esterase, UA Trace     Nitrite, UA Negative     Urobilinogen, UA >=8.0 E.U./dL    Urinalysis, Microscopic Only - Urine, Clean Catch [[id number]] Collected: 05/09/25 1034    Specimen: Urine, Clean Catch Updated: 05/09/25 1052     RBC, UA 0-2 /HPF      WBC, UA 0-2 /HPF      Bacteria, UA None Seen /HPF      Squamous Epithelial Cells, UA 0-2 /HPF      Hyaline Casts, UA 0-2 /LPF      Methodology Automated Microscopy    STAT Lactic Acid, Reflex [[id number]]  (Abnormal) Collected: 05/09/25 1136    Specimen: Blood Updated: 05/09/25 1213     Lactate  2.2 mmol/L              Imaging:    CT Abdomen Pelvis With Contrast  Result Date: 5/9/2025  CT ABDOMEN PELVIS W CONTRAST Date of Exam: 5/9/2025 10:12 AM EDT Indication: Abdominal pain, elevated liver enzymes abdominal pain. Comparison: CT abdomen and pelvis 7/29/2024 Technique: Axial CT images were obtained of the abdomen and pelvis after the uneventful intravenous administration of iodinated contrast. Reconstructed coronal and sagittal images were also obtained. Automated exposure control and iterative construction methods were used. Findings: LUNG BASES: There is trace pericardial effusion. LIVER: Evaluation of the liver demonstrates decreased attenuation compatible with steatosis. In the left hepatic lobe there is a 5 mm low attenuating lesion (image 40 of series 3) which demonstrated focal arterial enhancement on prior study and could represent flash hemangioma but is indeterminate on this exam. There is a low attenuating lesion in the posterior right hepatic lobe (image 21 of series 3 measuring 4 mm and a low attenuating lesion in the inferior posterior right hepatic lobe measuring 6  mm (image 40 of series 3 which was present on prior study. These areas are indeterminate on this exam. BILIARY/GALLBLADDER: Surgically absent. SPLEEN: There are few vague areas within the anterior margin of the spleen appearing more conspicuous from prior study measuring 0.9 and 1.4 cm potentially related to perfusion however recommend attention to on follow-up imaging to exclude developing splenic lesions. PANCREAS:  Unremarkable ADRENAL:  Unremarkable KIDNEYS:  Unremarkable parenchyma with no solid mass identified. No obstruction.  No calculus identified. GASTROINTESTINAL/MESENTERY: Evaluation of the gastrointestinal tract demonstrates postsurgical changes from a right hemicolectomy. In the area of anastomosis and suture material there is abnormal eccentric bowel wall thickening medially. Some additional bowel wall thickening  involves the hepatic flexure with adjacent mesenteric fat stranding and wall this could represent inflammatory process such as colitis disease recurrence cannot be entirely excluded. The remaining gastrointestinal tract is unremarkable in appearance. MESENTERIC VESSELS:  Patent. AORTA/IVC:  Normal caliber. RETROPERITONEUM/LYMPH NODES: No lymphadenopathy noted. REPRODUCTIVE: The uterus is visualized. BLADDER:  Unremarkable OSSEUS STRUCTURES: There are healed posterior left rib fractures. Multilevel degenerative disc and facet disease is present in the lumbar spine. Intramedullary salma is noted in the proximal left femur.     Impression: 1. Abnormal bowel wall thickening at the level of surgical anastomosis in the right upper quadrant/mid abdomen. Additional bowel wall thickening involves the hepatic flexure with some fat stranding and this could all represent an inflammatory process such as colitis however disease recurrence cannot be entirely excluded and is of concern. 2. Indeterminate hepatic and splenic lesions for which early metastatic deposits cannot be excluded. Electronically Signed: Sherrie Bhagat MD  5/9/2025 10:44 AM EDT  Workstation ID: [id number]        Differential Diagnosis and Discussion:    Abdominal Pain: Based on the patient's signs and symptoms, I considered abdominal aortic aneurysm, small bowel obstruction, pancreatitis, acute cholecystitis, acute appendecitis, peptic ulcer disease, gastritis, colitis, endocrine disorders, irritable bowel syndrome and other differential diagnosis an etiology of the patient's abdominal pain.    PROCEDURES:    Labs were collected in the emergency department and all labs were reviewed and interpreted by me.  CT scan was performed in the emergency department and the CT scan radiology impression was interpreted by me.    No orders to display       Procedures    MDM     Amount and/or Complexity of Data Reviewed  Decide to obtain previous medical records or to obtain  history from someone other than the patient: yes                       Patient Care Considerations:          Consultants/Shared Management Plan:    Consultant: I have discussed the case with Dr. Dominguez who states she is in agreement with the plan for CT abdomen pelvis.  Plan will be for discharge home if lactic acid corrects with fluids CT shows no emergent pathology.    Social Determinants of Health:    Patient is independent, reliable, and has access to care.       Disposition and Care Coordination:    Discharged: I considered escalation of care by admitting this patient to the hospital, however the patient is essentially asymptomatic with no worsening abdominal pain.  She is very well-appearing.    I have explained the patient´s condition, diagnoses and treatment plan based on the information available to me at this time. I have answered questions and addressed any concerns. The patient has a good  understanding of the patient´s diagnosis, condition, and treatment plan as can be expected at this point. The vital signs have been stable. The patient´s condition is stable and appropriate for discharge from the emergency department.      The patient will pursue further outpatient evaluation with the primary care physician or other designated or consulting physician as outlined in the discharge instructions. They are agreeable to this plan of care and follow-up instructions have been explained in detail. The patient has received these instructions in written format and has expressed an understanding of the discharge instructions. The patient is aware that any significant change in condition or worsening of symptoms should prompt an immediate return to this or the closest emergency department or call to 911.    Final diagnoses:   Elevated liver enzymes   Colitis        ED Disposition       ED Disposition   Discharge    Condition   Stable    Comment   --               This medical record created using voice  recognition software.             Manuel Hodgson MD  05/09/25 1444       Manuel Hodgson MD  05/09/25 144

## 2025-05-09 NOTE — DISCHARGE INSTRUCTIONS
Stable hydrated.  Follow-up with Dr. Dominguez first thing Monday morning.  Return to the ER immediately for worsening or severe abdominal pain, vomiting, fever, black stools.

## 2025-05-09 NOTE — TELEPHONE ENCOUNTER
Contacted by ER regarding elevated liver enzymes.  Pt follows with Dr. Osorio's office for autoimmune hepatitis.  Pt will need f/u appointment with his office.

## 2025-05-12 ENCOUNTER — TELEPHONE (OUTPATIENT)
Dept: GASTROENTEROLOGY | Facility: CLINIC | Age: 75
End: 2025-05-12
Payer: MEDICARE

## 2025-05-12 NOTE — TELEPHONE ENCOUNTER
Spoke to patient and informed of Dr. Osorio's result note and recommendations. Verified patient understanding.    Patient agreeable to have labs drawn in 1 week (orders placed).    Patient agreeable to have MRI/MRA (order placed.  Patient states she will call to have scheduled.  Provided my direct number so she can call if scheduled out past two weeks.    Patient states she stopped taking prednisone on May 1.  Please advise or recommendation.

## 2025-05-12 NOTE — TELEPHONE ENCOUNTER
Hub staff attempted to follow warm transfer process and was unsuccessful     Caller: Qiana Altman    Relationship to patient: Self    Best call back number:[phone]     Patient is needing: PT STATED THAT SHE WAS SEEN BY AUDRA GALAVIZ IN THE ER ON 8/9/25. PT WAS SUPPOSED TO FOLLOW UP WITH DR. FRANCIS OFFICE BECAUSE SHE IS ESTABLISHED THERE. PT DID A TRANSFER OF CARE FROM [facility] IN 2/28/25 NOTE IS IN CHART. PT STATED THAT SHE IS NOT WANTING TO GO BACK TO DR. FERGUSON DUE TO HER CARE THERE. SHE STATED THAT THE DR HAS MESSED HER COLON UP AFTER THE PROCEDURE AND HER ENZYMES ARE HIGH. PT IS REFUSING TO SCHEDULE AN APT WITH DR. FERGUSON. PT IS ASKING TO COME BACK TO [facility]. PT WAS MADE AWARE OF THE TRANSFER  OF CARE STEPS BEING THAT SHE HAS ALREADY TRANSFERRED FROM [facility] ONCE. PLEASE ADVISE AND CALL BACK.

## 2025-05-12 NOTE — TELEPHONE ENCOUNTER
Spoke with patient about TIFFNAY process & explained it to her. She agreed to stay with Dr Osorio, she is unable to make it to [place] for appts.

## 2025-05-14 ENCOUNTER — TELEPHONE (OUTPATIENT)
Dept: GASTROENTEROLOGY | Facility: CLINIC | Age: 75
End: 2025-05-14
Payer: MEDICARE

## 2025-05-14 ENCOUNTER — PATIENT OUTREACH (OUTPATIENT)
Dept: CASE MANAGEMENT | Facility: OTHER | Age: 75
End: 2025-05-14
Payer: MEDICARE

## 2025-05-14 DIAGNOSIS — K76.9 LIVER LESION: ICD-10-CM

## 2025-05-14 DIAGNOSIS — D73.89 SPLENIC LESION: ICD-10-CM

## 2025-05-14 DIAGNOSIS — R93.5 ABNORMAL CT OF THE ABDOMEN: Primary | ICD-10-CM

## 2025-05-14 DIAGNOSIS — R10.10 UPPER ABDOMINAL PAIN OF UNKNOWN ETIOLOGY: ICD-10-CM

## 2025-05-14 DIAGNOSIS — F33.0 MILD EPISODE OF RECURRENT MAJOR DEPRESSIVE DISORDER: ICD-10-CM

## 2025-05-14 DIAGNOSIS — K21.9 GASTROESOPHAGEAL REFLUX DISEASE WITHOUT ESOPHAGITIS: Primary | ICD-10-CM

## 2025-05-14 NOTE — OUTREACH NOTE
AMBULATORY CASE MANAGEMENT NOTE    Names and Relationships of Patient/Support Persons: Contact: Qiana Altman; Relationship: Self -     Patient Outreach    Called and spoke with patient about CCM services and case management nursing. She feels no needs or health care goals she woould need to work on at this time and is not interested in program. She warranted an outreach from recent ER visit and risk scores. Reports having GI testing due to ER visit and elevated liver enzymes.     PCP updated per secure chat.     Kristina FORD  Ambulatory Case Management    5/14/2025, 15:55 EDT

## 2025-05-14 NOTE — TELEPHONE ENCOUNTER
Pt was unable to have MRI due to orders per MRI dept/scheduling. I resubmitted order as stat - Pt is scheduled for MRI 5.15.25 @ 1600. Per Dr Osorio - we will hold on Prednisone and/or Azathioprine until after MRI results. I have spoke with patient and she is agreeable to this plan. We will follow up when results are back.

## 2025-05-15 ENCOUNTER — HOSPITAL ENCOUNTER (OUTPATIENT)
Dept: MRI IMAGING | Facility: HOSPITAL | Age: 75
Discharge: HOME | End: 2025-05-15
Admitting: INTERNAL MEDICINE
Payer: MEDICARE

## 2025-05-15 DIAGNOSIS — R93.5 ABNORMAL CT OF THE ABDOMEN: ICD-10-CM

## 2025-05-15 DIAGNOSIS — K76.9 LIVER LESION: ICD-10-CM

## 2025-05-15 DIAGNOSIS — D73.89 SPLENIC LESION: ICD-10-CM

## 2025-05-15 PROCEDURE — 74183 MRI ABD W/O CNTR FLWD CNTR: CPT

## 2025-05-15 PROCEDURE — 25510000002 GADOBENATE DIMEGLUMINE 529 MG/ML SOLUTION: Performed by: INTERNAL MEDICINE

## 2025-05-15 PROCEDURE — A9577 INJ MULTIHANCE: HCPCS | Performed by: INTERNAL MEDICINE

## 2025-05-15 RX ADMIN — GADOBENATE DIMEGLUMINE 10 ML: 529 INJECTION, SOLUTION INTRAVENOUS at 17:05

## 2025-05-16 ENCOUNTER — TELEPHONE (OUTPATIENT)
Dept: GASTROENTEROLOGY | Facility: CLINIC | Age: 75
End: 2025-05-16
Payer: MEDICARE

## 2025-05-16 DIAGNOSIS — R93.5 ABNORMAL CT OF THE ABDOMEN: Primary | ICD-10-CM

## 2025-05-16 DIAGNOSIS — K52.9 COLITIS: ICD-10-CM

## 2025-05-16 NOTE — TELEPHONE ENCOUNTER
"I called patient to discuss MRI results. Per Dr. Osorio \"Patient has colitis with no liver lesions to be worried. Her PCP is delaying vaccinations for last few months. She has to start Azathioprine ASAP and it will take care of both colitis and AIH.\"     I have relayed results to patient. She states she cannot start the Azathioprine until she finishes this recent antibiotic, which is almost completed. She also states she has to get her last shingle vaccination as well.     Per Dr. Osorio, patient to take 50 MG Azathioprine per day. Patient would like to hold off on medication.   "

## 2025-05-19 ENCOUNTER — TELEPHONE (OUTPATIENT)
Dept: FAMILY MEDICINE CLINIC | Facility: CLINIC | Age: 75
End: 2025-05-19
Payer: MEDICARE

## 2025-05-19 RX ORDER — PREDNISONE 20 MG/1
40 TABLET ORAL DAILY
Qty: 60 TABLET | Refills: 0 | Status: SHIPPED | OUTPATIENT
Start: 2025-05-19 | End: 2025-06-18

## 2025-05-19 NOTE — TELEPHONE ENCOUNTER
Spoke to Dr. Osorio in regards to her hep A and her hep B vaccines.  Discussed that she can get those at the pharmacy but she is okay to get them here in the office.  He would like her to have those done before she starts any more therapy.  She is going for an MRI soon.  Staff has left her a message to have her come here for her first hep A and hep B vaccine.  I am not certain that Medicare will pay 100%.  Also we will need to do an A1c draw as he is starting her on prednisone again.  He wants me to monitor her sugars closely.  Her last A1c as of February 2025 was 5.8.  Zohreh Mcduffie, APRN

## 2025-05-19 NOTE — TELEPHONE ENCOUNTER
Per Dr. Osorio, patient to take 40 MG prednisone once daily for 30 days and hold azathioprine. I have spoken with patient. She is aware of new rx and how to take. I advised patient if she had any questions to give me a call. Patient agreed to this plan.

## 2025-05-21 ENCOUNTER — RESULTS FOLLOW-UP (OUTPATIENT)
Dept: FAMILY MEDICINE CLINIC | Facility: CLINIC | Age: 75
End: 2025-05-21

## 2025-05-21 ENCOUNTER — TELEPHONE (OUTPATIENT)
Dept: GASTROENTEROLOGY | Facility: CLINIC | Age: 75
End: 2025-05-21

## 2025-05-21 ENCOUNTER — LAB (OUTPATIENT)
Dept: FAMILY MEDICINE CLINIC | Facility: CLINIC | Age: 75
End: 2025-05-21
Payer: MEDICARE

## 2025-05-21 DIAGNOSIS — E08.65 DIABETES MELLITUS DUE TO UNDERLYING CONDITION WITH HYPERGLYCEMIA, WITHOUT LONG-TERM CURRENT USE OF INSULIN: ICD-10-CM

## 2025-05-21 DIAGNOSIS — Z23 NEED FOR HEPATITIS B VACCINATION: Primary | ICD-10-CM

## 2025-05-21 DIAGNOSIS — Z23 NEED FOR HEPATITIS A VACCINATION: ICD-10-CM

## 2025-05-21 LAB — HBA1C MFR BLD: 7.7 % (ref 4.8–5.6)

## 2025-05-21 PROCEDURE — 83036 HEMOGLOBIN GLYCOSYLATED A1C: CPT | Performed by: NURSE PRACTITIONER

## 2025-05-21 NOTE — TELEPHONE ENCOUNTER
Hub staff attempted to follow warm transfer process and was unsuccessful     Caller: HAZEL YOUSSEF    Relationship to patient: Emergency Contact    Best call back number:  [phone]    Patient is needing: PT'S DAUGHTER IS RETURNING CALL TO THE OFFICE. PLEASE REACH BACK OUT TO HER.

## 2025-05-22 ENCOUNTER — TELEPHONE (OUTPATIENT)
Dept: FAMILY MEDICINE CLINIC | Facility: CLINIC | Age: 75
End: 2025-05-22
Payer: MEDICARE

## 2025-05-22 DIAGNOSIS — K21.9 GASTROESOPHAGEAL REFLUX DISEASE WITHOUT ESOPHAGITIS: ICD-10-CM

## 2025-05-22 DIAGNOSIS — K75.4 AUTOIMMUNE HEPATITIS: Primary | ICD-10-CM

## 2025-05-22 NOTE — TELEPHONE ENCOUNTER
PER THE PATIENT'S DAUGHTER AND [facility]STRO IN [facility] PATIENT NEEDS TO BE REFERRED TO GASTRO IN [place] - PLEASE DO A REFERRAL FOR WHOMEVER YOU THINK IS BEST FOR HER TO SEE - MAYBE M[facility]LE OR BEST ONE YOU KNOW

## 2025-05-23 DIAGNOSIS — J30.9 ALLERGIC RHINITIS, UNSPECIFIED SEASONALITY, UNSPECIFIED TRIGGER: ICD-10-CM

## 2025-05-23 RX ORDER — CETIRIZINE HYDROCHLORIDE 10 MG/1
10 TABLET ORAL DAILY
Qty: 90 TABLET | Refills: 1 | Status: SHIPPED | OUTPATIENT
Start: 2025-05-23

## 2025-05-28 DIAGNOSIS — R74.8 ELEVATED LIVER ENZYMES: ICD-10-CM

## 2025-05-28 DIAGNOSIS — R79.89 ABNORMAL LFTS: Primary | ICD-10-CM

## 2025-05-30 ENCOUNTER — LAB (OUTPATIENT)
Facility: HOSPITAL | Age: 75
End: 2025-05-30
Payer: MEDICARE

## 2025-05-30 DIAGNOSIS — R74.8 ELEVATED LIVER ENZYMES: ICD-10-CM

## 2025-05-30 DIAGNOSIS — R79.89 ABNORMAL LFTS: ICD-10-CM

## 2025-05-30 LAB
ALBUMIN SERPL-MCNC: 3 G/DL (ref 3.5–5.2)
ALP SERPL-CCNC: 118 U/L (ref 39–117)
ALT SERPL W P-5'-P-CCNC: 220 U/L (ref 1–33)
ANION GAP SERPL CALCULATED.3IONS-SCNC: 8.1 MMOL/L (ref 5–15)
AST SERPL-CCNC: 111 U/L (ref 1–32)
BASOPHILS # BLD AUTO: 0 10*3/MM3 (ref 0–0.2)
BASOPHILS NFR BLD AUTO: 0 % (ref 0–1.5)
BILIRUB CONJ SERPL-MCNC: 1.1 MG/DL (ref 0–0.3)
BILIRUB INDIRECT SERPL-MCNC: 0.9 MG/DL
BILIRUB SERPL-MCNC: 2 MG/DL (ref 0–1.2)
BUN SERPL-MCNC: 14 MG/DL (ref 8–23)
BUN/CREAT SERPL: 14.4 (ref 7–25)
CALCIUM SPEC-SCNC: 9.2 MG/DL (ref 8.6–10.5)
CHLORIDE SERPL-SCNC: 101 MMOL/L (ref 98–107)
CO2 SERPL-SCNC: 27.9 MMOL/L (ref 22–29)
CREAT SERPL-MCNC: 0.97 MG/DL (ref 0.57–1)
DEPRECATED RDW RBC AUTO: 55.6 FL (ref 37–54)
EGFRCR SERPLBLD CKD-EPI 2021: 61.8 ML/MIN/1.73
EOSINOPHIL # BLD AUTO: 0 10*3/MM3 (ref 0–0.4)
EOSINOPHIL NFR BLD AUTO: 0 % (ref 0.3–6.2)
ERYTHROCYTE [DISTWIDTH] IN BLOOD BY AUTOMATED COUNT: 15.8 % (ref 12.3–15.4)
GLUCOSE SERPL-MCNC: 241 MG/DL (ref 65–99)
HCT VFR BLD AUTO: 39 % (ref 34–46.6)
HGB BLD-MCNC: 12.6 G/DL (ref 12–15.9)
IMM GRANULOCYTES # BLD AUTO: 0.06 10*3/MM3 (ref 0–0.05)
IMM GRANULOCYTES NFR BLD AUTO: 0.9 % (ref 0–0.5)
LYMPHOCYTES # BLD AUTO: 0.66 10*3/MM3 (ref 0.7–3.1)
LYMPHOCYTES NFR BLD AUTO: 9.9 % (ref 19.6–45.3)
MCH RBC QN AUTO: 30.9 PG (ref 26.6–33)
MCHC RBC AUTO-ENTMCNC: 32.3 G/DL (ref 31.5–35.7)
MCV RBC AUTO: 95.6 FL (ref 79–97)
MONOCYTES # BLD AUTO: 0.12 10*3/MM3 (ref 0.1–0.9)
MONOCYTES NFR BLD AUTO: 1.8 % (ref 5–12)
NEUTROPHILS NFR BLD AUTO: 5.86 10*3/MM3 (ref 1.7–7)
NEUTROPHILS NFR BLD AUTO: 87.4 % (ref 42.7–76)
NRBC BLD AUTO-RTO: 0 /100 WBC (ref 0–0.2)
PLATELET # BLD AUTO: 163 10*3/MM3 (ref 140–450)
PMV BLD AUTO: 9.7 FL (ref 6–12)
POTASSIUM SERPL-SCNC: 3.8 MMOL/L (ref 3.5–5.2)
PROT SERPL-MCNC: 8.1 G/DL (ref 6–8.5)
RBC # BLD AUTO: 4.08 10*6/MM3 (ref 3.77–5.28)
SODIUM SERPL-SCNC: 137 MMOL/L (ref 136–145)
WBC NRBC COR # BLD AUTO: 6.7 10*3/MM3 (ref 3.4–10.8)

## 2025-05-30 PROCEDURE — 85025 COMPLETE CBC W/AUTO DIFF WBC: CPT

## 2025-05-30 PROCEDURE — 36415 COLL VENOUS BLD VENIPUNCTURE: CPT

## 2025-05-30 PROCEDURE — 80076 HEPATIC FUNCTION PANEL: CPT

## 2025-05-30 PROCEDURE — 80048 BASIC METABOLIC PNL TOTAL CA: CPT

## 2025-05-31 ENCOUNTER — RESULTS FOLLOW-UP (OUTPATIENT)
Dept: GASTROENTEROLOGY | Facility: CLINIC | Age: 75
End: 2025-05-31
Payer: MEDICARE

## 2025-06-10 DIAGNOSIS — R79.89 ABNORMAL LFTS: Primary | ICD-10-CM

## 2025-06-13 DIAGNOSIS — E66.01 MORBID (SEVERE) OBESITY DUE TO EXCESS CALORIES: Primary | ICD-10-CM

## 2025-06-13 RX ORDER — FUROSEMIDE 20 MG/1
20 TABLET ORAL DAILY
Qty: 90 TABLET | Refills: 0 | Status: SHIPPED | OUTPATIENT
Start: 2025-06-13

## 2025-06-17 NOTE — PROGRESS NOTES
[facility]  Cardiology progress Note    Patient Name: Qiana Altman  : 1950    CHIEF COMPLAINT  Hypertension        Subjective   Subjective     HISTORY OF PRESENT ILLNESS    Qiana Altman is a 75 y.o. female with history of hypertension.    REVIEW OF SYSTEMS    Constitutional:    No fever, no weight loss  Skin:     No rash  Otolaryngeal:    No difficulty swallowing  Cardiovascular: See HPI.  Pulmonary:    No cough, no sputum production    Personal History     Social History:    reports that she quit smoking about 40 years ago. Her smoking use included cigarettes. She started smoking about 45 years ago. She has a 5 pack-year smoking history. She has been exposed to tobacco smoke. She has never used smokeless tobacco. She reports that she does not drink alcohol and does not use drugs.    Home Medications:  Current Outpatient Medications on File Prior to Visit   Medication Sig    Calcium Citrate-Vitamin D 250-2.5 MG-MCG per tablet Take 1 tablet by mouth 2 (Two) Times a Day.    cetirizine (zyrTEC) 10 MG tablet TAKE ONE TABLET BY MOUTH EVERY DAY    Cholecalciferol 25 MCG (1000 UT) tablet Take 1 tablet by mouth Daily.    esomeprazole (nexIUM) 40 MG capsule Take 1 capsule by mouth Daily.    fluticasone (FLONASE) 50 MCG/ACT nasal spray 2 sprays by Each Nare route Daily.    furosemide (LASIX) 20 MG tablet TAKE ONE TABLET BY MOUTH ONCE DAILY    hydrocortisone (ANUSOL-HC) 25 MG suppository Insert 1 suppository into the rectum 2 (Two) Times a Day for 90 days.    metFORMIN ER (GLUCOPHAGE-XR) 500 MG 24 hr tablet Take 1 tablet by mouth 2 (Two) Times a Day.    mupirocin (BACTROBAN) 2 % ointment apply to the affected area topically THREE TIMES DAILY    SITagliptin (Januvia) 100 MG tablet Take 1 tablet by mouth Daily.    vitamin B-12 (CYANOCOBALAMIN) 1000 MCG tablet Take 1 tablet by mouth Daily.    [DISCONTINUED] busPIRone (BUSPAR) 5 MG tablet Take 1 tablet by mouth 2 (Two) Times a Day As Needed. for anxiety  (Patient not taking: Reported on 6/20/2025)    [DISCONTINUED] gabapentin (NEURONTIN) 100 MG capsule Take 1 capsule by mouth 2 (Two) Times a Day. (Patient not taking: Reported on 6/20/2025)    [DISCONTINUED] montelukast (Singulair) 10 MG tablet Take 1 tablet by mouth Every Night. (Patient not taking: Reported on 6/20/2025)    [DISCONTINUED] ondansetron ODT (ZOFRAN-ODT) 4 MG disintegrating tablet Place 1 tablet on the tongue Every 6 (Six) Hours As Needed for Nausea or Vomiting. (Patient not taking: Reported on 6/20/2025)    [DISCONTINUED] predniSONE (DELTASONE) 20 MG tablet Take 1 tablet by mouth Daily. (Patient not taking: Reported on 6/20/2025)     No current facility-administered medications on file prior to visit.       Past Medical History:   Diagnosis Date    Allergic rhinitis due to allergen 09/10/2019    Anemia 09/10/2019    Anxiety disorder 06/09/2020    B12 deficiency 09/23/2020    Callus     Cataract     Cholelithiasis     Chronic kidney disease     COVID-19 06/23/2022    Depression     Diverticulitis of colon     Diverticulosis     Essential hypertension 01/03/2019    Family history of myocardial infarction 01/19/2023    Fatty liver     GERD (gastroesophageal reflux disease) 01/03/2019    Graves' disease     Hernia     HLD (hyperlipidemia) 01/03/2019    Hyperthyroidism     Hypothyroidism     Inflammatory bowel disease     Major depressive disorder 01/03/2019    Osteoarthritis 09/23/2020    Osteopenia     Peptic ulceration     T2DM (type 2 diabetes mellitus) 01/03/2019    Tuberculosis     Took meds will never need to be tested again    Vitamin D deficiency 01/03/2019       Allergies:  Allergies   Allergen Reactions    Shellfish Allergy Anaphylaxis    Moxifloxacin Hcl Unknown - Low Severity    Nitrous Oxide Other (See Comments)    Scopolamine Swelling    Sulfa Antibiotics Unknown - Low Severity    Morphine Rash    Penicillins Rash       Objective    Objective       Vitals:   Heart Rate:  [73] 73  BP:  (143)/(80) 143/80  Body mass index is 22.38 kg/m².     PHYSICAL EXAM:    General Appearance:   well developed  well nourished  HENT:   oropharynx moist  lips not cyanotic  Neck:  thyroid not enlarged  supple  Respiratory:  no respiratory distress  normal breath sounds  no rales  Cardiovascular:  no jugular venous distention  regular rhythm  apical impulse normal  S1 normal, S2 normal  no S3, no S4   no murmur  no rub, no thrill  carotid pulses normal; no bruit  pedal pulses normal  lower extremity edema: none    Skin:   warm, dry  Psychiatric:  judgement and insight appropriate  normal mood and affect        Result Review:  I have personally reviewed the available results from  [x]  Laboratory  [x]  EKG  [x]  Cardiology  [x]  Medications  [x]  Old records  []  Other:     Procedures  Lab Results   Component Value Date    CHOL 111 02/27/2025    CHOL 178 11/11/2024    CHOL 156 01/31/2024     Lab Results   Component Value Date    TRIG 80 02/27/2025    TRIG 77 11/11/2024    TRIG 85 01/31/2024     Lab Results   Component Value Date    HDL 34 (L) 02/27/2025    HDL 57 11/11/2024    HDL 52 01/31/2024     Lab Results   Component Value Date    LDL 61 02/27/2025     (H) 11/11/2024    LDL 88 01/31/2024     Lab Results   Component Value Date    VLDL 16 02/27/2025    VLDL 14 11/11/2024    VLDL 16 01/31/2024     Results for orders placed during the hospital encounter of 07/16/24    Adult Transthoracic Echo Complete W/ Cont if Necessary Per Protocol    Interpretation Summary  Normal left ventricular systolic function with mild left ventricular hypertrophy.  No significant valve abnormalities noted.     Impression/Plan:  1.  Essential hypertension controlled: Blood pressure controlled at home monitor blood pressure regularly.  2.  Mitral regurgitation: No significant valve abnormalities.  Asymptomatic.                   Lei Richmond MD   06/20/25   09:43 EDT

## 2025-06-20 ENCOUNTER — OFFICE VISIT (OUTPATIENT)
Dept: CARDIOLOGY | Facility: CLINIC | Age: 75
End: 2025-06-20
Payer: MEDICARE

## 2025-06-20 VITALS
SYSTOLIC BLOOD PRESSURE: 143 MMHG | BODY MASS INDEX: 22.5 KG/M2 | WEIGHT: 114.6 LBS | HEART RATE: 73 BPM | HEIGHT: 60 IN | DIASTOLIC BLOOD PRESSURE: 80 MMHG

## 2025-06-20 DIAGNOSIS — I34.0 NONRHEUMATIC MITRAL VALVE REGURGITATION: ICD-10-CM

## 2025-06-20 DIAGNOSIS — I10 HYPERTENSION, ESSENTIAL: Primary | ICD-10-CM

## 2025-06-20 PROCEDURE — 1160F RVW MEDS BY RX/DR IN RCRD: CPT | Performed by: SPECIALIST

## 2025-06-20 PROCEDURE — 99213 OFFICE O/P EST LOW 20 MIN: CPT | Performed by: SPECIALIST

## 2025-06-20 PROCEDURE — 1159F MED LIST DOCD IN RCRD: CPT | Performed by: SPECIALIST

## 2025-06-20 PROCEDURE — 3077F SYST BP >= 140 MM HG: CPT | Performed by: SPECIALIST

## 2025-06-20 PROCEDURE — 3079F DIAST BP 80-89 MM HG: CPT | Performed by: SPECIALIST

## 2025-06-24 ENCOUNTER — CLINICAL SUPPORT (OUTPATIENT)
Dept: FAMILY MEDICINE CLINIC | Facility: CLINIC | Age: 75
End: 2025-06-24
Payer: MEDICARE

## 2025-06-24 DIAGNOSIS — K21.9 GASTROESOPHAGEAL REFLUX DISEASE WITHOUT ESOPHAGITIS: ICD-10-CM

## 2025-06-24 DIAGNOSIS — Z23 NEED FOR HEPATITIS B BOOSTER VACCINATION: Primary | ICD-10-CM

## 2025-06-24 PROCEDURE — 90739 HEPB VACC 2/4 DOSE ADULT IM: CPT | Performed by: NURSE PRACTITIONER

## 2025-06-24 PROCEDURE — G0010 ADMIN HEPATITIS B VACCINE: HCPCS | Performed by: NURSE PRACTITIONER

## 2025-06-24 RX ORDER — ESOMEPRAZOLE MAGNESIUM 40 MG/1
40 CAPSULE, DELAYED RELEASE ORAL DAILY
Qty: 90 CAPSULE | Refills: 1 | Status: SHIPPED | OUTPATIENT
Start: 2025-06-24

## 2025-06-24 NOTE — PROGRESS NOTES
Pt came in for 2nd hep b shot. Shot was given in right deltoid, tolerated well. Pt waited 15 min with no reactions

## 2025-06-26 ENCOUNTER — CLINICAL SUPPORT (OUTPATIENT)
Dept: FAMILY MEDICINE CLINIC | Facility: CLINIC | Age: 75
End: 2025-06-26
Payer: MEDICARE

## 2025-06-26 ENCOUNTER — TELEPHONE (OUTPATIENT)
Dept: FAMILY MEDICINE CLINIC | Facility: CLINIC | Age: 75
End: 2025-06-26

## 2025-06-26 ENCOUNTER — LAB (OUTPATIENT)
Facility: HOSPITAL | Age: 75
End: 2025-06-26
Payer: MEDICARE

## 2025-06-26 DIAGNOSIS — R79.89 ABNORMAL LFTS: ICD-10-CM

## 2025-06-26 DIAGNOSIS — R30.0 DYSURIA: Primary | ICD-10-CM

## 2025-06-26 LAB
ALBUMIN SERPL-MCNC: 2.9 G/DL (ref 3.5–5.2)
ALP SERPL-CCNC: 74 U/L (ref 39–117)
ALT SERPL W P-5'-P-CCNC: 113 U/L (ref 1–33)
AST SERPL-CCNC: 122 U/L (ref 1–32)
BILIRUB BLD-MCNC: NEGATIVE MG/DL
BILIRUB CONJ SERPL-MCNC: 0.8 MG/DL (ref 0–0.3)
BILIRUB INDIRECT SERPL-MCNC: 0.9 MG/DL
BILIRUB SERPL-MCNC: 1.7 MG/DL (ref 0–1.2)
CLARITY, POC: ABNORMAL
COLOR UR: ABNORMAL
EXPIRATION DATE: ABNORMAL
GLUCOSE UR STRIP-MCNC: NEGATIVE MG/DL
KETONES UR QL: NEGATIVE
LEUKOCYTE EST, POC: ABNORMAL
Lab: ABNORMAL
NITRITE UR-MCNC: POSITIVE MG/ML
PH UR: 5.5 [PH] (ref 5–8)
PROT SERPL-MCNC: 6.8 G/DL (ref 6–8.5)
PROT UR STRIP-MCNC: NEGATIVE MG/DL
RBC # UR STRIP: ABNORMAL /UL
SP GR UR: 1.01 (ref 1–1.03)
UROBILINOGEN UR QL: NORMAL

## 2025-06-26 PROCEDURE — 36415 COLL VENOUS BLD VENIPUNCTURE: CPT

## 2025-06-26 PROCEDURE — 87077 CULTURE AEROBIC IDENTIFY: CPT | Performed by: NURSE PRACTITIONER

## 2025-06-26 PROCEDURE — 87086 URINE CULTURE/COLONY COUNT: CPT | Performed by: NURSE PRACTITIONER

## 2025-06-26 PROCEDURE — 80076 HEPATIC FUNCTION PANEL: CPT

## 2025-06-26 PROCEDURE — 87186 SC STD MICRODIL/AGAR DIL: CPT | Performed by: NURSE PRACTITIONER

## 2025-06-26 NOTE — TELEPHONE ENCOUNTER
DELETE AFTER REVIEWING: Send this encounter to the appropriate pool. See your Call Action Grid or Workflows for direction.    Caller: AgapitoQiana AMA    Relationship: Self    Best call back number: [phone]     What medication are you requesting: ANTIBIOTICS    What are your current symptoms: BURNING, PAINFUL URINATION    How long have you been experiencing symptoms: 1 DAY    Have you had these symptoms before:    [x] Yes  [] No    Have you been treated for these symptoms before:   [x] Yes  [] No    If a prescription is needed, what is your preferred pharmacy and phone number:    [facility] - [place], [place] - [address] [address] - [phone]  - [phone] FX   Additional notes:

## 2025-06-28 ENCOUNTER — RESULTS FOLLOW-UP (OUTPATIENT)
Dept: FAMILY MEDICINE CLINIC | Facility: CLINIC | Age: 75
End: 2025-06-28
Payer: MEDICARE

## 2025-06-28 RX ORDER — NITROFURANTOIN 25; 75 MG/1; MG/1
100 CAPSULE ORAL 2 TIMES DAILY
Qty: 14 CAPSULE | Refills: 0 | Status: SHIPPED | OUTPATIENT
Start: 2025-06-28

## 2025-06-29 ENCOUNTER — RESULTS FOLLOW-UP (OUTPATIENT)
Dept: GASTROENTEROLOGY | Facility: CLINIC | Age: 75
End: 2025-06-29
Payer: MEDICARE

## 2025-06-29 DIAGNOSIS — R79.89 ELEVATED LFTS: Primary | ICD-10-CM

## 2025-06-29 LAB
BACTERIA SPEC AEROBE CULT: ABNORMAL
BACTERIA SPEC AEROBE CULT: ABNORMAL

## 2025-06-30 ENCOUNTER — TELEPHONE (OUTPATIENT)
Dept: GASTROENTEROLOGY | Facility: CLINIC | Age: 75
End: 2025-06-30
Payer: MEDICARE

## 2025-06-30 NOTE — TELEPHONE ENCOUNTER
Caller: JOE AVILA     Relationship: SELF    Best call back number: [phone]     What is the best time to reach you: MORNING     Who are you requesting to speak with (clinical staff, provider,  specific staff member): CLINICAL    Do you know the name of the person who called: OG    What was the call regarding: RESULTS     Is it okay if the provider responds through MyChart: NO PHONE CALL

## 2025-06-30 NOTE — TELEPHONE ENCOUNTER
Left message with patient asking for a returned call to follow up on overdue results for imaging studies.

## 2025-06-30 NOTE — TELEPHONE ENCOUNTER
Contacted pt regarding OD imaging, pt completed an MRI abd w wo contrast on 5.15.25. Pt was wondering if she needed to complete the MRI angiogram abd w contrast. I told the pt I would reach out to Dr. Villagomez medical assistant to see what he would advise. Pt verbalized understanding.

## 2025-07-01 RX ORDER — PREDNISONE 10 MG/1
30 TABLET ORAL DAILY
Qty: 90 TABLET | Refills: 1 | Status: SHIPPED | OUTPATIENT
Start: 2025-07-01

## 2025-07-01 NOTE — TELEPHONE ENCOUNTER
This order was replaced- Not needed  and has been canceled. Addressed with patient during result notes

## 2025-07-13 ENCOUNTER — HOSPITAL ENCOUNTER (EMERGENCY)
Facility: HOSPITAL | Age: 75
Discharge: HOME | End: 2025-07-13
Attending: EMERGENCY MEDICINE | Admitting: EMERGENCY MEDICINE
Payer: MEDICARE

## 2025-07-13 ENCOUNTER — APPOINTMENT (OUTPATIENT)
Dept: GENERAL RADIOLOGY | Facility: HOSPITAL | Age: 75
End: 2025-07-13
Payer: MEDICARE

## 2025-07-13 VITALS
DIASTOLIC BLOOD PRESSURE: 63 MMHG | OXYGEN SATURATION: 93 % | RESPIRATION RATE: 18 BRPM | SYSTOLIC BLOOD PRESSURE: 117 MMHG | TEMPERATURE: 98.1 F | HEART RATE: 80 BPM

## 2025-07-13 DIAGNOSIS — T14.8XXA ABRASION: Primary | ICD-10-CM

## 2025-07-13 PROCEDURE — 73502 X-RAY EXAM HIP UNI 2-3 VIEWS: CPT

## 2025-07-13 PROCEDURE — 99283 EMERGENCY DEPT VISIT LOW MDM: CPT

## 2025-07-13 PROCEDURE — 71101 X-RAY EXAM UNILAT RIBS/CHEST: CPT

## 2025-07-13 RX ORDER — PREDNISONE 10 MG/1
30 TABLET ORAL DAILY
COMMUNITY
End: 2025-07-30

## 2025-07-13 RX ORDER — GINSENG 100 MG
1 CAPSULE ORAL ONCE
Status: COMPLETED | OUTPATIENT
Start: 2025-07-13 | End: 2025-07-13

## 2025-07-13 RX ORDER — GINSENG 100 MG
1 CAPSULE ORAL 2 TIMES DAILY
Qty: 13 G | Refills: 0 | Status: SHIPPED | OUTPATIENT
Start: 2025-07-13 | End: 2025-07-20

## 2025-07-13 RX ORDER — CEPHALEXIN 500 MG/1
500 CAPSULE ORAL 3 TIMES DAILY
Qty: 21 CAPSULE | Refills: 0 | Status: SHIPPED | OUTPATIENT
Start: 2025-07-13 | End: 2025-07-20

## 2025-07-13 RX ADMIN — BACITRACIN 0.9 G: 500 OINTMENT TOPICAL at 15:41

## 2025-07-13 NOTE — ED PROVIDER NOTES
Time: 2:40 PM EDT  Date of encounter:  7/13/2025  Independent Historian/Clinical History and Information was obtained by:   Patient    History is limited by: N/A    Chief Complaint: fall      History of Present Illness:  Patient is a 75 y.o. year old female who presents to the emergency department for evaluation of fall at home earlier today after letting her dog out of his kennel.  She states the dog accidentally knocked her down and got on top of her and scratched her.  She denies hitting her head or losing consciousness.  Patient states she has mild right rib and right hip pain following the fall.  Patient denies chest pain, abdominal pain, shortness of breath, headache, vision changes, neck pain, vomiting.      Patient Care Team  Primary Care Provider: Zohreh Mcduffie APRN    Past Medical History:     Allergies   Allergen Reactions    Shellfish Allergy Anaphylaxis    Moxifloxacin Hcl Unknown - Low Severity    Nitrous Oxide Other (See Comments)    Scopolamine Swelling    Sulfa Antibiotics Unknown - Low Severity    Morphine Rash    Penicillins Rash     Past Medical History:   Diagnosis Date    Allergic rhinitis due to allergen 09/10/2019    Anemia 09/10/2019    Anxiety disorder 06/09/2020    B12 deficiency 09/23/2020    Callus     Cataract     Cholelithiasis     Chronic kidney disease     COVID-19 06/23/2022    Depression     Diverticulitis of colon     Diverticulosis     Essential hypertension 01/03/2019    Family history of myocardial infarction 01/19/2023    Fatty liver     GERD (gastroesophageal reflux disease) 01/03/2019    Graves' disease     Hernia     HLD (hyperlipidemia) 01/03/2019    Hyperthyroidism     Hypothyroidism     Inflammatory bowel disease     Major depressive disorder 01/03/2019    Osteoarthritis 09/23/2020    Osteopenia     Peptic ulceration     T2DM (type 2 diabetes mellitus) 01/03/2019    Tuberculosis     Took meds will never need to be tested again    Vitamin D deficiency 01/03/2019      Past Surgical History:   Procedure Laterality Date    ABDOMINAL SURGERY      APPENDECTOMY       SECTION      CHOLECYSTECTOMY  2016    DR SCALES    COLON SURGERY      COLONOSCOPY      COLONOSCOPY N/A 2025    Procedure: COLONOSCOPY WITH HOT SNARE POLYPECTOMY;  Surgeon: Peter Osorio MD;  Location: [facility] ENDOSCOPY;  Service: Gastroenterology;  Laterality: N/A;  COLON POLYP, HEMORRHOIDS, MELANOSIS COLI    ENDOSCOPY N/A 2025    Procedure: ESOPHAGOGASTRODUODENOSCOPY WITH BIOPSIES;  Surgeon: Peter Osorio MD;  Location: [facility] ENDOSCOPY;  Service: Gastroenterology;  Laterality: N/A;  ESOPHAGITIS, DUODENITIS, SMALL HIATAL HERNIA    HIP FRACTURE SURGERY Left     REPAIR    HIP SURGERY      LIVER BIOPSY      OOPHORECTOMY      SMALL INTESTINE SURGERY      TUBAL ABDOMINAL LIGATION      UPPER GASTROINTESTINAL ENDOSCOPY       Family History   Problem Relation Age of Onset    Heart disease Mother     Heart failure Mother     Arthritis Mother     Hyperlipidemia Mother     Hypertension Mother     Stroke Mother     Thyroid disease Mother     Colon polyps Mother     Heart disease Brother     Heart disease Brother     Rectal cancer Maternal Grandmother     Kidney disease Daughter     Miscarriages / Stillbirths Daughter     Birth defects Daughter         Cleft palate    Lung cancer Other     Skin cancer Other     Colon cancer Neg Hx        Home Medications:  Prior to Admission medications    Medication Sig Start Date End Date Taking? Authorizing Provider   Calcium Citrate-Vitamin D 250-2.5 MG-MCG per tablet Take 1 tablet by mouth 2 (Two) Times a Day. 24  Peter Osorio MD   cetirizine (zyrTEC) 10 MG tablet TAKE ONE TABLET BY MOUTH EVERY DAY 25   Zohreh Mcduffie APRN   Cholecalciferol 25 MCG (1000 UT) tablet Take 1 tablet by mouth Daily.    Provider, MD Laverne   esomeprazole (nexIUM) 40 MG capsule TAKE ONE CAPSULE BY MOUTH EVERY DAY 25   Zohreh Mcduffie  LOYD   fluticasone (FLONASE) 50 MCG/ACT nasal spray 2 sprays by Each Nare route Daily. 25   Zohreh Mcduffie APRN   furosemide (LASIX) 20 MG tablet TAKE ONE TABLET BY MOUTH ONCE DAILY 25   Zohreh Mcduffie APRN   hydrocortisone (ANUSOL-HC) 25 MG suppository Insert 1 suppository into the rectum 2 (Two) Times a Day for 90 days. 25  Jewels Brooks APRN   metFORMIN ER (GLUCOPHAGE-XR) 500 MG 24 hr tablet Take 1 tablet by mouth 2 (Two) Times a Day. 25   Zohreh Mcduffie APRN   mupirocin (BACTROBAN) 2 % ointment apply to the affected area topically THREE TIMES DAILY 25   Laverne Seals MD   predniSONE (DELTASONE) 10 MG tablet Take 3 tablets by mouth Daily.  25  Laverne Seals MD   SITagliptin (Januvia) 100 MG tablet Take 1 tablet by mouth Daily. 25   Zohreh Mcduffie APRN   vitamin B-12 (CYANOCOBALAMIN) 1000 MCG tablet Take 1 tablet by mouth Daily.    ProviderLaverne MD   nitrofurantoin, macrocrystal-monohydrate, (Macrobid) 100 MG capsule Take 1 capsule by mouth 2 (Two) Times a Day. 25  Zohreh Mcduffie APRN   predniSONE (DELTASONE) 10 MG tablet Take 3 tablets by mouth Daily. 25  Peter Osorio MD        Social History:   Social History     Tobacco Use    Smoking status: Former     Current packs/day: 0.00     Average packs/day: 1 pack/day for 5.0 years (5.0 ttl pk-yrs)     Types: Cigarettes     Start date: 1980     Quit date: 1985     Years since quittin.5     Passive exposure: Past    Smokeless tobacco: Never   Vaping Use    Vaping status: Never Used   Substance Use Topics    Alcohol use: Never    Drug use: Never         Review of Systems:  Review of Systems   Constitutional:  Negative for chills and fever.   HENT:  Negative for congestion, rhinorrhea and sore throat.    Eyes:  Negative for pain and visual disturbance.   Respiratory:  Negative for apnea, cough, chest tightness  and shortness of breath.    Cardiovascular:  Negative for chest pain and palpitations.   Gastrointestinal:  Negative for abdominal pain, diarrhea, nausea and vomiting.   Genitourinary:  Negative for difficulty urinating and dysuria.   Musculoskeletal:  Positive for arthralgias. Negative for joint swelling and myalgias.   Skin:  Positive for wound. Negative for color change.   Neurological:  Negative for seizures and headaches.   Psychiatric/Behavioral: Negative.     All other systems reviewed and are negative.       Physical Exam:  /80 (BP Location: Right arm, Patient Position: Sitting)   Pulse 77   Temp 97.7 °F (36.5 °C) (Oral)   Resp 22   SpO2 97%     Physical Exam  Vitals and nursing note reviewed.   Constitutional:       General: She is not in acute distress.     Appearance: Normal appearance. She is not toxic-appearing.   HENT:      Head: Normocephalic and atraumatic.      Jaw: There is normal jaw occlusion.   Eyes:      General: Lids are normal.      Extraocular Movements: Extraocular movements intact.      Conjunctiva/sclera: Conjunctivae normal.      Pupils: Pupils are equal, round, and reactive to light.   Cardiovascular:      Rate and Rhythm: Normal rate and regular rhythm.      Pulses: Normal pulses.      Heart sounds: Normal heart sounds.   Pulmonary:      Effort: Pulmonary effort is normal. No respiratory distress.      Breath sounds: Normal breath sounds. No wheezing or rhonchi.   Abdominal:      General: Abdomen is flat.      Palpations: Abdomen is soft.      Tenderness: There is no abdominal tenderness. There is no guarding or rebound.   Musculoskeletal:         General: Tenderness (Mild right ribs and right hip) present. Normal range of motion.      Cervical back: Normal range of motion and neck supple.      Right lower leg: No edema.      Left lower leg: No edema.   Skin:     General: Skin is warm and dry.      Comments: Notable skin tear/abrasions throughout upper extremities, face,  chest.  Not actively bleeding.   Neurological:      Mental Status: She is alert and oriented to person, place, and time. Mental status is at baseline.   Psychiatric:         Mood and Affect: Mood normal.                    Medical Decision Making:      Comorbidities that affect care:    Thyroid disease, hypertension, diabetes    External Notes reviewed:          The following orders were placed and all results were independently analyzed by me:  Orders Placed This Encounter   Procedures    Laceration Repair    XR Ribs Right With PA Chest    XR Hip With or Without Pelvis 2 - 3 View Right       Medications Given in the Emergency Department:  Medications   bacitracin 500 UNIT/GM ointment 0.9 g (has no administration in time range)        ED Course:         Labs:    Lab Results (last 24 hours)       ** No results found for the last 24 hours. **             Imaging:    XR Hip With or Without Pelvis 2 - 3 View Right  Result Date: 7/13/2025  XR HIP W OR WO PELVIS 2-3 VIEW RIGHT Date of Exam: 7/13/2025 2:25 PM EDT Indication: fall Comparison: 8/23/2023 FINDINGS: Postoperative changes are noted involving the proximal left femur. These findings are stable. There is no displaced fracture or dislocation. The bilateral hips are intact. Changes of arthropathy are seen involving the bilateral hips. Age-related changes of the SI joints and pubic symphysis are also noted. The sacral alae are intact. The pubic rami are intact. No focal osseous abnormalities are seen. Phleboliths are noted in the pelvis.     1.No evidence for displaced fracture or dislocation. 2.Mild degenerative changes are noted. Electronically Signed: Amos Medel MD  7/13/2025 2:48 PM EDT  Workstation ID: [id number]    XR Ribs Right With PA Chest  Result Date: 7/13/2025  XR RIBS RIGHT W PA CHEST Date of Exam: 7/13/2025 2:25 PM EDT Indication: fall Comparison: None available. FINDINGS: There is no evidence for displaced rib fracture. No underlying atelectasis or  infiltrate is seen. There is no evidence for pleural effusion. There is no evidence for pneumothorax. The cardiac silhouette and mediastinum are unremarkable.     1.No evidence for displaced rib fracture. 2.No evidence for acute cardiopulmonary process. Electronically Signed: Amos Medel MD  7/13/2025 2:46 PM EDT  Workstation ID: [id number]        Differential Diagnosis and Discussion:    Extremity Pain: Differential diagnosis includes but is not limited to soft tissue sprain, tendonitis, tendon injury, dislocation, fracture, deep vein thrombosis, arterial insufficiency, osteoarthritis, bursitis, and ligamentous damage.  Wound Evaluation: Differential diagnosis includes but is not limited to laceration, abrasion, puncture, burn, ulcer, cellulitis, abscess, vasculitis, malignancy, and rash.    PROCEDURES:    X-ray were performed in the emergency department and all X-ray impressions were independently interpreted by me.    No orders to display       Laceration Repair    Date/Time: 7/13/2025 3:33 PM    Performed by: Mark Mahan PA-C  Authorized by: Neptali Arcos DO    Consent:     Consent obtained:  Verbal    Consent given by:  Patient    Risks, benefits, and alternatives were discussed: yes      Risks discussed:  Infection, need for additional repair, nerve damage, poor wound healing, poor cosmetic result, pain, retained foreign body, tendon damage and vascular damage    Alternatives discussed:  Delayed treatment and no treatment  Universal protocol:     Procedure explained and questions answered to patient or proxy's satisfaction: yes      Relevant documents present and verified: yes      Site/side marked: yes      Immediately prior to procedure, a time out was called: yes      Patient identity confirmed:  Verbally with patient and arm band  Anesthesia:     Anesthesia method:  None  Laceration details:     Location:  Face    Face location:  Nose    Length (cm):  2    Depth (mm):  1  Pre-procedure details:      Preparation:  Patient was prepped and draped in usual sterile fashion  Exploration:     Limited defect created (wound extended): no      Hemostasis achieved with:  Direct pressure    Contaminated: no    Treatment:     Area cleansed with:  Saline    Amount of cleaning:  Standard    Irrigation solution:  Sterile saline    Irrigation volume:  10cc    Irrigation method:  Syringe    Visualized foreign bodies/material removed: no      Debridement:  None  Skin repair:     Repair method:  Steri-Strips and tissue adhesive    Number of Steri-Strips:  2  Approximation:     Approximation:  Loose  Repair type:     Repair type:  Simple  Post-procedure details:     Dressing:  Non-adherent dressing    Procedure completion:  Tolerated well, no immediate complications      MDM       X-ray showed no acute osseous abnormality.  Will send patient home with bacitracin.  Will start patient on Keflex.  Instructed patient to return to ED if she develops any new or worsening symptoms.  Otherwise follow-up with PCP.  Patient states she understands and agrees with plan of care.  Patient resting comfortably this time.              Patient Care Considerations:          Consultants/Shared Management Plan:    None    Social Determinants of Health:    Patient is independent, reliable, and has access to care.       Disposition and Care Coordination:    Discharged: The patient is suitable and stable for discharge with no need for consideration of admission.    I have explained the patient´s condition, diagnoses and treatment plan based on the information available to me at this time. I have answered questions and addressed any concerns. The patient has a good  understanding of the patient´s diagnosis, condition, and treatment plan as can be expected at this point. The vital signs have been stable. The patient´s condition is stable and appropriate for discharge from the emergency department.      The patient will pursue further outpatient  evaluation with the primary care physician or other designated or consulting physician as outlined in the discharge instructions. They are agreeable to this plan of care and follow-up instructions have been explained in detail. The patient has received these instructions in written format and has expressed an understanding of the discharge instructions. The patient is aware that any significant change in condition or worsening of symptoms should prompt an immediate return to this or the closest emergency department or call to 911.  I have explained discharge medications and the need for follow up with the patient/caretakers. This was also printed in the discharge instructions. Patient was discharged with the following medications and follow up:      Medication List        New Prescriptions      bacitracin 500 UNIT/GM ointment  Apply 1 Application topically to the appropriate area as directed 2 (Two) Times a Day for 7 days.     cephalexin 500 MG capsule  Commonly known as: KEFLEX  Take 1 capsule by mouth 3 (Three) Times a Day for 7 days.               Where to Get Your Medications        These medications were sent to [facility]/pharmacy #[id number] - [place], [place] - [address] - [phone] [facility] [phone]   [address] [place] [address] [place] [address]      Hours: 24-hours Phone: [phone]   bacitracin 500 UNIT/GM ointment  cephalexin 500 MG capsule      Zohreh Mcduffie, [place]YD  [address] [place] [address] [phone]    Call in 1 day         Final diagnoses:   Abrasion        ED Disposition       ED Disposition   Discharge    Condition   Stable    Comment   --               This medical record created using voice recognition software.             Mark Mahan PA-C  07/13/25 3200

## 2025-07-13 NOTE — ED PROVIDER NOTES
SHARED VISIT ATTESTATION:    This visit was performed by myself and an APC.  I personally approved the management plan/medical decision making and take responsibility for the patient management.      SHARED VISIT NOTE:    Patient is 75 y.o. year old female that presents to the ED for evaluation of fall after injury.  The patient injured her right hip and right chest        ED Course:    /63   Pulse 80   Temp 98.1 °F (36.7 °C) (Oral)   Resp 18   SpO2 93%       The following orders were placed and all results were independently analyzed by me:  Orders Placed This Encounter   Procedures    Laceration Repair    XR Ribs Right With PA Chest    XR Hip With or Without Pelvis 2 - 3 View Right       Medications Given in the Emergency Department:  Medications   bacitracin 500 UNIT/GM ointment 0.9 g (0.9 g Topical Given 7/13/25 1541)        ED Course:         Labs:    Lab Results (last 24 hours)       ** No results found for the last 24 hours. **             Imaging:    XR Hip With or Without Pelvis 2 - 3 View Right  Result Date: 7/13/2025  XR HIP W OR WO PELVIS 2-3 VIEW RIGHT Date of Exam: 7/13/2025 2:25 PM EDT Indication: fall Comparison: 8/23/2023 FINDINGS: Postoperative changes are noted involving the proximal left femur. These findings are stable. There is no displaced fracture or dislocation. The bilateral hips are intact. Changes of arthropathy are seen involving the bilateral hips. Age-related changes of the SI joints and pubic symphysis are also noted. The sacral alae are intact. The pubic rami are intact. No focal osseous abnormalities are seen. Phleboliths are noted in the pelvis.     1.No evidence for displaced fracture or dislocation. 2.Mild degenerative changes are noted. Electronically Signed: Amos Medel MD  7/13/2025 2:48 PM EDT  Workstation ID: [id number]    XR Ribs Right With PA Chest  Result Date: 7/13/2025  XR RIBS RIGHT W PA CHEST Date of Exam: 7/13/2025 2:25 PM EDT Indication: fall Comparison:  None available. FINDINGS: There is no evidence for displaced rib fracture. No underlying atelectasis or infiltrate is seen. There is no evidence for pleural effusion. There is no evidence for pneumothorax. The cardiac silhouette and mediastinum are unremarkable.     1.No evidence for displaced rib fracture. 2.No evidence for acute cardiopulmonary process. Electronically Signed: Amos Medel MD  7/13/2025 2:46 PM EDT  Workstation ID: [id number]      MDM:    Procedures    X-ray were performed in the emergency department and all X-ray impressions were independently interpreted by me.                     Neptali Arcos DO  17:48 EDT  07/13/25         Neptali Arcos DO  07/13/25 1748

## 2025-07-14 ENCOUNTER — PATIENT MESSAGE (OUTPATIENT)
Dept: FAMILY MEDICINE CLINIC | Facility: CLINIC | Age: 75
End: 2025-07-14
Payer: MEDICARE

## 2025-07-14 RX ORDER — MUPIROCIN CALCIUM 20 MG/G
1 CREAM TOPICAL 3 TIMES DAILY
Qty: 30 G | Refills: 0 | Status: SHIPPED | OUTPATIENT
Start: 2025-07-14

## 2025-07-16 ENCOUNTER — HOSPITAL ENCOUNTER (OUTPATIENT)
Dept: GENERAL RADIOLOGY | Facility: HOSPITAL | Age: 75
Discharge: HOME | End: 2025-07-16
Admitting: NURSE PRACTITIONER
Payer: MEDICARE

## 2025-07-16 ENCOUNTER — OFFICE VISIT (OUTPATIENT)
Dept: FAMILY MEDICINE CLINIC | Facility: CLINIC | Age: 75
End: 2025-07-16
Payer: MEDICARE

## 2025-07-16 VITALS
WEIGHT: 122 LBS | BODY MASS INDEX: 23.95 KG/M2 | OXYGEN SATURATION: 98 % | DIASTOLIC BLOOD PRESSURE: 66 MMHG | HEIGHT: 60 IN | HEART RATE: 78 BPM | SYSTOLIC BLOOD PRESSURE: 137 MMHG | TEMPERATURE: 97.8 F

## 2025-07-16 DIAGNOSIS — Z23 NEED FOR TDAP VACCINATION: Primary | ICD-10-CM

## 2025-07-16 DIAGNOSIS — T14.8XXA ABRASION: ICD-10-CM

## 2025-07-16 DIAGNOSIS — R07.81 RIB PAIN ON RIGHT SIDE: ICD-10-CM

## 2025-07-16 DIAGNOSIS — M79.89 LEG SWELLING: ICD-10-CM

## 2025-07-16 DIAGNOSIS — M19.90 ARTHRITIS: ICD-10-CM

## 2025-07-16 LAB
ALBUMIN SERPL-MCNC: 2.9 G/DL (ref 3.5–5.2)
ALBUMIN/GLOB SERPL: 0.8 G/DL
ALP SERPL-CCNC: 95 U/L (ref 39–117)
ALT SERPL W P-5'-P-CCNC: 89 U/L (ref 1–33)
ANION GAP SERPL CALCULATED.3IONS-SCNC: 7.8 MMOL/L (ref 5–15)
AST SERPL-CCNC: 72 U/L (ref 1–32)
BILIRUB SERPL-MCNC: 1.3 MG/DL (ref 0–1.2)
BUN SERPL-MCNC: 7 MG/DL (ref 8–23)
BUN/CREAT SERPL: 8 (ref 7–25)
CALCIUM SPEC-SCNC: 9.3 MG/DL (ref 8.6–10.5)
CHLORIDE SERPL-SCNC: 104 MMOL/L (ref 98–107)
CO2 SERPL-SCNC: 27.2 MMOL/L (ref 22–29)
CREAT SERPL-MCNC: 0.88 MG/DL (ref 0.57–1)
EGFRCR SERPLBLD CKD-EPI 2021: 69.5 ML/MIN/1.73
GLOBULIN UR ELPH-MCNC: 3.8 GM/DL
GLUCOSE SERPL-MCNC: 202 MG/DL (ref 65–99)
POTASSIUM SERPL-SCNC: 4.2 MMOL/L (ref 3.5–5.2)
PROT SERPL-MCNC: 6.7 G/DL (ref 6–8.5)
SODIUM SERPL-SCNC: 139 MMOL/L (ref 136–145)

## 2025-07-16 PROCEDURE — 71101 X-RAY EXAM UNILAT RIBS/CHEST: CPT

## 2025-07-16 PROCEDURE — 80053 COMPREHEN METABOLIC PANEL: CPT | Performed by: NURSE PRACTITIONER

## 2025-07-16 NOTE — PROGRESS NOTES
Chief Complaint  ER follow  up  and Arthritis    Subjective          Qiana Altman presents to [facility]  History of Present Illness    History of Present Illness  The patient presents for evaluation of injuries sustained from a fall, leg swelling, and arthritis.    The fall occurred when her 8-month-old pit bull puppy jumped on her, causing her to lose balance and fall. The dog did not bite her but caused scratches on her face and ribs. She was unable to rise from the ground as the dog continued to scratch her. At the hospital, an x-ray was performed, but she did not receive a tetanus shot. She is currently applying a cream to the affected areas and wrapping them with gauze. She is also taking prednisone 30 mg. She reports pain in her elbow and wrist.    She has noticed swelling in her legs, which she attributes to not taking her diuretic medication yesterday or today. She suspects that her sodium levels may be elevated again. She observed swelling in her ankles last night.    She is using a gel for her arthritis and has requested a prescription for it.      Patient or patient representative verbalized consent for the use of Ambient Listening during the visit with  LOYD Nolen for chart documentation. 2025  15:07 EDT          BMI is within normal parameters. No other follow-up for BMI required.         Allergies  Shellfish allergy, Moxifloxacin hcl, Nitrous oxide, Scopolamine, Sulfa antibiotics, Morphine, and Penicillins    Social History     Tobacco Use    Smoking status: Former     Current packs/day: 0.00     Average packs/day: 1 pack/day for 5.0 years (5.0 ttl pk-yrs)     Types: Cigarettes     Start date: 1980     Quit date: 1985     Years since quittin.5     Passive exposure: Past    Smokeless tobacco: Never   Vaping Use    Vaping status: Never Used   Substance Use Topics    Alcohol use: Never    Drug use: Never       Family History   Problem  "Relation Age of Onset    Heart disease Mother     Heart failure Mother     Arthritis Mother     Hyperlipidemia Mother     Hypertension Mother     Stroke Mother     Thyroid disease Mother     Colon polyps Mother     Heart disease Brother     Heart disease Brother     Rectal cancer Maternal Grandmother     Kidney disease Daughter     Miscarriages / Stillbirths Daughter     Birth defects Daughter         Cleft palate    Lung cancer Other     Skin cancer Other     Colon cancer Neg Hx         Health Maintenance Due   Topic Date Due    DIABETIC FOOT EXAM  Never done    COVID-19 Vaccine (3 - 2024-25 season) 09/01/2024        Immunization History   Administered Date(s) Administered    COVID-19 (MODERNA) 1st,2nd,3rd Dose Monovalent 03/26/2021, 04/23/2021    Flu Vaccine Quad PF >36MO 09/09/2014, 12/29/2015    Fluzone (or Fluarix & Flulaval for VFC) >6mos 09/09/2014, 12/29/2015    Fluzone High-Dose 65+YRS 11/11/2024    Fluzone High-Dose 65+yrs 10/04/2021, 09/29/2022, 10/31/2023    Hepatitis A 05/21/2025    Hepatitis B 2 Dose Vaccine Heplisav-B 05/21/2025, 06/24/2025    Influenza, Unspecified 09/23/2020    Pneumococcal Conjugate 13-Valent (PCV13) 11/14/2017    Pneumococcal Conjugate 20-Valent (PCV20) 01/24/2025    Pneumococcal Polysaccharide (PPSV23) 09/23/2020    Shingrix 02/27/2025, 06/16/2025    Tdap 06/22/2016    mRESVIA (RSV 60+) 01/24/2025       Review of Systems   Constitutional:  Negative for fatigue.   Skin:  Positive for wound and bruise.        Objective       Vitals:    07/16/25 1255   BP: 137/66   BP Location: Right arm   Patient Position: Sitting   Cuff Size: Adult   Pulse: 78   Temp: 97.8 °F (36.6 °C)   SpO2: 98%   Weight: 55.3 kg (122 lb)   Height: 152.4 cm (60\")       Body mass index is 23.83 kg/m².         Physical Exam  Constitutional:       Appearance: Normal appearance.   HENT:      Head: Normocephalic.   Pulmonary:      Effort: Pulmonary effort is normal.   Skin:     Findings: No bruising.   Neurological: "      General: No focal deficit present.      Mental Status: She is alert and oriented to person, place, and time.   Psychiatric:         Mood and Affect: Mood normal.         Behavior: Behavior normal.         Thought Content: Thought content normal.         Judgment: Judgment normal.                     Physical Exam  Nose: Skin tear noted on the nose.  Extremities: Lower extremity edema noted.  Skin: Multiple skin tears noted on the face and wrist.              Result Review :     The following data was reviewed by: LOYD Nolen on 07/16/2025:            XR Hip With or Without Pelvis 2 - 3 View Right  Result Date: 7/13/2025  1.No evidence for displaced fracture or dislocation. 2.Mild degenerative changes are noted. Electronically Signed: Amos Medel MD  7/13/2025 2:48 PM EDT  Workstation ID: [id number]    XR Ribs Right With PA Chest  Result Date: 7/13/2025  1.No evidence for displaced rib fracture. 2.No evidence for acute cardiopulmonary process. Electronically Signed: Amos Medel MD  7/13/2025 2:46 PM EDT  Workstation ID: [id number]    MRI Abdomen With & Without Contrast  Result Date: 5/15/2025  Impression: 1. Prior right hemicolectomy with extensive colitis involving the proximal transverse colon extending to the level of the ileocolonic anastomosis which appears worsened from CT on 5/9/2025. Differential considerations would include infectious/inflammatory etiology, ischemic colitis not excluded, correlate with lactate level and patient's symptoms. 2. Two small liver lesions seen on prior CT are stable from remote comparison exams in 2023 compatible with benign etiology favor small hemangiomas, although small size partially limits complete assessment. 3. Mild splenomegaly. No suspicious splenic lesion. 4. Small ascites without drainable collection. Message regarding these findings sent to Dr. Osorio via [employer] secure chat at time of dictation. Electronically Signed: Endy Lyles MD  5/15/2025 5:57  PM EDT  Workstation ID: [id number]    CT Abdomen Pelvis With Contrast  Result Date: 5/9/2025  Impression: 1. Abnormal bowel wall thickening at the level of surgical anastomosis in the right upper quadrant/mid abdomen. Additional bowel wall thickening involves the hepatic flexure with some fat stranding and this could all represent an inflammatory process such as colitis however disease recurrence cannot be entirely excluded and is of concern. 2. Indeterminate hepatic and splenic lesions for which early metastatic deposits cannot be excluded. Electronically Signed: Sherrie Bhagat MD  5/9/2025 10:44 AM EDT  Workstation ID: [id number]           Results                      Assessment and Plan      Assessment & Plan  Need for Tdap vaccination    Orders:    Tdap (BOOSTRIX) 5-2.5-18.5 LF-MCG/0.5 injection; Inject 0.5 mL into the appropriate muscle as directed by prescriber 1 (One) Time for 1 dose.    Abrasion    Orders:    Tdap (BOOSTRIX) 5-2.5-18.5 LF-MCG/0.5 injection; Inject 0.5 mL into the appropriate muscle as directed by prescriber 1 (One) Time for 1 dose.    Comprehensive metabolic panel    Leg swelling    Orders:    Comprehensive metabolic panel    Arthritis    Orders:    Diclofenac Sodium (Voltaren) 1 % gel gel; Apply 4 g topically to the appropriate area as directed 4 (Four) Times a Day As Needed (hand pain).    Rib pain on right side    Orders:    XR Ribs Right With PA Chest; Future       Diagnosis Plan   1. Need for Tdap vaccination  Tdap (BOOSTRIX) 5-2.5-18.5 LF-MCG/0.5 injection      2. Abrasion  Tdap (BOOSTRIX) 5-2.5-18.5 LF-MCG/0.5 injection    Comprehensive metabolic panel      3. Leg swelling  Comprehensive metabolic panel      4. Arthritis  Diclofenac Sodium (Voltaren) 1 % gel gel      5. Rib pain on right side  XR Ribs Right With PA Chest            Assessment & Plan  1. abrasions  - Experienced a fall caused by her dog, resulting in multiple skin tears and abrasions.  - Treated at the hospital with  Steri-Strips and antibiotics.  - Advised to continue using the antibiotic cream on her nose and face.  - A Tdap vaccine will be administered at [facility]. An x-ray of the right side of her ribs will be ordered to rule out any potential fractures.    2. Leg swelling.  - Reports leg swelling, which may be related to not taking her water pill for the past two days.  - Physical exam noted leg swelling.  - Blood tests will be conducted today to check her sodium and potassium levels.  - Will resume taking the water pill as prescribed.    3. Arthritis.  - Reports wrist pain and arthritis symptoms.  - Physical exam noted pain in the wrist area.  - Discussed the need for an arthritis cream.  - A prescription for an arthritis cream will be provided, to be applied four times daily on two areas at a time. The cream will be sent to the pharmacy.    I did wrap the arms both up with her medication and Vaseline guaze and kerlix and pt tolerated well.        Follow Up     No follow-ups on file.    Patient was given instructions and counseling regarding her condition or for health maintenance advice. Please see specific information pulled into the AVS if appropriate.     Parts of this note are electronic transcriptions/translations of spoken language to printed text using the Dragon Dictation system.          Zohreh Mcduffie, APRN  07/16/2025

## 2025-07-20 ENCOUNTER — RESULTS FOLLOW-UP (OUTPATIENT)
Dept: FAMILY MEDICINE CLINIC | Facility: CLINIC | Age: 75
End: 2025-07-20
Payer: MEDICARE

## 2025-08-20 ENCOUNTER — TELEPHONE (OUTPATIENT)
Dept: GASTROENTEROLOGY | Facility: CLINIC | Age: 75
End: 2025-08-20
Payer: MEDICARE

## 2025-08-20 DIAGNOSIS — R79.89 ABNORMAL LFTS: Primary | ICD-10-CM

## 2025-08-21 ENCOUNTER — LAB (OUTPATIENT)
Facility: HOSPITAL | Age: 75
End: 2025-08-21
Payer: MEDICARE

## 2025-08-22 ENCOUNTER — HOSPITAL ENCOUNTER (INPATIENT)
Facility: HOSPITAL | Age: 75
LOS: 3 days | Discharge: HOME | End: 2025-08-25
Attending: EMERGENCY MEDICINE | Admitting: FAMILY MEDICINE
Payer: MEDICARE

## 2025-08-22 ENCOUNTER — APPOINTMENT (OUTPATIENT)
Dept: MRI IMAGING | Facility: HOSPITAL | Age: 75
End: 2025-08-22
Payer: MEDICARE

## 2025-08-22 ENCOUNTER — OFFICE VISIT (OUTPATIENT)
Dept: GASTROENTEROLOGY | Facility: CLINIC | Age: 75
End: 2025-08-22
Payer: MEDICARE

## 2025-08-22 VITALS
DIASTOLIC BLOOD PRESSURE: 66 MMHG | WEIGHT: 116 LBS | SYSTOLIC BLOOD PRESSURE: 119 MMHG | HEIGHT: 60 IN | HEART RATE: 97 BPM | BODY MASS INDEX: 22.78 KG/M2

## 2025-08-22 DIAGNOSIS — R71.0 DROP IN HEMOGLOBIN: ICD-10-CM

## 2025-08-22 DIAGNOSIS — R63.4 WEIGHT LOSS, ABNORMAL: ICD-10-CM

## 2025-08-22 DIAGNOSIS — R17 JAUNDICE: Primary | ICD-10-CM

## 2025-08-22 DIAGNOSIS — R79.89 ABNORMAL LFTS (LIVER FUNCTION TESTS): ICD-10-CM

## 2025-08-22 DIAGNOSIS — K75.9 JAUNDICE DUE TO HEPATITIS: ICD-10-CM

## 2025-08-22 DIAGNOSIS — Z86.2 HISTORY OF ANEMIA DUE TO VITAMIN B12 DEFICIENCY: ICD-10-CM

## 2025-08-22 DIAGNOSIS — K75.4 AUTOIMMUNE HEPATITIS: ICD-10-CM

## 2025-08-22 DIAGNOSIS — E11.65 TYPE 2 DIABETES MELLITUS WITH HYPERGLYCEMIA, WITHOUT LONG-TERM CURRENT USE OF INSULIN: ICD-10-CM

## 2025-08-22 DIAGNOSIS — K75.4 AUTOIMMUNE HEPATITIS TREATED WITH STEROIDS: Primary | ICD-10-CM

## 2025-08-22 DIAGNOSIS — R17 JAUNDICE: ICD-10-CM

## 2025-08-22 LAB
ALBUMIN SERPL-MCNC: 2.3 G/DL (ref 3.5–5.2)
ALBUMIN/GLOB SERPL: 0.4 G/DL
ALP SERPL-CCNC: 147 U/L (ref 39–117)
ALT SERPL W P-5'-P-CCNC: 374 U/L (ref 1–33)
ANION GAP SERPL CALCULATED.3IONS-SCNC: 8.5 MMOL/L (ref 5–15)
AST SERPL-CCNC: 542 U/L (ref 1–32)
BASOPHILS # BLD AUTO: 0.01 10*3/MM3 (ref 0–0.2)
BASOPHILS NFR BLD AUTO: 0.2 % (ref 0–1.5)
BILIRUB SERPL-MCNC: 7.3 MG/DL (ref 0–1.2)
BUN SERPL-MCNC: 13.8 MG/DL (ref 8–23)
BUN/CREAT SERPL: 13.1 (ref 7–25)
CALCIUM SPEC-SCNC: 8.3 MG/DL (ref 8.6–10.5)
CHLORIDE SERPL-SCNC: 99 MMOL/L (ref 98–107)
CO2 SERPL-SCNC: 18.5 MMOL/L (ref 22–29)
CREAT SERPL-MCNC: 1.05 MG/DL (ref 0.57–1)
DEPRECATED RDW RBC AUTO: 49.7 FL (ref 37–54)
EGFRCR SERPLBLD CKD-EPI 2021: 56.2 ML/MIN/1.73
EOSINOPHIL # BLD AUTO: 0.03 10*3/MM3 (ref 0–0.4)
EOSINOPHIL NFR BLD AUTO: 0.7 % (ref 0.3–6.2)
ERYTHROCYTE [DISTWIDTH] IN BLOOD BY AUTOMATED COUNT: 14.1 % (ref 12.3–15.4)
FERRITIN SERPL-MCNC: 507.7 NG/ML (ref 13–150)
GLOBULIN UR ELPH-MCNC: 6 GM/DL
GLUCOSE BLDC GLUCOMTR-MCNC: 100 MG/DL (ref 70–99)
GLUCOSE SERPL-MCNC: 264 MG/DL (ref 65–99)
HCT VFR BLD AUTO: 33.9 % (ref 34–46.6)
HGB BLD-MCNC: 11 G/DL (ref 12–15.9)
HOLD SPECIMEN: NORMAL
HOLD SPECIMEN: NORMAL
IMM GRANULOCYTES # BLD AUTO: 0.02 10*3/MM3 (ref 0–0.05)
IMM GRANULOCYTES NFR BLD AUTO: 0.5 % (ref 0–0.5)
INR PPP: 2.01 (ref 0.86–1.15)
IRON 24H UR-MRATE: 137 MCG/DL (ref 37–145)
IRON SATN MFR SERPL: 85 % (ref 20–50)
LYMPHOCYTES # BLD AUTO: 0.82 10*3/MM3 (ref 0.7–3.1)
LYMPHOCYTES NFR BLD AUTO: 18.8 % (ref 19.6–45.3)
MCH RBC QN AUTO: 30.9 PG (ref 26.6–33)
MCHC RBC AUTO-ENTMCNC: 32.4 G/DL (ref 31.5–35.7)
MCV RBC AUTO: 95.2 FL (ref 79–97)
MONOCYTES # BLD AUTO: 0.36 10*3/MM3 (ref 0.1–0.9)
MONOCYTES NFR BLD AUTO: 8.3 % (ref 5–12)
NEUTROPHILS NFR BLD AUTO: 3.12 10*3/MM3 (ref 1.7–7)
NEUTROPHILS NFR BLD AUTO: 71.5 % (ref 42.7–76)
NRBC BLD AUTO-RTO: 0 /100 WBC (ref 0–0.2)
PLATELET # BLD AUTO: 171 10*3/MM3 (ref 140–450)
PMV BLD AUTO: 9.1 FL (ref 6–12)
POTASSIUM SERPL-SCNC: 4.5 MMOL/L (ref 3.5–5.2)
PROT SERPL-MCNC: 8.3 G/DL (ref 6–8.5)
PROTHROMBIN TIME: 23.8 SECONDS (ref 11.8–14.9)
RBC # BLD AUTO: 3.56 10*6/MM3 (ref 3.77–5.28)
SODIUM SERPL-SCNC: 126 MMOL/L (ref 136–145)
TIBC SERPL-MCNC: 161 MCG/DL (ref 298–536)
TRANSFERRIN SERPL-MCNC: 108 MG/DL (ref 200–360)
TSH SERPL DL<=0.05 MIU/L-ACNC: 1.54 UIU/ML (ref 0.27–4.2)
WBC NRBC COR # BLD AUTO: 4.36 10*3/MM3 (ref 3.4–10.8)
WHOLE BLOOD HOLD COAG: NORMAL
WHOLE BLOOD HOLD SPECIMEN: NORMAL

## 2025-08-22 PROCEDURE — 83540 ASSAY OF IRON: CPT | Performed by: INTERNAL MEDICINE

## 2025-08-22 PROCEDURE — 82728 ASSAY OF FERRITIN: CPT | Performed by: INTERNAL MEDICINE

## 2025-08-22 PROCEDURE — 80053 COMPREHEN METABOLIC PANEL: CPT

## 2025-08-22 PROCEDURE — 99285 EMERGENCY DEPT VISIT HI MDM: CPT

## 2025-08-22 PROCEDURE — 82948 REAGENT STRIP/BLOOD GLUCOSE: CPT | Performed by: FAMILY MEDICINE

## 2025-08-22 PROCEDURE — 84466 ASSAY OF TRANSFERRIN: CPT | Performed by: INTERNAL MEDICINE

## 2025-08-22 PROCEDURE — 82607 VITAMIN B-12: CPT | Performed by: INTERNAL MEDICINE

## 2025-08-22 PROCEDURE — 74181 MRI ABDOMEN W/O CONTRAST: CPT

## 2025-08-22 PROCEDURE — 85025 COMPLETE CBC W/AUTO DIFF WBC: CPT

## 2025-08-22 PROCEDURE — 25010000002 HYDROCORTISONE SOD SUC (PF) 250 MG RECONSTITUTED SOLUTION: Performed by: EMERGENCY MEDICINE

## 2025-08-22 PROCEDURE — 84443 ASSAY THYROID STIM HORMONE: CPT | Performed by: INTERNAL MEDICINE

## 2025-08-22 PROCEDURE — 36415 COLL VENOUS BLD VENIPUNCTURE: CPT

## 2025-08-22 PROCEDURE — 82746 ASSAY OF FOLIC ACID SERUM: CPT | Performed by: INTERNAL MEDICINE

## 2025-08-22 PROCEDURE — 85610 PROTHROMBIN TIME: CPT

## 2025-08-22 RX ORDER — NICOTINE POLACRILEX 4 MG
15 LOZENGE BUCCAL
Status: DISCONTINUED | OUTPATIENT
Start: 2025-08-22 | End: 2025-08-25 | Stop reason: HOSPADM

## 2025-08-22 RX ORDER — HYDROCORTISONE SODIUM SUCCINATE 100 MG/2ML
100 INJECTION INTRAMUSCULAR; INTRAVENOUS EVERY 8 HOURS
Status: DISCONTINUED | OUTPATIENT
Start: 2025-08-23 | End: 2025-08-25 | Stop reason: HOSPADM

## 2025-08-22 RX ORDER — INSULIN LISPRO 100 [IU]/ML
2-9 INJECTION, SOLUTION INTRAVENOUS; SUBCUTANEOUS
Status: DISCONTINUED | OUTPATIENT
Start: 2025-08-22 | End: 2025-08-25 | Stop reason: HOSPADM

## 2025-08-22 RX ORDER — PREDNISONE 10 MG/1
TABLET ORAL
Qty: 360 TABLET | Refills: 0 | Status: SHIPPED | OUTPATIENT
Start: 2025-08-25 | End: 2025-08-25 | Stop reason: HOSPADM

## 2025-08-22 RX ORDER — SODIUM CHLORIDE 9 MG/ML
75 INJECTION, SOLUTION INTRAVENOUS CONTINUOUS
Status: ACTIVE | OUTPATIENT
Start: 2025-08-22 | End: 2025-08-23

## 2025-08-22 RX ORDER — SODIUM CHLORIDE 9 MG/ML
40 INJECTION, SOLUTION INTRAVENOUS AS NEEDED
Status: DISCONTINUED | OUTPATIENT
Start: 2025-08-22 | End: 2025-08-25 | Stop reason: HOSPADM

## 2025-08-22 RX ORDER — AMOXICILLIN 250 MG
2 CAPSULE ORAL 2 TIMES DAILY PRN
Status: DISCONTINUED | OUTPATIENT
Start: 2025-08-22 | End: 2025-08-25 | Stop reason: HOSPADM

## 2025-08-22 RX ORDER — BISACODYL 5 MG/1
5 TABLET, DELAYED RELEASE ORAL DAILY PRN
Status: DISCONTINUED | OUTPATIENT
Start: 2025-08-22 | End: 2025-08-25 | Stop reason: HOSPADM

## 2025-08-22 RX ORDER — IBUPROFEN 600 MG/1
1 TABLET ORAL
Status: DISCONTINUED | OUTPATIENT
Start: 2025-08-22 | End: 2025-08-25 | Stop reason: HOSPADM

## 2025-08-22 RX ORDER — BISACODYL 10 MG
10 SUPPOSITORY, RECTAL RECTAL DAILY PRN
Status: DISCONTINUED | OUTPATIENT
Start: 2025-08-22 | End: 2025-08-25 | Stop reason: HOSPADM

## 2025-08-22 RX ORDER — SODIUM CHLORIDE 0.9 % (FLUSH) 0.9 %
10 SYRINGE (ML) INJECTION AS NEEDED
Status: DISCONTINUED | OUTPATIENT
Start: 2025-08-22 | End: 2025-08-25 | Stop reason: HOSPADM

## 2025-08-22 RX ORDER — ONDANSETRON 2 MG/ML
4 INJECTION INTRAMUSCULAR; INTRAVENOUS EVERY 6 HOURS PRN
Status: DISCONTINUED | OUTPATIENT
Start: 2025-08-22 | End: 2025-08-25 | Stop reason: HOSPADM

## 2025-08-22 RX ORDER — SODIUM CHLORIDE 0.9 % (FLUSH) 0.9 %
10 SYRINGE (ML) INJECTION EVERY 12 HOURS SCHEDULED
Status: DISCONTINUED | OUTPATIENT
Start: 2025-08-22 | End: 2025-08-25 | Stop reason: HOSPADM

## 2025-08-22 RX ORDER — DEXTROSE MONOHYDRATE 25 G/50ML
25 INJECTION, SOLUTION INTRAVENOUS
Status: DISCONTINUED | OUTPATIENT
Start: 2025-08-22 | End: 2025-08-25 | Stop reason: HOSPADM

## 2025-08-22 RX ORDER — POLYETHYLENE GLYCOL 3350 17 G/17G
17 POWDER, FOR SOLUTION ORAL DAILY PRN
Status: DISCONTINUED | OUTPATIENT
Start: 2025-08-22 | End: 2025-08-25 | Stop reason: HOSPADM

## 2025-08-22 RX ADMIN — HYDROCORTISONE SODIUM SUCCINATE 100 MG: 250 INJECTION, POWDER, FOR SOLUTION INTRAMUSCULAR; INTRAVENOUS at 21:31

## 2025-08-23 LAB
ALBUMIN SERPL-MCNC: 2.3 G/DL (ref 3.5–5.2)
ALBUMIN SERPL-MCNC: 2.4 G/DL (ref 3.5–5.2)
ALBUMIN/GLOB SERPL: 0.4 G/DL
ALBUMIN/GLOB SERPL: 0.4 G/DL
ALP SERPL-CCNC: 111 U/L (ref 39–117)
ALP SERPL-CCNC: 111 U/L (ref 39–117)
ALT SERPL W P-5'-P-CCNC: 321 U/L (ref 1–33)
ALT SERPL W P-5'-P-CCNC: 324 U/L (ref 1–33)
ANION GAP SERPL CALCULATED.3IONS-SCNC: 9.3 MMOL/L (ref 5–15)
ANION GAP SERPL CALCULATED.3IONS-SCNC: 9.9 MMOL/L (ref 5–15)
AST SERPL-CCNC: 408 U/L (ref 1–32)
AST SERPL-CCNC: 446 U/L (ref 1–32)
BILIRUB SERPL-MCNC: 6.9 MG/DL (ref 0–1.2)
BILIRUB SERPL-MCNC: 6.9 MG/DL (ref 0–1.2)
BUN SERPL-MCNC: 14.3 MG/DL (ref 8–23)
BUN SERPL-MCNC: 14.5 MG/DL (ref 8–23)
BUN/CREAT SERPL: 14.8 (ref 7–25)
BUN/CREAT SERPL: 15.7 (ref 7–25)
CALCIUM SPEC-SCNC: 8.6 MG/DL (ref 8.6–10.5)
CALCIUM SPEC-SCNC: 8.8 MG/DL (ref 8.6–10.5)
CHLORIDE SERPL-SCNC: 102 MMOL/L (ref 98–107)
CHLORIDE SERPL-SCNC: 103 MMOL/L (ref 98–107)
CO2 SERPL-SCNC: 18.1 MMOL/L (ref 22–29)
CO2 SERPL-SCNC: 18.7 MMOL/L (ref 22–29)
CREAT SERPL-MCNC: 0.91 MG/DL (ref 0.57–1)
CREAT SERPL-MCNC: 0.98 MG/DL (ref 0.57–1)
EGFRCR SERPLBLD CKD-EPI 2021: 61.1 ML/MIN/1.73
EGFRCR SERPLBLD CKD-EPI 2021: 66.8 ML/MIN/1.73
FOLATE SERPL-MCNC: 18.1 NG/ML (ref 4.78–24.2)
GLOBULIN UR ELPH-MCNC: 5.7 GM/DL
GLOBULIN UR ELPH-MCNC: 5.7 GM/DL
GLUCOSE BLDC GLUCOMTR-MCNC: 247 MG/DL (ref 70–99)
GLUCOSE BLDC GLUCOMTR-MCNC: 247 MG/DL (ref 70–99)
GLUCOSE BLDC GLUCOMTR-MCNC: 252 MG/DL (ref 70–99)
GLUCOSE BLDC GLUCOMTR-MCNC: 261 MG/DL (ref 70–99)
GLUCOSE SERPL-MCNC: 291 MG/DL (ref 65–99)
GLUCOSE SERPL-MCNC: 317 MG/DL (ref 65–99)
HOLD SPECIMEN: NORMAL
IGA1 MFR SER: 238 MG/DL (ref 70–400)
IGG1 SER-MCNC: 4681 MG/DL (ref 700–1600)
IGM SERPL-MCNC: 119 MG/DL (ref 40–230)
INR PPP: 2.25 (ref 0.86–1.15)
POTASSIUM SERPL-SCNC: 4.6 MMOL/L (ref 3.5–5.2)
POTASSIUM SERPL-SCNC: 5.4 MMOL/L (ref 3.5–5.2)
PROT SERPL-MCNC: 8 G/DL (ref 6–8.5)
PROT SERPL-MCNC: 8.1 G/DL (ref 6–8.5)
PROTHROMBIN TIME: 26.1 SECONDS (ref 11.8–14.9)
SODIUM SERPL-SCNC: 130 MMOL/L (ref 136–145)
SODIUM SERPL-SCNC: 131 MMOL/L (ref 136–145)
VIT B12 BLD-MCNC: >2000 PG/ML (ref 211–946)
WHOLE BLOOD HOLD SPECIMEN: NORMAL

## 2025-08-23 PROCEDURE — 63710000001 INSULIN LISPRO (HUMAN) PER 5 UNITS: Performed by: FAMILY MEDICINE

## 2025-08-23 PROCEDURE — 86038 ANTINUCLEAR ANTIBODIES: CPT | Performed by: INTERNAL MEDICINE

## 2025-08-23 PROCEDURE — 82948 REAGENT STRIP/BLOOD GLUCOSE: CPT | Performed by: FAMILY MEDICINE

## 2025-08-23 PROCEDURE — 80053 COMPREHEN METABOLIC PANEL: CPT | Performed by: INTERNAL MEDICINE

## 2025-08-23 PROCEDURE — 85610 PROTHROMBIN TIME: CPT | Performed by: INTERNAL MEDICINE

## 2025-08-23 PROCEDURE — 82784 ASSAY IGA/IGD/IGG/IGM EACH: CPT | Performed by: INTERNAL MEDICINE

## 2025-08-23 PROCEDURE — 25810000003 SODIUM CHLORIDE 0.9 % SOLUTION: Performed by: FAMILY MEDICINE

## 2025-08-23 PROCEDURE — 25010000002 HYDROCORTISONE SOD SUC (PF) 100 MG RECONSTITUTED SOLUTION: Performed by: FAMILY MEDICINE

## 2025-08-23 PROCEDURE — 82948 REAGENT STRIP/BLOOD GLUCOSE: CPT

## 2025-08-23 PROCEDURE — 80053 COMPREHEN METABOLIC PANEL: CPT | Performed by: FAMILY MEDICINE

## 2025-08-23 RX ORDER — PHYTONADIONE 2 MG/ML
10 INJECTION, EMULSION INTRAMUSCULAR; INTRAVENOUS; SUBCUTANEOUS ONCE
Status: DISCONTINUED | OUTPATIENT
Start: 2025-08-23 | End: 2025-08-23

## 2025-08-23 RX ORDER — PANTOPRAZOLE SODIUM 40 MG/1
40 TABLET, DELAYED RELEASE ORAL
Status: DISCONTINUED | OUTPATIENT
Start: 2025-08-23 | End: 2025-08-25 | Stop reason: HOSPADM

## 2025-08-23 RX ORDER — FAMOTIDINE 20 MG/1
20 TABLET, FILM COATED ORAL ONCE AS NEEDED
Status: COMPLETED | OUTPATIENT
Start: 2025-08-23 | End: 2025-08-23

## 2025-08-23 RX ORDER — CETIRIZINE HYDROCHLORIDE 10 MG/1
10 TABLET ORAL DAILY
Status: DISCONTINUED | OUTPATIENT
Start: 2025-08-23 | End: 2025-08-25 | Stop reason: HOSPADM

## 2025-08-23 RX ORDER — PHYTONADIONE 5 MG/1
10 TABLET ORAL DAILY
Status: COMPLETED | OUTPATIENT
Start: 2025-08-23 | End: 2025-08-25

## 2025-08-23 RX ORDER — CALCIUM CARBONATE 500 MG/1
1 TABLET, CHEWABLE ORAL ONCE AS NEEDED
Status: DISCONTINUED | OUTPATIENT
Start: 2025-08-23 | End: 2025-08-23

## 2025-08-23 RX ADMIN — PHYTONADIONE 10 MG: 5 TABLET ORAL at 12:20

## 2025-08-23 RX ADMIN — Medication 10 ML: at 08:07

## 2025-08-23 RX ADMIN — FAMOTIDINE 20 MG: 20 TABLET, FILM COATED ORAL at 20:37

## 2025-08-23 RX ADMIN — Medication 10 ML: at 20:37

## 2025-08-23 RX ADMIN — INSULIN LISPRO 4 UNITS: 100 INJECTION, SOLUTION INTRAVENOUS; SUBCUTANEOUS at 17:24

## 2025-08-23 RX ADMIN — HYDROCORTISONE SODIUM SUCCINATE 100 MG: 100 INJECTION, POWDER, FOR SOLUTION INTRAMUSCULAR; INTRAVENOUS at 20:37

## 2025-08-23 RX ADMIN — Medication 10 ML: at 00:10

## 2025-08-23 RX ADMIN — PANTOPRAZOLE SODIUM 40 MG: 40 TABLET, DELAYED RELEASE ORAL at 05:21

## 2025-08-23 RX ADMIN — INSULIN LISPRO 6 UNITS: 100 INJECTION, SOLUTION INTRAVENOUS; SUBCUTANEOUS at 20:37

## 2025-08-23 RX ADMIN — CETIRIZINE HYDROCHLORIDE 10 MG: 10 TABLET, FILM COATED ORAL at 08:07

## 2025-08-23 RX ADMIN — INSULIN LISPRO 4 UNITS: 100 INJECTION, SOLUTION INTRAVENOUS; SUBCUTANEOUS at 08:07

## 2025-08-23 RX ADMIN — HYDROCORTISONE SODIUM SUCCINATE 100 MG: 100 INJECTION, POWDER, FOR SOLUTION INTRAMUSCULAR; INTRAVENOUS at 03:05

## 2025-08-23 RX ADMIN — INSULIN LISPRO 6 UNITS: 100 INJECTION, SOLUTION INTRAVENOUS; SUBCUTANEOUS at 12:19

## 2025-08-23 RX ADMIN — SODIUM CHLORIDE 75 ML/HR: 9 INJECTION, SOLUTION INTRAVENOUS at 00:10

## 2025-08-23 RX ADMIN — HYDROCORTISONE SODIUM SUCCINATE 100 MG: 100 INJECTION, POWDER, FOR SOLUTION INTRAMUSCULAR; INTRAVENOUS at 12:19

## 2025-08-24 LAB
ALBUMIN SERPL-MCNC: 2.2 G/DL (ref 3.5–5.2)
ALBUMIN/GLOB SERPL: 0.4 G/DL
ALP SERPL-CCNC: 108 U/L (ref 39–117)
ALT SERPL W P-5'-P-CCNC: 243 U/L (ref 1–33)
ANION GAP SERPL CALCULATED.3IONS-SCNC: 5.4 MMOL/L (ref 5–15)
AST SERPL-CCNC: 261 U/L (ref 1–32)
BILIRUB SERPL-MCNC: 4.6 MG/DL (ref 0–1.2)
BUN SERPL-MCNC: 15.8 MG/DL (ref 8–23)
BUN/CREAT SERPL: 18.2 (ref 7–25)
CALCIUM SPEC-SCNC: 8.2 MG/DL (ref 8.6–10.5)
CHLORIDE SERPL-SCNC: 106 MMOL/L (ref 98–107)
CO2 SERPL-SCNC: 19.6 MMOL/L (ref 22–29)
CREAT SERPL-MCNC: 0.87 MG/DL (ref 0.57–1)
DEPRECATED RDW RBC AUTO: 48.2 FL (ref 37–54)
EGFRCR SERPLBLD CKD-EPI 2021: 70.5 ML/MIN/1.73
ERYTHROCYTE [DISTWIDTH] IN BLOOD BY AUTOMATED COUNT: 14 % (ref 12.3–15.4)
GLOBULIN UR ELPH-MCNC: 5.2 GM/DL
GLUCOSE BLDC GLUCOMTR-MCNC: 199 MG/DL (ref 70–99)
GLUCOSE BLDC GLUCOMTR-MCNC: 246 MG/DL (ref 70–99)
GLUCOSE BLDC GLUCOMTR-MCNC: 287 MG/DL (ref 70–99)
GLUCOSE BLDC GLUCOMTR-MCNC: 323 MG/DL (ref 70–99)
GLUCOSE SERPL-MCNC: 203 MG/DL (ref 65–99)
HCT VFR BLD AUTO: 29.8 % (ref 34–46.6)
HGB BLD-MCNC: 9.9 G/DL (ref 12–15.9)
INR PPP: 2.16 (ref 0.86–1.15)
MAGNESIUM SERPL-MCNC: 1.9 MG/DL (ref 1.6–2.4)
MCH RBC QN AUTO: 30.9 PG (ref 26.6–33)
MCHC RBC AUTO-ENTMCNC: 33.2 G/DL (ref 31.5–35.7)
MCV RBC AUTO: 93.1 FL (ref 79–97)
PHOSPHATE SERPL-MCNC: 2.8 MG/DL (ref 2.5–4.5)
PLATELET # BLD AUTO: 143 10*3/MM3 (ref 140–450)
PMV BLD AUTO: 9.3 FL (ref 6–12)
POTASSIUM SERPL-SCNC: 4.1 MMOL/L (ref 3.5–5.2)
PROT SERPL-MCNC: 7.4 G/DL (ref 6–8.5)
PROTHROMBIN TIME: 25.2 SECONDS (ref 11.8–14.9)
RBC # BLD AUTO: 3.2 10*6/MM3 (ref 3.77–5.28)
SODIUM SERPL-SCNC: 131 MMOL/L (ref 136–145)
WBC NRBC COR # BLD AUTO: 4.89 10*3/MM3 (ref 3.4–10.8)

## 2025-08-24 PROCEDURE — 84100 ASSAY OF PHOSPHORUS: CPT | Performed by: INTERNAL MEDICINE

## 2025-08-24 PROCEDURE — 80053 COMPREHEN METABOLIC PANEL: CPT | Performed by: FAMILY MEDICINE

## 2025-08-24 PROCEDURE — 85027 COMPLETE CBC AUTOMATED: CPT | Performed by: INTERNAL MEDICINE

## 2025-08-24 PROCEDURE — 83735 ASSAY OF MAGNESIUM: CPT | Performed by: INTERNAL MEDICINE

## 2025-08-24 PROCEDURE — 82948 REAGENT STRIP/BLOOD GLUCOSE: CPT | Performed by: FAMILY MEDICINE

## 2025-08-24 PROCEDURE — 85610 PROTHROMBIN TIME: CPT | Performed by: INTERNAL MEDICINE

## 2025-08-24 PROCEDURE — 82948 REAGENT STRIP/BLOOD GLUCOSE: CPT

## 2025-08-24 PROCEDURE — 25010000002 HYDROCORTISONE SOD SUC (PF) 100 MG RECONSTITUTED SOLUTION: Performed by: FAMILY MEDICINE

## 2025-08-24 PROCEDURE — 63710000001 INSULIN LISPRO (HUMAN) PER 5 UNITS: Performed by: FAMILY MEDICINE

## 2025-08-24 RX ADMIN — INSULIN LISPRO 4 UNITS: 100 INJECTION, SOLUTION INTRAVENOUS; SUBCUTANEOUS at 12:10

## 2025-08-24 RX ADMIN — PHYTONADIONE 10 MG: 5 TABLET ORAL at 08:24

## 2025-08-24 RX ADMIN — INSULIN LISPRO 2 UNITS: 100 INJECTION, SOLUTION INTRAVENOUS; SUBCUTANEOUS at 08:24

## 2025-08-24 RX ADMIN — Medication 10 ML: at 20:32

## 2025-08-24 RX ADMIN — INSULIN LISPRO 6 UNITS: 100 INJECTION, SOLUTION INTRAVENOUS; SUBCUTANEOUS at 17:13

## 2025-08-24 RX ADMIN — INSULIN LISPRO 7 UNITS: 100 INJECTION, SOLUTION INTRAVENOUS; SUBCUTANEOUS at 20:31

## 2025-08-24 RX ADMIN — HYDROCORTISONE SODIUM SUCCINATE 100 MG: 100 INJECTION, POWDER, FOR SOLUTION INTRAMUSCULAR; INTRAVENOUS at 10:59

## 2025-08-24 RX ADMIN — Medication 10 ML: at 08:25

## 2025-08-24 RX ADMIN — CETIRIZINE HYDROCHLORIDE 10 MG: 10 TABLET, FILM COATED ORAL at 08:24

## 2025-08-24 RX ADMIN — HYDROCORTISONE SODIUM SUCCINATE 100 MG: 100 INJECTION, POWDER, FOR SOLUTION INTRAMUSCULAR; INTRAVENOUS at 19:19

## 2025-08-24 RX ADMIN — HYDROCORTISONE SODIUM SUCCINATE 100 MG: 100 INJECTION, POWDER, FOR SOLUTION INTRAMUSCULAR; INTRAVENOUS at 02:22

## 2025-08-24 RX ADMIN — PANTOPRAZOLE SODIUM 40 MG: 40 TABLET, DELAYED RELEASE ORAL at 05:31

## 2025-08-25 ENCOUNTER — READMISSION MANAGEMENT (OUTPATIENT)
Dept: CALL CENTER | Facility: HOSPITAL | Age: 75
End: 2025-08-25
Payer: MEDICARE

## 2025-08-25 ENCOUNTER — APPOINTMENT (OUTPATIENT)
Dept: CT IMAGING | Facility: HOSPITAL | Age: 75
End: 2025-08-25
Payer: MEDICARE

## 2025-08-25 VITALS
WEIGHT: 114.64 LBS | DIASTOLIC BLOOD PRESSURE: 69 MMHG | OXYGEN SATURATION: 99 % | HEART RATE: 85 BPM | TEMPERATURE: 98 F | SYSTOLIC BLOOD PRESSURE: 118 MMHG | HEIGHT: 60 IN | BODY MASS INDEX: 22.51 KG/M2 | RESPIRATION RATE: 16 BRPM

## 2025-08-25 LAB
ALBUMIN SERPL-MCNC: 2 G/DL (ref 3.5–5.2)
ALBUMIN/GLOB SERPL: 0.4 G/DL
ALP SERPL-CCNC: 118 U/L (ref 39–117)
ALT SERPL W P-5'-P-CCNC: 220 U/L (ref 1–33)
ANION GAP SERPL CALCULATED.3IONS-SCNC: 5.1 MMOL/L (ref 5–15)
AST SERPL-CCNC: 189 U/L (ref 1–32)
BILIRUB SERPL-MCNC: 3.2 MG/DL (ref 0–1.2)
BUN SERPL-MCNC: 21.2 MG/DL (ref 8–23)
BUN/CREAT SERPL: 23.3 (ref 7–25)
CALCIUM SPEC-SCNC: 8.4 MG/DL (ref 8.6–10.5)
CHLORIDE SERPL-SCNC: 108 MMOL/L (ref 98–107)
CO2 SERPL-SCNC: 18.9 MMOL/L (ref 22–29)
CREAT SERPL-MCNC: 0.91 MG/DL (ref 0.57–1)
DEPRECATED RDW RBC AUTO: 47.2 FL (ref 37–54)
EGFRCR SERPLBLD CKD-EPI 2021: 66.8 ML/MIN/1.73
ERYTHROCYTE [DISTWIDTH] IN BLOOD BY AUTOMATED COUNT: 14 % (ref 12.3–15.4)
GLOBULIN UR ELPH-MCNC: 5.4 GM/DL
GLUCOSE BLDC GLUCOMTR-MCNC: 229 MG/DL (ref 70–99)
GLUCOSE BLDC GLUCOMTR-MCNC: 243 MG/DL (ref 70–99)
GLUCOSE BLDC GLUCOMTR-MCNC: 354 MG/DL (ref 70–99)
GLUCOSE SERPL-MCNC: 285 MG/DL (ref 65–99)
HCT VFR BLD AUTO: 28.9 % (ref 34–46.6)
HGB BLD-MCNC: 9.7 G/DL (ref 12–15.9)
HOLD SPECIMEN: NORMAL
INR PPP: 1.93 (ref 0.86–1.15)
MAGNESIUM SERPL-MCNC: 2.1 MG/DL (ref 1.6–2.4)
MCH RBC QN AUTO: 31.2 PG (ref 26.6–33)
MCHC RBC AUTO-ENTMCNC: 33.6 G/DL (ref 31.5–35.7)
MCV RBC AUTO: 92.9 FL (ref 79–97)
PHOSPHATE SERPL-MCNC: 2.4 MG/DL (ref 2.5–4.5)
PLATELET # BLD AUTO: 143 10*3/MM3 (ref 140–450)
PMV BLD AUTO: 8.8 FL (ref 6–12)
POTASSIUM SERPL-SCNC: 3.8 MMOL/L (ref 3.5–5.2)
PROT SERPL-MCNC: 7.4 G/DL (ref 6–8.5)
PROTHROMBIN TIME: 23 SECONDS (ref 11.8–14.9)
RBC # BLD AUTO: 3.11 10*6/MM3 (ref 3.77–5.28)
SODIUM SERPL-SCNC: 132 MMOL/L (ref 136–145)
WBC NRBC COR # BLD AUTO: 3.49 10*3/MM3 (ref 3.4–10.8)

## 2025-08-25 PROCEDURE — 84100 ASSAY OF PHOSPHORUS: CPT | Performed by: INTERNAL MEDICINE

## 2025-08-25 PROCEDURE — 85027 COMPLETE CBC AUTOMATED: CPT | Performed by: INTERNAL MEDICINE

## 2025-08-25 PROCEDURE — 82948 REAGENT STRIP/BLOOD GLUCOSE: CPT | Performed by: FAMILY MEDICINE

## 2025-08-25 PROCEDURE — 83735 ASSAY OF MAGNESIUM: CPT | Performed by: INTERNAL MEDICINE

## 2025-08-25 PROCEDURE — 85610 PROTHROMBIN TIME: CPT | Performed by: INTERNAL MEDICINE

## 2025-08-25 PROCEDURE — 25010000002 HYDROCORTISONE SOD SUC (PF) 100 MG RECONSTITUTED SOLUTION: Performed by: FAMILY MEDICINE

## 2025-08-25 PROCEDURE — 63710000001 INSULIN LISPRO (HUMAN) PER 5 UNITS: Performed by: FAMILY MEDICINE

## 2025-08-25 PROCEDURE — 80053 COMPREHEN METABOLIC PANEL: CPT | Performed by: INTERNAL MEDICINE

## 2025-08-25 PROCEDURE — 82948 REAGENT STRIP/BLOOD GLUCOSE: CPT

## 2025-08-25 PROCEDURE — 71250 CT THORAX DX C-: CPT

## 2025-08-25 RX ORDER — PREDNISONE 20 MG/1
40 TABLET ORAL DAILY
Qty: 60 TABLET | Refills: 0 | Status: SHIPPED | OUTPATIENT
Start: 2025-08-25 | End: 2025-09-24

## 2025-08-25 RX ADMIN — HYDROCORTISONE SODIUM SUCCINATE 100 MG: 100 INJECTION, POWDER, FOR SOLUTION INTRAMUSCULAR; INTRAVENOUS at 02:55

## 2025-08-25 RX ADMIN — PANTOPRAZOLE SODIUM 40 MG: 40 TABLET, DELAYED RELEASE ORAL at 06:45

## 2025-08-25 RX ADMIN — PHYTONADIONE 10 MG: 5 TABLET ORAL at 08:37

## 2025-08-25 RX ADMIN — Medication 10 ML: at 08:38

## 2025-08-25 RX ADMIN — INSULIN LISPRO 4 UNITS: 100 INJECTION, SOLUTION INTRAVENOUS; SUBCUTANEOUS at 08:37

## 2025-08-25 RX ADMIN — HYDROCORTISONE SODIUM SUCCINATE 100 MG: 100 INJECTION, POWDER, FOR SOLUTION INTRAMUSCULAR; INTRAVENOUS at 11:42

## 2025-08-25 RX ADMIN — CETIRIZINE HYDROCHLORIDE 10 MG: 10 TABLET, FILM COATED ORAL at 08:37

## 2025-08-25 RX ADMIN — INSULIN LISPRO 4 UNITS: 100 INJECTION, SOLUTION INTRAVENOUS; SUBCUTANEOUS at 12:27

## 2025-08-25 RX ADMIN — INSULIN LISPRO 8 UNITS: 100 INJECTION, SOLUTION INTRAVENOUS; SUBCUTANEOUS at 17:09

## 2025-08-26 ENCOUNTER — TRANSITIONAL CARE MANAGEMENT TELEPHONE ENCOUNTER (OUTPATIENT)
Dept: CALL CENTER | Facility: HOSPITAL | Age: 75
End: 2025-08-26
Payer: MEDICARE

## 2025-08-26 LAB — ANA SER QL: NEGATIVE

## 2025-08-28 ENCOUNTER — OFFICE VISIT (OUTPATIENT)
Dept: FAMILY MEDICINE CLINIC | Facility: CLINIC | Age: 75
End: 2025-08-28
Payer: MEDICARE

## 2025-08-28 VITALS
HEIGHT: 60 IN | DIASTOLIC BLOOD PRESSURE: 74 MMHG | SYSTOLIC BLOOD PRESSURE: 142 MMHG | TEMPERATURE: 97 F | HEART RATE: 82 BPM | WEIGHT: 121.5 LBS | RESPIRATION RATE: 18 BRPM | OXYGEN SATURATION: 99 % | BODY MASS INDEX: 23.85 KG/M2

## 2025-08-28 DIAGNOSIS — R79.89 ELEVATED LFTS: ICD-10-CM

## 2025-08-28 DIAGNOSIS — E11.9 COMPREHENSIVE DIABETIC FOOT EXAMINATION, TYPE 2 DM, ENCOUNTER FOR: ICD-10-CM

## 2025-08-28 DIAGNOSIS — K75.4 AUTOIMMUNE HEPATITIS: ICD-10-CM

## 2025-08-28 DIAGNOSIS — R60.1 GENERALIZED EDEMA: ICD-10-CM

## 2025-08-28 DIAGNOSIS — R79.1 ELEVATED INR: ICD-10-CM

## 2025-08-28 DIAGNOSIS — E11.65 TYPE 2 DIABETES MELLITUS WITH HYPERGLYCEMIA, WITHOUT LONG-TERM CURRENT USE OF INSULIN: ICD-10-CM

## 2025-08-28 DIAGNOSIS — I10 ELEVATED BLOOD PRESSURE READING IN OFFICE WITH DIAGNOSIS OF HYPERTENSION: ICD-10-CM

## 2025-08-28 DIAGNOSIS — Z09 ENCOUNTER FOR EXAMINATION FOLLOWING TREATMENT AT HOSPITAL: ICD-10-CM

## 2025-08-28 DIAGNOSIS — Z78.9 TRANSITION OF CARE: Primary | ICD-10-CM

## 2025-08-28 LAB — HBA1C MFR BLD: 8.1 % (ref 4.8–5.6)

## 2025-08-28 PROCEDURE — 83036 HEMOGLOBIN GLYCOSYLATED A1C: CPT | Performed by: NURSE PRACTITIONER

## 2025-08-28 RX ORDER — FLUTICASONE PROPIONATE 50 MCG
2 SPRAY, SUSPENSION (ML) NASAL DAILY
Qty: 16 G | Refills: 5 | Status: CANCELLED | OUTPATIENT
Start: 2025-08-28

## 2025-08-28 RX ORDER — METFORMIN HYDROCHLORIDE 500 MG/1
1000 TABLET, EXTENDED RELEASE ORAL
Qty: 180 TABLET | Refills: 0 | Status: SHIPPED | OUTPATIENT
Start: 2025-08-28 | End: 2025-08-29 | Stop reason: SDUPTHER

## 2025-08-28 RX ORDER — BLOOD-GLUCOSE METER
KIT MISCELLANEOUS DAILY
Qty: 1 EACH | Refills: 0 | Status: SHIPPED | OUTPATIENT
Start: 2025-08-28

## 2025-08-28 RX ORDER — FUROSEMIDE 20 MG/1
20 TABLET ORAL DAILY
Qty: 90 TABLET | Refills: 0 | Status: SHIPPED | OUTPATIENT
Start: 2025-08-28

## 2025-08-29 ENCOUNTER — TELEPHONE (OUTPATIENT)
Dept: FAMILY MEDICINE CLINIC | Facility: CLINIC | Age: 75
End: 2025-08-29
Payer: MEDICARE

## 2025-08-29 DIAGNOSIS — E11.65 TYPE 2 DIABETES MELLITUS WITH HYPERGLYCEMIA, WITHOUT LONG-TERM CURRENT USE OF INSULIN: Primary | ICD-10-CM

## 2025-08-29 RX ORDER — DAPAGLIFLOZIN 5 MG/1
5 TABLET, FILM COATED ORAL DAILY
Qty: 90 TABLET | Refills: 0 | Status: SHIPPED | OUTPATIENT
Start: 2025-08-29

## 2025-08-29 RX ORDER — METFORMIN HYDROCHLORIDE 500 MG/1
TABLET, EXTENDED RELEASE ORAL
Qty: 270 TABLET | Refills: 0 | Status: SHIPPED | OUTPATIENT
Start: 2025-08-29

## (undated) DEVICE — LINER SURG CANSTR SXN S/RIGD 1500CC

## (undated) DEVICE — PAD GRND REM POLYHESIVE A/ DISP

## (undated) DEVICE — SOL IRRG H2O PL/BG 1000ML STRL

## (undated) DEVICE — SNAR E/S POLYP SNAREMASTER OVL/10MM 2.8X2300MM YEL

## (undated) DEVICE — CONN JET HYDRA H20 AUXILIARY DISP

## (undated) DEVICE — BLCK/BITE BLOX WO/DENTL/RIM W/STRAP 54F

## (undated) DEVICE — DISPOSABLE DISTAL ATTACHMENT: Brand: DISPOSABLE DISTAL ATTACHMENT

## (undated) DEVICE — SINGLE-USE BIOPSY FORCEPS: Brand: RADIAL JAW 4

## (undated) DEVICE — DEFENDO AIR WATER SUCTION AND BIOPSY VALVE KIT FOR  OLYMPUS: Brand: DEFENDO AIR/WATER/SUCTION AND BIOPSY VALVE

## (undated) DEVICE — Device

## (undated) DEVICE — SOLIDIFIER LIQLOC PLS 1500CC BT

## (undated) DEVICE — THE SINGLE USE ETRAP – POLYP TRAP IS USED FOR SUCTION RETRIEVAL OF ENDOSCOPICALLY REMOVED POLYPS.: Brand: ETRAP